# Patient Record
Sex: MALE | Race: OTHER | NOT HISPANIC OR LATINO
[De-identification: names, ages, dates, MRNs, and addresses within clinical notes are randomized per-mention and may not be internally consistent; named-entity substitution may affect disease eponyms.]

---

## 2017-01-13 ENCOUNTER — APPOINTMENT (OUTPATIENT)
Dept: ORTHOPEDIC SURGERY | Facility: CLINIC | Age: 57
End: 2017-01-13

## 2017-05-22 ENCOUNTER — APPOINTMENT (OUTPATIENT)
Dept: ORTHOPEDIC SURGERY | Facility: CLINIC | Age: 57
End: 2017-05-22

## 2017-09-27 ENCOUNTER — APPOINTMENT (OUTPATIENT)
Dept: ORTHOPEDIC SURGERY | Facility: CLINIC | Age: 57
End: 2017-09-27
Payer: MEDICARE

## 2017-09-27 VITALS — WEIGHT: 240 LBS | HEIGHT: 71 IN | BODY MASS INDEX: 33.6 KG/M2

## 2017-09-27 DIAGNOSIS — Z86.39 PERSONAL HISTORY OF OTHER ENDOCRINE, NUTRITIONAL AND METABOLIC DISEASE: ICD-10-CM

## 2017-09-27 DIAGNOSIS — Z78.9 OTHER SPECIFIED HEALTH STATUS: ICD-10-CM

## 2017-09-27 DIAGNOSIS — M17.11 UNILATERAL PRIMARY OSTEOARTHRITIS, RIGHT KNEE: ICD-10-CM

## 2017-09-27 DIAGNOSIS — Z96.651 PRESENCE OF RIGHT ARTIFICIAL KNEE JOINT: ICD-10-CM

## 2017-09-27 PROCEDURE — 99213 OFFICE O/P EST LOW 20 MIN: CPT | Mod: PD

## 2017-09-27 PROCEDURE — 73562 X-RAY EXAM OF KNEE 3: CPT | Mod: LT

## 2017-09-27 PROCEDURE — 73560 X-RAY EXAM OF KNEE 1 OR 2: CPT | Mod: RT

## 2017-09-28 ENCOUNTER — INPATIENT (INPATIENT)
Facility: HOSPITAL | Age: 57
LOS: 11 days | Discharge: HOME | End: 2017-10-10
Attending: SURGERY | Admitting: SURGERY
Payer: MEDICARE

## 2017-09-28 VITALS
DIASTOLIC BLOOD PRESSURE: 66 MMHG | SYSTOLIC BLOOD PRESSURE: 128 MMHG | TEMPERATURE: 98 F | HEART RATE: 83 BPM | OXYGEN SATURATION: 100 % | RESPIRATION RATE: 20 BRPM

## 2017-09-28 LAB
ALBUMIN SERPL ELPH-MCNC: 4 G/DL — SIGNIFICANT CHANGE UP (ref 3.3–5)
ALP SERPL-CCNC: 86 U/L — SIGNIFICANT CHANGE UP (ref 40–120)
ALT FLD-CCNC: 8 U/L — SIGNIFICANT CHANGE UP (ref 4–41)
APTT BLD: 29.9 SEC — SIGNIFICANT CHANGE UP (ref 27.5–37.4)
AST SERPL-CCNC: 15 U/L — SIGNIFICANT CHANGE UP (ref 4–40)
BASOPHILS # BLD AUTO: 0.09 K/UL — SIGNIFICANT CHANGE UP (ref 0–0.2)
BASOPHILS NFR BLD AUTO: 0.8 % — SIGNIFICANT CHANGE UP (ref 0–2)
BILIRUB SERPL-MCNC: 0.6 MG/DL — SIGNIFICANT CHANGE UP (ref 0.2–1.2)
BUN SERPL-MCNC: 12 MG/DL — SIGNIFICANT CHANGE UP (ref 7–23)
CALCIUM SERPL-MCNC: 8.9 MG/DL — SIGNIFICANT CHANGE UP (ref 8.4–10.5)
CHLORIDE SERPL-SCNC: 100 MMOL/L — SIGNIFICANT CHANGE UP (ref 98–107)
CO2 SERPL-SCNC: 21 MMOL/L — LOW (ref 22–31)
CREAT SERPL-MCNC: 0.92 MG/DL — SIGNIFICANT CHANGE UP (ref 0.5–1.3)
EOSINOPHIL # BLD AUTO: 0.66 K/UL — HIGH (ref 0–0.5)
EOSINOPHIL NFR BLD AUTO: 6.2 % — HIGH (ref 0–6)
GLUCOSE SERPL-MCNC: 103 MG/DL — HIGH (ref 70–99)
HCT VFR BLD CALC: 42.4 % — SIGNIFICANT CHANGE UP (ref 39–50)
HGB BLD-MCNC: 14.5 G/DL — SIGNIFICANT CHANGE UP (ref 13–17)
IMM GRANULOCYTES # BLD AUTO: 0.06 # — SIGNIFICANT CHANGE UP
IMM GRANULOCYTES NFR BLD AUTO: 0.6 % — SIGNIFICANT CHANGE UP (ref 0–1.5)
INR BLD: 1.06 — SIGNIFICANT CHANGE UP (ref 0.88–1.17)
LYMPHOCYTES # BLD AUTO: 2.94 K/UL — SIGNIFICANT CHANGE UP (ref 1–3.3)
LYMPHOCYTES # BLD AUTO: 27.4 % — SIGNIFICANT CHANGE UP (ref 13–44)
MCHC RBC-ENTMCNC: 32.1 PG — SIGNIFICANT CHANGE UP (ref 27–34)
MCHC RBC-ENTMCNC: 34.2 % — SIGNIFICANT CHANGE UP (ref 32–36)
MCV RBC AUTO: 93.8 FL — SIGNIFICANT CHANGE UP (ref 80–100)
MONOCYTES # BLD AUTO: 1.19 K/UL — HIGH (ref 0–0.9)
MONOCYTES NFR BLD AUTO: 11.1 % — SIGNIFICANT CHANGE UP (ref 2–14)
NEUTROPHILS # BLD AUTO: 5.79 K/UL — SIGNIFICANT CHANGE UP (ref 1.8–7.4)
NEUTROPHILS NFR BLD AUTO: 53.9 % — SIGNIFICANT CHANGE UP (ref 43–77)
NRBC # FLD: 0 — SIGNIFICANT CHANGE UP
PLATELET # BLD AUTO: 260 K/UL — SIGNIFICANT CHANGE UP (ref 150–400)
PMV BLD: 9.3 FL — SIGNIFICANT CHANGE UP (ref 7–13)
POTASSIUM SERPL-MCNC: 4.2 MMOL/L — SIGNIFICANT CHANGE UP (ref 3.5–5.3)
POTASSIUM SERPL-SCNC: 4.2 MMOL/L — SIGNIFICANT CHANGE UP (ref 3.5–5.3)
PROT SERPL-MCNC: 7.8 G/DL — SIGNIFICANT CHANGE UP (ref 6–8.3)
PROTHROM AB SERPL-ACNC: 11.9 SEC — SIGNIFICANT CHANGE UP (ref 9.8–13.1)
RBC # BLD: 4.52 M/UL — SIGNIFICANT CHANGE UP (ref 4.2–5.8)
RBC # FLD: 13.8 % — SIGNIFICANT CHANGE UP (ref 10.3–14.5)
SODIUM SERPL-SCNC: 138 MMOL/L — SIGNIFICANT CHANGE UP (ref 135–145)
WBC # BLD: 10.73 K/UL — HIGH (ref 3.8–10.5)
WBC # FLD AUTO: 10.73 K/UL — HIGH (ref 3.8–10.5)

## 2017-09-28 PROCEDURE — 74174 CTA ABD&PLVS W/CONTRAST: CPT | Mod: 26

## 2017-09-28 PROCEDURE — 93971 EXTREMITY STUDY: CPT | Mod: 26,LT

## 2017-09-28 RX ORDER — RIVAROXABAN 15 MG-20MG
15 KIT ORAL ONCE
Qty: 0 | Refills: 0 | Status: COMPLETED | OUTPATIENT
Start: 2017-09-28 | End: 2017-09-28

## 2017-09-28 RX ADMIN — RIVAROXABAN 15 MILLIGRAM(S): KIT at 23:29

## 2017-09-28 NOTE — ED ADULT NURSE NOTE - OBJECTIVE STATEMENT
pt received in RW. pt robbie lt leg swelling and pain x 3 weeks. states he had a DVT in his rt leg that went to his lung. pt placed on plavix but it was costing $800.00 / month and he stopped it because his insurance capped out. denies difficulty breathing and chest pain. sl placed labs sent. awaiting ant

## 2017-09-28 NOTE — ED PROVIDER NOTE - PMH
Anxiety    Bulging disc    Depression    Diabetes Mellitus  diagnosed 10 years ago  doesn't do fingersticks  GERD (gastroesophageal reflux disease)    Hyperlipidemia    Hypertension, Essential    Insomnia    Obese    Sleep apnea  had test > 10 years ago--supposed to wear device.  Lost 25 pounds but never retested  Spinal stenosis    Spondylisthesis    Varicosities

## 2017-09-28 NOTE — ED PROVIDER NOTE - OBJECTIVE STATEMENT
58 y/o male, with PMHx of DM (on metformin), HTN (hydrochlorothiazide) right leg DVT and PE, 3 months ago, and PSHx of right knee replacement in 2013 and revision in 2014, presents to the ED c/o atraumatic left calf pain x 2 weeks. Report known cause for DVT. Started on unknown anti-coagulant, which were stopped 2 months ago due to cost. Visited an orthopedist with normal XR. Denies fever, chills, SOB, CP, N/V, and any other complaints.

## 2017-09-28 NOTE — ED PROVIDER NOTE - PROGRESS NOTE DETAILS
RICHARD Mckeon: pt NAD, VSS. Discussed the results of the imaging with the patient. Spoke with the hospitalist, Dr. Moctezuma, for admission due to the high copay on Xaralto and the inability to pay for the medication. Dr. Moctezuma wanted to see if the  was able to assist the patient in getting Xaralto with a lower payment to prevent admission. Discussed the case with the  - will speak with patient and assist the patient to receive 0$ copay on Xaralto. Pending CTA of abd/pelvis and chest to see extent of clot which will determine admission vs. possible CDU stay to have the patient follow up with social work tomorrow. RICHARD Mckeon: called PCP Dr. Darnell [phone]. Spoke with the  left a message. Awaiting call back. RICHARD Mckeon: spoke with the covering doctor. Does not know the patient. States that if there warrents an admission they admit to Dr. Eduardo Ugarte [phone]

## 2017-09-28 NOTE — ED PROVIDER NOTE - LOWER EXTREMITY EXAM, LEFT
Able to fully range left leg. Left knee with old scar. No surrounding erythema. Distal pulses intact. FROM of ankle. Some TTP and swelling at the posterior calf to left leg.

## 2017-09-28 NOTE — ED ADULT TRIAGE NOTE - CHIEF COMPLAINT QUOTE
Pt co left ankle swelling x 3 weeks with pain. Pt treated for DVT and PE in past ,sent in by PMD to r.o dvt. Pt currently not on any blood thinners. Pt denies sob or chest pain.

## 2017-09-28 NOTE — ED PROVIDER NOTE - PSH
H/O laser iridotomy  left-2008  R Knee Arthroplasty  Right 2003, Left 2011,  Reflux  Gastroscopy 2013

## 2017-09-29 DIAGNOSIS — G47.30 SLEEP APNEA, UNSPECIFIED: ICD-10-CM

## 2017-09-29 DIAGNOSIS — I82.409 ACUTE EMBOLISM AND THROMBOSIS OF UNSPECIFIED DEEP VEINS OF UNSPECIFIED LOWER EXTREMITY: ICD-10-CM

## 2017-09-29 DIAGNOSIS — I10 ESSENTIAL (PRIMARY) HYPERTENSION: ICD-10-CM

## 2017-09-29 DIAGNOSIS — E11.9 TYPE 2 DIABETES MELLITUS WITHOUT COMPLICATIONS: ICD-10-CM

## 2017-09-29 DIAGNOSIS — F32.9 MAJOR DEPRESSIVE DISORDER, SINGLE EPISODE, UNSPECIFIED: ICD-10-CM

## 2017-09-29 DIAGNOSIS — I82.492 ACUTE EMBOLISM AND THROMBOSIS OF OTHER SPECIFIED DEEP VEIN OF LEFT LOWER EXTREMITY: ICD-10-CM

## 2017-09-29 DIAGNOSIS — I82.412 ACUTE EMBOLISM AND THROMBOSIS OF LEFT FEMORAL VEIN: ICD-10-CM

## 2017-09-29 LAB — HBA1C BLD-MCNC: 6.7 % — HIGH (ref 4–5.6)

## 2017-09-29 PROCEDURE — 99222 1ST HOSP IP/OBS MODERATE 55: CPT

## 2017-09-29 RX ORDER — DEXTROSE 50 % IN WATER 50 %
25 SYRINGE (ML) INTRAVENOUS ONCE
Qty: 0 | Refills: 0 | Status: DISCONTINUED | OUTPATIENT
Start: 2017-09-29 | End: 2017-10-10

## 2017-09-29 RX ORDER — TRAMADOL HYDROCHLORIDE 50 MG/1
50 TABLET ORAL ONCE
Qty: 0 | Refills: 0 | Status: DISCONTINUED | OUTPATIENT
Start: 2017-09-29 | End: 2017-09-29

## 2017-09-29 RX ORDER — LOSARTAN POTASSIUM 100 MG/1
100 TABLET, FILM COATED ORAL DAILY
Qty: 0 | Refills: 0 | Status: DISCONTINUED | OUTPATIENT
Start: 2017-09-29 | End: 2017-10-10

## 2017-09-29 RX ORDER — INFLUENZA VIRUS VACCINE 15; 15; 15; 15 UG/.5ML; UG/.5ML; UG/.5ML; UG/.5ML
0.5 SUSPENSION INTRAMUSCULAR ONCE
Qty: 0 | Refills: 0 | Status: DISCONTINUED | OUTPATIENT
Start: 2017-09-29 | End: 2017-10-10

## 2017-09-29 RX ORDER — RIVAROXABAN 15 MG-20MG
15 KIT ORAL
Qty: 0 | Refills: 0 | Status: DISCONTINUED | OUTPATIENT
Start: 2017-09-29 | End: 2017-09-29

## 2017-09-29 RX ORDER — ZOLPIDEM TARTRATE 10 MG/1
1 TABLET ORAL
Qty: 0 | Refills: 0 | COMMUNITY

## 2017-09-29 RX ORDER — OXYCODONE AND ACETAMINOPHEN 5; 325 MG/1; MG/1
1 TABLET ORAL EVERY 4 HOURS
Qty: 0 | Refills: 0 | Status: DISCONTINUED | OUTPATIENT
Start: 2017-09-29 | End: 2017-10-01

## 2017-09-29 RX ORDER — ZOLPIDEM TARTRATE 10 MG/1
5 TABLET ORAL AT BEDTIME
Qty: 0 | Refills: 0 | Status: DISCONTINUED | OUTPATIENT
Start: 2017-09-29 | End: 2017-09-29

## 2017-09-29 RX ORDER — ZOLPIDEM TARTRATE 10 MG/1
5 TABLET ORAL ONCE
Qty: 0 | Refills: 0 | Status: DISCONTINUED | OUTPATIENT
Start: 2017-09-29 | End: 2017-09-29

## 2017-09-29 RX ORDER — HEPARIN SODIUM 5000 [USP'U]/ML
1800 INJECTION INTRAVENOUS; SUBCUTANEOUS
Qty: 25000 | Refills: 0 | Status: DISCONTINUED | OUTPATIENT
Start: 2017-09-29 | End: 2017-09-30

## 2017-09-29 RX ORDER — ENOXAPARIN SODIUM 100 MG/ML
100 INJECTION SUBCUTANEOUS
Qty: 0 | Refills: 0 | Status: DISCONTINUED | OUTPATIENT
Start: 2017-09-29 | End: 2017-09-29

## 2017-09-29 RX ORDER — ACETAMINOPHEN 500 MG
650 TABLET ORAL EVERY 6 HOURS
Qty: 0 | Refills: 0 | Status: DISCONTINUED | OUTPATIENT
Start: 2017-09-29 | End: 2017-10-10

## 2017-09-29 RX ORDER — SODIUM CHLORIDE 9 MG/ML
1000 INJECTION, SOLUTION INTRAVENOUS
Qty: 0 | Refills: 0 | Status: DISCONTINUED | OUTPATIENT
Start: 2017-09-29 | End: 2017-09-29

## 2017-09-29 RX ORDER — TRAMADOL HYDROCHLORIDE 50 MG/1
2 TABLET ORAL
Qty: 0 | Refills: 0 | COMMUNITY

## 2017-09-29 RX ORDER — ENOXAPARIN SODIUM 100 MG/ML
120 INJECTION SUBCUTANEOUS
Qty: 30 | Refills: 0 | OUTPATIENT
Start: 2017-09-29 | End: 2017-10-29

## 2017-09-29 RX ORDER — ZOLPIDEM TARTRATE 10 MG/1
5 TABLET ORAL AT BEDTIME
Qty: 0 | Refills: 0 | Status: DISCONTINUED | OUTPATIENT
Start: 2017-09-29 | End: 2017-10-03

## 2017-09-29 RX ORDER — DEXTROSE 50 % IN WATER 50 %
12.5 SYRINGE (ML) INTRAVENOUS ONCE
Qty: 0 | Refills: 0 | Status: DISCONTINUED | OUTPATIENT
Start: 2017-09-29 | End: 2017-10-10

## 2017-09-29 RX ORDER — DEXTROSE 50 % IN WATER 50 %
1 SYRINGE (ML) INTRAVENOUS ONCE
Qty: 0 | Refills: 0 | Status: DISCONTINUED | OUTPATIENT
Start: 2017-09-29 | End: 2017-10-10

## 2017-09-29 RX ORDER — GLUCAGON INJECTION, SOLUTION 0.5 MG/.1ML
1 INJECTION, SOLUTION SUBCUTANEOUS ONCE
Qty: 0 | Refills: 0 | Status: DISCONTINUED | OUTPATIENT
Start: 2017-09-29 | End: 2017-10-10

## 2017-09-29 RX ORDER — INSULIN LISPRO 100/ML
VIAL (ML) SUBCUTANEOUS
Qty: 0 | Refills: 0 | Status: DISCONTINUED | OUTPATIENT
Start: 2017-09-29 | End: 2017-10-02

## 2017-09-29 RX ORDER — WARFARIN SODIUM 2.5 MG/1
7.5 TABLET ORAL ONCE
Qty: 0 | Refills: 0 | Status: COMPLETED | OUTPATIENT
Start: 2017-09-29 | End: 2017-09-29

## 2017-09-29 RX ORDER — METFORMIN HYDROCHLORIDE 850 MG/1
500 TABLET ORAL
Qty: 0 | Refills: 0 | Status: DISCONTINUED | OUTPATIENT
Start: 2017-09-29 | End: 2017-09-29

## 2017-09-29 RX ADMIN — TRAMADOL HYDROCHLORIDE 50 MILLIGRAM(S): 50 TABLET ORAL at 07:11

## 2017-09-29 RX ADMIN — ZOLPIDEM TARTRATE 5 MILLIGRAM(S): 10 TABLET ORAL at 22:05

## 2017-09-29 RX ADMIN — OXYCODONE AND ACETAMINOPHEN 1 TABLET(S): 5; 325 TABLET ORAL at 21:32

## 2017-09-29 RX ADMIN — RIVAROXABAN 15 MILLIGRAM(S): KIT at 09:21

## 2017-09-29 RX ADMIN — LOSARTAN POTASSIUM 100 MILLIGRAM(S): 100 TABLET, FILM COATED ORAL at 17:49

## 2017-09-29 RX ADMIN — TRAMADOL HYDROCHLORIDE 50 MILLIGRAM(S): 50 TABLET ORAL at 08:06

## 2017-09-29 RX ADMIN — OXYCODONE AND ACETAMINOPHEN 1 TABLET(S): 5; 325 TABLET ORAL at 20:32

## 2017-09-29 RX ADMIN — OXYCODONE AND ACETAMINOPHEN 1 TABLET(S): 5; 325 TABLET ORAL at 14:49

## 2017-09-29 RX ADMIN — WARFARIN SODIUM 7.5 MILLIGRAM(S): 2.5 TABLET ORAL at 17:49

## 2017-09-29 RX ADMIN — ENOXAPARIN SODIUM 100 MILLIGRAM(S): 100 INJECTION SUBCUTANEOUS at 18:08

## 2017-09-29 RX ADMIN — HEPARIN SODIUM 18 UNIT(S)/HR: 5000 INJECTION INTRAVENOUS; SUBCUTANEOUS at 22:28

## 2017-09-29 NOTE — ED CDU PROVIDER NOTE - FAMILY HISTORY
Father  Still living? No  Family history of pancreatic cancer, Age at diagnosis: Age Unknown     Mother  Still living? Yes, Estimated age: Age Unknown  Family history of rheumatoid arthritis, Age at diagnosis: Age Unknown  Family history of asthma, Age at diagnosis: Age Unknown  Family history of asthma, Age at diagnosis: Age Unknown  Family history of essential hypertension, Age at diagnosis: Age Unknown

## 2017-09-29 NOTE — ED CDU PROVIDER NOTE - ATTENDING CONTRIBUTION TO CARE
Dr. Wild Pt was seen and evaluated by me in the ED. Pt presented for left calf pain over the past 2 weeks with a history of a previous right calf DVT. Was on Xarelto previously but stopped 2 months ago secondary to cost. Pt denies any fever, chills, SOB, chest pain, or abd pain. Lungs CTA b/l. RRR. Abd soft, non-tender. + distal pulses with left calf swelling and tenderness. Found to have a DVT to LE and sent to CDU for  and possible options for anticoagulation with insurance coverage.

## 2017-09-29 NOTE — ED CDU PROVIDER NOTE - OBJECTIVE STATEMENT
58 y/o male, with PMHx of DM (on metformin), HTN (hydrochlorothiazide) right leg DVT and PE, 3 months ago, and PSHx of right knee replacement in 2013 and revision in 2014, presents to the ED c/o atraumatic left calf pain x 2 weeks. Report known cause for DVT. Started on unknown anti-coagulant, which were stopped 2 months ago due to cost. Visited an orthopedist with normal XR. Denies fever, chills, SOB, CP, N/V, and any other complaints.    CDU RICHARD Malik Note-----  ED Provider Note reviewed per above; pt is a 58 yo male with PMH of DVT (diagnosed a few months ago, was on anticoagulant treatment, but pt ran into insurance issues where insurance co. stated pt max-ed out on coverage), DM, HTN, hyperlipidemia, GERD, insomnia, anxiety, depression, bulging discs, sleep apnea (intermittently adherent to CPAP use), spinal stenosis, and varicosities, who presented to the ED for atraumatic left calf pain/swelling as noted above.  Pt. was evaluated in the ED and diagnosed with extensive acute DVT extending from the left femoral vein into the distal lower extremity on US; pt had CT scan of abdomen and pelvis which showed no evidence of proximal extension.  Pt. was sent to CDU for meds per orders (started on Xarelto in ED), and for  regarding coordinating outpatient medication regimen with insurance/pharmacy, and for generalized observation and reassessment.  On CDU arrival, pt has no active c/o; states LLE discomfort (described as "tightness"/"heaviness") to calf is tolerable; pt declined needing analgesia at the time.  Pt. denies SOB, palpitations, chest discomfort/heaviness or other c/o.  CDU plan discussed with pt who verbalizes agreement with plan.  Of note, regarding home meds, pt states he takes metformin 500 mg TID with meals and ambien 5 mg at night for insomnia; cannot recall names/dosages of other home 56 y/o male, with PMHx of DM (on metformin), HTN (hydrochlorothiazide) right leg DVT and PE, 3 months ago, and PSHx of right knee replacement in 2013 and revision in 2014, presents to the ED c/o atraumatic left calf pain x 2 weeks. Report known cause for DVT. Started on unknown anti-coagulant, which were stopped 2 months ago due to cost. Visited an orthopedist with normal XR. Denies fever, chills, SOB, CP, N/V, and any other complaints.    CDU RICHARD Malik Note-----  ED Provider Note reviewed per above; pt is a 56 yo male with PMH of DVT (diagnosed a few months ago, was on anticoagulant treatment, but pt ran into insurance issues where insurance co. stated pt max-ed out on coverage), DM, HTN, hyperlipidemia, GERD, insomnia, anxiety, depression, bulging discs, sleep apnea (intermittently adherent to CPAP use), spinal stenosis, and varicosities, who presented to the ED for atraumatic left calf pain/swelling as noted above.  Pt. was evaluated in the ED and diagnosed with extensive acute DVT extending from the left femoral vein into the distal lower extremity on US; pt had CT scan of abdomen and pelvis which showed no evidence of proximal extension.  Pt. was sent to CDU for meds per orders (started on Xarelto in ED), and for  regarding coordinating outpatient medication regimen with insurance/pharmacy, and for generalized observation and reassessment.  On CDU arrival, pt has no active c/o; states LLE discomfort (described as "tightness"/"heaviness") to calf is tolerable; pt declined needing analgesia at the time.  Pt. denies SOB, palpitations, chest discomfort/heaviness or other c/o.  CDU plan discussed with pt who verbalizes agreement with plan.  Of note, regarding home meds, pt states he takes metformin 500 mg TID with meals and ambien 5 mg at night for insomnia; cannot recall names/dosages of other home meds.  Pt. uses [facility], [place], [place] (store phone #: [phone]); closed at time of CDU arrival.

## 2017-09-29 NOTE — H&P ADULT - NSHPREVIEWOFSYSTEMS_GEN_ALL_CORE
Gen: no loss of wt or appetite  ENT: no dizziness or hearing loss  Ophth: no blurring of vision or loss of vision  Resp: No cough or sputum production  CVS: No CP or palpitations or orthopnea  GI: no N/V/D  : no dysuria, hematuria  Endo: no polyuria or excessive sweating  Neuro: no weakness or paresthesias  Psych: No suicidal or depressive ideation  Heme: No petechiae or easy bruising  Msk: No joint pain or swelling + left leg pain and swelling  All other ROS negative

## 2017-09-29 NOTE — ED CDU PROVIDER NOTE - INDICATION FOR OBSERVATION
Other/Meds per orders,  regarding outpatient medication regimen, observation and reassessment to improvement.

## 2017-09-29 NOTE — ED CDU PROVIDER NOTE - RESPIRATORY NEGATIVE STATEMENT, MLM
no chest pain, no cough, and no shortness of breath. no pleurisy, no cough, and no shortness of breath.

## 2017-09-29 NOTE — H&P ADULT - ASSESSMENT
58 y/o male, with PMHx of DM (on metformin), HTN (hydrochlorothiazide) right leg DVT and PE, 3 months ago, ?PTSD and PSHx of right knee replacement in 2013 and revision in 2014, presents to the ED c/o atraumatic left calf pain x 2 weeks now with extensive DVT left leg

## 2017-09-29 NOTE — ED CDU PROVIDER NOTE - PMH
Anxiety    Bulging disc    Depression    Diabetes Mellitus  diagnosed 10 years ago  doesn't do fingersticks  GERD (gastroesophageal reflux disease)    Hyperlipidemia    Hypertension, Essential    Insomnia    Obese    Sleep apnea  had test > 10 years ago--supposed to wear device.  Lost 25 pounds but never retested  Spinal stenosis    Spondylisthesis    Varicosities Anxiety    Bulging disc    Depression    Diabetes Mellitus  diagnosed 10 years ago  doesn't do fingersticks  DVT (deep venous thrombosis)    GERD (gastroesophageal reflux disease)    Hyperlipidemia    Hypertension, Essential    Insomnia    Obese    Sleep apnea  had test > 10 years ago--supposed to wear device.  Lost 25 pounds but never retested  Spinal stenosis    Spondylisthesis    Varicosities

## 2017-09-29 NOTE — CONSULT NOTE ADULT - VASCULAR DETAILS
rle mild and lle mod venous insuff w rle mild and lle mod  to sev edema no signs of phlegmasia at this time   grossly intact motor and sensory fx

## 2017-09-29 NOTE — CONSULT NOTE ADULT - SUBJECTIVE AND OBJECTIVE BOX
CC: 57y old Male admitted with a chief complaint of leg swelling, now with LLE DVT    HPI: 58 yo M with prior RLE DVT diagnosed in April, had been maintained on Xarelto until his insurance stopped covering it. Then had several days of heaviness and discomfort in his left leg, with swelling. Patient has been ambulatory, and able to weight bear but uncomfortable with the swelling. Denies weakness, parasthesia.    PMHx: HTN, HLD, GERD, DM II, WILSON, insomnia, anxiety, RLE DVT (04/2017), chronic back pain, obesity    PSHx: bilateral knee arthroplasties, spinal fusion, "stapling of a swollen vessel in R leg"    Medications (inpatient): rivaroxaban 15 milliGRAM(s) Oral two times a day    Allergies: No Known Allergies  (Intolerances: None)  Social Hx: denies EtOH/tobacco use; lives at home with wife and three teenaged kids, on disability/workman's comp for 10 years secondary to back and knee pain    Physical Exam  T(C): 37.2 (09-29-17 @ 09:57)  HR: 82 (09-29-17 @ 09:57) (72 - 83)  BP: 118/76 (09-29-17 @ 09:57) (118/76 - 141/93)  RR: 14 (09-29-17 @ 09:57) (14 - 20)  SpO2: 99% (09-29-17 @ 09:57) (96% - 100%)  Tmax: T(C): , Max: 37.2 (09-29-17 @ 09:57)    General: well developed, well nourished, NAD  Neuro: alert and oriented, no focal deficits, moves all extremities spontaneously  HEENT: NCAT, EOMI, anicteric, mucosa moist  Respiratory: airway patent, respirations unlabored  CVS: regular rate and rhythm  Abdomen: soft, obese  Extremities: bilateral edema, L > R, with bilateral palpable DP pulses, no tenderness to palpation; sensation and movement grossly intact, normal capillary refill  Skin: warm, dry, appropriate color    Labs:                        14.5   10.73 )-----------( 260      ( 28 Sep 2017 16:50 )             42.4     PT/INR - ( 28 Sep 2017 16:50 )   PT: 11.9 SEC;   INR: 1.06          PTT - ( 28 Sep 2017 16:50 )  PTT:29.9 SEC  09-28    138  |  100  |  12  ----------------------------<  103<H>  4.2   |  21<L>  |  0.92    Ca    8.9      28 Sep 2017 16:50    TPro  7.8  /  Alb  4.0  /  TBili  0.6  /  DBili  x   /  AST  15  /  ALT  8   /  AlkPhos  86  09-28    Imaging and other studies:    < from: CT Angio Abdomen and Pelvis w/ IV Cont (09.28.17 @ 20:34) >  IMPRESSION: Redemonstration of thrombus in the left common femoral vein.   No evidence of proximal extension.    < end of copied text >  < from: US Duplex Venous Lower Ext Ltd, Left (09.28.17 @ 18:02) >  FINDINGS:    There is extensive filling defect extending from the left common femoral   vein and at least as far distal as the popliteal vein. The calf veins   could not be visualized. Findings are consistent with acute DVT.    The contralateral common femoral vein is patent.    < end of copied text > CC: 57y old Male admitted with a chief complaint of leg swelling, now with LLE DVT  pt currently c/o severe left leg pain and swelling and cannot ambulate  more than sev feet  and pain c/o is worse from the onset 3 weeks ago     HPI: 56 yo M with prior RLE DVT diagnosed in April, had been maintained on Xarelto until his insurance stopped covering it. Then had several days of heaviness and discomfort in his left leg, with swelling. Patient has been ambulatory, and able to weight bear but uncomfortable with the swelling. Denies weakness, parasthesia.    PMHx: HTN, HLD, GERD, DM II, WILSON, insomnia, anxiety, RLE DVT (04/2017), chronic back pain, obesity    PSHx: bilateral knee arthroplasties, spinal fusion, "stapling of a swollen vessel in R leg"    Medications (inpatient): rivaroxaban 15 milliGRAM(s) Oral two times a day    Allergies: No Known Allergies  (Intolerances: None)  Social Hx: denies EtOH/tobacco use; lives at home with wife and three teenaged kids, on disability/workman's comp for 10 years secondary to back and knee pain    Physical Exam  T(C): 37.2 (09-29-17 @ 09:57)  HR: 82 (09-29-17 @ 09:57) (72 - 83)  BP: 118/76 (09-29-17 @ 09:57) (118/76 - 141/93)  RR: 14 (09-29-17 @ 09:57) (14 - 20)  SpO2: 99% (09-29-17 @ 09:57) (96% - 100%)  Tmax: T(C): , Max: 37.2 (09-29-17 @ 09:57)    General: well developed, well nourished, NAD  Neuro: alert and oriented, no focal deficits, moves all extremities spontaneously  HEENT: NCAT, EOMI, anicteric, mucosa moist  Respiratory: airway patent, respirations unlabored  CVS: regular rate and rhythm  Abdomen: soft, obese  Extremities: bilateral edema, L > R, with bilateral palpable DP pulses, no tenderness to palpation; sensation and movement grossly intact, normal capillary refill  Skin: warm, dry, appropriate color    Labs:                        14.5   10.73 )-----------( 260      ( 28 Sep 2017 16:50 )             42.4     PT/INR - ( 28 Sep 2017 16:50 )   PT: 11.9 SEC;   INR: 1.06          PTT - ( 28 Sep 2017 16:50 )  PTT:29.9 SEC  09-28    138  |  100  |  12  ----------------------------<  103<H>  4.2   |  21<L>  |  0.92    Ca    8.9      28 Sep 2017 16:50    TPro  7.8  /  Alb  4.0  /  TBili  0.6  /  DBili  x   /  AST  15  /  ALT  8   /  AlkPhos  86  09-28    Imaging and other studies:    < from: CT Angio Abdomen and Pelvis w/ IV Cont (09.28.17 @ 20:34) >  IMPRESSION: Redemonstration of thrombus in the left common femoral vein.   No evidence of proximal extension.    < end of copied text >  < from: US Duplex Venous Lower Ext Ltd, Left (09.28.17 @ 18:02) >  FINDINGS:    There is extensive filling defect extending from the left common femoral   vein and at least as far distal as the popliteal vein. The calf veins   could not be visualized. Findings are consistent with acute DVT.    The contralateral common femoral vein is patent.    < end of copied text >

## 2017-09-29 NOTE — H&P ADULT - HISTORY OF PRESENT ILLNESS
56 y/o male, with PMHx of DM (on metformin), HTN (hydrochlorothiazide) right leg DVT and PE, 3 months ago, ?PTSD and PSHx of right knee replacement in 2013 and revision in 2014, presents to the ED c/o atraumatic left calf pain x 2 weeks. Report known cause for DVT. Started on Xarelto, which patient self discontinued secondary to not being afford the copay. Denies fever, chills, SOB, chest pain , nausea, vomiting or diarrhea, and any other complaints. Does have pain in the left leg moderate intensity

## 2017-09-29 NOTE — H&P ADULT - PMH
Anxiety    Bulging disc    Depression    Diabetes Mellitus  diagnosed 10 years ago  doesn't do fingersticks  DVT (deep venous thrombosis)    GERD (gastroesophageal reflux disease)    Hyperlipidemia    Hypertension, Essential    Insomnia    Obese    Sleep apnea  had test > 10 years ago--supposed to wear device.  Lost 25 pounds but never retested  Spinal stenosis    Spondylisthesis    Varicosities

## 2017-09-29 NOTE — CONSULT NOTE ADULT - SUBJECTIVE AND OBJECTIVE BOX
Pulmonary Consult Note    SKYLAR MCCULLOUGH  MRN-[id number]    Chief Complaint: Patient is a 57y old  Male who presents with a chief complaint of leg pain    HPI:  57yMale   -extensive lower extremity DVT.  Was diagnosed with DVT/PE earlier this year (unprovoked)  was only on ac for 2 months bc of insurance/medication cost issues.   Denies sob/chest pain  palpitations.  No lightheadedness.  No history of previous blood clots prior to this year,   no known hx of clotting disorder, per patient all routine cancer screening up to date.    ROS:  All other systems reviewed and negative    PAST MEDICAL HISTORY: HEALTH ISSUES - PROBLEM Dx:  DVT/PE      ACTIVE MEDICATION LIST:  MEDICATIONS  (STANDING):  rivaroxaban 15 milliGRAM(s) Oral two times a day    MEDICATIONS  (PRN):      EXAM:  Vital Signs Last 24 Hrs  T(C): 37.2 (29 Sep 2017 09:57), Max: 37.2 (29 Sep 2017 09:57)  T(F): 99 (29 Sep 2017 09:57), Max: 99 (29 Sep 2017 09:57)  HR: 82 (29 Sep 2017 09:57) (72 - 83)  BP: 118/76 (29 Sep 2017 09:57) (118/76 - 141/93)  BP(mean): --  RR: 14 (29 Sep 2017 09:57) (14 - 20)  SpO2: 99% (29 Sep 2017 09:57) (96% - 100%)  GENERAL: No acute distress  NEURO: Alert and oriented x 3  LUNGS: Clear to auscultation bilaterally, no rales or wheezes  CV: S1/S2, no murmur  ABDOMEN: +BS, nontender  EXTREMITIES: +edema and pain    LABS/IMAGING: reviewed                          14.5   10.73 )-----------( 260      ( 28 Sep 2017 16:50 )             42.4     09-28    138  |  100  |  12  ----------------------------<  103<H>  4.2   |  21<L>  |  0.92    Ca    8.9      28 Sep 2017 16:50    TPro  7.8  /  Alb  4.0  /  TBili  0.6  /  DBili  x   /  AST  15  /  ALT  8   /  AlkPhos  86  09-28    < from: US Duplex Venous Lower Ext Ltd, Left (09.28.17 @ 18:02) >  IMPRESSION:     Extensive acute DVT extending from the left femoral vein into the distal   lower extremity.    < end of copied text >      PROBLEM LIST:  57yMale with HEALTH ISSUES - PROBLEM Dx:  unprovoked DVT/PE earlier this year  extensive LLE DVT    RECS:  -full dose a/c  -follow up vascular note for possible intervention/filter?  -heme follow up for hypercoaguable work up as out patient  -routine cancer screening.  -Pt asymptomatic, hemodynamically stable but could check TTE to r/o heart strain in case new/worse PE.  Would not   do CTA at this time given that patient just received IV contrast for CT abdomen and may  have vascular procedure?  Would likely not  at this time anyway.    Thank you for this consultation, please feel free to call with any questions [phone]  Paz Victoria MD

## 2017-09-29 NOTE — ED CDU PROVIDER NOTE - CALF TENDERNESS
Left lower extremity edema (non-pitting) noted.  +NVI to lower extremities.  No mottling/cyanosis/skin discoloration.  Skin temperature symmetrical./LEFT

## 2017-09-29 NOTE — H&P ADULT - NSHPPHYSICALEXAM_GEN_ALL_CORE
vital signs as above  in no apparent distress  HEENT: KEIRY EOMI  Neck: Supple, no JVD, no thyromegaly  Lungs: clear, no rhonchi, no wheeze, no crackles  CVS: S1 S2 no M/R/G  Abdomen: no tenderness, no organomegaly, BS present  Neuro: AO x 3 no focal weakness, no sensory abnormalities  Psych: appropriate affect  Skin: warm, dry  Ext: no edema, no clubbing increased warmth and mild tenderness left leg consistent with DVT  Msk: no joint swelling or deformities b/l scars over knees  Back: no CVA tenderness, no kyphosis/scoliosis

## 2017-09-29 NOTE — ED CDU PROVIDER NOTE - PROGRESS NOTE DETAILS
VIK Malik Addendum----  Pt. resting comfortably in interim; no issues to date; will continue to monitor / reassess. CDU attending admission note, Pierre:  57M sent to CDU for LLE DVT management.  Pt previously on Xarelto for DVT in RLE and PE, stopped taking the meds as his insurance would not cover it.  Sent to CDU pending / for medication coverage options.  No complaints overnight, denies CP/SOB.  Exam: well appearing, rrr, ctab, abd soft ntnd, LLE edema with no discoloration and distal pulses intact.  A/P:  spoke with [facility], unfortunately patient's insurance will not cover either Xarelto nor Lovenox.  Will admit patient for heparin bridge to Coumadin. CDU attending admission note, Pierre:  Dr. Jay:  I performed a face to face bedside interview with patient regarding history of present illness, review of symptoms and past medical history. I completed an independent physical exam.  I have discussed patient's plan of care with PA.   I agree with note as stated above, having amended the EMR as needed to reflect my findings.   This includes HISTORY OF PRESENT ILLNESS, HIV, PAST MEDICAL/SURGICAL/FAMILY/SOCIAL HISTORY, ALLERGIES AND HOME MEDICATIONS, REVIEW OF SYSTEMS, PHYSICAL EXAM, and any PROGRESS NOTES during the time I functioned as the attending physician for this patient.  57M sent to CDU for LLE DVT management.  Pt previously on Xarelto for DVT in RLE and PE, stopped taking the meds as his insurance would not cover it.  Sent to CDU pending / for medication coverage options.  No complaints overnight, denies CP/SOB.  Exam: well appearing, rrr, ctab, abd soft ntnd, LLE edema with no discoloration and distal pulses intact.  A/P:  spoke with [facility], unfortunately patient's insurance will not cover either Xarelto nor Lovenox.  Will admit patient for heparin bridge to Coumadin.

## 2017-09-29 NOTE — ED CDU PROVIDER NOTE - MEDICAL DECISION MAKING DETAILS
Meds per orders,  regarding outpatient medication regimen, observation and reassessment to improvement.

## 2017-09-29 NOTE — H&P ADULT - PROBLEM SELECTOR PLAN 1
recurrent episodes  will need Lovenox bridge to coumadin  unable to afford co-pays for any of the novel agents  vascular input noted

## 2017-09-29 NOTE — ED CDU PROVIDER NOTE - CARDIOVASCULAR NEGATIVE STATEMENT, MLM
normal rate and rhythm, no chest pain and no edema. no palpitations, no chest pain/discomfort and no edema.

## 2017-09-29 NOTE — ED CDU PROVIDER NOTE - SKIN NEGATIVE STATEMENT, MLM
no abrasions, no jaundice, no lesions, no pruritis, and no rashes. no lesions, no pruritis, and no rashes.

## 2017-09-30 DIAGNOSIS — M48.00 SPINAL STENOSIS, SITE UNSPECIFIED: ICD-10-CM

## 2017-09-30 DIAGNOSIS — I82.492 ACUTE EMBOLISM AND THROMBOSIS OF OTHER SPECIFIED DEEP VEIN OF LEFT LOWER EXTREMITY: ICD-10-CM

## 2017-09-30 LAB
APTT BLD: 51.4 SEC — HIGH (ref 27.5–37.4)
APTT BLD: 54 SEC — HIGH (ref 27.5–37.4)
APTT BLD: 69.9 SEC — HIGH (ref 27.5–37.4)
BUN SERPL-MCNC: 10 MG/DL — SIGNIFICANT CHANGE UP (ref 7–23)
CALCIUM SERPL-MCNC: 8.9 MG/DL — SIGNIFICANT CHANGE UP (ref 8.4–10.5)
CHLORIDE SERPL-SCNC: 101 MMOL/L — SIGNIFICANT CHANGE UP (ref 98–107)
CO2 SERPL-SCNC: 26 MMOL/L — SIGNIFICANT CHANGE UP (ref 22–31)
CREAT SERPL-MCNC: 0.76 MG/DL — SIGNIFICANT CHANGE UP (ref 0.5–1.3)
GLUCOSE SERPL-MCNC: 109 MG/DL — HIGH (ref 70–99)
HCT VFR BLD CALC: 43.6 % — SIGNIFICANT CHANGE UP (ref 39–50)
HCV AB S/CO SERPL IA: 0.17 S/CO — SIGNIFICANT CHANGE UP
HCV AB SERPL-IMP: SIGNIFICANT CHANGE UP
HGB BLD-MCNC: 14.9 G/DL — SIGNIFICANT CHANGE UP (ref 13–17)
INR BLD: 1.18 — HIGH (ref 0.88–1.17)
MAGNESIUM SERPL-MCNC: 1.6 MG/DL — SIGNIFICANT CHANGE UP (ref 1.6–2.6)
MCHC RBC-ENTMCNC: 31.5 PG — SIGNIFICANT CHANGE UP (ref 27–34)
MCHC RBC-ENTMCNC: 34.2 % — SIGNIFICANT CHANGE UP (ref 32–36)
MCV RBC AUTO: 92.2 FL — SIGNIFICANT CHANGE UP (ref 80–100)
NRBC # FLD: 0 — SIGNIFICANT CHANGE UP
PLATELET # BLD AUTO: 306 K/UL — SIGNIFICANT CHANGE UP (ref 150–400)
PMV BLD: 9.4 FL — SIGNIFICANT CHANGE UP (ref 7–13)
POTASSIUM SERPL-MCNC: 4.3 MMOL/L — SIGNIFICANT CHANGE UP (ref 3.5–5.3)
POTASSIUM SERPL-SCNC: 4.3 MMOL/L — SIGNIFICANT CHANGE UP (ref 3.5–5.3)
PROTHROM AB SERPL-ACNC: 13.3 SEC — HIGH (ref 9.8–13.1)
RBC # BLD: 4.73 M/UL — SIGNIFICANT CHANGE UP (ref 4.2–5.8)
RBC # FLD: 13.7 % — SIGNIFICANT CHANGE UP (ref 10.3–14.5)
SODIUM SERPL-SCNC: 141 MMOL/L — SIGNIFICANT CHANGE UP (ref 135–145)
TSH SERPL-MCNC: 2.12 UIU/ML — SIGNIFICANT CHANGE UP (ref 0.27–4.2)
WBC # BLD: 9.46 K/UL — SIGNIFICANT CHANGE UP (ref 3.8–10.5)
WBC # FLD AUTO: 9.46 K/UL — SIGNIFICANT CHANGE UP (ref 3.8–10.5)

## 2017-09-30 PROCEDURE — G0452: CPT | Mod: 26

## 2017-09-30 PROCEDURE — 99232 SBSQ HOSP IP/OBS MODERATE 35: CPT | Mod: 24,57

## 2017-09-30 RX ORDER — HEPARIN SODIUM 5000 [USP'U]/ML
1900 INJECTION INTRAVENOUS; SUBCUTANEOUS
Qty: 25000 | Refills: 0 | Status: DISCONTINUED | OUTPATIENT
Start: 2017-09-30 | End: 2017-10-01

## 2017-09-30 RX ADMIN — Medication 650 MILLIGRAM(S): at 12:15

## 2017-09-30 RX ADMIN — LOSARTAN POTASSIUM 100 MILLIGRAM(S): 100 TABLET, FILM COATED ORAL at 05:44

## 2017-09-30 RX ADMIN — OXYCODONE AND ACETAMINOPHEN 1 TABLET(S): 5; 325 TABLET ORAL at 19:07

## 2017-09-30 RX ADMIN — Medication 1: at 17:17

## 2017-09-30 RX ADMIN — ZOLPIDEM TARTRATE 5 MILLIGRAM(S): 10 TABLET ORAL at 00:14

## 2017-09-30 RX ADMIN — OXYCODONE AND ACETAMINOPHEN 1 TABLET(S): 5; 325 TABLET ORAL at 18:37

## 2017-09-30 RX ADMIN — Medication 650 MILLIGRAM(S): at 06:48

## 2017-09-30 RX ADMIN — HEPARIN SODIUM 18 UNIT(S)/HR: 5000 INJECTION INTRAVENOUS; SUBCUTANEOUS at 07:34

## 2017-09-30 RX ADMIN — Medication 650 MILLIGRAM(S): at 12:45

## 2017-09-30 RX ADMIN — Medication 650 MILLIGRAM(S): at 05:49

## 2017-09-30 RX ADMIN — ZOLPIDEM TARTRATE 5 MILLIGRAM(S): 10 TABLET ORAL at 22:00

## 2017-09-30 RX ADMIN — Medication 1: at 12:16

## 2017-09-30 NOTE — PROGRESS NOTE ADULT - ASSESSMENT
56 yo M with acute L DVT, prior R DVT, hemodynamically stable    -hematology follow up for hypercoagulable workup, and for long term anticoagulation plan clinically improving; patient previously couldn't afford his anticoag meds, and will be switched to coumadin for outpt management  -continue iv hep; goal is to maintain aPTT  b/w 60-90sec.  Adjust dose per Heparin Normogram.  -Patient consented for OR on Monday (likely Monday) for IR thrombectomy.

## 2017-09-30 NOTE — PROGRESS NOTE ADULT - ASSESSMENT
56 y/o male, with PMHx of DM (on metformin), HTN (hydrochlorothiazide) right leg DVT and PE, 3 months ago, ?PTSD and PSHx of right knee replacement in 2013 and revision in 2014, presents to the ED c/o atraumatic left calf pain x 2 weeks now with extensive DVT left leg

## 2017-09-30 NOTE — PROVIDER CONTACT NOTE (OTHER) - RECOMMENDATIONS
Maintain heparin dose Maintain heparin dose.  Consent form at nurses' station. Awaiting MD to get consent from pt.

## 2017-09-30 NOTE — CONSULT NOTE ADULT - ASSESSMENT
56 yo M with acute L DVT, prior R DVT, hemodynamically stable, without evidence of phlegmasia.    - patient reports discomfort, may benefit from IR thrombolysis  - recommend elevation, compression, heat as tolerated, and therapeutic anticoagulation  - no acute surgical needs at this time  - would consider hematology follow up for hypercoagulable workup, and for long term anticoagulation plan    will review with Dr. Adams  --MERRITT Tomas, [id number]
58 y/o male, with PMHx of DM (on metformin), HTN (hydrochlorothiazide) right leg DVT and PE, 3,PSHx of right knee replacement in 2013 and revision in 2014, presents to the ED with left calf pain x 2 weeks now with extensive DVT left leg.  Was not on anticoagulation for over 2 months due to insurance reasons. Prior episde of DVT and PE was unprovoked without any secondary cause.

## 2017-09-30 NOTE — PROGRESS NOTE ADULT - SUBJECTIVE AND OBJECTIVE BOX
Patient is a 57y old  Male who presents with a chief complaint of left leg swelling and pain (29 Sep 2017 14:28)      Vascular Surgery Attending Progress Note    Interval HPI: pt states he is feeliong better on  iv hep rx w less left leg discomfort    Medications:  acetaminophen   Tablet. 650 milliGRAM(s) Oral every 6 hours PRN  oxyCODONE    5 mG/acetaminophen 325 mG 1 Tablet(s) Oral every 4 hours PRN  losartan 100 milliGRAM(s) Oral daily  insulin lispro (HumaLOG) corrective regimen sliding scale   SubCutaneous three times a day before meals  dextrose Gel 1 Dose(s) Oral once PRN  dextrose 50% Injectable 12.5 Gram(s) IV Push once  dextrose 50% Injectable 25 Gram(s) IV Push once  dextrose 50% Injectable 25 Gram(s) IV Push once  glucagon  Injectable 1 milliGRAM(s) IntraMuscular once PRN  influenza   Vaccine 0.5 milliLiter(s) IntraMuscular once  heparin  Infusion 1800 Unit(s)/Hr IV Continuous <Continuous>  zolpidem 5 milliGRAM(s) Oral at bedtime PRN      Vital Signs Last 24 Hrs  T(C): 36.7 (30 Sep 2017 09:52), Max: 37.1 (29 Sep 2017 16:50)  T(F): 98 (30 Sep 2017 09:52), Max: 98.8 (29 Sep 2017 16:50)  HR: 83 (30 Sep 2017 09:52) (77 - 83)  BP: 115/76 (30 Sep 2017 09:52) (115/76 - 148/96)  BP(mean): --  RR: 18 (30 Sep 2017 09:52) (16 - 19)  SpO2: 99% (30 Sep 2017 09:52) (96% - 100%)  I&O's Summary      Physical Exam:  Neuro  A&Ox3 VSS  Vascular:  slight dec in lle edema   Pulses    DPA          rt   +2        lt  +2                PTA          rt   +2        lt  +2    Musculoskeletal: wnl grossly intact motor and sensory    LABS:                        14.9   9.46  )-----------( 306      ( 30 Sep 2017 04:15 )             43.6     09-30    141  |  101  |  10  ----------------------------<  109<H>  4.3   |  26  |  0.76    Ca    8.9      30 Sep 2017 04:30  Mg     1.6     09-30    TPro  7.8  /  Alb  4.0  /  TBili  0.6  /  DBili  x   /  AST  15  /  ALT  8   /  AlkPhos  86  09-28    PT/INR - ( 30 Sep 2017 04:15 )   PT: 13.3 SEC;   INR: 1.18          PTT - ( 30 Sep 2017 10:45 )  PTT:54.0 SEC    DENVER BISHOP MD  [phone]

## 2017-09-30 NOTE — PROGRESS NOTE ADULT - SUBJECTIVE AND OBJECTIVE BOX
Patient is a 57y old  Male who presents with a chief complaint of left leg swelling and pain (29 Sep 2017 14:28)      SUBJECTIVE / OVERNIGHT EVENTS: No nausea, vomiting or diarrhea, no fever or chills, no dizziness or chest pain, no dysuria or hematuria, no joint pain or swelling    states left leg is less tense and tender  no chest pain     MEDICATIONS  (STANDING):  losartan 100 milliGRAM(s) Oral daily  insulin lispro (HumaLOG) corrective regimen sliding scale   SubCutaneous three times a day before meals  dextrose 50% Injectable 12.5 Gram(s) IV Push once  dextrose 50% Injectable 25 Gram(s) IV Push once  dextrose 50% Injectable 25 Gram(s) IV Push once  influenza   Vaccine 0.5 milliLiter(s) IntraMuscular once  heparin  Infusion 1800 Unit(s)/Hr (18 mL/Hr) IV Continuous <Continuous>    MEDICATIONS  (PRN):  acetaminophen   Tablet. 650 milliGRAM(s) Oral every 6 hours PRN Mild Pain (1 - 3)  oxyCODONE    5 mG/acetaminophen 325 mG 1 Tablet(s) Oral every 4 hours PRN Moderate Pain (4 - 6)  dextrose Gel 1 Dose(s) Oral once PRN Blood Glucose LESS THAN 70 milliGRAM(s)/deciliter  glucagon  Injectable 1 milliGRAM(s) IntraMuscular once PRN Glucose LESS THAN 70 milligrams/deciliter  zolpidem 5 milliGRAM(s) Oral at bedtime PRN Insomnia        CAPILLARY BLOOD GLUCOSE  172 (30 Sep 2017 11:55)  120 (30 Sep 2017 07:45)  133 (29 Sep 2017 21:31)  134 (29 Sep 2017 16:59)        I&O's Summary      PHYSICAL EXAM:  vital signs as above  in no apparent distress  HEENT: KEIRY EOMI  Neck: Supple, no JVD, no thyromegaly  Lungs: clear, no rhonchi, no wheeze, no crackles  CVS: S1 S2 no M/R/G  Abdomen: no tenderness, no organomegaly, BS present  Neuro: AO x 3 no focal weakness, no sensory abnormalities  Psych: appropriate affect  Skin: warm, dry  Ext: no edema, no clubbing  less warmth and tenseness left LE, ROM improved   Msk: no joint swelling or deformities b/l scars over knees  Back: no CVA tenderness, no kyphosis/scoliosis    LABS:                        14.9   9.46  )-----------( 306      ( 30 Sep 2017 04:15 )             43.6     09-30    141  |  101  |  10  ----------------------------<  109<H>  4.3   |  26  |  0.76    Ca    8.9      30 Sep 2017 04:30  Mg     1.6     09-30    TPro  7.8  /  Alb  4.0  /  TBili  0.6  /  DBili  x   /  AST  15  /  ALT  8   /  AlkPhos  86  09-28    PT/INR - ( 30 Sep 2017 04:15 )   PT: 13.3 SEC;   INR: 1.18          PTT - ( 30 Sep 2017 10:45 )  PTT:54.0 SEC            Consultant(s) Notes Reviewed:      Care Discussed with Consultants/Other Providers:    Contact Number, Dr Ugarte [phone]

## 2017-09-30 NOTE — CONSULT NOTE ADULT - PROBLEM SELECTOR PROBLEM 1
Acute deep vein thrombosis (DVT) of femoral vein of left lower extremity
Acute deep vein thrombosis (DVT) of femoral vein of left lower extremity

## 2017-09-30 NOTE — PROGRESS NOTE ADULT - PROBLEM SELECTOR PLAN 1
vasc input noted  for possible thrombectomy tomorrow  on IV heparin for now  PTT therapeutic  heme input noted for work up of recurrent DVT vasc input noted  for possible thrombectomy tomorrow  patient is medically optimized for the procedure tomorrow  d/w vascular service   on IV heparin for now  PTT therapeutic  heme input noted for work up of recurrent DVT

## 2017-09-30 NOTE — CONSULT NOTE ADULT - ATTENDING COMMENTS
discussed with patient in detail, all questions answered
pt  has no sign of phlegmasia but  may benefit for open lle thrombectomy   d/w opt indications risks and benefits and that due to the chronicity  of the dvt there is a possibility  that a mech thrombectomy may not succeed but it is the best  chance for minimizing long term lle post phlebitic syndrome   pt to decide  given the chronicity of the lle dvt pt is in my opinion not a good candidate for lle thrombolysis rx  d/w Dr Ugarte  will transfer pt to the [facility] sueg service and d/c lovenox and start iv hep rx

## 2017-09-30 NOTE — PROGRESS NOTE ADULT - ASSESSMENT
56 yo M with acute L DVT, prior R DVT, hemodynamically stable, without evidence of phlegmasia.    -hematology follow up for hypercoagulable workup, and for long term anticoagulation plan  clinically improving  continue iv hep  pt wants to proceed w lle attempted thrombectomy  indications risks and benefits d/w pt who consents

## 2017-09-30 NOTE — CONSULT NOTE ADULT - SUBJECTIVE AND OBJECTIVE BOX
Chief Complaint: Patient is a 57y old  Male who presents with a chief complaint of left leg pain    HPI:  57yMale   -extensive lower extremity DVT.  Was diagnosed with DVT/PE earlier this year (unprovoked)  was only on ac for 2 months bc of insurance/medication cost issues.   Denies sob/chest pain  palpitations.  No lightheadedness.  No history of previous blood clots prior to this year,   no known hx of clotting disorder, per patient all routine cancer screening up to date. Patient denies any family or personal history of DVT.    ROS:  All other systems reviewed and negative    PAST MEDICAL HISTORY: HEALTH ISSUES - PROBLEM Dx:  DVT/PE      ACTIVE MEDICATION LIST:  MEDICATIONS  (STANDING):  rivaroxaban 15 milliGRAM(s) Oral two times a day    MEDICATIONS  (PRN):      EXAM:  Vital Signs Last 24 Hrs  T(C): 37.2 (29 Sep 2017 09:57), Max: 37.2 (29 Sep 2017 09:57)  < from: CT Angio Abdomen and Pelvis w/ IV Cont (09.28.17 @ 20:34) >    IMPRESSION: Redemonstration of thrombus in the left common femoral vein.   No evidence of proximal extension.    < end of copied text >  T(F): 99 (29 Sep 2017 09:57), Max: 99 (29 Sep 2017 09:57)  HR: 82 (29 Sep 2017 09:57) (72 - 83)  BP: 118/76 (29 Sep 2017 09:57) (118/76 - 141/93)  BP(mean): --  RR: 14 (29 Sep 2017 09:57) (14 - 20)  SpO2: 99% (29 Sep 2017 09:57) (96% - 100%)  GENERAL: No acute distress  NEURO: Alert and oriented x 3  LUNGS: Clear to auscultation bilaterally, no rales or wheezes  CV: S1/S2, no murmur  ABDOMEN: +BS, nontender  EXTREMITIES: +edema and pain    LABS/IMAGING: reviewed                          14.5   10.73 )-----------( 260      ( 28 Sep 2017 16:50 )             42.4     09-28    138  |  100  |  12  ----------------------------<  103<H>  4.2   |  21<L>  |  0.92    Ca    8.9      28 Sep 2017 16:50    TPro  7.8  /  Alb  4.0  /  TBili  0.6  /  DBili  x   /  AST  15  /  ALT  8   /  AlkPhos  86  09-28    < from: US Duplex Venous Lower Ext Ltd, Left (09.28.17 @ 18:02) >  IMPRESSION:     Sept 28 2017: CT Angio :Extensive acute DVT extending from the left femoral vein into the distal   lower extremity.

## 2017-09-30 NOTE — CONSULT NOTE ADULT - PROBLEM SELECTOR RECOMMENDATION 9
as above
Patient on heparin and coumadin. Hypercoagulable workup requested. At present the cause of his prior DVT and PE is not known. Patient was seen by vascular surgery and thrombolysis by IR is recommended.

## 2017-09-30 NOTE — PROGRESS NOTE ADULT - SUBJECTIVE AND OBJECTIVE BOX
Surgery C Team (Vascular) Progress Note:    HD 2 admitted for LLE DVT in Left Common Femoral Vein    Subjective:  No acute overnight events. Patient examined at bedside, resting well.  No complaints at this time.    Objective:  Vital Signs Last 24 Hrs  T(C): 37.1 (30 Sep 2017 14:10), Max: 37.1 (29 Sep 2017 16:50)  T(F): 98.8 (30 Sep 2017 14:10), Max: 98.8 (29 Sep 2017 16:50)  HR: 80 (30 Sep 2017 14:10) (77 - 83)  BP: 135/87 (30 Sep 2017 14:10) (115/76 - 148/96)  RR: 18 (30 Sep 2017 14:10) (16 - 19)  SpO2: 96% (30 Sep 2017 14:10) (96% - 100%)    Labs:                        14.9   9.46  )-----------( 306      ( 30 Sep 2017 04:15 )             43.6       09-30    141  |  101  |  10  ----------------------------<  109<H>  4.3   |  26  |  0.76    Ca    8.9      30 Sep 2017 04:30  Mg     1.6     09-30    TPro  7.8  /  Alb  4.0  /  TBili  0.6  /  DBili  x   /  AST  15  /  ALT  8   /  AlkPhos  86  09-28      I&O's Detail    Focused Physical Exam:  General: NAD  Respiratory: Nonlabored breathing  Extremities: Mild LLE edema, PT/DP pulses intact

## 2017-09-30 NOTE — CONSULT NOTE ADULT - CONSULT REASON
DVT
Patient with left lower extremity DVT. H/O PE and RLE DVT about 4 months ago, off anticoagulation for last 2 months as he had high copayment and readmitted with left lower extremity swelling.
DVT hx of PE

## 2017-10-01 ENCOUNTER — TRANSCRIPTION ENCOUNTER (OUTPATIENT)
Age: 57
End: 2017-10-01

## 2017-10-01 ENCOUNTER — RESULT REVIEW (OUTPATIENT)
Age: 57
End: 2017-10-01

## 2017-10-01 LAB
APTT BLD: 181.1 SEC — CRITICAL HIGH (ref 27.5–37.4)
APTT BLD: 59 SEC — HIGH (ref 27.5–37.4)
APTT BLD: 59 SEC — HIGH (ref 27.5–37.4)
BASOPHILS # BLD AUTO: 0.06 K/UL — SIGNIFICANT CHANGE UP (ref 0–0.2)
BASOPHILS NFR BLD AUTO: 0.6 % — SIGNIFICANT CHANGE UP (ref 0–2)
BLD GP AB SCN SERPL QL: NEGATIVE — SIGNIFICANT CHANGE UP
BUN SERPL-MCNC: 11 MG/DL — SIGNIFICANT CHANGE UP (ref 7–23)
BUN SERPL-MCNC: 12 MG/DL — SIGNIFICANT CHANGE UP (ref 7–23)
CALCIUM SERPL-MCNC: 8.2 MG/DL — LOW (ref 8.4–10.5)
CALCIUM SERPL-MCNC: 9.3 MG/DL — SIGNIFICANT CHANGE UP (ref 8.4–10.5)
CHLORIDE SERPL-SCNC: 103 MMOL/L — SIGNIFICANT CHANGE UP (ref 98–107)
CHLORIDE SERPL-SCNC: 98 MMOL/L — SIGNIFICANT CHANGE UP (ref 98–107)
CO2 SERPL-SCNC: 21 MMOL/L — LOW (ref 22–31)
CO2 SERPL-SCNC: 23 MMOL/L — SIGNIFICANT CHANGE UP (ref 22–31)
CREAT SERPL-MCNC: 0.73 MG/DL — SIGNIFICANT CHANGE UP (ref 0.5–1.3)
CREAT SERPL-MCNC: 0.76 MG/DL — SIGNIFICANT CHANGE UP (ref 0.5–1.3)
D DIMER BLD IA.RAPID-MCNC: 1867 NG/ML — SIGNIFICANT CHANGE UP
EOSINOPHIL # BLD AUTO: 0.5 K/UL — SIGNIFICANT CHANGE UP (ref 0–0.5)
EOSINOPHIL NFR BLD AUTO: 5.3 % — SIGNIFICANT CHANGE UP (ref 0–6)
FACT II CIRC INHIB PPP QL: 37.4 SEC — SIGNIFICANT CHANGE UP (ref 27.5–37.4)
FIBRINOGEN PPP-MCNC: 731.1 MG/DL — HIGH (ref 310–510)
GLUCOSE SERPL-MCNC: 136 MG/DL — HIGH (ref 70–99)
GLUCOSE SERPL-MCNC: 226 MG/DL — HIGH (ref 70–99)
HCT VFR BLD CALC: 37.2 % — LOW (ref 39–50)
HCT VFR BLD CALC: 45.3 % — SIGNIFICANT CHANGE UP (ref 39–50)
HCYS SERPL-MCNC: 7.8 UMOL/L — SIGNIFICANT CHANGE UP (ref 5–15)
HGB BLD-MCNC: 12.4 G/DL — LOW (ref 13–17)
HGB BLD-MCNC: 15.2 G/DL — SIGNIFICANT CHANGE UP (ref 13–17)
IMM GRANULOCYTES # BLD AUTO: 0.03 # — SIGNIFICANT CHANGE UP
IMM GRANULOCYTES NFR BLD AUTO: 0.3 % — SIGNIFICANT CHANGE UP (ref 0–1.5)
INR BLD: 1.04 — SIGNIFICANT CHANGE UP (ref 0.88–1.17)
INR BLD: 1.09 — SIGNIFICANT CHANGE UP (ref 0.88–1.17)
LYMPHOCYTES # BLD AUTO: 2.53 K/UL — SIGNIFICANT CHANGE UP (ref 1–3.3)
LYMPHOCYTES # BLD AUTO: 26.9 % — SIGNIFICANT CHANGE UP (ref 13–44)
MAGNESIUM SERPL-MCNC: 1.5 MG/DL — LOW (ref 1.6–2.6)
MAGNESIUM SERPL-MCNC: 1.8 MG/DL — SIGNIFICANT CHANGE UP (ref 1.6–2.6)
MCHC RBC-ENTMCNC: 30.8 PG — SIGNIFICANT CHANGE UP (ref 27–34)
MCHC RBC-ENTMCNC: 31.1 PG — SIGNIFICANT CHANGE UP (ref 27–34)
MCHC RBC-ENTMCNC: 33.3 % — SIGNIFICANT CHANGE UP (ref 32–36)
MCHC RBC-ENTMCNC: 33.6 % — SIGNIFICANT CHANGE UP (ref 32–36)
MCV RBC AUTO: 92.3 FL — SIGNIFICANT CHANGE UP (ref 80–100)
MCV RBC AUTO: 92.6 FL — SIGNIFICANT CHANGE UP (ref 80–100)
MONOCYTES # BLD AUTO: 0.95 K/UL — HIGH (ref 0–0.9)
MONOCYTES NFR BLD AUTO: 10.1 % — SIGNIFICANT CHANGE UP (ref 2–14)
NEUTROPHILS # BLD AUTO: 5.32 K/UL — SIGNIFICANT CHANGE UP (ref 1.8–7.4)
NEUTROPHILS NFR BLD AUTO: 56.8 % — SIGNIFICANT CHANGE UP (ref 43–77)
NRBC # FLD: 0 — SIGNIFICANT CHANGE UP
NRBC # FLD: 0 — SIGNIFICANT CHANGE UP
PHOSPHATE SERPL-MCNC: 3 MG/DL — SIGNIFICANT CHANGE UP (ref 2.5–4.5)
PHOSPHATE SERPL-MCNC: 4.2 MG/DL — SIGNIFICANT CHANGE UP (ref 2.5–4.5)
PLATELET # BLD AUTO: 300 K/UL — SIGNIFICANT CHANGE UP (ref 150–400)
PLATELET # BLD AUTO: 321 K/UL — SIGNIFICANT CHANGE UP (ref 150–400)
PMV BLD: 9.5 FL — SIGNIFICANT CHANGE UP (ref 7–13)
PMV BLD: 9.5 FL — SIGNIFICANT CHANGE UP (ref 7–13)
POTASSIUM SERPL-MCNC: 4.2 MMOL/L — SIGNIFICANT CHANGE UP (ref 3.5–5.3)
POTASSIUM SERPL-MCNC: 4.4 MMOL/L — SIGNIFICANT CHANGE UP (ref 3.5–5.3)
POTASSIUM SERPL-SCNC: 4.2 MMOL/L — SIGNIFICANT CHANGE UP (ref 3.5–5.3)
POTASSIUM SERPL-SCNC: 4.4 MMOL/L — SIGNIFICANT CHANGE UP (ref 3.5–5.3)
PROTHROM AB SERPL-ACNC: 11.7 SEC — SIGNIFICANT CHANGE UP (ref 9.8–13.1)
PROTHROM AB SERPL-ACNC: 12.2 SEC — SIGNIFICANT CHANGE UP (ref 9.8–13.1)
PROTHROMBIN TIME/NOMAL: 11 SEC — SIGNIFICANT CHANGE UP (ref 9.8–15.3)
PROTHROMBIN TIME/NOMAL: 33.4 SEC — SIGNIFICANT CHANGE UP (ref 27.5–37.4)
PTT INHIB SC 2 HR: 43.3 SEC — HIGH (ref 27.5–37.4)
RBC # BLD: 4.03 M/UL — LOW (ref 4.2–5.8)
RBC # BLD: 4.89 M/UL — SIGNIFICANT CHANGE UP (ref 4.2–5.8)
RBC # FLD: 13.5 % — SIGNIFICANT CHANGE UP (ref 10.3–14.5)
RBC # FLD: 13.7 % — SIGNIFICANT CHANGE UP (ref 10.3–14.5)
RH IG SCN BLD-IMP: POSITIVE — SIGNIFICANT CHANGE UP
SODIUM SERPL-SCNC: 137 MMOL/L — SIGNIFICANT CHANGE UP (ref 135–145)
SODIUM SERPL-SCNC: 139 MMOL/L — SIGNIFICANT CHANGE UP (ref 135–145)
THROMBIN TIME: 24.1 SEC — SIGNIFICANT CHANGE UP (ref 17–26)
WBC # BLD: 10.83 K/UL — HIGH (ref 3.8–10.5)
WBC # BLD: 9.39 K/UL — SIGNIFICANT CHANGE UP (ref 3.8–10.5)
WBC # FLD AUTO: 10.83 K/UL — HIGH (ref 3.8–10.5)
WBC # FLD AUTO: 9.39 K/UL — SIGNIFICANT CHANGE UP (ref 3.8–10.5)

## 2017-10-01 PROCEDURE — 88304 TISSUE EXAM BY PATHOLOGIST: CPT | Mod: 26

## 2017-10-01 PROCEDURE — 34451 REMOVAL OF VEIN CLOT: CPT | Mod: LT

## 2017-10-01 PROCEDURE — 36140 INTRO NDL ICATH UPR/LXTR ART: CPT | Mod: LT

## 2017-10-01 PROCEDURE — 75820 VEIN X-RAY ARM/LEG: CPT | Mod: 26,LT

## 2017-10-01 RX ORDER — HEPARIN SODIUM 5000 [USP'U]/ML
14 INJECTION INTRAVENOUS; SUBCUTANEOUS
Qty: 25000 | Refills: 0 | Status: DISCONTINUED | OUTPATIENT
Start: 2017-10-01 | End: 2017-10-01

## 2017-10-01 RX ORDER — SODIUM CHLORIDE 9 MG/ML
1000 INJECTION, SOLUTION INTRAVENOUS ONCE
Qty: 0 | Refills: 0 | Status: COMPLETED | OUTPATIENT
Start: 2017-10-01 | End: 2017-10-01

## 2017-10-01 RX ORDER — SODIUM CHLORIDE 9 MG/ML
1000 INJECTION, SOLUTION INTRAVENOUS
Qty: 0 | Refills: 0 | Status: DISCONTINUED | OUTPATIENT
Start: 2017-10-01 | End: 2017-10-01

## 2017-10-01 RX ORDER — HEPARIN SODIUM 5000 [USP'U]/ML
1400 INJECTION INTRAVENOUS; SUBCUTANEOUS
Qty: 25000 | Refills: 0 | Status: DISCONTINUED | OUTPATIENT
Start: 2017-10-01 | End: 2017-10-02

## 2017-10-01 RX ORDER — ONDANSETRON 8 MG/1
4 TABLET, FILM COATED ORAL ONCE
Qty: 0 | Refills: 0 | Status: DISCONTINUED | OUTPATIENT
Start: 2017-10-01 | End: 2017-10-01

## 2017-10-01 RX ORDER — WARFARIN SODIUM 2.5 MG/1
1 TABLET ORAL ONCE
Qty: 0 | Refills: 0 | Status: DISCONTINUED | OUTPATIENT
Start: 2017-10-01 | End: 2017-10-01

## 2017-10-01 RX ORDER — OXYCODONE HYDROCHLORIDE 5 MG/1
10 TABLET ORAL ONCE
Qty: 0 | Refills: 0 | Status: DISCONTINUED | OUTPATIENT
Start: 2017-10-01 | End: 2017-10-01

## 2017-10-01 RX ORDER — ACETAMINOPHEN 500 MG
1000 TABLET ORAL ONCE
Qty: 0 | Refills: 0 | Status: COMPLETED | OUTPATIENT
Start: 2017-10-02 | End: 2017-10-02

## 2017-10-01 RX ORDER — ACETAMINOPHEN 500 MG
1000 TABLET ORAL ONCE
Qty: 0 | Refills: 0 | Status: COMPLETED | OUTPATIENT
Start: 2017-10-01 | End: 2017-10-01

## 2017-10-01 RX ORDER — SERTRALINE 25 MG/1
200 TABLET, FILM COATED ORAL DAILY
Qty: 0 | Refills: 0 | Status: DISCONTINUED | OUTPATIENT
Start: 2017-10-01 | End: 2017-10-10

## 2017-10-01 RX ORDER — OXYCODONE HYDROCHLORIDE 5 MG/1
5 TABLET ORAL ONCE
Qty: 0 | Refills: 0 | Status: DISCONTINUED | OUTPATIENT
Start: 2017-10-01 | End: 2017-10-02

## 2017-10-01 RX ORDER — HYDROMORPHONE HYDROCHLORIDE 2 MG/ML
0.5 INJECTION INTRAMUSCULAR; INTRAVENOUS; SUBCUTANEOUS
Qty: 0 | Refills: 0 | Status: DISCONTINUED | OUTPATIENT
Start: 2017-10-01 | End: 2017-10-01

## 2017-10-01 RX ORDER — OXYCODONE AND ACETAMINOPHEN 5; 325 MG/1; MG/1
1 TABLET ORAL EVERY 4 HOURS
Qty: 0 | Refills: 0 | Status: DISCONTINUED | OUTPATIENT
Start: 2017-10-01 | End: 2017-10-01

## 2017-10-01 RX ORDER — SODIUM CHLORIDE 9 MG/ML
3 INJECTION INTRAMUSCULAR; INTRAVENOUS; SUBCUTANEOUS EVERY 8 HOURS
Qty: 0 | Refills: 0 | Status: DISCONTINUED | OUTPATIENT
Start: 2017-10-01 | End: 2017-10-10

## 2017-10-01 RX ORDER — SODIUM CHLORIDE 9 MG/ML
1000 INJECTION, SOLUTION INTRAVENOUS
Qty: 0 | Refills: 0 | Status: DISCONTINUED | OUTPATIENT
Start: 2017-10-01 | End: 2017-10-02

## 2017-10-01 RX ORDER — HEPARIN SODIUM 5000 [USP'U]/ML
1700 INJECTION INTRAVENOUS; SUBCUTANEOUS
Qty: 25000 | Refills: 0 | Status: DISCONTINUED | OUTPATIENT
Start: 2017-10-01 | End: 2017-10-01

## 2017-10-01 RX ADMIN — HYDROMORPHONE HYDROCHLORIDE 0.5 MILLIGRAM(S): 2 INJECTION INTRAMUSCULAR; INTRAVENOUS; SUBCUTANEOUS at 14:20

## 2017-10-01 RX ADMIN — HEPARIN SODIUM 19 UNIT(S)/HR: 5000 INJECTION INTRAVENOUS; SUBCUTANEOUS at 01:27

## 2017-10-01 RX ADMIN — OXYCODONE HYDROCHLORIDE 10 MILLIGRAM(S): 5 TABLET ORAL at 16:50

## 2017-10-01 RX ADMIN — SODIUM CHLORIDE 50 MILLILITER(S): 9 INJECTION, SOLUTION INTRAVENOUS at 14:35

## 2017-10-01 RX ADMIN — HYDROMORPHONE HYDROCHLORIDE 0.5 MILLIGRAM(S): 2 INJECTION INTRAMUSCULAR; INTRAVENOUS; SUBCUTANEOUS at 14:29

## 2017-10-01 RX ADMIN — HEPARIN SODIUM 0.14 UNIT(S)/HR: 5000 INJECTION INTRAVENOUS; SUBCUTANEOUS at 15:36

## 2017-10-01 RX ADMIN — HYDROMORPHONE HYDROCHLORIDE 0.5 MILLIGRAM(S): 2 INJECTION INTRAMUSCULAR; INTRAVENOUS; SUBCUTANEOUS at 15:37

## 2017-10-01 RX ADMIN — HEPARIN SODIUM 17 UNIT(S)/HR: 5000 INJECTION INTRAVENOUS; SUBCUTANEOUS at 14:00

## 2017-10-01 RX ADMIN — SODIUM CHLORIDE 3 MILLILITER(S): 9 INJECTION INTRAMUSCULAR; INTRAVENOUS; SUBCUTANEOUS at 17:22

## 2017-10-01 RX ADMIN — HEPARIN SODIUM 14 UNIT(S)/HR: 5000 INJECTION INTRAVENOUS; SUBCUTANEOUS at 20:45

## 2017-10-01 RX ADMIN — SODIUM CHLORIDE 3 MILLILITER(S): 9 INJECTION INTRAMUSCULAR; INTRAVENOUS; SUBCUTANEOUS at 22:25

## 2017-10-01 RX ADMIN — SODIUM CHLORIDE 1000 MILLILITER(S): 9 INJECTION, SOLUTION INTRAVENOUS at 13:55

## 2017-10-01 RX ADMIN — Medication 1000 MILLIGRAM(S): at 21:15

## 2017-10-01 RX ADMIN — HEPARIN SODIUM 14 UNIT(S)/HR: 5000 INJECTION INTRAVENOUS; SUBCUTANEOUS at 16:40

## 2017-10-01 RX ADMIN — SODIUM CHLORIDE 30 MILLILITER(S): 9 INJECTION, SOLUTION INTRAVENOUS at 20:45

## 2017-10-01 RX ADMIN — OXYCODONE HYDROCHLORIDE 10 MILLIGRAM(S): 5 TABLET ORAL at 17:21

## 2017-10-01 RX ADMIN — Medication 400 MILLIGRAM(S): at 20:45

## 2017-10-01 RX ADMIN — HYDROMORPHONE HYDROCHLORIDE 0.5 MILLIGRAM(S): 2 INJECTION INTRAMUSCULAR; INTRAVENOUS; SUBCUTANEOUS at 14:43

## 2017-10-01 NOTE — BRIEF OPERATIVE NOTE - POST-OP DX
Acute deep vein thrombosis (DVT) of femoral vein of left lower extremity  10/01/2017    Active  Giancarlo Perez

## 2017-10-01 NOTE — PROGRESS NOTE ADULT - ASSESSMENT
56 yo M with acute L DVT, prior R DVT, hemodynamically stable  -IR today for thrombectomy.  -hematology follow up for hypercoagulable workup, and for long term anticoagulation plan clinically improving; patient previously couldn't afford his anticoag meds, and will be switched to coumadin for outpt management  -continue iv hep; goal is to maintain aPTT  b/w 60-90sec.  Adjust dose per Heparin Normogram.  -pain control post op  -resume regular diet

## 2017-10-01 NOTE — PROVIDER CONTACT NOTE (CRITICAL VALUE NOTIFICATION) - ASSESSMENT
VSS. Surgical sites intact. Heparin gtt ongoing. No active bleeding. Small amount of dry bloody drainage to dressings. Provider aware.

## 2017-10-01 NOTE — PROGRESS NOTE ADULT - SUBJECTIVE AND OBJECTIVE BOX
Patient is a 57y old  Male who presents with a chief complaint of left leg swelling and pain (29 Sep 2017 14:28)      SUBJECTIVE / OVERNIGHT EVENTS: No nausea, vomiting or diarrhea, no fever or chills, no dizziness or chest pain, no dysuria or hematuria, no joint pain or swelling    left leg better    MEDICATIONS  (STANDING):  losartan 100 milliGRAM(s) Oral daily  insulin lispro (HumaLOG) corrective regimen sliding scale   SubCutaneous three times a day before meals  dextrose 50% Injectable 12.5 Gram(s) IV Push once  dextrose 50% Injectable 25 Gram(s) IV Push once  dextrose 50% Injectable 25 Gram(s) IV Push once  influenza   Vaccine 0.5 milliLiter(s) IntraMuscular once  heparin  Infusion 1900 Unit(s)/Hr (19 mL/Hr) IV Continuous <Continuous>    MEDICATIONS  (PRN):  acetaminophen   Tablet. 650 milliGRAM(s) Oral every 6 hours PRN Mild Pain (1 - 3)  oxyCODONE    5 mG/acetaminophen 325 mG 1 Tablet(s) Oral every 4 hours PRN Moderate Pain (4 - 6)  dextrose Gel 1 Dose(s) Oral once PRN Blood Glucose LESS THAN 70 milliGRAM(s)/deciliter  glucagon  Injectable 1 milliGRAM(s) IntraMuscular once PRN Glucose LESS THAN 70 milligrams/deciliter  zolpidem 5 milliGRAM(s) Oral at bedtime PRN Insomnia        CAPILLARY BLOOD GLUCOSE  134 (01 Oct 2017 07:45)  134 (01 Oct 2017 07:43)  126 (30 Sep 2017 23:09)  184 (30 Sep 2017 16:55)  172 (30 Sep 2017 11:55)        I&O's Summary    30 Sep 2017 07:01  -  01 Oct 2017 07:00  --------------------------------------------------------  IN: 580 mL / OUT: 1600 mL / NET: -1020 mL    PHYSICAL EXAM:  vital signs as above  in no apparent distress  HEENT: KEIRY EOMI  Neck: Supple, no JVD, no thyromegaly  Lungs: clear, no rhonchi, no wheeze, no crackles  CVS: S1 S2 no M/R/G  Abdomen: no tenderness, no organomegaly, BS present  Neuro: AO x 3 no focal weakness, no sensory abnormalities  Psych: appropriate affect  Skin: warm, dry  Ext: no edema, no clubbing  less warmth and tenseness left LE, ROM improved   Msk: no joint swelling or deformities b/l scars over knees  Back: no CVA tenderness, no kyphosis/scoliosis    LABS:                        15.2   9.39  )-----------( 321      ( 01 Oct 2017 04:40 )             45.3     10-01    137  |  98  |  12  ----------------------------<  136<H>  4.2   |  23  |  0.76    Ca    9.3      01 Oct 2017 04:40  Phos  4.2     10-01  Mg     1.8     10-01      PT/INR - ( 01 Oct 2017 04:40 )   PT: 11.7 SEC;   INR: 1.04          PTT - ( 01 Oct 2017 04:40 )  PTT:59.0 SEC            Consultant(s) Notes Reviewed:      Care Discussed with Consultants/Other Providers:    Contact Number, Dr Ugarte [phone]

## 2017-10-01 NOTE — PROGRESS NOTE ADULT - SUBJECTIVE AND OBJECTIVE BOX
Surgery C Team (Vascular) Progress Note:    Subjective:  No acute overnight events. Patient examined at bedside doing well.  Going to IR for thrombectomy this morning    Objective:  T(C): 37.1 (10-01-17 @ 07:45), Max: 37.1 (09-30-17 @ 14:10)  HR: 74 (10-01-17 @ 07:45) (72 - 86)  BP: 125/86 (10-01-17 @ 07:45) (125/86 - 143/82)  RR: 18 (10-01-17 @ 07:45) (18 - 18)  SpO2: 100% (10-01-17 @ 07:45) (96% - 100%)  Wt(kg): --    09-30 @ 07:01  -  10-01 @ 07:00  --------------------------------------------------------  IN:    Oral Fluid: 580 mL  Total IN: 580 mL    OUT:    Voided: 1600 mL  Total OUT: 1600 mL    Total NET: -1020 mL                          15.2   9.39  )-----------( 321      ( 01 Oct 2017 04:40 )             45.3     10-01    137  |  98  |  12  ----------------------------<  136<H>  4.2   |  23  |  0.76    Ca    9.3      01 Oct 2017 04:40  Phos  4.2     10-01  Mg     1.8     10-01        PT/INR - ( 01 Oct 2017 04:40 )   PT: 11.7 SEC;   INR: 1.04          PTT - ( 01 Oct 2017 04:40 )  PTT:59.0 SEC      PE:  General: NAD  Respiratory: Nonlabored breathing  Extremities: Mild LLE edema, PT/DP pulses intact

## 2017-10-01 NOTE — PROGRESS NOTE ADULT - SUBJECTIVE AND OBJECTIVE BOX
POST-OPERATIVE NOTE    Patient is a 57y old  Male who presents with a chief complaint of left leg swelling and pain (29 Sep 2017 14:28)    Pt is S/P Left lower extremity fem-pop vein thrombectomy                             Vital Signs Last 24 Hrs  T(C): 36.7 (01 Oct 2017 18:00), Max: 37.1 (01 Oct 2017 01:43)  T(F): 98.1 (01 Oct 2017 18:00), Max: 98.8 (01 Oct 2017 01:43)  HR: 86 (01 Oct 2017 18:00) (63 - 98)  BP: 112/66 (01 Oct 2017 18:00) (91/57 - 143/82)  BP(mean): --  RR: 12 (01 Oct 2017 18:00) (10 - 23)  SpO2: 100% (01 Oct 2017 18:00) (98% - 100%)  I&O's Detail    30 Sep 2017 07:01  -  01 Oct 2017 07:00  --------------------------------------------------------  IN:    Oral Fluid: 580 mL  Total IN: 580 mL    OUT:    Voided: 1600 mL  Total OUT: 1600 mL    Total NET: -1020 mL      01 Oct 2017 07:01  -  01 Oct 2017 18:45  --------------------------------------------------------  IN:    heparin Infusion: 17 mL    heparin Infusion: 14 mL    lactated ringers.: 200 mL    Oral Fluid: 600 mL  Total IN: 831 mL    OUT:    Indwelling Catheter - Urethral: 1025 mL  Total OUT: 1025 mL    Total NET: -194 mL        losartan 100  heparin  Infusion 1400    PAST MEDICAL & SURGICAL HISTORY:  DVT (deep venous thrombosis)  Varicosities  Insomnia  Anxiety  Sleep apnea: had test &gt; 10 years ago--supposed to wear device.  Lost 25 pounds but never retested  Spondylisthesis  Spinal stenosis  Bulging disc  Obese  Depression  GERD (gastroesophageal reflux disease)  Hyperlipidemia  Hypertension, Essential  Diabetes Mellitus: diagnosed 10 years ago  doesn&#x27;t do fingersticks  Reflux: Gastroscopy 2013  H/O laser iridotomy: left-2008  R Knee Arthroplasty: Right 2003, Left 2011,    Physical Exam:  General: NAD, resting comfortably in bed  Pulmonary: Nonlabored breathing, no respiratory distress  Extremities: L groin incision c/d/i with staples, TTP, no hematoma or erythema. L lower leg medial incision c/d/i, no evidence of hematoma. TTP along posterior LLE.  Pulses: Palpable DP pulses b/l. Dopplerable PT pulses b/l.       LABS:                        12.4   10.83 )-----------( 300      ( 01 Oct 2017 14:15 )             37.2     10-01    139  |  103  |  11  ----------------------------<  226<H>  4.4   |  21<L>  |  0.73    Ca    8.2<L>      01 Oct 2017 14:15  Phos  3.0     10-01  Mg     1.5     10-01      PT/INR - ( 01 Oct 2017 14:15 )   PT: 12.2 SEC;   INR: 1.09          PTT - ( 01 Oct 2017 14:15 )  PTT:181.1 SEC  CAPILLARY BLOOD GLUCOSE  134 (01 Oct 2017 07:45)  134 (01 Oct 2017 07:43)  126 (30 Sep 2017 23:09)          Radiology and Additional Studies:    Assessment:57yMaleHPI:  56 y/o male, with PMHx of DM (on metformin), HTN (hydrochlorothiazide) right leg DVT and PE, 3 months ago, ?PTSD and PSHx of right knee replacement in 2013 and revision in 2014, presents to the ED c/o atraumatic left calf pain x 2 weeks. Report known cause for DVT. Started on Xarelto, which patient self discontinued secondary to not being afford the copay. Denies fever, chills, SOB, chest pain , nausea, vomiting or diarrhea, and any other complaints. Does have pain in the left leg moderate intensity (29 Sep 2017 14:28)   S/P Left lower extremity fem-pop vein thrombectomy  Plan:  - Pain control  - Heparin gtt  - Trend coags  - F/u labs  - Monitor vitals  - DVT ppx

## 2017-10-02 LAB
APCR PPP: 2.84 RATIO — SIGNIFICANT CHANGE UP
APTT BLD: 46.8 SEC — HIGH (ref 27.5–37.4)
APTT BLD: 49.8 SEC — HIGH (ref 27.5–37.4)
APTT BLD: 51.3 SEC — HIGH (ref 27.5–37.4)
APTT BLD: 57.9 SEC — HIGH (ref 27.5–37.4)
AT III ACT/NOR PPP CHRO: 84 % — SIGNIFICANT CHANGE UP (ref 76–140)
BASOPHILS # BLD AUTO: 0.04 K/UL — SIGNIFICANT CHANGE UP (ref 0–0.2)
BASOPHILS NFR BLD AUTO: 0.3 % — SIGNIFICANT CHANGE UP (ref 0–2)
BUN SERPL-MCNC: 12 MG/DL — SIGNIFICANT CHANGE UP (ref 7–23)
CALCIUM SERPL-MCNC: 8.5 MG/DL — SIGNIFICANT CHANGE UP (ref 8.4–10.5)
CHLORIDE SERPL-SCNC: 101 MMOL/L — SIGNIFICANT CHANGE UP (ref 98–107)
CO2 SERPL-SCNC: 23 MMOL/L — SIGNIFICANT CHANGE UP (ref 22–31)
CREAT SERPL-MCNC: 0.69 MG/DL — SIGNIFICANT CHANGE UP (ref 0.5–1.3)
DRVVT CONFIRM: 30.5 SEC — SIGNIFICANT CHANGE UP (ref 27–41)
DRVVT INTERPRETATION.: POSITIVE — SIGNIFICANT CHANGE UP
DRVVT SCREEN TO CONFIRM RATIO: 1.39 — HIGH (ref 0–1.2)
EOSINOPHIL # BLD AUTO: 0.05 K/UL — SIGNIFICANT CHANGE UP (ref 0–0.5)
EOSINOPHIL NFR BLD AUTO: 0.4 % — SIGNIFICANT CHANGE UP (ref 0–6)
FACT VIII ACT/NOR PPP: 157.6 % — HIGH (ref 50–125)
GLUCOSE SERPL-MCNC: 222 MG/DL — HIGH (ref 70–99)
HCT VFR BLD CALC: 34.1 % — LOW (ref 39–50)
HGB BLD-MCNC: 11.6 G/DL — LOW (ref 13–17)
IMM GRANULOCYTES # BLD AUTO: 0.08 # — SIGNIFICANT CHANGE UP
IMM GRANULOCYTES NFR BLD AUTO: 0.6 % — SIGNIFICANT CHANGE UP (ref 0–1.5)
INR BLD: 1.03 — SIGNIFICANT CHANGE UP (ref 0.88–1.17)
LYMPHOCYTES # BLD AUTO: 16.5 % — SIGNIFICANT CHANGE UP (ref 13–44)
LYMPHOCYTES # BLD AUTO: 2.25 K/UL — SIGNIFICANT CHANGE UP (ref 1–3.3)
MCHC RBC-ENTMCNC: 32 PG — SIGNIFICANT CHANGE UP (ref 27–34)
MCHC RBC-ENTMCNC: 34 % — SIGNIFICANT CHANGE UP (ref 32–36)
MCV RBC AUTO: 94.2 FL — SIGNIFICANT CHANGE UP (ref 80–100)
MONOCYTES # BLD AUTO: 1.37 K/UL — HIGH (ref 0–0.9)
MONOCYTES NFR BLD AUTO: 10.1 % — SIGNIFICANT CHANGE UP (ref 2–14)
NEUTROPHILS # BLD AUTO: 9.82 K/UL — HIGH (ref 1.8–7.4)
NEUTROPHILS NFR BLD AUTO: 72.1 % — SIGNIFICANT CHANGE UP (ref 43–77)
NORMALIZED SCT PPP-RTO: 1.03 — SIGNIFICANT CHANGE UP (ref 0.81–1.17)
NORMALIZED SCT PPP-RTO: 37.9 SEC — SIGNIFICANT CHANGE UP (ref 33.7–49.5)
NORMALIZED SCT PPP-RTO: NEGATIVE — SIGNIFICANT CHANGE UP
NRBC # FLD: 0 — SIGNIFICANT CHANGE UP
PLATELET # BLD AUTO: 306 K/UL — SIGNIFICANT CHANGE UP (ref 150–400)
PMV BLD: 9.7 FL — SIGNIFICANT CHANGE UP (ref 7–13)
POTASSIUM SERPL-MCNC: 4.1 MMOL/L — SIGNIFICANT CHANGE UP (ref 3.5–5.3)
POTASSIUM SERPL-SCNC: 4.1 MMOL/L — SIGNIFICANT CHANGE UP (ref 3.5–5.3)
PROT C ACT/NOR PPP: 55 % — LOW (ref 74–150)
PROT S FREE AG PPP IA-ACNC: 102 % — SIGNIFICANT CHANGE UP (ref 67–141)
PROTHROM AB SERPL-ACNC: 11.5 SEC — SIGNIFICANT CHANGE UP (ref 9.8–13.1)
RBC # BLD: 3.62 M/UL — LOW (ref 4.2–5.8)
RBC # FLD: 13.5 % — SIGNIFICANT CHANGE UP (ref 10.3–14.5)
SODIUM SERPL-SCNC: 140 MMOL/L — SIGNIFICANT CHANGE UP (ref 135–145)
WBC # BLD: 13.61 K/UL — HIGH (ref 3.8–10.5)
WBC # FLD AUTO: 13.61 K/UL — HIGH (ref 3.8–10.5)

## 2017-10-02 RX ORDER — TRAMADOL HYDROCHLORIDE 50 MG/1
100 TABLET ORAL THREE TIMES A DAY
Qty: 0 | Refills: 0 | Status: DISCONTINUED | OUTPATIENT
Start: 2017-10-02 | End: 2017-10-09

## 2017-10-02 RX ORDER — OXYCODONE AND ACETAMINOPHEN 5; 325 MG/1; MG/1
2 TABLET ORAL EVERY 6 HOURS
Qty: 0 | Refills: 0 | Status: DISCONTINUED | OUTPATIENT
Start: 2017-10-02 | End: 2017-10-09

## 2017-10-02 RX ORDER — HEPARIN SODIUM 5000 [USP'U]/ML
1600 INJECTION INTRAVENOUS; SUBCUTANEOUS
Qty: 25000 | Refills: 0 | Status: DISCONTINUED | OUTPATIENT
Start: 2017-10-02 | End: 2017-10-07

## 2017-10-02 RX ORDER — INSULIN LISPRO 100/ML
VIAL (ML) SUBCUTANEOUS
Qty: 0 | Refills: 0 | Status: DISCONTINUED | OUTPATIENT
Start: 2017-10-02 | End: 2017-10-10

## 2017-10-02 RX ORDER — TRAMADOL HYDROCHLORIDE 50 MG/1
100 TABLET ORAL THREE TIMES A DAY
Qty: 0 | Refills: 0 | Status: DISCONTINUED | OUTPATIENT
Start: 2017-10-02 | End: 2017-10-02

## 2017-10-02 RX ORDER — OXYCODONE AND ACETAMINOPHEN 5; 325 MG/1; MG/1
1 TABLET ORAL EVERY 6 HOURS
Qty: 0 | Refills: 0 | Status: DISCONTINUED | OUTPATIENT
Start: 2017-10-02 | End: 2017-10-07

## 2017-10-02 RX ORDER — WARFARIN SODIUM 2.5 MG/1
10 TABLET ORAL ONCE
Qty: 0 | Refills: 0 | Status: COMPLETED | OUTPATIENT
Start: 2017-10-02 | End: 2017-10-02

## 2017-10-02 RX ADMIN — ZOLPIDEM TARTRATE 5 MILLIGRAM(S): 10 TABLET ORAL at 00:34

## 2017-10-02 RX ADMIN — OXYCODONE AND ACETAMINOPHEN 2 TABLET(S): 5; 325 TABLET ORAL at 16:42

## 2017-10-02 RX ADMIN — SODIUM CHLORIDE 3 MILLILITER(S): 9 INJECTION INTRAMUSCULAR; INTRAVENOUS; SUBCUTANEOUS at 13:56

## 2017-10-02 RX ADMIN — Medication 1: at 10:45

## 2017-10-02 RX ADMIN — OXYCODONE HYDROCHLORIDE 5 MILLIGRAM(S): 5 TABLET ORAL at 00:47

## 2017-10-02 RX ADMIN — HEPARIN SODIUM 16 UNIT(S)/HR: 5000 INJECTION INTRAVENOUS; SUBCUTANEOUS at 00:40

## 2017-10-02 RX ADMIN — HEPARIN SODIUM 19 UNIT(S)/HR: 5000 INJECTION INTRAVENOUS; SUBCUTANEOUS at 22:10

## 2017-10-02 RX ADMIN — SODIUM CHLORIDE 3 MILLILITER(S): 9 INJECTION INTRAMUSCULAR; INTRAVENOUS; SUBCUTANEOUS at 06:11

## 2017-10-02 RX ADMIN — OXYCODONE AND ACETAMINOPHEN 2 TABLET(S): 5; 325 TABLET ORAL at 16:12

## 2017-10-02 RX ADMIN — OXYCODONE HYDROCHLORIDE 5 MILLIGRAM(S): 5 TABLET ORAL at 01:17

## 2017-10-02 RX ADMIN — Medication 1: at 22:20

## 2017-10-02 RX ADMIN — HEPARIN SODIUM 18 UNIT(S)/HR: 5000 INJECTION INTRAVENOUS; SUBCUTANEOUS at 10:45

## 2017-10-02 RX ADMIN — OXYCODONE AND ACETAMINOPHEN 2 TABLET(S): 5; 325 TABLET ORAL at 22:19

## 2017-10-02 RX ADMIN — Medication 400 MILLIGRAM(S): at 03:29

## 2017-10-02 RX ADMIN — OXYCODONE AND ACETAMINOPHEN 1 TABLET(S): 5; 325 TABLET ORAL at 11:54

## 2017-10-02 RX ADMIN — LOSARTAN POTASSIUM 100 MILLIGRAM(S): 100 TABLET, FILM COATED ORAL at 08:40

## 2017-10-02 RX ADMIN — OXYCODONE AND ACETAMINOPHEN 1 TABLET(S): 5; 325 TABLET ORAL at 09:29

## 2017-10-02 RX ADMIN — SODIUM CHLORIDE 30 MILLILITER(S): 9 INJECTION, SOLUTION INTRAVENOUS at 13:41

## 2017-10-02 RX ADMIN — Medication 1000 MILLIGRAM(S): at 03:59

## 2017-10-02 RX ADMIN — WARFARIN SODIUM 10 MILLIGRAM(S): 2.5 TABLET ORAL at 23:25

## 2017-10-02 RX ADMIN — SODIUM CHLORIDE 3 MILLILITER(S): 9 INJECTION INTRAMUSCULAR; INTRAVENOUS; SUBCUTANEOUS at 22:09

## 2017-10-02 RX ADMIN — OXYCODONE AND ACETAMINOPHEN 1 TABLET(S): 5; 325 TABLET ORAL at 10:35

## 2017-10-02 RX ADMIN — Medication 400 MILLIGRAM(S): at 12:07

## 2017-10-02 RX ADMIN — SERTRALINE 200 MILLIGRAM(S): 25 TABLET, FILM COATED ORAL at 22:10

## 2017-10-02 RX ADMIN — Medication 1000 MILLIGRAM(S): at 12:37

## 2017-10-02 RX ADMIN — OXYCODONE AND ACETAMINOPHEN 2 TABLET(S): 5; 325 TABLET ORAL at 22:49

## 2017-10-02 NOTE — PROGRESS NOTE ADULT - PROBLEM SELECTOR PLAN 1
s/p thrombectomy  doing well  on IV heparin post procedure  d/w heme attending   will defer to heme to start coumadin

## 2017-10-02 NOTE — PROGRESS NOTE ADULT - SUBJECTIVE AND OBJECTIVE BOX
Patient seen this morning,s/p left femoral and popliteal vein thrombectomy. On iv heparin. Left lower extremity swelling reduced significantly.  Vital Signs Last 24 Hrs  T(C): 37.2 (02 Oct 2017 14:08), Max: 37.2 (02 Oct 2017 14:08)  T(F): 98.9 (02 Oct 2017 14:08), Max: 98.9 (02 Oct 2017 14:08)  HR: 85 (02 Oct 2017 14:08) (66 - 98)  BP: 149/79 (02 Oct 2017 14:08) (99/57 - 149/79)  BP(mean): --  RR: 16 (02 Oct 2017 14:08) (12 - 23)  SpO2: 98% (02 Oct 2017 14:08) (97% - 100%)                          11.6   13.61 )-----------( 306      ( 02 Oct 2017 06:00 )             34.1       10-02    140  |  101  |  12  ----------------------------<  222<H>  4.1   |  23  |  0.69    Ca    8.5      02 Oct 2017 06:00  Phos  3.0     10-01  Mg     1.5     10-01            PT/INR - ( 02 Oct 2017 06:00 )   PT: 11.5 SEC;   INR: 1.03          PTT - ( 02 Oct 2017 12:20 )  PTT:49.8 SEC    diagnosis

## 2017-10-02 NOTE — PROGRESS NOTE ADULT - SUBJECTIVE AND OBJECTIVE BOX
ANESTHESIA POSTOP CHECK    57y Male POSTOP DAY 1     No COMPLAINTS    NO APPARENT ANESTHESIA COMPLICATIONS

## 2017-10-02 NOTE — PROGRESS NOTE ADULT - SUBJECTIVE AND OBJECTIVE BOX
Patient is a 57y old  Male who presents with a chief complaint of left leg swelling and pain (29 Sep 2017 14:28)      SUBJECTIVE / OVERNIGHT EVENTS: No nausea, vomiting or diarrhea, no fever or chills, no dizziness or chest pain, no dysuria or hematuria, no joint pain or swelling  left groin discomfort    MEDICATIONS  (STANDING):  losartan 100 milliGRAM(s) Oral daily  insulin lispro (HumaLOG) corrective regimen sliding scale   SubCutaneous three times a day before meals  dextrose 50% Injectable 12.5 Gram(s) IV Push once  dextrose 50% Injectable 25 Gram(s) IV Push once  dextrose 50% Injectable 25 Gram(s) IV Push once  influenza   Vaccine 0.5 milliLiter(s) IntraMuscular once  sodium chloride 0.9% lock flush 3 milliLiter(s) IV Push every 8 hours  lactated ringers. 1000 milliLiter(s) (30 mL/Hr) IV Continuous <Continuous>  sertraline 200 milliGRAM(s) Oral daily  heparin  Infusion 1600 Unit(s)/Hr (18 mL/Hr) IV Continuous <Continuous>  warfarin 10 milliGRAM(s) Oral once    MEDICATIONS  (PRN):  acetaminophen   Tablet. 650 milliGRAM(s) Oral every 6 hours PRN Mild Pain (1 - 3)  dextrose Gel 1 Dose(s) Oral once PRN Blood Glucose LESS THAN 70 milliGRAM(s)/deciliter  glucagon  Injectable 1 milliGRAM(s) IntraMuscular once PRN Glucose LESS THAN 70 milligrams/deciliter  zolpidem 5 milliGRAM(s) Oral at bedtime PRN Insomnia  oxyCODONE    5 mG/acetaminophen 325 mG 1 Tablet(s) Oral every 6 hours PRN Moderate Pain (4 - 6)  oxyCODONE    5 mG/acetaminophen 325 mG 2 Tablet(s) Oral every 6 hours PRN Severe Pain (7 - 10)  traMADol 100 milliGRAM(s) Oral three times a day PRN Pain        CAPILLARY BLOOD GLUCOSE  142 (02 Oct 2017 12:08)  196 (02 Oct 2017 08:50)        I&O's Summary    01 Oct 2017 07:01  -  02 Oct 2017 07:00  --------------------------------------------------------  IN: 1967 mL / OUT: 3625 mL / NET: -1658 mL    02 Oct 2017 07:01  -  02 Oct 2017 13:49  --------------------------------------------------------  IN: 586 mL / OUT: 800 mL / NET: -214 mL    PHYSICAL EXAM:  vital signs as above  in no apparent distress  HEENT: KEIRY EOMI  Neck: Supple, no JVD, no thyromegaly  Lungs: clear, no rhonchi, no wheeze, no crackles  CVS: S1 S2 no M/R/G  Abdomen: no tenderness, no organomegaly, BS present  Neuro: AO x 3 no focal weakness, no sensory abnormalities  Psych: appropriate affect  Skin: warm, dry  Ext: no edema, no clubbing + clean suture lines over incision sites in left groin and left medial thigh  less warmth and tenseness left LE, ROM improved   Msk: no joint swelling or deformities b/l scars over knees  Back: no CVA tenderness, no kyphosis/scoliosis    LABS:                        11.6   13.61 )-----------( 306      ( 02 Oct 2017 06:00 )             34.1     10-02    140  |  101  |  12  ----------------------------<  222<H>  4.1   |  23  |  0.69    Ca    8.5      02 Oct 2017 06:00  Phos  3.0     10-01  Mg     1.5     10-01      PT/INR - ( 02 Oct 2017 06:00 )   PT: 11.5 SEC;   INR: 1.03          PTT - ( 02 Oct 2017 12:20 )  PTT:49.8 SEC            Consultant(s) Notes Reviewed:      Care Discussed with Consultants/Other Providers:    Contact Number, Dr Ugarte [phone]

## 2017-10-02 NOTE — PROGRESS NOTE ADULT - ASSESSMENT
Patient with left lower extremity extensive DVT. About 3 months ago diagnosed with right lower extremity DVT and PE, but for last 2 months was off anticoagulation as he could not afford it.  Now readmitted with extensive left lower extremity DVT.  He had left fem/popliteal thrombectomy by IR yesterday and currently on UFH.  he had his hypercoagulable workup drawn that shows low protein C functional assay and postive DRVVT. These tests were done after he received one dose of Coumadin that can affect protein C level and presence of heparin can give a positive DRVVT.  other tests, including anticardiolipin antibody and beta 2 glycoprotein requested together with factor V Leiden and prothrombin gene mutation. His CT scan did not show any evidence of malignancy.  clinical exam nor, organomegaly, or any new lumps.  At present, the cause of recurrent venous thrombosis is not clear.  Will start him on Coumadin as directed.  Thrombin inhibitors are not covered by his insurance.  Continue unfractionated heparin.

## 2017-10-02 NOTE — PROGRESS NOTE ADULT - SUBJECTIVE AND OBJECTIVE BOX
Physical Exam:  General: NAD, resting comfortably in bed  Pulmonary: Nonlabored breathing, no respiratory distress  Extremities: L groin incision c/d/i with staples, TTP, no hematoma or erythema. L lower leg medial incision c/d/i, no evidence of hematoma. TTP along posterior LLE.  Pulses: Palpable DP pulses b/l. Dopplerable PT pulses b/l    Radiology and Additional Studies:    Assessment: 58 y/o male, with PMHx of DM (on metformin), HTN (hydrochlorothiazide) right leg DVT and PE, 3 months ago, stopped anticoagulation because was unable to afford it, presented with LLE DVT S/P Left lower extremity fem-pop vein thrombectomy, currently on heparin gtt doing well.  Plan:  - Pain control  - Heparin gtt  - Trend coags  - F/u labs  - Monitor vitals  - DVT ppx  - OOB  - D/c benton  - F/u with hematology re workup for DVT's, if they are completed with workup with start coumadin tonight  - D/w Dr Adams Morning Surgical Progress Note  Patient is a 57y old  Male who presents with a chief complaint of left leg swelling and pain (29 Sep 2017 14:28)    SUBJECTIVE: Patient seen and examined at bedside with surgical team, patient without complaints.     Vital Signs Last 24 Hrs  T(C): 36.7 (02 Oct 2017 06:25), Max: 37 (01 Oct 2017 22:00)  T(F): 98 (02 Oct 2017 06:25), Max: 98.6 (01 Oct 2017 22:00)  HR: 82 (02 Oct 2017 06:25) (63 - 98)  BP: 126/79 (02 Oct 2017 06:25) (91/57 - 135/79)  BP(mean): --  RR: 17 (02 Oct 2017 06:25) (10 - 23)  SpO2: 99% (02 Oct 2017 06:25) (97% - 100%)  I&O's Detail    01 Oct 2017 07:01  -  02 Oct 2017 07:00  --------------------------------------------------------  IN:    heparin Infusion: 120 mL    heparin Infusion: 17 mL    heparin Infusion: 14 mL    IV PiggyBack: 100 mL    lactated ringers.: 180 mL    lactated ringers.: 200 mL    Oral Fluid: 960 mL  Total IN: 1591 mL    OUT:    Indwelling Catheter - Urethral: 3625 mL  Total OUT: 3625 mL    Total NET: -2034 mL    MEDICATIONS  (STANDING):  losartan 100 milliGRAM(s) Oral daily  insulin lispro (HumaLOG) corrective regimen sliding scale   SubCutaneous three times a day before meals  dextrose 50% Injectable 12.5 Gram(s) IV Push once  dextrose 50% Injectable 25 Gram(s) IV Push once  dextrose 50% Injectable 25 Gram(s) IV Push once  influenza   Vaccine 0.5 milliLiter(s) IntraMuscular once  sodium chloride 0.9% lock flush 3 milliLiter(s) IV Push every 8 hours  acetaminophen  IVPB. 1000 milliGRAM(s) IV Intermittent once  lactated ringers. 1000 milliLiter(s) (30 mL/Hr) IV Continuous <Continuous>  sertraline 200 milliGRAM(s) Oral daily  heparin  Infusion 1600 Unit(s)/Hr (18 mL/Hr) IV Continuous <Continuous>    MEDICATIONS  (PRN):  acetaminophen   Tablet. 650 milliGRAM(s) Oral every 6 hours PRN Mild Pain (1 - 3)  dextrose Gel 1 Dose(s) Oral once PRN Blood Glucose LESS THAN 70 milliGRAM(s)/deciliter  glucagon  Injectable 1 milliGRAM(s) IntraMuscular once PRN Glucose LESS THAN 70 milligrams/deciliter  zolpidem 5 milliGRAM(s) Oral at bedtime PRN Insomnia      Physical Exam  General: NAD, resting comfortably in bed, pleasant   Extremities: L groin incision c/d/i with staples, TTP, no hematoma or erythema. L lower leg medial incision c/d/i, no evidence of hematoma. mildly tender to palpation lle, soft  Pulses: Palpable DP pulses b/l      LABS:                        11.6   13.61 )-----------( 306      ( 02 Oct 2017 06:00 )             34.1     10-02    140  |  101  |  12  ----------------------------<  222<H>  4.1   |  23  |  0.69    Ca    8.5      02 Oct 2017 06:00  Phos  3.0     10-01  Mg     1.5     10-01      PT/INR - ( 02 Oct 2017 06:00 )   PT: 11.5 SEC;   INR: 1.03          PTT - ( 02 Oct 2017 06:00 )  PTT:51.3 SEC    Assessment: 56 y/o male, with PMHx of DM (on metformin), HTN (hydrochlorothiazide) right leg DVT and PE, 3 months ago, stopped anticoagulation because was unable to afford it, presented with LLE DVT S/P Left lower extremity fem-pop vein thrombectomy, currently on heparin gtt doing well.  Plan:  - Pain control  - Heparin gtt increased by 2 as per normogram for ptt of 51  - Trend coags  - F/u labs  - Monitor vitals  - DVT ppx  - OOB  - D/c benton  - F/u with hematology re workup for DVT's, if they are completed with workup with start coumadin tonight  - D/w Dr Adams Morning Surgical Progress Note  Patient is a 57y old  Male who presents with a chief complaint of left leg swelling and pain (29 Sep 2017 14:28)    SUBJECTIVE: Patient seen and examined at bedside with surgical team, patient without complaints.   pt states left leg is feeling better     Vital Signs Last 24 Hrs  T(C): 36.7 (02 Oct 2017 06:25), Max: 37 (01 Oct 2017 22:00)  T(F): 98 (02 Oct 2017 06:25), Max: 98.6 (01 Oct 2017 22:00)  HR: 82 (02 Oct 2017 06:25) (63 - 98)  BP: 126/79 (02 Oct 2017 06:25) (91/57 - 135/79)  BP(mean): --  RR: 17 (02 Oct 2017 06:25) (10 - 23)  SpO2: 99% (02 Oct 2017 06:25) (97% - 100%)  I&O's Detail    01 Oct 2017 07:01  -  02 Oct 2017 07:00  --------------------------------------------------------  IN:    heparin Infusion: 120 mL    heparin Infusion: 17 mL    heparin Infusion: 14 mL    IV PiggyBack: 100 mL    lactated ringers.: 180 mL    lactated ringers.: 200 mL    Oral Fluid: 960 mL  Total IN: 1591 mL    OUT:    Indwelling Catheter - Urethral: 3625 mL  Total OUT: 3625 mL    Total NET: -2034 mL    MEDICATIONS  (STANDING):  losartan 100 milliGRAM(s) Oral daily  insulin lispro (HumaLOG) corrective regimen sliding scale   SubCutaneous three times a day before meals  dextrose 50% Injectable 12.5 Gram(s) IV Push once  dextrose 50% Injectable 25 Gram(s) IV Push once  dextrose 50% Injectable 25 Gram(s) IV Push once  influenza   Vaccine 0.5 milliLiter(s) IntraMuscular once  sodium chloride 0.9% lock flush 3 milliLiter(s) IV Push every 8 hours  acetaminophen  IVPB. 1000 milliGRAM(s) IV Intermittent once  lactated ringers. 1000 milliLiter(s) (30 mL/Hr) IV Continuous <Continuous>  sertraline 200 milliGRAM(s) Oral daily  heparin  Infusion 1600 Unit(s)/Hr (18 mL/Hr) IV Continuous <Continuous>    MEDICATIONS  (PRN):  acetaminophen   Tablet. 650 milliGRAM(s) Oral every 6 hours PRN Mild Pain (1 - 3)  dextrose Gel 1 Dose(s) Oral once PRN Blood Glucose LESS THAN 70 milliGRAM(s)/deciliter  glucagon  Injectable 1 milliGRAM(s) IntraMuscular once PRN Glucose LESS THAN 70 milligrams/deciliter  zolpidem 5 milliGRAM(s) Oral at bedtime PRN Insomnia      Physical Exam  General: NAD, resting comfortably in bed, pleasant   Extremities: L groin incision c/d/i with staples, TTP, no hematoma or erythema. L lower leg medial incision c/d/i, no evidence of hematoma. mildly tender to palpation lle, soft  Pulses: Palpable DP pulses b/l      LABS:                        11.6   13.61 )-----------( 306      ( 02 Oct 2017 06:00 )             34.1     10-02    140  |  101  |  12  ----------------------------<  222<H>  4.1   |  23  |  0.69    Ca    8.5      02 Oct 2017 06:00  Phos  3.0     10-01  Mg     1.5     10-01      PT/INR - ( 02 Oct 2017 06:00 )   PT: 11.5 SEC;   INR: 1.03          PTT - ( 02 Oct 2017 06:00 )  PTT:51.3 SEC    Assessment: 58 y/o male, with PMHx of DM (on metformin), HTN (hydrochlorothiazide) right leg DVT and PE, 3 months ago, stopped anticoagulation because was unable to afford it, presented with LLE DVT S/P Left lower extremity fem-pop vein thrombectomy, currently on heparin gtt doing well.  Plan:  - Pain control  - Heparin gtt increased by 2 as per normogram for ptt of 51  - Trend coags  - F/u labs  - Monitor vitals  - DVT ppx  - OOB  - D/c benton  - F/u with hematology re workup for DVT's, if they are completed with workup with start coumadin tonight  - D/w Dr Adams

## 2017-10-03 ENCOUNTER — TRANSCRIPTION ENCOUNTER (OUTPATIENT)
Age: 57
End: 2017-10-03

## 2017-10-03 LAB
APTT BLD: 39.7 SEC — HIGH (ref 27.5–37.4)
APTT BLD: 66.5 SEC — HIGH (ref 27.5–37.4)
APTT BLD: 72.3 SEC — HIGH (ref 27.5–37.4)
HIV 1+2 AB+HIV1 P24 AG SERPL QL IA: SIGNIFICANT CHANGE UP
INR BLD: 1.1 — SIGNIFICANT CHANGE UP (ref 0.88–1.17)
LIDOCAIN SERPL-MCNC: 14 NMOL/L — SIGNIFICANT CHANGE UP
PROTHROM AB SERPL-ACNC: 12.4 SEC — SIGNIFICANT CHANGE UP (ref 9.8–13.1)

## 2017-10-03 RX ORDER — ZOLPIDEM TARTRATE 10 MG/1
5 TABLET ORAL AT BEDTIME
Qty: 0 | Refills: 0 | Status: DISCONTINUED | OUTPATIENT
Start: 2017-10-03 | End: 2017-10-09

## 2017-10-03 RX ORDER — SODIUM CHLORIDE 9 MG/ML
1000 INJECTION INTRAMUSCULAR; INTRAVENOUS; SUBCUTANEOUS
Qty: 0 | Refills: 0 | Status: DISCONTINUED | OUTPATIENT
Start: 2017-10-03 | End: 2017-10-10

## 2017-10-03 RX ORDER — WARFARIN SODIUM 2.5 MG/1
5 TABLET ORAL ONCE
Qty: 0 | Refills: 0 | Status: COMPLETED | OUTPATIENT
Start: 2017-10-03 | End: 2017-10-03

## 2017-10-03 RX ORDER — WARFARIN SODIUM 2.5 MG/1
2.5 TABLET ORAL ONCE
Qty: 0 | Refills: 0 | Status: COMPLETED | OUTPATIENT
Start: 2017-10-03 | End: 2017-10-03

## 2017-10-03 RX ADMIN — OXYCODONE AND ACETAMINOPHEN 2 TABLET(S): 5; 325 TABLET ORAL at 13:38

## 2017-10-03 RX ADMIN — TRAMADOL HYDROCHLORIDE 100 MILLIGRAM(S): 50 TABLET ORAL at 10:42

## 2017-10-03 RX ADMIN — HEPARIN SODIUM 20 UNIT(S)/HR: 5000 INJECTION INTRAVENOUS; SUBCUTANEOUS at 03:41

## 2017-10-03 RX ADMIN — SODIUM CHLORIDE 3 MILLILITER(S): 9 INJECTION INTRAMUSCULAR; INTRAVENOUS; SUBCUTANEOUS at 05:53

## 2017-10-03 RX ADMIN — LOSARTAN POTASSIUM 100 MILLIGRAM(S): 100 TABLET, FILM COATED ORAL at 05:57

## 2017-10-03 RX ADMIN — WARFARIN SODIUM 5 MILLIGRAM(S): 2.5 TABLET ORAL at 19:15

## 2017-10-03 RX ADMIN — OXYCODONE AND ACETAMINOPHEN 2 TABLET(S): 5; 325 TABLET ORAL at 19:47

## 2017-10-03 RX ADMIN — OXYCODONE AND ACETAMINOPHEN 2 TABLET(S): 5; 325 TABLET ORAL at 20:17

## 2017-10-03 RX ADMIN — Medication 1: at 10:19

## 2017-10-03 RX ADMIN — TRAMADOL HYDROCHLORIDE 100 MILLIGRAM(S): 50 TABLET ORAL at 11:30

## 2017-10-03 RX ADMIN — OXYCODONE AND ACETAMINOPHEN 2 TABLET(S): 5; 325 TABLET ORAL at 06:34

## 2017-10-03 RX ADMIN — WARFARIN SODIUM 2.5 MILLIGRAM(S): 2.5 TABLET ORAL at 22:04

## 2017-10-03 RX ADMIN — SODIUM CHLORIDE 3 MILLILITER(S): 9 INJECTION INTRAMUSCULAR; INTRAVENOUS; SUBCUTANEOUS at 13:31

## 2017-10-03 RX ADMIN — SODIUM CHLORIDE 3 MILLILITER(S): 9 INJECTION INTRAMUSCULAR; INTRAVENOUS; SUBCUTANEOUS at 21:52

## 2017-10-03 RX ADMIN — HEPARIN SODIUM 20 UNIT(S)/HR: 5000 INJECTION INTRAVENOUS; SUBCUTANEOUS at 13:37

## 2017-10-03 RX ADMIN — Medication 1: at 19:15

## 2017-10-03 RX ADMIN — SERTRALINE 200 MILLIGRAM(S): 25 TABLET, FILM COATED ORAL at 22:03

## 2017-10-03 RX ADMIN — OXYCODONE AND ACETAMINOPHEN 2 TABLET(S): 5; 325 TABLET ORAL at 14:08

## 2017-10-03 RX ADMIN — ZOLPIDEM TARTRATE 5 MILLIGRAM(S): 10 TABLET ORAL at 00:25

## 2017-10-03 RX ADMIN — ZOLPIDEM TARTRATE 5 MILLIGRAM(S): 10 TABLET ORAL at 22:21

## 2017-10-03 RX ADMIN — OXYCODONE AND ACETAMINOPHEN 2 TABLET(S): 5; 325 TABLET ORAL at 06:04

## 2017-10-03 NOTE — DISCHARGE NOTE ADULT - HOSPITAL COURSE
Patient is a 57y Male with history of DVT who stopped taking his anticoagulation, he presented with LLE SVT and went to the OR with Dr Adams s/p thrombectomy, post op he was on heparin gtt with coumadin bridge INR 2.07 today Patient is a 57y Male with history of DVT who stopped taking his anticoagulation, he presented with LLE SVT and went to the OR with Dr Adams s/p thrombectomy, post op he was on heparin gtt with coumadin bridge INR 2.07 today, rechecked prior to d/c 2.14. During hospital course patients diet was slowly advanced as tolerated.  At this time, pt is tolerating a regular diet, ambulating and voiding.  Pt has been deemed stable for discharge at this time.

## 2017-10-03 NOTE — DISCHARGE NOTE ADULT - MEDICATION SUMMARY - MEDICATIONS TO TAKE
I will START or STAY ON the medications listed below when I get home from the hospital:    traMADol 50 mg oral tablet  -- 2  by mouth 3 times a day  -- Indication: For Pain    acetaminophen 325 mg oral tablet  -- 2 tab(s) by mouth every 6 hours, As needed, Mild Pain (1 - 3)  -- Indication: For Pain    losartan 100 mg oral tablet  -- 1 tab(s) by mouth once a day  -- Indication: For Hypertension, Essential    Coumadin 6 mg oral tablet  -- 2 tab(s) by mouth once a day MDD:2  -- Do not take this drug if you are pregnant.  It is very important that you take or use this exactly as directed.  Do not skip doses or discontinue unless directed by your doctor.  Obtain medical advice before taking any non-prescription drugs as some may affect the action of this medication.    -- Indication: For DVT (deep venous thrombosis)    sertraline 100 mg oral tablet  -- 2 tab(s) by mouth once a day  -- Indication: For Depression    metFORMIN 500 mg oral tablet  -- 1 tab(s) by mouth 3 times a day with meals  -- Indication: For Diabetes Mellitus    Jardiance 25 mg oral tablet  -- 1 tab(s) by mouth once a day (in the morning)  -- Indication: For Diabetes Mellitus    zolpidem 12.5 mg oral tablet, extended release  -- 1 tab(s) by mouth once a day (at bedtime)  -- Indication: For Depression    Advair Diskus 500 mcg-50 mcg inhalation powder  -- 1 puff(s) inhaled 2 times a day  -- Indication: For Asthma    docusate sodium 100 mg oral capsule  -- 1 cap(s) by mouth 3 times a day  -- Indication: For Constipation    senna oral tablet  -- 2 tab(s) by mouth once a day (at bedtime)  -- Indication: For Constipation    fluticasone nasal  -- nasal once a day  -- Indication: For Allergies    Nasal Mist 0.05% nasal spray  -- 2 spray(s) into nose every 4 hours  -- Indication: For Allergies    omeprazole 40 mg oral delayed release capsule  -- 1 cap(s) by mouth once a day  -- Indication: For GERD I will START or STAY ON the medications listed below when I get home from the hospital:    traMADol 50 mg oral tablet  -- 2  by mouth 3 times a day  -- Indication: For Pain    acetaminophen 325 mg oral tablet  -- 2 tab(s) by mouth every 6 hours, As needed, Mild Pain (1 - 3)  -- Indication: For Pain    losartan 100 mg oral tablet  -- 1 tab(s) by mouth once a day  -- Indication: For Hypertension, Essential    Coumadin 10 mg oral tablet  -- 1 tab(s) by mouth once a day MDD:1  -- Do not take this drug if you are pregnant.  It is very important that you take or use this exactly as directed.  Do not skip doses or discontinue unless directed by your doctor.  Obtain medical advice before taking any non-prescription drugs as some may affect the action of this medication.    -- Indication: For DVT (deep venous thrombosis)    sertraline 100 mg oral tablet  -- 2 tab(s) by mouth once a day  -- Indication: For Depression    metFORMIN 500 mg oral tablet  -- 1 tab(s) by mouth 3 times a day with meals  -- Indication: For Diabetes Mellitus    Jardiance 25 mg oral tablet  -- 1 tab(s) by mouth once a day (in the morning)  -- Indication: For Diabetes Mellitus    zolpidem 12.5 mg oral tablet, extended release  -- 1 tab(s) by mouth once a day (at bedtime)  -- Indication: For Depression    Advair Diskus 500 mcg-50 mcg inhalation powder  -- 1 puff(s) inhaled 2 times a day  -- Indication: For Asthma    docusate sodium 100 mg oral capsule  -- 1 cap(s) by mouth 3 times a day  -- Indication: For Constipation    senna oral tablet  -- 2 tab(s) by mouth once a day (at bedtime)  -- Indication: For Constipation    fluticasone nasal  -- nasal once a day  -- Indication: For Allergies    Nasal Mist 0.05% nasal spray  -- 2 spray(s) into nose every 4 hours  -- Indication: For Allergies    omeprazole 40 mg oral delayed release capsule  -- 1 cap(s) by mouth once a day  -- Indication: For GERD

## 2017-10-03 NOTE — DISCHARGE NOTE ADULT - CARE PROVIDERS DIRECT ADDRESSES
[email or web address] [email or web address],DirectAddress_Unknown,DirectAddress_Unknown

## 2017-10-03 NOTE — DIETITIAN INITIAL EVALUATION ADULT. - NS FNS WEIGHT CHANGE REASON
intentional/Pt reports that he lost ~35# in 1 year and gained 20# back d/t to his medical condition.

## 2017-10-03 NOTE — PROGRESS NOTE ADULT - PROBLEM SELECTOR PLAN 1
s/p thrombectomy  doing well  on IV heparin post procedure  will d/w vascular attending if can switch to full dose warfarin   s/p warfarin 10 mg yesterday  monitor INR daily

## 2017-10-03 NOTE — PROGRESS NOTE ADULT - SUBJECTIVE AND OBJECTIVE BOX
Surgery Team C (Vascular) Progress Note:    HD 5 POD 2 s/p LLE DVT thrombectomy    Subjective:  No acute overnight events.  Yesterday patient's heparin gtt bridge to coumadin started w/ 10mg coumadin per heme/onc.  Patient examined at bedside this AM - states his groin rash (site of surgical incision) is improving.  No complaints at this time.    Objective:  Vital Signs Last 24 Hrs  T(C): 37 (03 Oct 2017 06:01), Max: 37.2 (02 Oct 2017 14:08)  T(F): 98.6 (03 Oct 2017 06:01), Max: 98.9 (02 Oct 2017 14:08)  HR: 74 (03 Oct 2017 06:01) (70 - 85)  BP: 142/88 (03 Oct 2017 06:01) (138/75 - 149/79)  RR: 16 (03 Oct 2017 06:01) (16 - 18)  SpO2: 99% (03 Oct 2017 06:01) (97% - 100%)    Labs:                        11.6   13.61 )-----------( 306      ( 02 Oct 2017 06:00 )             34.1       10-02    140  |  101  |  12  ----------------------------<  222<H>  4.1   |  23  |  0.69    Ca    8.5      02 Oct 2017 06:00  Phos  3.0     10-01  Mg     1.5     10-01        I&O's Detail    02 Oct 2017 07:01  -  03 Oct 2017 07:00  --------------------------------------------------------  IN:    heparin Infusion: 427 mL    lactated ringers.: 210 mL    Oral Fluid: 1450 mL  Total IN: 2087 mL    OUT:    Indwelling Catheter - Urethral: 800 mL    Voided: 1500 mL  Total OUT: 2300 mL    Total NET: -213 mL      Focused Physical Exam:  General: NAD  Resp: Nonlabored breathing  Extremities: L groin incision c/d/i with staples, TTP, no hematoma, mild erythema but improvement from yesterday (likely irritation). L lower leg medial incision c/d/i, no evidence of hematoma. mildly tender to palpation lle, soft  Pulses: Palpable DP pulses b/l.    MEDICATIONS  (STANDING):  dextrose 50% Injectable 12.5 Gram(s) IV Push once  dextrose 50% Injectable 25 Gram(s) IV Push once  dextrose 50% Injectable 25 Gram(s) IV Push once  heparin  Infusion 1600 Unit(s)/Hr (20 mL/Hr) IV Continuous <Continuous>  influenza   Vaccine 0.5 milliLiter(s) IntraMuscular once  insulin lispro (HumaLOG) corrective regimen sliding scale   SubCutaneous Before meals and at bedtime  losartan 100 milliGRAM(s) Oral daily  sertraline 200 milliGRAM(s) Oral daily  sodium chloride 0.9% lock flush 3 milliLiter(s) IV Push every 8 hours    MEDICATIONS  (PRN):  acetaminophen   Tablet. 650 milliGRAM(s) Oral every 6 hours PRN Mild Pain (1 - 3)  dextrose Gel 1 Dose(s) Oral once PRN Blood Glucose LESS THAN 70 milliGRAM(s)/deciliter  glucagon  Injectable 1 milliGRAM(s) IntraMuscular once PRN Glucose LESS THAN 70 milligrams/deciliter  oxyCODONE    5 mG/acetaminophen 325 mG 1 Tablet(s) Oral every 6 hours PRN Moderate Pain (4 - 6)  oxyCODONE    5 mG/acetaminophen 325 mG 2 Tablet(s) Oral every 6 hours PRN Severe Pain (7 - 10)  traMADol 100 milliGRAM(s) Oral three times a day PRN Break Through pain  zolpidem 5 milliGRAM(s) Oral at bedtime PRN Insomnia Surgery Team C (Vascular) Progress Note:    HD 5 POD 2 s/p LLE DVT thrombectomy    Subjective:  No acute overnight events.  Yesterday patient's heparin gtt bridge to coumadin started w/ 10mg coumadin per heme/onc.  Patient examined at bedside this AM - states his groin rash (site of surgical incision) is improving.  No complaints at this time.    Objective:  Vital Signs Last 24 Hrs  T(C): 37 (03 Oct 2017 06:01), Max: 37.2 (02 Oct 2017 14:08)  T(F): 98.6 (03 Oct 2017 06:01), Max: 98.9 (02 Oct 2017 14:08)  HR: 74 (03 Oct 2017 06:01) (70 - 85)  BP: 142/88 (03 Oct 2017 06:01) (138/75 - 149/79)  RR: 16 (03 Oct 2017 06:01) (16 - 18)  SpO2: 99% (03 Oct 2017 06:01) (97% - 100%)    Labs:                        11.6   13.61 )-----------( 306      ( 02 Oct 2017 06:00 )             34.1       10-02    140  |  101  |  12  ----------------------------<  222<H>  4.1   |  23  |  0.69    Ca    8.5      02 Oct 2017 06:00  Phos  3.0     10-01  Mg     1.5     10-01        I&O's Detail    02 Oct 2017 07:01  -  03 Oct 2017 07:00  --------------------------------------------------------  IN:    heparin Infusion: 427 mL    lactated ringers.: 210 mL    Oral Fluid: 1450 mL  Total IN: 2087 mL    OUT:    Indwelling Catheter - Urethral: 800 mL    Voided: 1500 mL  Total OUT: 2300 mL    Total NET: -213 mL      Focused Physical Exam:  General: NAD  Resp: Nonlabored breathing  Extremities: L groin incision c/d/i with staples, TTP, no hematoma, mild erythema but improvement from yesterday (likely irritation). L lower leg medial incision c/d/i, no evidence of hematoma. mildly tender to palpation lle, soft w sig dec in left thigh edema   Pulses: Palpable DP pulses b/l.    MEDICATIONS  (STANDING):  dextrose 50% Injectable 12.5 Gram(s) IV Push once  dextrose 50% Injectable 25 Gram(s) IV Push once  dextrose 50% Injectable 25 Gram(s) IV Push once  heparin  Infusion 1600 Unit(s)/Hr (20 mL/Hr) IV Continuous <Continuous>  influenza   Vaccine 0.5 milliLiter(s) IntraMuscular once  insulin lispro (HumaLOG) corrective regimen sliding scale   SubCutaneous Before meals and at bedtime  losartan 100 milliGRAM(s) Oral daily  sertraline 200 milliGRAM(s) Oral daily  sodium chloride 0.9% lock flush 3 milliLiter(s) IV Push every 8 hours    MEDICATIONS  (PRN):  acetaminophen   Tablet. 650 milliGRAM(s) Oral every 6 hours PRN Mild Pain (1 - 3)  dextrose Gel 1 Dose(s) Oral once PRN Blood Glucose LESS THAN 70 milliGRAM(s)/deciliter  glucagon  Injectable 1 milliGRAM(s) IntraMuscular once PRN Glucose LESS THAN 70 milligrams/deciliter  oxyCODONE    5 mG/acetaminophen 325 mG 1 Tablet(s) Oral every 6 hours PRN Moderate Pain (4 - 6)  oxyCODONE    5 mG/acetaminophen 325 mG 2 Tablet(s) Oral every 6 hours PRN Severe Pain (7 - 10)  traMADol 100 milliGRAM(s) Oral three times a day PRN Break Through pain  zolpidem 5 milliGRAM(s) Oral at bedtime PRN Insomnia

## 2017-10-03 NOTE — DISCHARGE NOTE ADULT - CARE PROVIDER_API CALL
Lazarus Adams), Vascular Surgery  [address]  [address]  [place] [address]  Phone: [phone]  Fax: [phone]    Alejandro Hartman), Cardiology; Internal Medicine; Nuclear Cardiology  [address] [place] [address]  Phone: [phone]  Fax: [phone] Lazarus Adams), Vascular Surgery  [address] [facility]  [address]  [place] [address]  Phone: [phone]  Fax: [phone]    Alejandro Hartman), Cardiology; Internal Medicine; Nuclear Cardiology  [address] [place] [address]  Phone: [phone]  Fax: [phone]    Max Darnell), EndocrinologyMetabDiabetes; Internal Medicine  [address] [facility] [address]  [address]  [place], [place] [id number]  Phone: [phone]  Fax: [phone]

## 2017-10-03 NOTE — DIETITIAN INITIAL EVALUATION ADULT. - ADHERENCE
Regular diet without any restrictions. Pt states he likes Estonian and Mexican food. He usually eats one meal per day (in the evening). Sometimes he will have soup during the day. Pt presents with HgbA1c 6.7% (09/28)/poor

## 2017-10-03 NOTE — DIETITIAN INITIAL EVALUATION ADULT. - OTHER INFO
Nutrition consult received for education. Pt is 56 y/o M with past medical history of DM, HTN, HLD, Depression, Knee replacement in 2013. Pt presents with left lower extremity with extensive DVT. Now on Coumadin. Met with patient at bedside. Pt reports good appetite and PO intake at present; consumes >80% of his meals. He denies any nausea, vomiting, diarrhea or constipation nor any chewing or swallowing difficulties. Patient has no known food allergies. RD provides comprehensive verbal and written education on Coumadin/Vitamin K interaction, vitamin K points and serving sizes, consistent vitamin K intake.

## 2017-10-03 NOTE — DISCHARGE NOTE ADULT - HOME CARE AGENCY
[facility] agency [phone]. Visiting Nurse to visit day after discharge. Nurse to call prior to visit to arrange time.

## 2017-10-03 NOTE — DISCHARGE NOTE ADULT - PATIENT PORTAL LINK FT
“You can access the Follow[employer] Patient Portal, offered by [facility], by registering with the following website: [email or web address]”

## 2017-10-03 NOTE — DIETITIAN INITIAL EVALUATION ADULT. - ENERGY NEEDS
Admission weight 106 kg (09/28), Height 5'9'', BMI 32.6, IBW 72.7 kg  No noted edema or pressure injuries.

## 2017-10-03 NOTE — DISCHARGE NOTE ADULT - PLAN OF CARE
DVT Continue Coumadin, follow up with your cardiologist Dr. Hartman for your INR to be checked  [address] [place] [address] [phone] Continue Coumadin, follow up with your cardiologist Dr. Hartman for your INR to be checked  As per Esvin Tay from hem/onc need at least 6-9 months of complete anticoagulation followed by repeat evaluation to demonstrate complete resolution of clot Thrombectomy WOUND CARE:  Please keep incisions clean and dry. Please do not Scrub or rub incisions. Do not use lotion or powder on incisions.   BATHING: You may shower and/or sponge bathe. You may use warm soapy water in the shower and rinse, pat dry.  ACTIVITY: No heavy lifting or straining. Otherwise, you may return to your usual level of physical activity. If you are taking narcotic pain medication DO NOT drive a car, operate machinery or make important decisions.  DIET: Return to your usual diet.  NOTIFY YOUR SURGEON IF: You have any bleeding that does not stop, any pus draining from your wound(s), any fever (over 100.4 F) persistent nausea/vomiting, or if your pain is not controlled on your discharge pain medications.  Please follow up with your primary care physician in one week regarding your hospitalization.  Please follow up with your surgeon, Dr. Adams next week for staple removal Continue Coumadin (12mg starting tonight, take this medication every night), follow up with your primary care doctor Dr Darnell on Thursday for your INR to be checked, your INR should be between 2-3, your INR on 10/10 was 2.07  As per Esvin Tay from hem/onc need at least 6-9 months of complete anticoagulation followed by repeat evaluation to demonstrate complete resolution of clot Continue Coumadin (10mg starting tonight, take this medication every night), follow up with your primary care doctor Dr Darnell on Thursday for your INR to be checked, your INR should be between 2-3, your INR on 10/10 was 2.07  As per Esvin Tay from hem/onc need at least 6-9 months of complete anticoagulation followed by repeat evaluation to demonstrate complete resolution of clot

## 2017-10-03 NOTE — PROGRESS NOTE ADULT - ASSESSMENT
56 yo M with acute L DVT, prior R DVT, hemodynamically stable  -Per hematology recs, patient is being bridge from heparin gtt to coumadin; started yesterday 10/02/17 at 10mg coumadin, continue transition today, will increase per heme/onc recs  -pain control post op  -continue regular diet  -OOB 56 yo M with acute L DVT, prior R DVT, hemodynamically stable  -Per hematology recs, patient is being bridge from heparin gtt to coumadin; started yesterday 10/02/17 at 10mg coumadin, continue transition today, will increase per heme/onc recs  -pain control post op  -continue regular diet  -OOB  continue anticoag

## 2017-10-03 NOTE — PROGRESS NOTE ADULT - ASSESSMENT
Patient with left lower extremity extensive DVT. About 3 months ago diagnosed with right lower extremity DVT and PE, but for last 2 months was off anticoagulation as he could not afford it.  Now readmitted with extensive left lower extremity DVT.  He had left fem/popliteal thrombectomy by IR yesterday and currently on UFH.  he had his hypercoagulable workup drawn that shows low protein C functional assay and postive DRVVT. These tests were done after he received one dose of Coumadin that can affect protein C level and presence of heparin can give a positive DRVVT.  other tests, including anticardiolipin antibody and beta 2 glycoprotein requested together with factor V Leiden and prothrombin gene mutation. His CT scan did not show any evidence of malignancy.  clinical exam nor, organomegaly, or any new lumps.  At present, the cause of recurrent venous thrombosis is not clear. Patient is on coumadin with heparin bridge.

## 2017-10-03 NOTE — DISCHARGE NOTE ADULT - CARE PLAN
Goal:	DVT  Instructions for follow-up, activity and diet:	Continue Coumadin, follow up with your cardiologist Dr. Hartman for your INR to be checked  [address] [place] [address] [phone] Goal:	DVT  Instructions for follow-up, activity and diet:	Continue Coumadin, follow up with your cardiologist Dr. Hartman for your INR to be checked  As per Esvin Tay from hem/onc need at least 6-9 months of complete anticoagulation followed by repeat evaluation to demonstrate complete resolution of clot Goal:	Thrombectomy  Instructions for follow-up, activity and diet:	WOUND CARE:  Please keep incisions clean and dry. Please do not Scrub or rub incisions. Do not use lotion or powder on incisions.   BATHING: You may shower and/or sponge bathe. You may use warm soapy water in the shower and rinse, pat dry.  ACTIVITY: No heavy lifting or straining. Otherwise, you may return to your usual level of physical activity. If you are taking narcotic pain medication DO NOT drive a car, operate machinery or make important decisions.  DIET: Return to your usual diet.  NOTIFY YOUR SURGEON IF: You have any bleeding that does not stop, any pus draining from your wound(s), any fever (over 100.4 F) persistent nausea/vomiting, or if your pain is not controlled on your discharge pain medications.  Please follow up with your primary care physician in one week regarding your hospitalization.  Please follow up with your surgeon, Dr. Adams next week for staple removal  Goal:	DVT  Instructions for follow-up, activity and diet:	Continue Coumadin (12mg starting tonight, take this medication every night), follow up with your primary care doctor Dr Darnell on Thursday for your INR to be checked, your INR should be between 2-3, your INR on 10/10 was 2.07  As per Esvin Tay from hem/onc need at least 6-9 months of complete anticoagulation followed by repeat evaluation to demonstrate complete resolution of clot Goal:	Thrombectomy  Instructions for follow-up, activity and diet:	WOUND CARE:  Please keep incisions clean and dry. Please do not Scrub or rub incisions. Do not use lotion or powder on incisions.   BATHING: You may shower and/or sponge bathe. You may use warm soapy water in the shower and rinse, pat dry.  ACTIVITY: No heavy lifting or straining. Otherwise, you may return to your usual level of physical activity. If you are taking narcotic pain medication DO NOT drive a car, operate machinery or make important decisions.  DIET: Return to your usual diet.  NOTIFY YOUR SURGEON IF: You have any bleeding that does not stop, any pus draining from your wound(s), any fever (over 100.4 F) persistent nausea/vomiting, or if your pain is not controlled on your discharge pain medications.  Please follow up with your primary care physician in one week regarding your hospitalization.  Please follow up with your surgeon, Dr. Adams next week for staple removal  Goal:	DVT  Instructions for follow-up, activity and diet:	Continue Coumadin (10mg starting tonight, take this medication every night), follow up with your primary care doctor Dr Darnell on Thursday for your INR to be checked, your INR should be between 2-3, your INR on 10/10 was 2.07  As per Esvin Tay from hem/onc need at least 6-9 months of complete anticoagulation followed by repeat evaluation to demonstrate complete resolution of clot

## 2017-10-03 NOTE — PROGRESS NOTE ADULT - SUBJECTIVE AND OBJECTIVE BOX
Patient seen this afternoon,doing well ,left lower extremity swelling reduced .  Vital Signs Last 24 Hrs  T(C): 37.1 (03 Oct 2017 14:00), Max: 37.2 (02 Oct 2017 17:12)  T(F): 98.8 (03 Oct 2017 14:00), Max: 98.9 (02 Oct 2017 17:12)  HR: 77 (03 Oct 2017 14:00) (71 - 77)  BP: 130/79 (03 Oct 2017 14:00) (113/74 - 142/88)  BP(mean): --  RR: 16 (03 Oct 2017 14:00) (16 - 18)  SpO2: 98% (03 Oct 2017 14:00) (97% - 99%)                          11.6   13.61 )-----------( 306      ( 02 Oct 2017 06:00 )             34.1       10-02    140  |  101  |  12  ----------------------------<  222<H>  4.1   |  23  |  0.69    Ca    8.5      02 Oct 2017 06:00            PT/INR - ( 03 Oct 2017 06:37 )   PT: 12.4 SEC;   INR: 1.10          PTT - ( 03 Oct 2017 13:15 )  PTT:72.3 SEC    diagnosis

## 2017-10-03 NOTE — DISCHARGE NOTE ADULT - MEDICATION SUMMARY - MEDICATIONS TO STOP TAKING
I will STOP taking the medications listed below when I get home from the hospital:    Xarelto 20 mg oral tablet  -- 1 tab(s) by mouth once a day (in the evening) with food

## 2017-10-03 NOTE — DISCHARGE NOTE ADULT - PROVIDER TOKENS
TOKEN:'[id number]:'[id number]' TOKEN:'[id number]:'[id number]:'[id number]'

## 2017-10-03 NOTE — PROGRESS NOTE ADULT - SUBJECTIVE AND OBJECTIVE BOX
Patient is a 57y old  Male who presents with a chief complaint of left leg swelling and pain (03 Oct 2017 09:03)      SUBJECTIVE / OVERNIGHT EVENTS: No nausea, vomiting or diarrhea, no fever or chills, no dizziness or chest pain, no dysuria or hematuria, no joint pain or swelling  left groin less red and swollen    MEDICATIONS  (STANDING):  dextrose 50% Injectable 12.5 Gram(s) IV Push once  dextrose 50% Injectable 25 Gram(s) IV Push once  dextrose 50% Injectable 25 Gram(s) IV Push once  heparin  Infusion 1600 Unit(s)/Hr (20 mL/Hr) IV Continuous <Continuous>  influenza   Vaccine 0.5 milliLiter(s) IntraMuscular once  insulin lispro (HumaLOG) corrective regimen sliding scale   SubCutaneous Before meals and at bedtime  losartan 100 milliGRAM(s) Oral daily  sertraline 200 milliGRAM(s) Oral daily  sodium chloride 0.9% lock flush 3 milliLiter(s) IV Push every 8 hours    MEDICATIONS  (PRN):  acetaminophen   Tablet. 650 milliGRAM(s) Oral every 6 hours PRN Mild Pain (1 - 3)  dextrose Gel 1 Dose(s) Oral once PRN Blood Glucose LESS THAN 70 milliGRAM(s)/deciliter  glucagon  Injectable 1 milliGRAM(s) IntraMuscular once PRN Glucose LESS THAN 70 milligrams/deciliter  oxyCODONE    5 mG/acetaminophen 325 mG 1 Tablet(s) Oral every 6 hours PRN Moderate Pain (4 - 6)  oxyCODONE    5 mG/acetaminophen 325 mG 2 Tablet(s) Oral every 6 hours PRN Severe Pain (7 - 10)  traMADol 100 milliGRAM(s) Oral three times a day PRN Break Through pain  zolpidem 5 milliGRAM(s) Oral at bedtime PRN Insomnia        CAPILLARY BLOOD GLUCOSE  169 (03 Oct 2017 08:26)  158 (02 Oct 2017 22:23)  122 (02 Oct 2017 16:37)        I&O's Summary    02 Oct 2017 07:01  -  03 Oct 2017 07:00  --------------------------------------------------------  IN: 2087 mL / OUT: 2300 mL / NET: -213 mL    03 Oct 2017 07:01  -  03 Oct 2017 12:43  --------------------------------------------------------  IN: 310 mL / OUT: 1400 mL / NET: -1090 mL    PHYSICAL EXAM:  vital signs as above  in no apparent distress  HEENT: KEIRY EOMI  Neck: Supple, no JVD, no thyromegaly  Lungs: clear, no rhonchi, no wheeze, no crackles  CVS: S1 S2 no M/R/G  Abdomen: no tenderness, no organomegaly, BS present  Neuro: AO x 3 no focal weakness, no sensory abnormalities  Psych: appropriate affect  Skin: warm, dry  Ext: no edema, no clubbing + clean suture lines over incision sites in left groin and left medial thigh  less redness and warmth left groin  less warmth and tenseness left LE, ROM improved   Msk: no joint swelling or deformities b/l scars over knees  Back: no CVA tenderness, no kyphosis/scoliosis    LABS:                        11.6   13.61 )-----------( 306      ( 02 Oct 2017 06:00 )             34.1     10-02    140  |  101  |  12  ----------------------------<  222<H>  4.1   |  23  |  0.69    Ca    8.5      02 Oct 2017 06:00  Phos  3.0     10-01  Mg     1.5     10-01      PT/INR - ( 03 Oct 2017 06:37 )   PT: 12.4 SEC;   INR: 1.10          PTT - ( 03 Oct 2017 06:37 )  PTT:66.5 SEC            Consultant(s) Notes Reviewed:      Care Discussed with Consultants/Other Providers:    Contact Number, Dr Ugarte [phone]

## 2017-10-03 NOTE — DISCHARGE NOTE ADULT - ADDITIONAL INSTRUCTIONS
Please call Dr. Adams at [phone] to make an appointment in one week Please call Dr. Adams at [phone] to make an appointment in one week  Please follow up with Dr. Hartman this week for INR check [address] [place] [address] [phone]

## 2017-10-03 NOTE — PROGRESS NOTE ADULT - ASSESSMENT
56 y/o male, with PMHx of DM (on metformin), HTN (hydrochlorothiazide) right leg DVT and PE, 3 months ago, ?PTSD and PSHx of right knee replacement in 2013 and revision in 2014, presents to the ED c/o atraumatic left calf pain x 2 weeks now with extensive DVT left leg s/p thrombectomy

## 2017-10-03 NOTE — DIETITIAN INITIAL EVALUATION ADULT. - NS AS NUTRI INTERV MEALS SNACK
1) Continue on consistent CHO diet and DASH/TLC. 2) Provide food preferences within diet restrictions when requested by patient.

## 2017-10-04 LAB
APTT BLD: 69.4 SEC — HIGH (ref 27.5–37.4)
CARDIOLIPIN IGA SER-MCNC: SIGNIFICANT CHANGE UP
HCT VFR BLD CALC: 41.8 % — SIGNIFICANT CHANGE UP (ref 39–50)
HGB BLD-MCNC: 13.7 G/DL — SIGNIFICANT CHANGE UP (ref 13–17)
INR BLD: 1.16 — SIGNIFICANT CHANGE UP (ref 0.88–1.17)
MCHC RBC-ENTMCNC: 31.2 PG — SIGNIFICANT CHANGE UP (ref 27–34)
MCHC RBC-ENTMCNC: 32.8 % — SIGNIFICANT CHANGE UP (ref 32–36)
MCV RBC AUTO: 95.2 FL — SIGNIFICANT CHANGE UP (ref 80–100)
NRBC # FLD: 0 — SIGNIFICANT CHANGE UP
PLATELET # BLD AUTO: 401 K/UL — HIGH (ref 150–400)
PMV BLD: 9.7 FL — SIGNIFICANT CHANGE UP (ref 7–13)
PROTHROM AB SERPL-ACNC: 13 SEC — SIGNIFICANT CHANGE UP (ref 9.8–13.1)
RBC # BLD: 4.39 M/UL — SIGNIFICANT CHANGE UP (ref 4.2–5.8)
RBC # FLD: 13.6 % — SIGNIFICANT CHANGE UP (ref 10.3–14.5)
WBC # BLD: 8.35 K/UL — SIGNIFICANT CHANGE UP (ref 3.8–10.5)
WBC # FLD AUTO: 8.35 K/UL — SIGNIFICANT CHANGE UP (ref 3.8–10.5)

## 2017-10-04 RX ORDER — WARFARIN SODIUM 2.5 MG/1
5 TABLET ORAL ONCE
Qty: 0 | Refills: 0 | Status: DISCONTINUED | OUTPATIENT
Start: 2017-10-04 | End: 2017-10-04

## 2017-10-04 RX ORDER — WARFARIN SODIUM 2.5 MG/1
7.5 TABLET ORAL ONCE
Qty: 0 | Refills: 0 | Status: COMPLETED | OUTPATIENT
Start: 2017-10-04 | End: 2017-10-04

## 2017-10-04 RX ADMIN — OXYCODONE AND ACETAMINOPHEN 2 TABLET(S): 5; 325 TABLET ORAL at 05:53

## 2017-10-04 RX ADMIN — LOSARTAN POTASSIUM 100 MILLIGRAM(S): 100 TABLET, FILM COATED ORAL at 05:48

## 2017-10-04 RX ADMIN — SODIUM CHLORIDE 3 MILLILITER(S): 9 INJECTION INTRAMUSCULAR; INTRAVENOUS; SUBCUTANEOUS at 05:48

## 2017-10-04 RX ADMIN — Medication 1: at 18:30

## 2017-10-04 RX ADMIN — OXYCODONE AND ACETAMINOPHEN 2 TABLET(S): 5; 325 TABLET ORAL at 12:35

## 2017-10-04 RX ADMIN — OXYCODONE AND ACETAMINOPHEN 2 TABLET(S): 5; 325 TABLET ORAL at 07:19

## 2017-10-04 RX ADMIN — SODIUM CHLORIDE 3 MILLILITER(S): 9 INJECTION INTRAMUSCULAR; INTRAVENOUS; SUBCUTANEOUS at 22:00

## 2017-10-04 RX ADMIN — OXYCODONE AND ACETAMINOPHEN 2 TABLET(S): 5; 325 TABLET ORAL at 12:01

## 2017-10-04 RX ADMIN — OXYCODONE AND ACETAMINOPHEN 2 TABLET(S): 5; 325 TABLET ORAL at 18:45

## 2017-10-04 RX ADMIN — OXYCODONE AND ACETAMINOPHEN 2 TABLET(S): 5; 325 TABLET ORAL at 19:15

## 2017-10-04 RX ADMIN — SODIUM CHLORIDE 3 MILLILITER(S): 9 INJECTION INTRAMUSCULAR; INTRAVENOUS; SUBCUTANEOUS at 13:33

## 2017-10-04 RX ADMIN — Medication 1: at 09:22

## 2017-10-04 RX ADMIN — SERTRALINE 200 MILLIGRAM(S): 25 TABLET, FILM COATED ORAL at 22:00

## 2017-10-04 RX ADMIN — HEPARIN SODIUM 20 UNIT(S)/HR: 5000 INJECTION INTRAVENOUS; SUBCUTANEOUS at 16:08

## 2017-10-04 RX ADMIN — WARFARIN SODIUM 7.5 MILLIGRAM(S): 2.5 TABLET ORAL at 18:32

## 2017-10-04 RX ADMIN — OXYCODONE AND ACETAMINOPHEN 2 TABLET(S): 5; 325 TABLET ORAL at 06:23

## 2017-10-04 RX ADMIN — ZOLPIDEM TARTRATE 5 MILLIGRAM(S): 10 TABLET ORAL at 22:00

## 2017-10-04 NOTE — PROGRESS NOTE ADULT - SUBJECTIVE AND OBJECTIVE BOX
Patient seen this morning, doing well with reduction in left lower extremity swelling.  Vital Signs Last 24 Hrs  T(C): 36.9 (04 Oct 2017 14:27), Max: 37.1 (03 Oct 2017 18:32)  T(F): 98.5 (04 Oct 2017 14:27), Max: 98.7 (03 Oct 2017 18:32)  HR: 75 (04 Oct 2017 14:27) (66 - 96)  BP: 117/76 (04 Oct 2017 14:27) (117/76 - 142/91)  BP(mean): --  RR: 17 (04 Oct 2017 14:27) (16 - 18)  SpO2: 97% (04 Oct 2017 14:27) (95% - 100%)                          13.7   8.35  )-----------( 401      ( 04 Oct 2017 06:15 )             41.8                   PT/INR - ( 04 Oct 2017 06:15 )   PT: 13.0 SEC;   INR: 1.16          PTT - ( 04 Oct 2017 06:15 )  PTT:69.4 SEC    diagnosis

## 2017-10-04 NOTE — PROGRESS NOTE ADULT - SUBJECTIVE AND OBJECTIVE BOX
Morning Surgical Progress Note  Patient is a 57y old  Male who presents with a chief complaint of left leg swelling and pain (03 Oct 2017 09:03)    SUBJECTIVE: Patient seen and examined at bedside with surgical team, patient without complaints. Received 7.5 mg of coumadin last night    Vital Signs Last 24 Hrs  T(C): 37.1 (04 Oct 2017 10:01), Max: 37.1 (03 Oct 2017 14:00)  T(F): 98.7 (04 Oct 2017 10:01), Max: 98.8 (03 Oct 2017 14:00)  HR: 96 (04 Oct 2017 10:01) (66 - 96)  BP: 129/74 (04 Oct 2017 10:01) (121/75 - 142/91)  BP(mean): --  RR: 16 (04 Oct 2017 10:01) (16 - 18)  SpO2: 100% (04 Oct 2017 10:01) (95% - 100%)  I&O's Detail    03 Oct 2017 07:01  -  04 Oct 2017 07:00  --------------------------------------------------------  IN:    heparin Infusion: 440 mL    Oral Fluid: 1210 mL    sodium chloride 0.9%.: 280 mL  Total IN: 1930 mL    OUT:    Voided: 4950 mL  Total OUT: 4950 mL    Total NET: -3020 mL      04 Oct 2017 07:01  -  04 Oct 2017 10:10  --------------------------------------------------------  IN:    heparin Infusion: 60 mL    Oral Fluid: 250 mL    sodium chloride 0.9%.: 60 mL  Total IN: 370 mL    OUT:  Total OUT: 0 mL    Total NET: 370 mL    MEDICATIONS  (STANDING):  dextrose 50% Injectable 12.5 Gram(s) IV Push once  dextrose 50% Injectable 25 Gram(s) IV Push once  dextrose 50% Injectable 25 Gram(s) IV Push once  heparin  Infusion 1600 Unit(s)/Hr (20 mL/Hr) IV Continuous <Continuous>  influenza   Vaccine 0.5 milliLiter(s) IntraMuscular once  insulin lispro (HumaLOG) corrective regimen sliding scale   SubCutaneous Before meals and at bedtime  losartan 100 milliGRAM(s) Oral daily  sertraline 200 milliGRAM(s) Oral daily  sodium chloride 0.9% lock flush 3 milliLiter(s) IV Push every 8 hours  sodium chloride 0.9%. 1000 milliLiter(s) (20 mL/Hr) IV Continuous <Continuous>  warfarin 7.5 milliGRAM(s) Oral once    MEDICATIONS  (PRN):  acetaminophen   Tablet. 650 milliGRAM(s) Oral every 6 hours PRN Mild Pain (1 - 3)  dextrose Gel 1 Dose(s) Oral once PRN Blood Glucose LESS THAN 70 milliGRAM(s)/deciliter  glucagon  Injectable 1 milliGRAM(s) IntraMuscular once PRN Glucose LESS THAN 70 milligrams/deciliter  oxyCODONE    5 mG/acetaminophen 325 mG 1 Tablet(s) Oral every 6 hours PRN Moderate Pain (4 - 6)  oxyCODONE    5 mG/acetaminophen 325 mG 2 Tablet(s) Oral every 6 hours PRN Severe Pain (7 - 10)  traMADol 100 milliGRAM(s) Oral three times a day PRN Break Through pain  zolpidem 5 milliGRAM(s) Oral at bedtime PRN Insomnia      Physical Exam  General: A&Ox3, NAD  Ext: L groin incision c/d/i, periwound erythema resolved, soft nontender, l leg incision c/d/i no surrounding edema or erythema, feet warm    LABS:                        13.7   8.35  )-----------( 401      ( 04 Oct 2017 06:15 )             41.8           PT/INR - ( 04 Oct 2017 06:15 )   PT: 13.0 SEC;   INR: 1.16          PTT - ( 04 Oct 2017 06:15 )  PTT:69.4 SEC          Patient is a 57y Male Morning Surgical Progress Note  Patient is a 57y old  Male who presents with a chief complaint of left leg swelling and pain (03 Oct 2017 09:03)    SUBJECTIVE: Patient seen and examined at bedside with surgical team, patient without complaints. Received 7.5 mg of coumadin last night states that he is able to now ambulate at least sev blocks w/o discomfort     Vital Signs Last 24 Hrs  T(C): 37.1 (04 Oct 2017 10:01), Max: 37.1 (03 Oct 2017 14:00)  T(F): 98.7 (04 Oct 2017 10:01), Max: 98.8 (03 Oct 2017 14:00)  HR: 96 (04 Oct 2017 10:01) (66 - 96)  BP: 129/74 (04 Oct 2017 10:01) (121/75 - 142/91)  BP(mean): --  RR: 16 (04 Oct 2017 10:01) (16 - 18)  SpO2: 100% (04 Oct 2017 10:01) (95% - 100%)  I&O's Detail    03 Oct 2017 07:01  -  04 Oct 2017 07:00  --------------------------------------------------------  IN:    heparin Infusion: 440 mL    Oral Fluid: 1210 mL    sodium chloride 0.9%.: 280 mL  Total IN: 1930 mL    OUT:    Voided: 4950 mL  Total OUT: 4950 mL    Total NET: -3020 mL      04 Oct 2017 07:01  -  04 Oct 2017 10:10  --------------------------------------------------------  IN:    heparin Infusion: 60 mL    Oral Fluid: 250 mL    sodium chloride 0.9%.: 60 mL  Total IN: 370 mL    OUT:  Total OUT: 0 mL    Total NET: 370 mL    MEDICATIONS  (STANDING):  dextrose 50% Injectable 12.5 Gram(s) IV Push once  dextrose 50% Injectable 25 Gram(s) IV Push once  dextrose 50% Injectable 25 Gram(s) IV Push once  heparin  Infusion 1600 Unit(s)/Hr (20 mL/Hr) IV Continuous <Continuous>  influenza   Vaccine 0.5 milliLiter(s) IntraMuscular once  insulin lispro (HumaLOG) corrective regimen sliding scale   SubCutaneous Before meals and at bedtime  losartan 100 milliGRAM(s) Oral daily  sertraline 200 milliGRAM(s) Oral daily  sodium chloride 0.9% lock flush 3 milliLiter(s) IV Push every 8 hours  sodium chloride 0.9%. 1000 milliLiter(s) (20 mL/Hr) IV Continuous <Continuous>  warfarin 7.5 milliGRAM(s) Oral once    MEDICATIONS  (PRN):  acetaminophen   Tablet. 650 milliGRAM(s) Oral every 6 hours PRN Mild Pain (1 - 3)  dextrose Gel 1 Dose(s) Oral once PRN Blood Glucose LESS THAN 70 milliGRAM(s)/deciliter  glucagon  Injectable 1 milliGRAM(s) IntraMuscular once PRN Glucose LESS THAN 70 milligrams/deciliter  oxyCODONE    5 mG/acetaminophen 325 mG 1 Tablet(s) Oral every 6 hours PRN Moderate Pain (4 - 6)  oxyCODONE    5 mG/acetaminophen 325 mG 2 Tablet(s) Oral every 6 hours PRN Severe Pain (7 - 10)  traMADol 100 milliGRAM(s) Oral three times a day PRN Break Through pain  zolpidem 5 milliGRAM(s) Oral at bedtime PRN Insomnia      Physical Exam  General: A&Ox3, NAD  Ext: L groin incision c/d/i, periwound erythema resolved, soft nontender, l leg incision c/d/i no surrounding edema or erythema, feet warm    LABS:                        13.7   8.35  )-----------( 401      ( 04 Oct 2017 06:15 )             41.8           PT/INR - ( 04 Oct 2017 06:15 )   PT: 13.0 SEC;   INR: 1.16          PTT - ( 04 Oct 2017 06:15 )  PTT:69.4 SEC          Patient is a 57y Male

## 2017-10-04 NOTE — PROGRESS NOTE ADULT - ASSESSMENT
Physical Exam:   · Constitutional  Well-developed, well nourished  · Eyes  EOMI; PERRL; no drainage or redness  · ENMT  No oral lesions; no gross abnormalities  · Neck  No bruits; no thyromegaly or nodules  · Breasts  No masses; no nipple discharge  · Back  No deformity or limitation of movement  · Respiratory  Breath Sounds equal & clear to percussion & auscultation, no accessory muscle use  · Cardiovascular  Regular rate & rhythm, normal S1, S2; no murmurs, gallops or rubs; no S3, S4  · Gastrointestinal  Soft, non-tender, no hepatosplenomegaly, normal bowel sounds  · Genitourinary  Normal genitalia; no lesions; no discharge  · Extremities  detailed exam  · Extremities Comments  reduced swelling LLE  · Neurological  Alert & oriented; no sensory, motor or coordination deficits, normal reflexes  · Skin  No lesions; no rash  · Lymph Nodes  No lymphadedenopathy  · Musculoskeletal  No joint pain, swelling or deformity; no limitation of movement    Assessment and Plan:   · Assessment    Patient with left lower extremity extensive DVT. About 3 months ago diagnosed with right lower extremity DVT and PE, but for last 2 months was off anticoagulation as he could not afford it.  Now readmitted with extensive left lower extremity DVT.  He had left fem/popliteal thrombectomy by IR yesterday and currently on UFH.  he had his hypercoagulable workup drawn that shows low protein C functional assay and postive DRVVT. These tests were done after he received one dose of Coumadin that can affect protein C level and presence of heparin can give a positive DRVVT.  other tests, including anticardiolipin antibody and beta 2 glycoprotein requested together with factor V Leiden and prothrombin gene mutation. His CT scan did not show any evidence of malignancy.  clinical exam nor, organomegaly, or any new lumps.  At present, the cause of recurrent venous thrombosis is not clear. Patient is on coumadin with heparin bridge.  Hypercoagulable workup drawn will follow the results. Physical Exam:   · Constitutional  Well-developed, well nourished  · Eyes  EOMI; PERRL; no drainage or redness  · ENMT  No oral lesions; no gross abnormalities  · Neck  No bruits; no thyromegaly or nodules  · Breasts  No masses; no nipple discharge  · Back  No deformity or limitation of movement  · Respiratory  Breath Sounds equal & clear to percussion & auscultation, no accessory muscle use  · Cardiovascular  Regular rate & rhythm, normal S1, S2; no murmurs, gallops or rubs; no S3, S4  · Gastrointestinal  Soft, non-tender, no hepatosplenomegaly, normal bowel sounds  · Genitourinary  Normal genitalia; no lesions; no discharge  · Extremities  detailed exam  · Extremities Comments  reduced swelling LLE  · Neurological  Alert & oriented; no sensory, motor or coordination deficits, normal reflexes  · Skin  No lesions; no rash  · Lymph Nodes  No lymphadedenopathy  · Musculoskeletal  No joint pain, swelling or deformity; no limitation of movement    Assessment and Plan:   · Assessment    Patient with left lower extremity extensive DVT. About 3 months ago diagnosed with right lower extremity DVT and PE, but for last 2 months was off anticoagulation as he could not afford it.  Now readmitted with extensive left lower extremity DVT.  He had left fem/popliteal thrombectomy by IR yesterday and currently on UFH.  he had his hypercoagulable workup drawn that shows low protein C functional assay and postive DRVVT. These tests were done after he received one dose of Coumadin that can affect protein C level and presence of heparin can give a positive DRVVT.  other tests, including anticardiolipin antibody and beta 2 glycoprotein requested together with factor V Leiden and prothrombin gene mutation. His CT scan did not show any evidence of malignancy.  clinical exam normal, no organomegaly, or any new lumps.  At present, the cause of recurrent venous thrombosis is not clear. Patient is on coumadin with heparin bridge.  Hypercoagulable workup drawn will follow the results.

## 2017-10-04 NOTE — PROGRESS NOTE ADULT - SUBJECTIVE AND OBJECTIVE BOX
Patient is a 57y old  Male who presents with a chief complaint of left leg swelling and pain (03 Oct 2017 09:03)      SUBJECTIVE / OVERNIGHT EVENTS: No nausea, vomiting or diarrhea, no fever or chills, no dizziness or chest pain, no dysuria or hematuria, no joint pain or swelling  states continues to improve    MEDICATIONS  (STANDING):  dextrose 50% Injectable 12.5 Gram(s) IV Push once  dextrose 50% Injectable 25 Gram(s) IV Push once  dextrose 50% Injectable 25 Gram(s) IV Push once  heparin  Infusion 1600 Unit(s)/Hr (20 mL/Hr) IV Continuous <Continuous>  influenza   Vaccine 0.5 milliLiter(s) IntraMuscular once  insulin lispro (HumaLOG) corrective regimen sliding scale   SubCutaneous Before meals and at bedtime  losartan 100 milliGRAM(s) Oral daily  sertraline 200 milliGRAM(s) Oral daily  sodium chloride 0.9% lock flush 3 milliLiter(s) IV Push every 8 hours  sodium chloride 0.9%. 1000 milliLiter(s) (20 mL/Hr) IV Continuous <Continuous>  warfarin 7.5 milliGRAM(s) Oral once    MEDICATIONS  (PRN):  acetaminophen   Tablet. 650 milliGRAM(s) Oral every 6 hours PRN Mild Pain (1 - 3)  dextrose Gel 1 Dose(s) Oral once PRN Blood Glucose LESS THAN 70 milliGRAM(s)/deciliter  glucagon  Injectable 1 milliGRAM(s) IntraMuscular once PRN Glucose LESS THAN 70 milligrams/deciliter  oxyCODONE    5 mG/acetaminophen 325 mG 1 Tablet(s) Oral every 6 hours PRN Moderate Pain (4 - 6)  oxyCODONE    5 mG/acetaminophen 325 mG 2 Tablet(s) Oral every 6 hours PRN Severe Pain (7 - 10)  traMADol 100 milliGRAM(s) Oral three times a day PRN Break Through pain  zolpidem 5 milliGRAM(s) Oral at bedtime PRN Insomnia        CAPILLARY BLOOD GLUCOSE  130 (04 Oct 2017 12:32)  155 (04 Oct 2017 09:01)  121 (03 Oct 2017 22:13)  158 (03 Oct 2017 18:32)        I&O's Summary    03 Oct 2017 07:01  -  04 Oct 2017 07:00  --------------------------------------------------------  IN: 1930 mL / OUT: 4950 mL / NET: -3020 mL    04 Oct 2017 07:01  -  04 Oct 2017 14:43  --------------------------------------------------------  IN: 810 mL / OUT: 1100 mL / NET: -290 mL    PHYSICAL EXAM:  vital signs as above  in no apparent distress  HEENT: KEIRY EOMI  Neck: Supple, no JVD, no thyromegaly  Lungs: clear, no rhonchi, no wheeze, no crackles  CVS: S1 S2 no M/R/G  Abdomen: no tenderness, no organomegaly, BS present  Neuro: AO x 3 no focal weakness, no sensory abnormalities  Psych: appropriate affect  Skin: warm, dry  Ext: no edema, no clubbing + clean suture lines over incision sites in left groin and left medial thigh  redness and warmth left groin resolved  less warmth and tenseness left LE, ROM improved   Msk: no joint swelling or deformities b/l scars over knees  Back: no CVA tenderness, no kyphosis/scoliosis    LABS:                        13.7   8.35  )-----------( 401      ( 04 Oct 2017 06:15 )             41.8           PT/INR - ( 04 Oct 2017 06:15 )   PT: 13.0 SEC;   INR: 1.16          PTT - ( 04 Oct 2017 06:15 )  PTT:69.4 SEC            Consultant(s) Notes Reviewed:      Care Discussed with Consultants/Other Providers:    Contact Number, Dr Ugarte [phone]

## 2017-10-04 NOTE — PROGRESS NOTE ADULT - PROBLEM SELECTOR PLAN 1
s/p thrombectomy  doing well  on IV heparin post procedure  s/p warfarin 10 mg yesterday  monitor INR daily

## 2017-10-04 NOTE — PROGRESS NOTE ADULT - ASSESSMENT
56 yo M with acute L DVT, prior R DVT, hemodynamically stable, on a/c hep gtt bridge to coumadin day 3  -Per hematology recs, patient is being bridge from heparin gtt to coumadin; started 10/02/17 at 10mg coumadin, 7.5 mg given last night will give additional 7.5 today for INR of 1.1  -pain control post op  -continue regular diet  -OOB  -DW Dr. Adams 58 yo M with acute L DVT, prior R DVT, hemodynamically stable, on a/c hep gtt bridge to coumadin day 3  -Per hematology recs, patient is being bridge from heparin gtt to coumadin; started 10/02/17 at 10mg coumadin, 7.5 mg given last night will give additional 7.5 today for INR of 1.1  -pain control post op  -continue regular diet    continue iv hep rx till inr therapeutic >2  continue coumadin  rx  ADVISED PT THAT HE WILL NEED HEME F/U FOR HYPERCOAG W/U AND THAT GIVEN HIS HX IF THE W/U IS NEG GIVEN HIS HX I WOULD RECOMMEND [facility]  ANTICOAG RX  given hx and also recommend  his siblings and kids be also tested

## 2017-10-05 LAB
APTT BLD: 75.2 SEC — HIGH (ref 27.5–37.4)
B2 GLYCOPROT1 AB SER QL: NEGATIVE — SIGNIFICANT CHANGE UP
B2 GLYCOPROT1 AB SER QL: NEGATIVE — SIGNIFICANT CHANGE UP
DNA PLOIDY SPEC FC-IMP: NORMAL — SIGNIFICANT CHANGE UP
INR BLD: 1.22 — HIGH (ref 0.88–1.17)
PROTHROM AB SERPL-ACNC: 13.7 SEC — HIGH (ref 9.8–13.1)
PTR INTERPRETATION: NORMAL — SIGNIFICANT CHANGE UP

## 2017-10-05 RX ORDER — WARFARIN SODIUM 2.5 MG/1
7.5 TABLET ORAL ONCE
Qty: 0 | Refills: 0 | Status: DISCONTINUED | OUTPATIENT
Start: 2017-10-05 | End: 2017-10-05

## 2017-10-05 RX ORDER — WARFARIN SODIUM 2.5 MG/1
10 TABLET ORAL ONCE
Qty: 0 | Refills: 0 | Status: COMPLETED | OUTPATIENT
Start: 2017-10-05 | End: 2017-10-05

## 2017-10-05 RX ADMIN — SODIUM CHLORIDE 20 MILLILITER(S): 9 INJECTION INTRAMUSCULAR; INTRAVENOUS; SUBCUTANEOUS at 11:02

## 2017-10-05 RX ADMIN — Medication 1: at 09:14

## 2017-10-05 RX ADMIN — WARFARIN SODIUM 10 MILLIGRAM(S): 2.5 TABLET ORAL at 19:02

## 2017-10-05 RX ADMIN — SERTRALINE 200 MILLIGRAM(S): 25 TABLET, FILM COATED ORAL at 21:01

## 2017-10-05 RX ADMIN — OXYCODONE AND ACETAMINOPHEN 2 TABLET(S): 5; 325 TABLET ORAL at 14:52

## 2017-10-05 RX ADMIN — Medication 1: at 12:44

## 2017-10-05 RX ADMIN — SODIUM CHLORIDE 3 MILLILITER(S): 9 INJECTION INTRAMUSCULAR; INTRAVENOUS; SUBCUTANEOUS at 23:10

## 2017-10-05 RX ADMIN — OXYCODONE AND ACETAMINOPHEN 2 TABLET(S): 5; 325 TABLET ORAL at 06:49

## 2017-10-05 RX ADMIN — OXYCODONE AND ACETAMINOPHEN 2 TABLET(S): 5; 325 TABLET ORAL at 15:22

## 2017-10-05 RX ADMIN — ZOLPIDEM TARTRATE 5 MILLIGRAM(S): 10 TABLET ORAL at 22:52

## 2017-10-05 RX ADMIN — SODIUM CHLORIDE 3 MILLILITER(S): 9 INJECTION INTRAMUSCULAR; INTRAVENOUS; SUBCUTANEOUS at 05:34

## 2017-10-05 RX ADMIN — OXYCODONE AND ACETAMINOPHEN 2 TABLET(S): 5; 325 TABLET ORAL at 21:00

## 2017-10-05 RX ADMIN — Medication 1: at 23:10

## 2017-10-05 RX ADMIN — LOSARTAN POTASSIUM 100 MILLIGRAM(S): 100 TABLET, FILM COATED ORAL at 06:48

## 2017-10-05 RX ADMIN — SODIUM CHLORIDE 3 MILLILITER(S): 9 INJECTION INTRAMUSCULAR; INTRAVENOUS; SUBCUTANEOUS at 12:38

## 2017-10-05 RX ADMIN — OXYCODONE AND ACETAMINOPHEN 2 TABLET(S): 5; 325 TABLET ORAL at 21:30

## 2017-10-05 NOTE — PROGRESS NOTE ADULT - SUBJECTIVE AND OBJECTIVE BOX
Patient is a 57y old  Male who presents with a chief complaint of left leg swelling and pain (03 Oct 2017 09:03)      SUBJECTIVE / OVERNIGHT EVENTS: No nausea, vomiting or diarrhea, no fever or chills, no dizziness or chest pain, no dysuria or hematuria, no joint pain or swelling    continues to improve    MEDICATIONS  (STANDING):  dextrose 50% Injectable 12.5 Gram(s) IV Push once  dextrose 50% Injectable 25 Gram(s) IV Push once  dextrose 50% Injectable 25 Gram(s) IV Push once  heparin  Infusion 1600 Unit(s)/Hr (20 mL/Hr) IV Continuous <Continuous>  influenza   Vaccine 0.5 milliLiter(s) IntraMuscular once  insulin lispro (HumaLOG) corrective regimen sliding scale   SubCutaneous Before meals and at bedtime  losartan 100 milliGRAM(s) Oral daily  sertraline 200 milliGRAM(s) Oral daily  sodium chloride 0.9% lock flush 3 milliLiter(s) IV Push every 8 hours  sodium chloride 0.9%. 1000 milliLiter(s) (20 mL/Hr) IV Continuous <Continuous>  warfarin 10 milliGRAM(s) Oral once    MEDICATIONS  (PRN):  acetaminophen   Tablet. 650 milliGRAM(s) Oral every 6 hours PRN Mild Pain (1 - 3)  dextrose Gel 1 Dose(s) Oral once PRN Blood Glucose LESS THAN 70 milliGRAM(s)/deciliter  glucagon  Injectable 1 milliGRAM(s) IntraMuscular once PRN Glucose LESS THAN 70 milligrams/deciliter  oxyCODONE    5 mG/acetaminophen 325 mG 1 Tablet(s) Oral every 6 hours PRN Moderate Pain (4 - 6)  oxyCODONE    5 mG/acetaminophen 325 mG 2 Tablet(s) Oral every 6 hours PRN Severe Pain (7 - 10)  traMADol 100 milliGRAM(s) Oral three times a day PRN Break Through pain  zolpidem 5 milliGRAM(s) Oral at bedtime PRN Insomnia        CAPILLARY BLOOD GLUCOSE  166 (05 Oct 2017 12:24)  157 (05 Oct 2017 08:52)  140 (04 Oct 2017 21:28)  165 (04 Oct 2017 17:43)        I&O's Summary    04 Oct 2017 07:01  -  05 Oct 2017 07:00  --------------------------------------------------------  IN: 2000 mL / OUT: 4000 mL / NET: -2000 mL    05 Oct 2017 07:01  -  05 Oct 2017 14:09  --------------------------------------------------------  IN: 0 mL / OUT: 1150 mL / NET: -1150 mL        PHYSICAL EXAM:  vital signs as above  in no apparent distress  HEENT: KEIRY EOMI  Neck: Supple, no JVD, no thyromegaly  Lungs: clear, no rhonchi, no wheeze, no crackles  CVS: S1 S2 no M/R/G  Abdomen: no tenderness, no organomegaly, BS present  Neuro: AO x 3 no focal weakness, no sensory abnormalities  Psych: appropriate affect  Skin: warm, dry  Ext: no edema, no clubbing + clean suture lines over incision sites in left groin and left medial thigh  Msk: no joint swelling or deformities b/l scars over knees  Back: no CVA tenderness, no kyphosis/scoliosis    LABS:                        13.7   8.35  )-----------( 401      ( 04 Oct 2017 06:15 )             41.8           PT/INR - ( 05 Oct 2017 06:10 )   PT: 13.7 SEC;   INR: 1.22          PTT - ( 05 Oct 2017 06:10 )  PTT:75.2 SEC            Consultant(s) Notes Reviewed:      Care Discussed with Consultants/Other Providers:    Contact Number, Dr Ugarte [phone]

## 2017-10-05 NOTE — PROGRESS NOTE ADULT - ASSESSMENT
58 y/o male, with PMHx of DM (on metformin), HTN (hydrochlorothiazide) right leg DVT and PE, 3 months ago, ?PTSD and PSHx of right knee replacement in 2013 and revision in 2014, presents to the ED c/o atraumatic left calf pain x 2 weeks now with extensive DVT left leg s/p thrombectomy

## 2017-10-05 NOTE — PROGRESS NOTE ADULT - SUBJECTIVE AND OBJECTIVE BOX
C-Team progress note    SUBJECTIVE: Patient seen and examined at bedside with surgical team, patient without complaints. Continuing to bridge to coumadin.     T(C): 36.6 (10-05-17 @ 06:50), Max: 37.1 (10-04-17 @ 10:01)  HR: 70 (10-05-17 @ 06:50) (62 - 96)  BP: 121/88 (10-05-17 @ 06:50) (116/71 - 132/80)  RR: 18 (10-05-17 @ 06:50) (16 - 18)  SpO2: 100% (10-05-17 @ 06:50) (94% - 100%)  Wt(kg): --    10-04 @ 07:01  -  10-05 @ 07:00  --------------------------------------------------------  IN:    heparin Infusion: 440 mL    Oral Fluid: 1140 mL    sodium chloride 0.9%.: 420 mL  Total IN: 2000 mL    OUT:    Voided: 4000 mL  Total OUT: 4000 mL    Total NET: -2000 mL      10-05 @ 07:01  -  10-05 @ 09:59  --------------------------------------------------------  IN:  Total IN: 0 mL    OUT:    Voided: 550 mL  Total OUT: 550 mL    Total NET: -550 mL      Physical Exam  General: A&Ox3, NAD  Ext: L groin incision c/d/i, periwound erythema resolved, soft nontender, l leg incision c/d/i no surrounding edema or erythema, feet warm                          13.7   8.35  )-----------( 401      ( 04 Oct 2017 06:15 )             41.8             PT/INR - ( 05 Oct 2017 06:10 )   PT: 13.7 SEC;   INR: 1.22          PTT - ( 05 Oct 2017 06:10 )  PTT:75.2 SEC

## 2017-10-05 NOTE — PROGRESS NOTE ADULT - ASSESSMENT
Patient with left lower extremity extensive DVT. About 3 months ago diagnosed with right lower extremity DVT and PE, but for last 2 months was off anticoagulation as he could not afford it.  Now readmitted with extensive left lower extremity DVT.  He had left fem/popliteal thrombectomy by IR yesterday and currently on UFH.  he had his hypercoagulable workup drawn that shows low protein C functional assay and postive DRVVT. These tests were done after he received one dose of Coumadin that can affect protein C level and presence of heparin can give a positive DRVVT.  other tests, including anticardiolipin antibody and beta 2 glycoprotein requested together with factor V Leiden and prothrombin gene mutation. His CT scan did not show any evidence of malignancy.  clinical exam normal, no organomegaly, or any new lumps.  At present, the cause of recurrent venous thrombosis is not clear. Patient is on coumadin with heparin bridge.  · Constitutional  Well-developed, well nourished  · Eyes  EOMI; PERRL; no drainage or redness  · ENMT  No oral lesions; no gross abnormalities  · Neck  No bruits; no thyromegaly or nodules  · Breasts  No masses; no nipple discharge  · Back  No deformity or limitation of movement  · Respiratory  Breath Sounds equal & clear to percussion & auscultation, no accessory muscle use  · Cardiovascular  Regular rate & rhythm, normal S1, S2; no murmurs, gallops or rubs; no S3, S4  · Gastrointestinal  Soft, non-tender, no hepatosplenomegaly, normal bowel sounds  · Genitourinary  Normal genitalia; no lesions; no discharge  · Extremities  detailed exam  · Extremities Comments  reduced swelling LLE  · Neurological  Alert & oriented; no sensory, motor or coordination deficits, normal reflexes  · Skin  No lesions; no rash  · Lymph Nodes  No lymphadedenopathy  · Musculoskeletal  No joint pain, swelling or deformity; no limitation of movement    Assessment and Plan:   · Assessment    Patient with left lower extremity extensive DVT. About 3 months ago diagnosed with right lower extremity DVT and PE, but for last 2 months was off anticoagulation as he could not afford it.  Now readmitted with extensive left lower extremity DVT.  He had left fem/popliteal thrombectomy by IR yesterday and currently on UFH.  he had his hypercoagulable workup drawn that shows low protein C functional assay and postive DRVVT. These tests were done after he received one dose of Coumadin that can affect protein C level and presence of heparin can give a positive DRVVT.  other tests, including anticardiolipin antibody and beta 2 glycoprotein requested together with factor V Leiden and prothrombin gene mutation. His CT scan did not show any evidence of malignancy.  clinical exam nor, organomegaly, or any new lumps.  At present, the cause of recurrent venous thrombosis is not clear. Patient is on coumadin with heparin bridge.  Hypercoagulable workup drawn will follow the results.   patient does not have factor V leiden or prothrombin gene mutation in his blood work.he will need at lest 6-9 months of complete anticoagulation followed by repeat evaluation to demonstrate complete resolution of his clots.

## 2017-10-05 NOTE — PROGRESS NOTE ADULT - PROBLEM SELECTOR PLAN 1
s/p thrombectomy  doing well  on IV heparin post procedure  would dose 10 mg today (d/w Dr Adams)  INR still only 1.2

## 2017-10-05 NOTE — PROGRESS NOTE ADULT - SUBJECTIVE AND OBJECTIVE BOX
Patient seen this afternoon, feeling better and left lower extremity swelling has reduced.    Vital Signs Last 24 Hrs  T(C): 36.9 (05 Oct 2017 10:11), Max: 37 (04 Oct 2017 21:28)  T(F): 98.5 (05 Oct 2017 10:11), Max: 98.6 (04 Oct 2017 21:28)  HR: 80 (05 Oct 2017 10:11) (62 - 80)  BP: 117/73 (05 Oct 2017 10:11) (116/71 - 132/80)  BP(mean): --  RR: 18 (05 Oct 2017 10:11) (18 - 18)  SpO2: 97% (05 Oct 2017 10:11) (94% - 100%)                          13.7   8.35  )-----------( 401      ( 04 Oct 2017 06:15 )             41.8                   PT/INR - ( 05 Oct 2017 06:10 )   PT: 13.7 SEC;   INR: 1.22          PTT - ( 05 Oct 2017 06:10 )  PTT:75.2 SEC    diagnosis

## 2017-10-05 NOTE — PROGRESS NOTE ADULT - ASSESSMENT
58 yo M with acute L DVT, prior R DVT, hemodynamically stable, on a/c hep gtt bridge to coumadin  -Per hematology recs, patient is being bridge from heparin gtt to coumadin; started 10/02/17 at 10mg coumadin, 7.5 mg given last night will give additional 7.5 today, goal INR 2-3  -pain control post op  -continue regular diet  - patient will need heme f/u for genetic hypercoag w/u, if w/u is negative it is recommended he receive lifelong anticoag given his history.  Also reccomended his siblings and children be tested.

## 2017-10-06 LAB
APTT BLD: 66 SEC — HIGH (ref 27.5–37.4)
APTT BLD: 89.7 SEC — HIGH (ref 27.5–37.4)
APTT BLD: 97.9 SEC — HIGH (ref 27.5–37.4)
B2 GLYCOPROT1 AB SER QL: NEGATIVE — SIGNIFICANT CHANGE UP
CARDIOLIPIN IGM SER-MCNC: 23.06 GPL — HIGH (ref 0–23)
CARDIOLIPIN IGM SER-MCNC: 9.64 MPL — SIGNIFICANT CHANGE UP (ref 0–11)
INR BLD: 1.38 — HIGH (ref 0.88–1.17)
PROTHROM AB SERPL-ACNC: 15.6 SEC — HIGH (ref 9.8–13.1)

## 2017-10-06 RX ORDER — WARFARIN SODIUM 2.5 MG/1
7.5 TABLET ORAL ONCE
Qty: 0 | Refills: 0 | Status: DISCONTINUED | OUTPATIENT
Start: 2017-10-06 | End: 2017-10-06

## 2017-10-06 RX ORDER — WARFARIN SODIUM 2.5 MG/1
10 TABLET ORAL ONCE
Qty: 0 | Refills: 0 | Status: COMPLETED | OUTPATIENT
Start: 2017-10-06 | End: 2017-10-06

## 2017-10-06 RX ORDER — DOCUSATE SODIUM 100 MG
100 CAPSULE ORAL THREE TIMES A DAY
Qty: 0 | Refills: 0 | Status: DISCONTINUED | OUTPATIENT
Start: 2017-10-06 | End: 2017-10-10

## 2017-10-06 RX ORDER — SENNA PLUS 8.6 MG/1
2 TABLET ORAL AT BEDTIME
Qty: 0 | Refills: 0 | Status: DISCONTINUED | OUTPATIENT
Start: 2017-10-06 | End: 2017-10-10

## 2017-10-06 RX ORDER — LACTULOSE 10 G/15ML
20 SOLUTION ORAL ONCE
Qty: 0 | Refills: 0 | Status: COMPLETED | OUTPATIENT
Start: 2017-10-06 | End: 2017-10-06

## 2017-10-06 RX ADMIN — HEPARIN SODIUM 19 UNIT(S)/HR: 5000 INJECTION INTRAVENOUS; SUBCUTANEOUS at 08:21

## 2017-10-06 RX ADMIN — SODIUM CHLORIDE 3 MILLILITER(S): 9 INJECTION INTRAMUSCULAR; INTRAVENOUS; SUBCUTANEOUS at 21:28

## 2017-10-06 RX ADMIN — WARFARIN SODIUM 10 MILLIGRAM(S): 2.5 TABLET ORAL at 19:49

## 2017-10-06 RX ADMIN — OXYCODONE AND ACETAMINOPHEN 2 TABLET(S): 5; 325 TABLET ORAL at 13:24

## 2017-10-06 RX ADMIN — OXYCODONE AND ACETAMINOPHEN 2 TABLET(S): 5; 325 TABLET ORAL at 07:01

## 2017-10-06 RX ADMIN — LACTULOSE 20 GRAM(S): 10 SOLUTION ORAL at 13:24

## 2017-10-06 RX ADMIN — SERTRALINE 200 MILLIGRAM(S): 25 TABLET, FILM COATED ORAL at 21:29

## 2017-10-06 RX ADMIN — Medication 100 MILLIGRAM(S): at 13:24

## 2017-10-06 RX ADMIN — OXYCODONE AND ACETAMINOPHEN 2 TABLET(S): 5; 325 TABLET ORAL at 07:31

## 2017-10-06 RX ADMIN — OXYCODONE AND ACETAMINOPHEN 2 TABLET(S): 5; 325 TABLET ORAL at 19:49

## 2017-10-06 RX ADMIN — SENNA PLUS 2 TABLET(S): 8.6 TABLET ORAL at 21:29

## 2017-10-06 RX ADMIN — SODIUM CHLORIDE 3 MILLILITER(S): 9 INJECTION INTRAMUSCULAR; INTRAVENOUS; SUBCUTANEOUS at 13:17

## 2017-10-06 RX ADMIN — OXYCODONE AND ACETAMINOPHEN 2 TABLET(S): 5; 325 TABLET ORAL at 20:39

## 2017-10-06 RX ADMIN — SODIUM CHLORIDE 20 MILLILITER(S): 9 INJECTION INTRAMUSCULAR; INTRAVENOUS; SUBCUTANEOUS at 13:24

## 2017-10-06 RX ADMIN — OXYCODONE AND ACETAMINOPHEN 2 TABLET(S): 5; 325 TABLET ORAL at 13:54

## 2017-10-06 RX ADMIN — Medication 1: at 09:47

## 2017-10-06 RX ADMIN — Medication 100 MILLIGRAM(S): at 21:29

## 2017-10-06 RX ADMIN — LOSARTAN POTASSIUM 100 MILLIGRAM(S): 100 TABLET, FILM COATED ORAL at 07:01

## 2017-10-06 RX ADMIN — SODIUM CHLORIDE 3 MILLILITER(S): 9 INJECTION INTRAMUSCULAR; INTRAVENOUS; SUBCUTANEOUS at 06:57

## 2017-10-06 NOTE — PROGRESS NOTE ADULT - EXTREMITIES COMMENTS
swelling of left lower extremity staples in the leg
left leg with staples.
left thigh dressing in place, swelling reduced  from before.
reduced swelling LLE

## 2017-10-06 NOTE — PROGRESS NOTE ADULT - SUBJECTIVE AND OBJECTIVE BOX
Morning Surgical Progress Note  Patient is a 57y old  Male who presents with a chief complaint of left leg swelling and pain (03 Oct 2017 09:03)    SUBJECTIVE: Patient seen and examined at bedside with surgical team, patient without complaints.     Vital Signs Last 24 Hrs  T(C): 36.9 (06 Oct 2017 10:04), Max: 37.1 (05 Oct 2017 22:01)  T(F): 98.5 (06 Oct 2017 10:04), Max: 98.8 (05 Oct 2017 22:01)  HR: 79 (06 Oct 2017 10:04) (66 - 80)  BP: 107/67 (06 Oct 2017 10:04) (107/67 - 130/89)  BP(mean): --  RR: 18 (06 Oct 2017 10:04) (17 - 18)  SpO2: 97% (06 Oct 2017 10:04) (96% - 100%)  I&O's Detail    05 Oct 2017 07:01  -  06 Oct 2017 07:00  --------------------------------------------------------  IN:    heparin Infusion: 480 mL    Oral Fluid: 860 mL    sodium chloride 0.9%.: 440 mL  Total IN: 1780 mL    OUT:    Voided: 6450 mL  Total OUT: 6450 mL    Total NET: -4670 mL    MEDICATIONS  (STANDING):  dextrose 50% Injectable 12.5 Gram(s) IV Push once  dextrose 50% Injectable 25 Gram(s) IV Push once  dextrose 50% Injectable 25 Gram(s) IV Push once  heparin  Infusion 1600 Unit(s)/Hr (19 mL/Hr) IV Continuous <Continuous>  influenza   Vaccine 0.5 milliLiter(s) IntraMuscular once  insulin lispro (HumaLOG) corrective regimen sliding scale   SubCutaneous Before meals and at bedtime  losartan 100 milliGRAM(s) Oral daily  sertraline 200 milliGRAM(s) Oral daily  sodium chloride 0.9% lock flush 3 milliLiter(s) IV Push every 8 hours  sodium chloride 0.9%. 1000 milliLiter(s) (20 mL/Hr) IV Continuous <Continuous>  warfarin 7.5 milliGRAM(s) Oral once    MEDICATIONS  (PRN):  acetaminophen   Tablet. 650 milliGRAM(s) Oral every 6 hours PRN Mild Pain (1 - 3)  dextrose Gel 1 Dose(s) Oral once PRN Blood Glucose LESS THAN 70 milliGRAM(s)/deciliter  glucagon  Injectable 1 milliGRAM(s) IntraMuscular once PRN Glucose LESS THAN 70 milligrams/deciliter  oxyCODONE    5 mG/acetaminophen 325 mG 1 Tablet(s) Oral every 6 hours PRN Moderate Pain (4 - 6)  oxyCODONE    5 mG/acetaminophen 325 mG 2 Tablet(s) Oral every 6 hours PRN Severe Pain (7 - 10)  traMADol 100 milliGRAM(s) Oral three times a day PRN Break Through pain  zolpidem 5 milliGRAM(s) Oral at bedtime PRN Insomnia      Physical Exam  General: A&Ox3, NAD  Ext: LLE both incisions are closed with staples, clean dry and intact no surround erythema or edema     LABS:  PT/INR - ( 06 Oct 2017 06:44 )   PT: 15.6 SEC;   INR: 1.38     PTT - ( 06 Oct 2017 06:44 )  PTT:97.9 SEC

## 2017-10-06 NOTE — PROGRESS NOTE ADULT - ASSESSMENT
Assessment and Plan:   · Assessment		  Patient with left lower extremity extensive DVT. About 3 months ago diagnosed with right lower extremity DVT and PE, but for last 2 months was off anticoagulation as he could not afford it.  Now readmitted with extensive left lower extremity DVT.  He had left fem/popliteal thrombectomy by IR yesterday and currently on UFH.  he had his hypercoagulable workup drawn that shows low protein C functional assay and postive DRVVT. These tests were done after he received one dose of Coumadin that can affect protein C level and presence of heparin can give a positive DRVVT.  other tests, including anticardiolipin antibody and beta 2 glycoprotein requested together with factor V Leiden and prothrombin gene mutation. His CT scan did not show any evidence of malignancy.  clinical exam normal, no organomegaly, or any new lumps.  At present, the cause of recurrent venous thrombosis is not clear. Patient is on coumadin with heparin bridge.  · Constitutional  Well-developed, well nourished  · Eyes  EOMI; PERRL; no drainage or redness  · ENMT  No oral lesions; no gross abnormalities  · Neck  No bruits; no thyromegaly or nodules  · Breasts  No masses; no nipple discharge  · Back  No deformity or limitation of movement  · Respiratory  Breath Sounds equal & clear to percussion & auscultation, no accessory muscle use  · Cardiovascular  Regular rate & rhythm, normal S1, S2; no murmurs, gallops or rubs; no S3, S4  · Gastrointestinal  Soft, non-tender, no hepatosplenomegaly, normal bowel sounds  · Genitourinary  Normal genitalia; no lesions; no discharge  · Extremities  detailed exam  · Extremities Comments  reduced swelling LLE  · Neurological  Alert & oriented; no sensory, motor or coordination deficits, normal reflexes  · Skin  No lesions; no rash  · Lymph Nodes  No lymphadedenopathy  · Musculoskeletal  No joint pain, swelling or deformity; no limitation of movement    Assessment and Plan:   · Assessment    Patient with left lower extremity extensive DVT. About 3 months ago diagnosed with right lower extremity DVT and PE, but for last 2 months was off anticoagulation as he could not afford it.  Now readmitted with extensive left lower extremity DVT.  He had left fem/popliteal thrombectomy by IR yesterday and currently on UFH.  he had his hypercoagulable workup drawn that shows low protein C functional assay and postive DRVVT. These tests were done after he received one dose of Coumadin that can affect protein C level and presence of heparin can give a positive DRVVT.  other tests, including anticardiolipin antibody and beta 2 glycoprotein requested together with factor V Leiden and prothrombin gene mutation. His CT scan did not show any evidence of malignancy.  clinical exam nor, organomegaly, or any new lumps.  At present, the cause of recurrent venous thrombosis is not clear. Patient is on coumadin with heparin bridge.  Hypercoagulable workup drawn will follow the results.   patient does not have factor V leiden or prothrombin gene mutation in his blood work.he will need at lest 6-9 months of complete anticoagulation followed by repeat evaluation to demonstrate complete resolution of his clots.  10/6/17 Patient feels better.On coumadin and heparin,follow the INR. Hypercoagulable workup is negative so far.

## 2017-10-06 NOTE — PROGRESS NOTE ADULT - PROBLEM SELECTOR PLAN 1
INR 1.4 today  will dose 10 mg today as well  trend INR daily INR 1.4 today  will dose 10 mg today as well  trend INR daily  hypercoagulable work up pending

## 2017-10-06 NOTE — PROGRESS NOTE ADULT - SUBJECTIVE AND OBJECTIVE BOX
Patient is a 57y old  Male who presents with a chief complaint of left leg swelling and pain (03 Oct 2017 09:03)      SUBJECTIVE / OVERNIGHT EVENTS: No nausea, vomiting or diarrhea, no fever or chills, no dizziness or chest pain, no dysuria or hematuria, no joint pain or swelling    c/o constipation    MEDICATIONS  (STANDING):  dextrose 50% Injectable 12.5 Gram(s) IV Push once  dextrose 50% Injectable 25 Gram(s) IV Push once  dextrose 50% Injectable 25 Gram(s) IV Push once  docusate sodium 100 milliGRAM(s) Oral three times a day  heparin  Infusion 1600 Unit(s)/Hr (19 mL/Hr) IV Continuous <Continuous>  influenza   Vaccine 0.5 milliLiter(s) IntraMuscular once  insulin lispro (HumaLOG) corrective regimen sliding scale   SubCutaneous Before meals and at bedtime  lactulose Syrup 20 Gram(s) Oral once  losartan 100 milliGRAM(s) Oral daily  senna 2 Tablet(s) Oral at bedtime  sertraline 200 milliGRAM(s) Oral daily  sodium chloride 0.9% lock flush 3 milliLiter(s) IV Push every 8 hours  sodium chloride 0.9%. 1000 milliLiter(s) (20 mL/Hr) IV Continuous <Continuous>  warfarin 7.5 milliGRAM(s) Oral once    MEDICATIONS  (PRN):  acetaminophen   Tablet. 650 milliGRAM(s) Oral every 6 hours PRN Mild Pain (1 - 3)  dextrose Gel 1 Dose(s) Oral once PRN Blood Glucose LESS THAN 70 milliGRAM(s)/deciliter  glucagon  Injectable 1 milliGRAM(s) IntraMuscular once PRN Glucose LESS THAN 70 milligrams/deciliter  oxyCODONE    5 mG/acetaminophen 325 mG 1 Tablet(s) Oral every 6 hours PRN Moderate Pain (4 - 6)  oxyCODONE    5 mG/acetaminophen 325 mG 2 Tablet(s) Oral every 6 hours PRN Severe Pain (7 - 10)  traMADol 100 milliGRAM(s) Oral three times a day PRN Break Through pain  zolpidem 5 milliGRAM(s) Oral at bedtime PRN Insomnia        CAPILLARY BLOOD GLUCOSE  167 (06 Oct 2017 08:35)  156 (05 Oct 2017 22:01)  125 (05 Oct 2017 17:34)  166 (05 Oct 2017 12:24)        I&O's Summary    05 Oct 2017 07:01  -  06 Oct 2017 07:00  --------------------------------------------------------  IN: 1780 mL / OUT: 6450 mL / NET: -4670 mL    PHYSICAL EXAM:  vital signs as above  in no apparent distress  HEENT: KEIRY EOMI  Neck: Supple, no JVD, no thyromegaly  Lungs: clear, no rhonchi, no wheeze, no crackles  CVS: S1 S2 no M/R/G  Abdomen: no tenderness, no organomegaly, BS present  Neuro: AO x 3 no focal weakness, no sensory abnormalities  Psych: appropriate affect  Skin: warm, dry  Ext: no edema, no clubbing + clean suture lines over incision sites in left groin and left medial thigh  redness and warmth left groin resolved  Msk: no joint swelling or deformities b/l scars over knees  Back: no CVA tenderness, no kyphosis/scoliosis    LABS:          PT/INR - ( 06 Oct 2017 06:44 )   PT: 15.6 SEC;   INR: 1.38          PTT - ( 06 Oct 2017 06:44 )  PTT:97.9 SEC            Consultant(s) Notes Reviewed:      Care Discussed with Consultants/Other Providers:    Contact Number, Dr Ugarte [phone]

## 2017-10-06 NOTE — PROGRESS NOTE ADULT - ASSESSMENT
56 yo M with acute L DVT, prior R DVT, hemodynamically stable, on a/c hep gtt bridge to coumadin  Neuro:  Continue current treatment  CV: Essential. Continue Losartan 100 mg po qd as per medicine  Resp: Stable  GI: regular diet  Renal: No active issues  Hem: On heparin gtt with po coumadin transition s/p 10 mg overnight INR this am 1.3, will dose 7.5 mg as per Dr Adams.  Dr. Sen states patient will need at lest 6-9 months of complete anticoagulation followed by repeat evaluation to demonstrate complete resolution of his clots  INR goal 2-3  ID: Stable  D/w Dr Adams 58 yo M with acute L DVT, prior R DVT, hemodynamically stable, on a/c hep gtt bridge to coumadin  Neuro:  Continue current treatment  CV: Essential. Continue Losartan 100 mg po qd as per medicine  Resp: Stable  GI: regular diet  Renal: No active issues  Hem: On heparin gtt with po coumadin transition s/p 10 mg overnight INR this am 1.38, will dose 7.5 mg as per Dr Adams.  Dr. Sen states patient will need at lest 6-9 months of complete anticoagulation followed by repeat evaluation to demonstrate complete resolution of his clots  INR goal 2-3  ID: Stable  D/w Dr Adams 56 yo M with acute L DVT, prior R DVT, hemodynamically stable, on a/c hep gtt bridge to coumadin  Neuro:  Continue current treatment  CV: Essential. Continue Losartan 100 mg po qd as per medicine  Resp: Stable  GI: regular diet  Renal: No active issues  Hem: On heparin gtt with po coumadin transition s/p 10 mg overnight INR this am 1.38, will dose 7.5 mg as per Dr Adams.  Dr. Sen states patient will need at lest 6-9 months of complete anticoagulation followed by repeat evaluation to demonstrate complete resolution of his clots  INR goal 2-3  ID: Stable  D/w Dr Adams    ***ADDENDUM****:  Vascular Surgery resident spoke to Dr. Ugarte (Internal Medicine Attending); Dr. Ugarte conveyed that he spoke to Dr. Adams and discussed adding an additional 10mg of Coumadin today to the patient's regimen as the INR/aPTT goals have not yet been achieved with the current 7.5mg regimen.  Per Dr. Ugarte, Dr. Adams agrees, and Dr. Ugarte has ordered 10mg of Coumadin today.  The patient will likely continue this regimen tomorrow as well.  Will continue to follow CBCs and trend the coag panel.

## 2017-10-06 NOTE — PROGRESS NOTE ADULT - SUBJECTIVE AND OBJECTIVE BOX
Patient seen this afternoon, feeling better. On heparin and coumadin.  Vital Signs Last 24 Hrs  T(C): 37.1 (06 Oct 2017 18:04), Max: 37.1 (05 Oct 2017 22:01)  T(F): 98.7 (06 Oct 2017 18:04), Max: 98.8 (05 Oct 2017 22:01)  HR: 68 (06 Oct 2017 18:04) (66 - 80)  BP: 118/79 (06 Oct 2017 18:04) (107/67 - 130/89)  BP(mean): --  RR: 18 (06 Oct 2017 18:04) (17 - 18)  SpO2: 99% (06 Oct 2017 18:04) (97% - 100%)                    PT/INR - ( 06 Oct 2017 06:44 )   PT: 15.6 SEC;   INR: 1.38          PTT - ( 06 Oct 2017 12:37 )  PTT:89.7 SEC    diagnosis

## 2017-10-07 LAB
APTT BLD: 133.5 SEC — CRITICAL HIGH (ref 27.5–37.4)
BUN SERPL-MCNC: 13 MG/DL — SIGNIFICANT CHANGE UP (ref 7–23)
CALCIUM SERPL-MCNC: 9.1 MG/DL — SIGNIFICANT CHANGE UP (ref 8.4–10.5)
CHLORIDE SERPL-SCNC: 97 MMOL/L — LOW (ref 98–107)
CO2 SERPL-SCNC: 22 MMOL/L — SIGNIFICANT CHANGE UP (ref 22–31)
CREAT SERPL-MCNC: 0.74 MG/DL — SIGNIFICANT CHANGE UP (ref 0.5–1.3)
GLUCOSE SERPL-MCNC: 152 MG/DL — HIGH (ref 70–99)
HCT VFR BLD CALC: 41.9 % — SIGNIFICANT CHANGE UP (ref 39–50)
HGB BLD-MCNC: 14.2 G/DL — SIGNIFICANT CHANGE UP (ref 13–17)
INR BLD: 1.48 — HIGH (ref 0.88–1.17)
MCHC RBC-ENTMCNC: 31.9 PG — SIGNIFICANT CHANGE UP (ref 27–34)
MCHC RBC-ENTMCNC: 33.9 % — SIGNIFICANT CHANGE UP (ref 32–36)
MCV RBC AUTO: 94.2 FL — SIGNIFICANT CHANGE UP (ref 80–100)
NRBC # FLD: 0 — SIGNIFICANT CHANGE UP
PLATELET # BLD AUTO: 422 K/UL — HIGH (ref 150–400)
PMV BLD: 9.7 FL — SIGNIFICANT CHANGE UP (ref 7–13)
POTASSIUM SERPL-MCNC: 4.5 MMOL/L — SIGNIFICANT CHANGE UP (ref 3.5–5.3)
POTASSIUM SERPL-SCNC: 4.5 MMOL/L — SIGNIFICANT CHANGE UP (ref 3.5–5.3)
PROTHROM AB SERPL-ACNC: 16.7 SEC — HIGH (ref 9.8–13.1)
RBC # BLD: 4.45 M/UL — SIGNIFICANT CHANGE UP (ref 4.2–5.8)
RBC # FLD: 13.5 % — SIGNIFICANT CHANGE UP (ref 10.3–14.5)
SODIUM SERPL-SCNC: 134 MMOL/L — LOW (ref 135–145)
WBC # BLD: 9.7 K/UL — SIGNIFICANT CHANGE UP (ref 3.8–10.5)
WBC # FLD AUTO: 9.7 K/UL — SIGNIFICANT CHANGE UP (ref 3.8–10.5)

## 2017-10-07 RX ORDER — OXYCODONE AND ACETAMINOPHEN 5; 325 MG/1; MG/1
1 TABLET ORAL EVERY 4 HOURS
Qty: 0 | Refills: 0 | Status: DISCONTINUED | OUTPATIENT
Start: 2017-10-07 | End: 2017-10-10

## 2017-10-07 RX ORDER — HEPARIN SODIUM 5000 [USP'U]/ML
1600 INJECTION INTRAVENOUS; SUBCUTANEOUS
Qty: 25000 | Refills: 0 | Status: DISCONTINUED | OUTPATIENT
Start: 2017-10-07 | End: 2017-10-10

## 2017-10-07 RX ORDER — WARFARIN SODIUM 2.5 MG/1
12.5 TABLET ORAL ONCE
Qty: 0 | Refills: 0 | Status: COMPLETED | OUTPATIENT
Start: 2017-10-07 | End: 2017-10-07

## 2017-10-07 RX ORDER — OXYCODONE AND ACETAMINOPHEN 5; 325 MG/1; MG/1
1 TABLET ORAL ONCE
Qty: 0 | Refills: 0 | Status: DISCONTINUED | OUTPATIENT
Start: 2017-10-07 | End: 2017-10-07

## 2017-10-07 RX ADMIN — TRAMADOL HYDROCHLORIDE 100 MILLIGRAM(S): 50 TABLET ORAL at 06:59

## 2017-10-07 RX ADMIN — OXYCODONE AND ACETAMINOPHEN 2 TABLET(S): 5; 325 TABLET ORAL at 22:29

## 2017-10-07 RX ADMIN — SERTRALINE 200 MILLIGRAM(S): 25 TABLET, FILM COATED ORAL at 21:30

## 2017-10-07 RX ADMIN — HEPARIN SODIUM 16 UNIT(S)/HR: 5000 INJECTION INTRAVENOUS; SUBCUTANEOUS at 23:02

## 2017-10-07 RX ADMIN — OXYCODONE AND ACETAMINOPHEN 2 TABLET(S): 5; 325 TABLET ORAL at 13:53

## 2017-10-07 RX ADMIN — SODIUM CHLORIDE 3 MILLILITER(S): 9 INJECTION INTRAMUSCULAR; INTRAVENOUS; SUBCUTANEOUS at 05:24

## 2017-10-07 RX ADMIN — OXYCODONE AND ACETAMINOPHEN 1 TABLET(S): 5; 325 TABLET ORAL at 17:59

## 2017-10-07 RX ADMIN — OXYCODONE AND ACETAMINOPHEN 1 TABLET(S): 5; 325 TABLET ORAL at 14:56

## 2017-10-07 RX ADMIN — SODIUM CHLORIDE 20 MILLILITER(S): 9 INJECTION INTRAMUSCULAR; INTRAVENOUS; SUBCUTANEOUS at 10:24

## 2017-10-07 RX ADMIN — TRAMADOL HYDROCHLORIDE 100 MILLIGRAM(S): 50 TABLET ORAL at 07:55

## 2017-10-07 RX ADMIN — LOSARTAN POTASSIUM 100 MILLIGRAM(S): 100 TABLET, FILM COATED ORAL at 05:24

## 2017-10-07 RX ADMIN — OXYCODONE AND ACETAMINOPHEN 1 TABLET(S): 5; 325 TABLET ORAL at 16:00

## 2017-10-07 RX ADMIN — Medication 2: at 17:27

## 2017-10-07 RX ADMIN — OXYCODONE AND ACETAMINOPHEN 1 TABLET(S): 5; 325 TABLET ORAL at 05:25

## 2017-10-07 RX ADMIN — SODIUM CHLORIDE 3 MILLILITER(S): 9 INJECTION INTRAMUSCULAR; INTRAVENOUS; SUBCUTANEOUS at 21:28

## 2017-10-07 RX ADMIN — OXYCODONE AND ACETAMINOPHEN 2 TABLET(S): 5; 325 TABLET ORAL at 21:29

## 2017-10-07 RX ADMIN — SODIUM CHLORIDE 3 MILLILITER(S): 9 INJECTION INTRAMUSCULAR; INTRAVENOUS; SUBCUTANEOUS at 13:54

## 2017-10-07 RX ADMIN — OXYCODONE AND ACETAMINOPHEN 1 TABLET(S): 5; 325 TABLET ORAL at 06:05

## 2017-10-07 RX ADMIN — OXYCODONE AND ACETAMINOPHEN 2 TABLET(S): 5; 325 TABLET ORAL at 10:24

## 2017-10-07 RX ADMIN — SENNA PLUS 2 TABLET(S): 8.6 TABLET ORAL at 21:30

## 2017-10-07 RX ADMIN — Medication 100 MILLIGRAM(S): at 21:30

## 2017-10-07 RX ADMIN — HEPARIN SODIUM 19 UNIT(S)/HR: 5000 INJECTION INTRAVENOUS; SUBCUTANEOUS at 10:24

## 2017-10-07 RX ADMIN — Medication 100 MILLIGRAM(S): at 05:24

## 2017-10-07 RX ADMIN — WARFARIN SODIUM 12.5 MILLIGRAM(S): 2.5 TABLET ORAL at 17:38

## 2017-10-07 NOTE — PROGRESS NOTE ADULT - ASSESSMENT
58 yo M with acute L DVT, prior R DVT, hemodynamically stable, on a/c hep gtt bridge to coumadin  General: NAD  CV: Essential HTN. Continue Losartan 100 mg po qd as per medicine  Resp: Stable  Hem: On heparin gtt with po coumadin transition s/p 10 mg overnight INR this am 1.48, will dose 10mg again today.  Dr. Sen states patient will need at lest 6-9 months of complete anticoagulation followed by repeat evaluation to demonstrate complete resolution of his clots  INR goal 2-3

## 2017-10-07 NOTE — PROGRESS NOTE ADULT - SUBJECTIVE AND OBJECTIVE BOX
Vascular surgery progress note    SUBJECTIVE: Patient seen and examined at bedside with surgical team, patient without complaints.     T(C): 37.1 (10-07-17 @ 05:26), Max: 37.1 (10-06-17 @ 18:04)  HR: 69 (10-07-17 @ 05:26) (67 - 80)  BP: 128/87 (10-07-17 @ 05:26) (107/67 - 128/87)  RR: 18 (10-07-17 @ 05:26) (18 - 18)  SpO2: 99% (10-07-17 @ 05:26) (95% - 99%)  Wt(kg): --    10-06 @ 07:01  -  10-07 @ 07:00  --------------------------------------------------------  IN:    heparin Infusion: 437 mL    Oral Fluid: 360 mL    sodium chloride 0.9%.: 480 mL  Total IN: 1277 mL    OUT:    Voided: 3750 mL  Total OUT: 3750 mL    Total NET: -2473 mL      Physical Exam  General: A&Ox3, NAD  Ext: LLE both incisions are closed with staples, clean dry and intact no surround erythema or edema                           14.2   9.70  )-----------( 422      ( 07 Oct 2017 06:40 )             41.9     10-07    134<L>  |  97<L>  |  13  ----------------------------<  152<H>  4.5   |  22  |  0.74    Ca    9.1      07 Oct 2017 06:40        PT/INR - ( 07 Oct 2017 06:40 )   PT: 16.7 SEC;   INR: 1.48          PTT - ( 06 Oct 2017 21:23 )  PTT:66.0 SEC

## 2017-10-07 NOTE — PROGRESS NOTE ADULT - PROBLEM SELECTOR PLAN 1
INR still only ~ 1.5 after 5 days of relatively bigger doses of warfarin  will dose 12.5 mg today  I suspect patient will require 12 - 15 mg warfarin daily as maintainence

## 2017-10-07 NOTE — PROGRESS NOTE ADULT - SUBJECTIVE AND OBJECTIVE BOX
Patient seen this morning, feeling much better no pain in left lower extremity.    Vital Signs Last 24 Hrs  T(C): 36.9 (07 Oct 2017 10:17), Max: 37.1 (06 Oct 2017 18:04)  T(F): 98.5 (07 Oct 2017 10:17), Max: 98.7 (06 Oct 2017 18:04)  HR: 78 (07 Oct 2017 10:17) (67 - 80)  BP: 109/70 (07 Oct 2017 10:17) (108/68 - 128/87)  BP(mean): --  RR: 18 (07 Oct 2017 10:17) (18 - 18)  SpO2: 96% (07 Oct 2017 10:17) (95% - 99%)                          14.2   9.70  )-----------( 422      ( 07 Oct 2017 06:40 )             41.9       10-07    134<L>  |  97<L>  |  13  ----------------------------<  152<H>  4.5   |  22  |  0.74    Ca    9.1      07 Oct 2017 06:40            PT/INR - ( 07 Oct 2017 06:40 )   PT: 16.7 SEC;   INR: 1.48          PTT - ( 06 Oct 2017 21:23 )  PTT:66.0 SEC    diagnosis   Review of Systems:   · General	negative	  · Skin/Breast	negative	  · Ophthalmologic	negative	  · ENMT	negative	  · Respiratory and Thorax	negative	  · Cardiovascular	negative	  · Gastrointestinal	negative	  · Genitourinary	negative	  · Negative Musculoskeletal Symptoms	left thigh and leg with staples	    Physical Exam:   · Constitutional	Well-developed, well nourished	  · Eyes	EOMI; PERRL; no drainage or redness	  · ENMT	No oral lesions; no gross abnormalities	  · Neck	No bruits; no thyromegaly or nodules	  · Breasts	No masses; no nipple discharge	  · Back	No deformity or limitation of movement	  · Respiratory	Breath Sounds equal & clear to percussion & auscultation, no accessory muscle use	  · Cardiovascular	Regular rate & rhythm, normal S1, S2; no murmurs, gallops or rubs; no S3, S4	  · Gastrointestinal	Soft, non-tender, no hepatosplenomegaly, normal bowel sounds	  · Genitourinary	Normal genitalia; no lesions; no discharge	  · Extremities	detailed exam	  · Extremities Comments	left leg with staples.	    Assessment and Plan:   · Assessment		  Assessment and Plan:   · Assessment		  Patient with left lower extremity extensive DVT. About 3 months ago diagnosed with right lower extremity DVT and PE, but for last 2 months was off anticoagulation as he could not afford it.  Now readmitted with extensive left lower extremity DVT.  He had left fem/popliteal thrombectomy by IR yesterday and currently on UFH.  he had his hypercoagulable workup drawn that shows low protein C functional assay and postive DRVVT. These tests were done after he received one dose of Coumadin that can affect protein C level and presence of heparin can give a positive DRVVT.  other tests, including anticardiolipin antibody and beta 2 glycoprotein requested together with factor V Leiden and prothrombin gene mutation. His CT scan did not show any evidence of malignancy.  clinical exam normal, no organomegaly, or any new lumps.  At present, the cause of recurrent venous thrombosis is not clear. Patient is on coumadin with heparin bridge.  · Constitutional  Well-developed, well nourished  · Eyes  EOMI; PERRL; no drainage or redness  · ENMT  No oral lesions; no gross abnormalities  · Neck  No bruits; no thyromegaly or nodules  · Breasts  No masses; no nipple discharge  · Back  No deformity or limitation of movement  · Respiratory  Breath Sounds equal & clear to percussion & auscultation, no accessory muscle use  · Cardiovascular  Regular rate & rhythm, normal S1, S2; no murmurs, gallops or rubs; no S3, S4  · Gastrointestinal  Soft, non-tender, no hepatosplenomegaly, normal bowel sounds  · Genitourinary  Normal genitalia; no lesions; no discharge  · Extremities  detailed exam  · Extremities Comments  reduced swelling LLE  · Neurological  Alert & oriented; no sensory, motor or coordination deficits, normal reflexes  · Skin  No lesions; no rash  · Lymph Nodes  No lymphadedenopathy  · Musculoskeletal  No joint pain, swelling or deformity; no limitation of movement    Assessment and Plan:   · Assessment    Patient with left lower extremity extensive DVT. About 3 months ago diagnosed with right lower extremity DVT and PE, but for last 2 months was off anticoagulation as he could not afford it.  Now readmitted with extensive left lower extremity DVT.  He had left fem/popliteal thrombectomy by IR yesterday and currently on UFH.  he had his hypercoagulable workup drawn that shows low protein C functional assay and postive DRVVT. These tests were done after he received one dose of Coumadin that can affect protein C level and presence of heparin can give a positive DRVVT.  other tests, including anticardiolipin antibody and beta 2 glycoprotein requested together with factor V Leiden and prothrombin gene mutation. His CT scan did not show any evidence of malignancy.  clinical exam nor, organomegaly, or any new lumps.  At present, the cause of recurrent venous thrombosis is not clear. Patient is on coumadin with heparin bridge.  Hypercoagulable workup drawn will follow the results.   patient does not have factor V leiden or prothrombin gene mutation in his blood work.he will need at lest 6-9 months of complete anticoagulation followed by repeat evaluation to demonstrate complete resolution of his clots.  10/6/17 Patient feels better.On coumadin and heparin,follow the INR. Hypercoagulable workup is negative so far.  10/7/17 Patient still with subtherapeutic INR. Continue UFH and coumadin.He will follow with his cardiologist for INR checks.   Outpt followup with hematology in 2 weeks

## 2017-10-08 DIAGNOSIS — K59.00 CONSTIPATION, UNSPECIFIED: ICD-10-CM

## 2017-10-08 LAB
APTT BLD: 67.4 SEC — HIGH (ref 27.5–37.4)
APTT BLD: 73.4 SEC — HIGH (ref 27.5–37.4)
BUN SERPL-MCNC: 15 MG/DL — SIGNIFICANT CHANGE UP (ref 7–23)
CALCIUM SERPL-MCNC: 8.9 MG/DL — SIGNIFICANT CHANGE UP (ref 8.4–10.5)
CHLORIDE SERPL-SCNC: 101 MMOL/L — SIGNIFICANT CHANGE UP (ref 98–107)
CO2 SERPL-SCNC: 22 MMOL/L — SIGNIFICANT CHANGE UP (ref 22–31)
CREAT SERPL-MCNC: 0.85 MG/DL — SIGNIFICANT CHANGE UP (ref 0.5–1.3)
GLUCOSE SERPL-MCNC: 147 MG/DL — HIGH (ref 70–99)
HCT VFR BLD CALC: 39.8 % — SIGNIFICANT CHANGE UP (ref 39–50)
HGB BLD-MCNC: 13.5 G/DL — SIGNIFICANT CHANGE UP (ref 13–17)
INR BLD: 1.67 — HIGH (ref 0.88–1.17)
MCHC RBC-ENTMCNC: 32 PG — SIGNIFICANT CHANGE UP (ref 27–34)
MCHC RBC-ENTMCNC: 33.9 % — SIGNIFICANT CHANGE UP (ref 32–36)
MCV RBC AUTO: 94.3 FL — SIGNIFICANT CHANGE UP (ref 80–100)
NRBC # FLD: 0 — SIGNIFICANT CHANGE UP
PLATELET # BLD AUTO: 397 K/UL — SIGNIFICANT CHANGE UP (ref 150–400)
PMV BLD: 9.5 FL — SIGNIFICANT CHANGE UP (ref 7–13)
POTASSIUM SERPL-MCNC: 4.6 MMOL/L — SIGNIFICANT CHANGE UP (ref 3.5–5.3)
POTASSIUM SERPL-SCNC: 4.6 MMOL/L — SIGNIFICANT CHANGE UP (ref 3.5–5.3)
PROTHROM AB SERPL-ACNC: 18.9 SEC — HIGH (ref 9.8–13.1)
RBC # BLD: 4.22 M/UL — SIGNIFICANT CHANGE UP (ref 4.2–5.8)
RBC # FLD: 13.3 % — SIGNIFICANT CHANGE UP (ref 10.3–14.5)
SODIUM SERPL-SCNC: 137 MMOL/L — SIGNIFICANT CHANGE UP (ref 135–145)
WBC # BLD: 9.16 K/UL — SIGNIFICANT CHANGE UP (ref 3.8–10.5)
WBC # FLD AUTO: 9.16 K/UL — SIGNIFICANT CHANGE UP (ref 3.8–10.5)

## 2017-10-08 RX ORDER — WARFARIN SODIUM 2.5 MG/1
12.5 TABLET ORAL ONCE
Qty: 0 | Refills: 0 | Status: COMPLETED | OUTPATIENT
Start: 2017-10-08 | End: 2017-10-08

## 2017-10-08 RX ADMIN — OXYCODONE AND ACETAMINOPHEN 2 TABLET(S): 5; 325 TABLET ORAL at 18:40

## 2017-10-08 RX ADMIN — OXYCODONE AND ACETAMINOPHEN 2 TABLET(S): 5; 325 TABLET ORAL at 19:10

## 2017-10-08 RX ADMIN — LOSARTAN POTASSIUM 100 MILLIGRAM(S): 100 TABLET, FILM COATED ORAL at 05:20

## 2017-10-08 RX ADMIN — Medication 100 MILLIGRAM(S): at 22:52

## 2017-10-08 RX ADMIN — OXYCODONE AND ACETAMINOPHEN 2 TABLET(S): 5; 325 TABLET ORAL at 04:22

## 2017-10-08 RX ADMIN — SODIUM CHLORIDE 3 MILLILITER(S): 9 INJECTION INTRAMUSCULAR; INTRAVENOUS; SUBCUTANEOUS at 22:48

## 2017-10-08 RX ADMIN — OXYCODONE AND ACETAMINOPHEN 2 TABLET(S): 5; 325 TABLET ORAL at 12:53

## 2017-10-08 RX ADMIN — Medication 100 MILLIGRAM(S): at 17:14

## 2017-10-08 RX ADMIN — Medication 100 MILLIGRAM(S): at 05:20

## 2017-10-08 RX ADMIN — OXYCODONE AND ACETAMINOPHEN 2 TABLET(S): 5; 325 TABLET ORAL at 05:22

## 2017-10-08 RX ADMIN — SERTRALINE 200 MILLIGRAM(S): 25 TABLET, FILM COATED ORAL at 22:49

## 2017-10-08 RX ADMIN — ZOLPIDEM TARTRATE 5 MILLIGRAM(S): 10 TABLET ORAL at 22:49

## 2017-10-08 RX ADMIN — WARFARIN SODIUM 12.5 MILLIGRAM(S): 2.5 TABLET ORAL at 17:20

## 2017-10-08 RX ADMIN — SODIUM CHLORIDE 3 MILLILITER(S): 9 INJECTION INTRAMUSCULAR; INTRAVENOUS; SUBCUTANEOUS at 14:00

## 2017-10-08 RX ADMIN — OXYCODONE AND ACETAMINOPHEN 2 TABLET(S): 5; 325 TABLET ORAL at 12:23

## 2017-10-08 RX ADMIN — SENNA PLUS 2 TABLET(S): 8.6 TABLET ORAL at 22:49

## 2017-10-08 RX ADMIN — SODIUM CHLORIDE 3 MILLILITER(S): 9 INJECTION INTRAMUSCULAR; INTRAVENOUS; SUBCUTANEOUS at 05:20

## 2017-10-08 NOTE — PROGRESS NOTE ADULT - SUBJECTIVE AND OBJECTIVE BOX
Vascular surgery progress note    SUBJECTIVE: Patient seen and examined at bedside. Yesterday patient complained od some LLE swelling and cold toes.  Pulses remained palpable.  He is still complaining of some swelling.    ICU Vital Signs Last 24 Hrs  T(C): 36.7 (08 Oct 2017 05:21), Max: 37.1 (07 Oct 2017 23:05)  T(F): 98 (08 Oct 2017 05:21), Max: 98.7 (07 Oct 2017 23:05)  HR: 69 (08 Oct 2017 05:21) (68 - 88)  BP: 147/85 (08 Oct 2017 05:21) (106/72 - 147/85)  BP(mean): --  ABP: --  ABP(mean): --  RR: 18 (08 Oct 2017 05:21) (18 - 20)  SpO2: 99% (08 Oct 2017 05:21) (96% - 100%)      Physical Exam  General: A&Ox3, NAD  Ext: LLE both incisions are closed with staples, clean dry and intact.  Leg edematous  Toes cold but non discolored. DP pulse intact, PT dopplerable,                           13.5   9.16  )-----------( 397      ( 08 Oct 2017 03:46 )             39.8     10-08    137  |  101  |  15  ----------------------------<  147<H>  4.6   |  22  |  0.85    Ca    8.9      08 Oct 2017 03:46        PT/INR - ( 08 Oct 2017 03:46 )   PT: 18.9 SEC;   INR: 1.67          PTT - ( 08 Oct 2017 03:46 )  PTT:73.4 SEC

## 2017-10-08 NOTE — PROGRESS NOTE ADULT - SUBJECTIVE AND OBJECTIVE BOX
Patient is a 57y old  Male who presents with a chief complaint of left leg swelling and pain (03 Oct 2017 09:03)      SUBJECTIVE / OVERNIGHT EVENTS: No nausea, vomiting or diarrhea, no fever or chills, no dizziness or chest pain, no dysuria or hematuria, no joint pain or swelling    MEDICATIONS  (STANDING):  dextrose 50% Injectable 12.5 Gram(s) IV Push once  dextrose 50% Injectable 25 Gram(s) IV Push once  dextrose 50% Injectable 25 Gram(s) IV Push once  docusate sodium 100 milliGRAM(s) Oral three times a day  heparin  Infusion 1600 Unit(s)/Hr (16 mL/Hr) IV Continuous <Continuous>  influenza   Vaccine 0.5 milliLiter(s) IntraMuscular once  insulin lispro (HumaLOG) corrective regimen sliding scale   SubCutaneous Before meals and at bedtime  losartan 100 milliGRAM(s) Oral daily  senna 2 Tablet(s) Oral at bedtime  sertraline 200 milliGRAM(s) Oral daily  sodium chloride 0.9% lock flush 3 milliLiter(s) IV Push every 8 hours  sodium chloride 0.9%. 1000 milliLiter(s) (20 mL/Hr) IV Continuous <Continuous>    MEDICATIONS  (PRN):  acetaminophen   Tablet. 650 milliGRAM(s) Oral every 6 hours PRN Mild Pain (1 - 3)  dextrose Gel 1 Dose(s) Oral once PRN Blood Glucose LESS THAN 70 milliGRAM(s)/deciliter  glucagon  Injectable 1 milliGRAM(s) IntraMuscular once PRN Glucose LESS THAN 70 milligrams/deciliter  oxyCODONE    5 mG/acetaminophen 325 mG 1 Tablet(s) Oral every 4 hours PRN Moderate Pain (4 - 6)  oxyCODONE    5 mG/acetaminophen 325 mG 2 Tablet(s) Oral every 6 hours PRN Severe Pain (7 - 10)  traMADol 100 milliGRAM(s) Oral three times a day PRN Break Through pain  zolpidem 5 milliGRAM(s) Oral at bedtime PRN Insomnia        CAPILLARY BLOOD GLUCOSE  133 (08 Oct 2017 08:21)  133 (07 Oct 2017 23:22)  242 (07 Oct 2017 17:39)  148 (07 Oct 2017 12:24)        I&O's Summary    07 Oct 2017 07:01  -  08 Oct 2017 07:00  --------------------------------------------------------  IN: 886 mL / OUT: 3400 mL / NET: -2514 mL    08 Oct 2017 07:01  -  08 Oct 2017 10:56  --------------------------------------------------------  IN: 0 mL / OUT: 300 mL / NET: -300 mL    PHYSICAL EXAM:  vital signs as above  in no apparent distress  HEENT: KEIRY EOMI  Neck: Supple, no JVD, no thyromegaly  Lungs: clear, no rhonchi, no wheeze, no crackles  CVS: S1 S2 no M/R/G  Abdomen: no tenderness, no organomegaly, BS present  Neuro: AO x 3 no focal weakness, no sensory abnormalities  Psych: appropriate affect  Skin: warm, dry  Ext: no edema, no clubbing + clean suture lines over incision sites in left groin and left medial thigh  Msk: no joint swelling or deformities b/l scars over knees  Back: no CVA tenderness, no kyphosis/scoliosis    LABS:                        13.5   9.16  )-----------( 397      ( 08 Oct 2017 03:46 )             39.8     10-08    137  |  101  |  15  ----------------------------<  147<H>  4.6   |  22  |  0.85    Ca    8.9      08 Oct 2017 03:46      PT/INR - ( 08 Oct 2017 03:46 )   PT: 18.9 SEC;   INR: 1.67          PTT - ( 08 Oct 2017 03:46 )  PTT:73.4 SEC            Consultant(s) Notes Reviewed:      Care Discussed with Consultants/Other Providers:    Contact Number, Dr Ugarte [phone]

## 2017-10-08 NOTE — PROGRESS NOTE ADULT - ASSESSMENT
56 yo M with acute L DVT, prior R DVT, hemodynamically stable, on a/c hep gtt bridge to coumadin  General: NAD  CV: Essential HTN. Continue Losartan 100 mg po qd as per medicine  Resp: Stable  Hem: On heparin gtt with po coumadin transition s/p 12 mg overnight INR this am 1.67, will dose 10mg again today.  Dr. Sen states patient will need at lest 6-9 months of complete anticoagulation followed by repeat evaluation to demonstrate complete resolution of his clots  INR goal 2-3

## 2017-10-09 LAB
APTT BLD: 92.7 SEC — HIGH (ref 27.5–37.4)
BASOPHILS # BLD AUTO: 0.1 K/UL — SIGNIFICANT CHANGE UP (ref 0–0.2)
BASOPHILS NFR BLD AUTO: 1.2 % — SIGNIFICANT CHANGE UP (ref 0–2)
EOSINOPHIL # BLD AUTO: 0.97 K/UL — HIGH (ref 0–0.5)
EOSINOPHIL NFR BLD AUTO: 11.7 % — HIGH (ref 0–6)
HCT VFR BLD CALC: 42.8 % — SIGNIFICANT CHANGE UP (ref 39–50)
HGB BLD-MCNC: 14.5 G/DL — SIGNIFICANT CHANGE UP (ref 13–17)
IMM GRANULOCYTES # BLD AUTO: 0.05 # — SIGNIFICANT CHANGE UP
IMM GRANULOCYTES NFR BLD AUTO: 0.6 % — SIGNIFICANT CHANGE UP (ref 0–1.5)
INR BLD: 1.96 — HIGH (ref 0.88–1.17)
LYMPHOCYTES # BLD AUTO: 3.35 K/UL — HIGH (ref 1–3.3)
LYMPHOCYTES # BLD AUTO: 40.6 % — SIGNIFICANT CHANGE UP (ref 13–44)
MCHC RBC-ENTMCNC: 31.8 PG — SIGNIFICANT CHANGE UP (ref 27–34)
MCHC RBC-ENTMCNC: 33.9 % — SIGNIFICANT CHANGE UP (ref 32–36)
MCV RBC AUTO: 93.9 FL — SIGNIFICANT CHANGE UP (ref 80–100)
MONOCYTES # BLD AUTO: 0.88 K/UL — SIGNIFICANT CHANGE UP (ref 0–0.9)
MONOCYTES NFR BLD AUTO: 10.7 % — SIGNIFICANT CHANGE UP (ref 2–14)
NEUTROPHILS # BLD AUTO: 2.91 K/UL — SIGNIFICANT CHANGE UP (ref 1.8–7.4)
NEUTROPHILS NFR BLD AUTO: 35.2 % — LOW (ref 43–77)
NRBC # FLD: 0 — SIGNIFICANT CHANGE UP
PLATELET # BLD AUTO: 422 K/UL — HIGH (ref 150–400)
PMV BLD: 9.7 FL — SIGNIFICANT CHANGE UP (ref 7–13)
PROTHROM AB SERPL-ACNC: 22.2 SEC — HIGH (ref 9.8–13.1)
RBC # BLD: 4.56 M/UL — SIGNIFICANT CHANGE UP (ref 4.2–5.8)
RBC # FLD: 13.5 % — SIGNIFICANT CHANGE UP (ref 10.3–14.5)
SURGICAL PATHOLOGY STUDY: SIGNIFICANT CHANGE UP
WBC # BLD: 8.26 K/UL — SIGNIFICANT CHANGE UP (ref 3.8–10.5)
WBC # FLD AUTO: 8.26 K/UL — SIGNIFICANT CHANGE UP (ref 3.8–10.5)

## 2017-10-09 PROCEDURE — 93970 EXTREMITY STUDY: CPT | Mod: 26

## 2017-10-09 RX ORDER — OXYCODONE AND ACETAMINOPHEN 5; 325 MG/1; MG/1
2 TABLET ORAL EVERY 6 HOURS
Qty: 0 | Refills: 0 | Status: DISCONTINUED | OUTPATIENT
Start: 2017-10-09 | End: 2017-10-10

## 2017-10-09 RX ORDER — WARFARIN SODIUM 2.5 MG/1
10 TABLET ORAL ONCE
Qty: 0 | Refills: 0 | Status: COMPLETED | OUTPATIENT
Start: 2017-10-09 | End: 2017-10-09

## 2017-10-09 RX ORDER — TRAMADOL HYDROCHLORIDE 50 MG/1
100 TABLET ORAL THREE TIMES A DAY
Qty: 0 | Refills: 0 | Status: DISCONTINUED | OUTPATIENT
Start: 2017-10-09 | End: 2017-10-10

## 2017-10-09 RX ADMIN — SENNA PLUS 2 TABLET(S): 8.6 TABLET ORAL at 22:32

## 2017-10-09 RX ADMIN — OXYCODONE AND ACETAMINOPHEN 2 TABLET(S): 5; 325 TABLET ORAL at 01:19

## 2017-10-09 RX ADMIN — HEPARIN SODIUM 16 UNIT(S)/HR: 5000 INJECTION INTRAVENOUS; SUBCUTANEOUS at 08:24

## 2017-10-09 RX ADMIN — OXYCODONE AND ACETAMINOPHEN 2 TABLET(S): 5; 325 TABLET ORAL at 14:07

## 2017-10-09 RX ADMIN — Medication 1: at 22:32

## 2017-10-09 RX ADMIN — OXYCODONE AND ACETAMINOPHEN 2 TABLET(S): 5; 325 TABLET ORAL at 23:01

## 2017-10-09 RX ADMIN — OXYCODONE AND ACETAMINOPHEN 2 TABLET(S): 5; 325 TABLET ORAL at 08:05

## 2017-10-09 RX ADMIN — SODIUM CHLORIDE 3 MILLILITER(S): 9 INJECTION INTRAMUSCULAR; INTRAVENOUS; SUBCUTANEOUS at 22:29

## 2017-10-09 RX ADMIN — SODIUM CHLORIDE 3 MILLILITER(S): 9 INJECTION INTRAMUSCULAR; INTRAVENOUS; SUBCUTANEOUS at 13:59

## 2017-10-09 RX ADMIN — LOSARTAN POTASSIUM 100 MILLIGRAM(S): 100 TABLET, FILM COATED ORAL at 06:30

## 2017-10-09 RX ADMIN — ZOLPIDEM TARTRATE 5 MILLIGRAM(S): 10 TABLET ORAL at 22:30

## 2017-10-09 RX ADMIN — OXYCODONE AND ACETAMINOPHEN 2 TABLET(S): 5; 325 TABLET ORAL at 07:35

## 2017-10-09 RX ADMIN — OXYCODONE AND ACETAMINOPHEN 2 TABLET(S): 5; 325 TABLET ORAL at 01:49

## 2017-10-09 RX ADMIN — OXYCODONE AND ACETAMINOPHEN 2 TABLET(S): 5; 325 TABLET ORAL at 22:31

## 2017-10-09 RX ADMIN — OXYCODONE AND ACETAMINOPHEN 2 TABLET(S): 5; 325 TABLET ORAL at 14:40

## 2017-10-09 RX ADMIN — WARFARIN SODIUM 10 MILLIGRAM(S): 2.5 TABLET ORAL at 18:29

## 2017-10-09 RX ADMIN — Medication 100 MILLIGRAM(S): at 08:24

## 2017-10-09 RX ADMIN — SODIUM CHLORIDE 3 MILLILITER(S): 9 INJECTION INTRAMUSCULAR; INTRAVENOUS; SUBCUTANEOUS at 05:23

## 2017-10-09 RX ADMIN — SERTRALINE 200 MILLIGRAM(S): 25 TABLET, FILM COATED ORAL at 22:31

## 2017-10-09 NOTE — PROGRESS NOTE ADULT - SUBJECTIVE AND OBJECTIVE BOX
Patient is a 57y old  Male who presents with a chief complaint of left leg swelling and pain (03 Oct 2017 09:03)      SUBJECTIVE / OVERNIGHT EVENTS: No nausea, vomiting or diarrhea, no fever or chills, no dizziness or chest pain, no dysuria or hematuria, no joint pain or swelling  feels great    MEDICATIONS  (STANDING):  dextrose 50% Injectable 12.5 Gram(s) IV Push once  dextrose 50% Injectable 25 Gram(s) IV Push once  dextrose 50% Injectable 25 Gram(s) IV Push once  docusate sodium 100 milliGRAM(s) Oral three times a day  heparin  Infusion 1600 Unit(s)/Hr (16 mL/Hr) IV Continuous <Continuous>  influenza   Vaccine 0.5 milliLiter(s) IntraMuscular once  insulin lispro (HumaLOG) corrective regimen sliding scale   SubCutaneous Before meals and at bedtime  losartan 100 milliGRAM(s) Oral daily  senna 2 Tablet(s) Oral at bedtime  sertraline 200 milliGRAM(s) Oral daily  sodium chloride 0.9% lock flush 3 milliLiter(s) IV Push every 8 hours  sodium chloride 0.9%. 1000 milliLiter(s) (20 mL/Hr) IV Continuous <Continuous>  warfarin 10 milliGRAM(s) Oral once    MEDICATIONS  (PRN):  acetaminophen   Tablet. 650 milliGRAM(s) Oral every 6 hours PRN Mild Pain (1 - 3)  dextrose Gel 1 Dose(s) Oral once PRN Blood Glucose LESS THAN 70 milliGRAM(s)/deciliter  glucagon  Injectable 1 milliGRAM(s) IntraMuscular once PRN Glucose LESS THAN 70 milligrams/deciliter  oxyCODONE    5 mG/acetaminophen 325 mG 1 Tablet(s) Oral every 4 hours PRN Moderate Pain (4 - 6)  oxyCODONE    5 mG/acetaminophen 325 mG 2 Tablet(s) Oral every 6 hours PRN Severe Pain (7 - 10)  traMADol 100 milliGRAM(s) Oral three times a day PRN Break Through pain  zolpidem 5 milliGRAM(s) Oral at bedtime PRN Insomnia        CAPILLARY BLOOD GLUCOSE  139 (09 Oct 2017 08:25)  140 (08 Oct 2017 22:28)  122 (08 Oct 2017 17:40)  141 (08 Oct 2017 12:13)        I&O's Summary    08 Oct 2017 07:01  -  09 Oct 2017 07:00  --------------------------------------------------------  IN: 2112 mL / OUT: 3700 mL / NET: -1588 mL    09 Oct 2017 07:01  -  09 Oct 2017 11:50  --------------------------------------------------------  IN: 108 mL / OUT: 300 mL / NET: -192 mL    PHYSICAL EXAM:  vital signs as above  in no apparent distress  HEENT: KEIRY EOMI  Neck: Supple, no JVD, no thyromegaly  Lungs: clear, no rhonchi, no wheeze, no crackles  CVS: S1 S2 no M/R/G  Abdomen: no tenderness, no organomegaly, BS present  Neuro: AO x 3 no focal weakness, no sensory abnormalities  Psych: appropriate affect  Skin: warm, dry  Ext: no edema, no clubbing + clean suture lines over incision sites in left groin and left medial thigh  Msk: no joint swelling or deformities b/l scars over knees  Back: no CVA tenderness, no kyphosis/scoliosis    LABS:                        14.5   8.26  )-----------( 422      ( 09 Oct 2017 06:45 )             42.8     10-08    137  |  101  |  15  ----------------------------<  147<H>  4.6   |  22  |  0.85    Ca    8.9      08 Oct 2017 03:46      PT/INR - ( 09 Oct 2017 06:45 )   PT: 22.2 SEC;   INR: 1.96          PTT - ( 09 Oct 2017 06:45 )  PTT:92.7 SEC            Consultant(s) Notes Reviewed:      Care Discussed with Consultants/Other Providers:    Contact Number, Dr Ugarte [phone]

## 2017-10-09 NOTE — PROGRESS NOTE ADULT - SUBJECTIVE AND OBJECTIVE BOX
Patient seen this morning, feeling well no chest pain/ SOB or extremity pain.    Vital Signs Last 24 Hrs  T(C): 37.1 (09 Oct 2017 10:20), Max: 37.3 (08 Oct 2017 23:02)  T(F): 98.7 (09 Oct 2017 10:20), Max: 99.2 (08 Oct 2017 23:02)  HR: 81 (09 Oct 2017 10:20) (64 - 81)  BP: 123/81 (09 Oct 2017 10:20) (118/76 - 132/77)  BP(mean): --  RR: 17 (09 Oct 2017 10:20) (16 - 18)  SpO2: 100% (09 Oct 2017 10:20) (98% - 100%)                          14.5   8.26  )-----------( 422      ( 09 Oct 2017 06:45 )             42.8       10-08    137  |  101  |  15  ----------------------------<  147<H>  4.6   |  22  |  0.85    Ca    8.9      08 Oct 2017 03:46            PT/INR - ( 09 Oct 2017 06:45 )   PT: 22.2 SEC;   INR: 1.96          PTT - ( 09 Oct 2017 06:45 )  PTT:92.7 SEC  Review of Systems:   · General	negative	  · Skin/Breast	negative	  · Ophthalmologic	negative	  · ENMT	negative	  · Respiratory and Thorax	negative	  · Cardiovascular	negative	  · Gastrointestinal	negative	  · Genitourinary	negative	  · Negative Musculoskeletal Symptoms	left thigh and leg with staples	    Physical Exam:   · Constitutional	Well-developed, well nourished	  · Eyes	EOMI; PERRL; no drainage or redness	  · ENMT	No oral lesions; no gross abnormalities	  · Neck	No bruits; no thyromegaly or nodules	  · Breasts	No masses; no nipple discharge	  · Back	No deformity or limitation of movement	  · Respiratory	Breath Sounds equal & clear to percussion & auscultation, no accessory muscle use	  · Cardiovascular	Regular rate & rhythm, normal S1, S2; no murmurs, gallops or rubs; no S3, S4	  · Gastrointestinal	Soft, non-tender, no hepatosplenomegaly, normal bowel sounds	  · Genitourinary	Normal genitalia; no lesions; no discharge	  · Extremities	detailed exam	  · Extremities Comments	left leg with staples.	    Assessment and Plan:   · Assessment		  Assessment and Plan:   · Assessment		  Patient with left lower extremity extensive DVT. About 3 months ago diagnosed with right lower extremity DVT and PE, but for last 2 months was off anticoagulation as he could not afford it.  Now readmitted with extensive left lower extremity DVT.  He had left fem/popliteal thrombectomy by IR yesterday and currently on UFH.  he had his hypercoagulable workup drawn that shows low protein C functional assay and postive DRVVT. These tests were done after he received one dose of Coumadin that can affect protein C level and presence of heparin can give a positive DRVVT.  other tests, including anticardiolipin antibody and beta 2 glycoprotein requested together with factor V Leiden and prothrombin gene mutation. His CT scan did not show any evidence of malignancy.  clinical exam normal, no organomegaly, or any new lumps.  At present, the cause of recurrent venous thrombosis is not clear. Patient is on coumadin with heparin bridge.  · Constitutional  Well-developed, well nourished  · Eyes  EOMI; PERRL; no drainage or redness  · ENMT  No oral lesions; no gross abnormalities  · Neck  No bruits; no thyromegaly or nodules  · Breasts  No masses; no nipple discharge  · Back  No deformity or limitation of movement  · Respiratory  Breath Sounds equal & clear to percussion & auscultation, no accessory muscle use  · Cardiovascular  Regular rate & rhythm, normal S1, S2; no murmurs, gallops or rubs; no S3, S4  · Gastrointestinal  Soft, non-tender, no hepatosplenomegaly, normal bowel sounds  · Genitourinary  Normal genitalia; no lesions; no discharge  · Extremities  detailed exam  · Extremities Comments  reduced swelling LLE  · Neurological  Alert & oriented; no sensory, motor or coordination deficits, normal reflexes  · Skin  No lesions; no rash  · Lymph Nodes  No lymphadedenopathy  · Musculoskeletal  No joint pain, swelling or deformity; no limitation of movement    Assessment and Plan:   · Assessment    Patient with left lower extremity extensive DVT. About 3 months ago diagnosed with right lower extremity DVT and PE, but for last 2 months was off anticoagulation as he could not afford it.  Now readmitted with extensive left lower extremity DVT.  He had left fem/popliteal thrombectomy by IR yesterday and currently on UFH.  he had his hypercoagulable workup drawn that shows low protein C functional assay and postive DRVVT. These tests were done after he received one dose of Coumadin that can affect protein C level and presence of heparin can give a positive DRVVT.  other tests, including anticardiolipin antibody and beta 2 glycoprotein requested together with factor V Leiden and prothrombin gene mutation. His CT scan did not show any evidence of malignancy.  clinical exam nor, organomegaly, or any new lumps.  At present, the cause of recurrent venous thrombosis is not clear. Patient is on coumadin with heparin bridge.  Hypercoagulable workup drawn will follow the results.   patient does not have factor V leiden or prothrombin gene mutation in his blood work.he will need at lest 6-9 months of complete anticoagulation followed by repeat evaluation to demonstrate complete resolution of his clots.  10/6/17 Patient feels better.On coumadin and heparin,follow the INR. Hypercoagulable workup is negative so far.  10/7/17 Patient still with subtherapeutic INR. Continue UFH and coumadin.He will follow with his cardiologist for INR checks.   Outpt followup with hematology in 2 weeks .  10/9/17 DVT and PE on anticoagulation. To be discharged once INR therapeutic. Outpt followup post discharge.

## 2017-10-09 NOTE — PROGRESS NOTE ADULT - SUBJECTIVE AND OBJECTIVE BOX
VASCULAR DAILY PROGRESS NOTE:     Hospital Day: 11    Postoperative Day: 1    Status post: L common fem s/p thrombectomy    Subjective:  NAEO. Pain well controlled.         Objective:    PE:  General: A&Ox3, NAD  Ext: LLE both incisions are closed with staples, clean dry and intact.  Leg edematous  Toes cold but non discolored. DP pulse intact, PT dopplerable,    Vital Signs Last 24 Hrs  T(C): 36.6 (09 Oct 2017 06:20), Max: 37.3 (08 Oct 2017 23:02)  T(F): 97.8 (09 Oct 2017 06:20), Max: 99.2 (08 Oct 2017 23:02)  HR: 64 (09 Oct 2017 06:20) (64 - 78)  BP: 129/86 (09 Oct 2017 06:20) (102/60 - 132/77)  BP(mean): --  RR: 17 (09 Oct 2017 06:20) (16 - 18)  SpO2: 98% (09 Oct 2017 06:20) (98% - 100%)    I&O's Detail    08 Oct 2017 07:01  -  09 Oct 2017 07:00  --------------------------------------------------------  IN:    heparin Infusion: 352 mL    Oral Fluid: 1320 mL    sodium chloride 0.9%.: 440 mL  Total IN: 2112 mL    OUT:    Voided: 3700 mL  Total OUT: 3700 mL    Total NET: -1588 mL          Daily     Daily     MEDICATIONS  (STANDING):  dextrose 50% Injectable 12.5 Gram(s) IV Push once  dextrose 50% Injectable 25 Gram(s) IV Push once  dextrose 50% Injectable 25 Gram(s) IV Push once  docusate sodium 100 milliGRAM(s) Oral three times a day  heparin  Infusion 1600 Unit(s)/Hr (16 mL/Hr) IV Continuous <Continuous>  influenza   Vaccine 0.5 milliLiter(s) IntraMuscular once  insulin lispro (HumaLOG) corrective regimen sliding scale   SubCutaneous Before meals and at bedtime  losartan 100 milliGRAM(s) Oral daily  senna 2 Tablet(s) Oral at bedtime  sertraline 200 milliGRAM(s) Oral daily  sodium chloride 0.9% lock flush 3 milliLiter(s) IV Push every 8 hours  sodium chloride 0.9%. 1000 milliLiter(s) (20 mL/Hr) IV Continuous <Continuous>    MEDICATIONS  (PRN):  acetaminophen   Tablet. 650 milliGRAM(s) Oral every 6 hours PRN Mild Pain (1 - 3)  dextrose Gel 1 Dose(s) Oral once PRN Blood Glucose LESS THAN 70 milliGRAM(s)/deciliter  glucagon  Injectable 1 milliGRAM(s) IntraMuscular once PRN Glucose LESS THAN 70 milligrams/deciliter  oxyCODONE    5 mG/acetaminophen 325 mG 1 Tablet(s) Oral every 4 hours PRN Moderate Pain (4 - 6)  oxyCODONE    5 mG/acetaminophen 325 mG 2 Tablet(s) Oral every 6 hours PRN Severe Pain (7 - 10)  traMADol 100 milliGRAM(s) Oral three times a day PRN Break Through pain  zolpidem 5 milliGRAM(s) Oral at bedtime PRN Insomnia      LABS:                        14.5   8.26  )-----------( 422      ( 09 Oct 2017 06:45 )             42.8     10-08    137  |  101  |  15  ----------------------------<  147<H>  4.6   |  22  |  0.85    Ca    8.9      08 Oct 2017 03:46      PT/INR - ( 09 Oct 2017 06:45 )   PT: 22.2 SEC;   INR: 1.96          PTT - ( 09 Oct 2017 06:45 )  PTT:92.7 SEC      RADIOLOGY & ADDITIONAL STUDIES: VASCULAR DAILY PROGRESS NOTE:     Hospital Day: 11    Status post: L common fem s/p thrombectomy    Subjective:  pt states that he is able to ambulate much better with increasing distances     NAEO. Pain well controlled.         Objective:    PE:  General: A&Ox3, NAD  Ext: LLE both incisions are closed with staples, clean dry and intact.  Leg edematous  Toes cold but non discolored. DP pulse intact, PT dopplerable,  lle only mild edema at this time     Vital Signs Last 24 Hrs  T(C): 36.6 (09 Oct 2017 06:20), Max: 37.3 (08 Oct 2017 23:02)  T(F): 97.8 (09 Oct 2017 06:20), Max: 99.2 (08 Oct 2017 23:02)  HR: 64 (09 Oct 2017 06:20) (64 - 78)  BP: 129/86 (09 Oct 2017 06:20) (102/60 - 132/77)  BP(mean): --  RR: 17 (09 Oct 2017 06:20) (16 - 18)  SpO2: 98% (09 Oct 2017 06:20) (98% - 100%)    I&O's Detail    08 Oct 2017 07:01  -  09 Oct 2017 07:00  --------------------------------------------------------  IN:    heparin Infusion: 352 mL    Oral Fluid: 1320 mL    sodium chloride 0.9%.: 440 mL  Total IN: 2112 mL    OUT:    Voided: 3700 mL  Total OUT: 3700 mL    Total NET: -1588 mL            Daily     MEDICATIONS  (STANDING):  dextrose 50% Injectable 12.5 Gram(s) IV Push once  dextrose 50% Injectable 25 Gram(s) IV Push once  dextrose 50% Injectable 25 Gram(s) IV Push once  docusate sodium 100 milliGRAM(s) Oral three times a day  heparin  Infusion 1600 Unit(s)/Hr (16 mL/Hr) IV Continuous <Continuous>  influenza   Vaccine 0.5 milliLiter(s) IntraMuscular once  insulin lispro (HumaLOG) corrective regimen sliding scale   SubCutaneous Before meals and at bedtime  losartan 100 milliGRAM(s) Oral daily  senna 2 Tablet(s) Oral at bedtime  sertraline 200 milliGRAM(s) Oral daily  sodium chloride 0.9% lock flush 3 milliLiter(s) IV Push every 8 hours  sodium chloride 0.9%. 1000 milliLiter(s) (20 mL/Hr) IV Continuous <Continuous>    MEDICATIONS  (PRN):  acetaminophen   Tablet. 650 milliGRAM(s) Oral every 6 hours PRN Mild Pain (1 - 3)  dextrose Gel 1 Dose(s) Oral once PRN Blood Glucose LESS THAN 70 milliGRAM(s)/deciliter  glucagon  Injectable 1 milliGRAM(s) IntraMuscular once PRN Glucose LESS THAN 70 milligrams/deciliter  oxyCODONE    5 mG/acetaminophen 325 mG 1 Tablet(s) Oral every 4 hours PRN Moderate Pain (4 - 6)  oxyCODONE    5 mG/acetaminophen 325 mG 2 Tablet(s) Oral every 6 hours PRN Severe Pain (7 - 10)  traMADol 100 milliGRAM(s) Oral three times a day PRN Break Through pain  zolpidem 5 milliGRAM(s) Oral at bedtime PRN Insomnia      LABS:                        14.5   8.26  )-----------( 422      ( 09 Oct 2017 06:45 )             42.8     10-08    137  |  101  |  15  ----------------------------<  147<H>  4.6   |  22  |  0.85    Ca    8.9      08 Oct 2017 03:46      PT/INR - ( 09 Oct 2017 06:45 )   PT: 22.2 SEC;   INR: 1.96          PTT - ( 09 Oct 2017 06:45 )  PTT:92.7 SEC      RADIOLOGY & ADDITIONAL STUDIES:

## 2017-10-09 NOTE — PROGRESS NOTE ADULT - PROBLEM SELECTOR PLAN 1
INR 1.9  continue IV heparin  repeat INR at 1600 hrs  if > 2 can discharge heparin and repeat tomorrow  dose 10 mg tonight  d/w vascular service  heme follow up for work up of hypercoagulable disorder

## 2017-10-09 NOTE — PROGRESS NOTE ADULT - ASSESSMENT
58 yo M with acute L DVT, prior R DVT, hemodynamically stable, on a/c hep gtt bridge to coumadin  - Continue Losartan 100 mg po qd as per medicine  - On heparin gtt with po coumadin transition s/p 12 mg overnight INR this am 1.9, INR goal 2-3

## 2017-10-10 VITALS
HEART RATE: 70 BPM | OXYGEN SATURATION: 98 % | SYSTOLIC BLOOD PRESSURE: 112 MMHG | RESPIRATION RATE: 16 BRPM | TEMPERATURE: 98 F | DIASTOLIC BLOOD PRESSURE: 78 MMHG

## 2017-10-10 LAB
APTT BLD: 40.4 SEC — HIGH (ref 27.5–37.4)
APTT BLD: 46.9 SEC — HIGH (ref 27.5–37.4)
BUN SERPL-MCNC: 12 MG/DL — SIGNIFICANT CHANGE UP (ref 7–23)
CALCIUM SERPL-MCNC: 9.2 MG/DL — SIGNIFICANT CHANGE UP (ref 8.4–10.5)
CHLORIDE SERPL-SCNC: 100 MMOL/L — SIGNIFICANT CHANGE UP (ref 98–107)
CO2 SERPL-SCNC: 21 MMOL/L — LOW (ref 22–31)
CREAT SERPL-MCNC: 0.85 MG/DL — SIGNIFICANT CHANGE UP (ref 0.5–1.3)
GLUCOSE SERPL-MCNC: 125 MG/DL — HIGH (ref 70–99)
HCT VFR BLD CALC: 43 % — SIGNIFICANT CHANGE UP (ref 39–50)
HGB BLD-MCNC: 14.8 G/DL — SIGNIFICANT CHANGE UP (ref 13–17)
INR BLD: 1.97 — HIGH (ref 0.88–1.17)
INR BLD: 2.07 — HIGH (ref 0.88–1.17)
INR BLD: 2.13 — HIGH (ref 0.88–1.17)
MAGNESIUM SERPL-MCNC: 1.9 MG/DL — SIGNIFICANT CHANGE UP (ref 1.6–2.6)
MCHC RBC-ENTMCNC: 32 PG — SIGNIFICANT CHANGE UP (ref 27–34)
MCHC RBC-ENTMCNC: 34.4 % — SIGNIFICANT CHANGE UP (ref 32–36)
MCV RBC AUTO: 93.1 FL — SIGNIFICANT CHANGE UP (ref 80–100)
NRBC # FLD: 0 — SIGNIFICANT CHANGE UP
PHOSPHATE SERPL-MCNC: 4.3 MG/DL — SIGNIFICANT CHANGE UP (ref 2.5–4.5)
PLATELET # BLD AUTO: 427 K/UL — HIGH (ref 150–400)
PMV BLD: 9.5 FL — SIGNIFICANT CHANGE UP (ref 7–13)
POTASSIUM SERPL-MCNC: 4.3 MMOL/L — SIGNIFICANT CHANGE UP (ref 3.5–5.3)
POTASSIUM SERPL-SCNC: 4.3 MMOL/L — SIGNIFICANT CHANGE UP (ref 3.5–5.3)
PROTHROM AB SERPL-ACNC: 22.4 SEC — HIGH (ref 9.8–13.1)
PROTHROM AB SERPL-ACNC: 23.5 SEC — HIGH (ref 9.8–13.1)
PROTHROM AB SERPL-ACNC: 24.2 SEC — HIGH (ref 9.8–13.1)
RBC # BLD: 4.62 M/UL — SIGNIFICANT CHANGE UP (ref 4.2–5.8)
RBC # FLD: 13.2 % — SIGNIFICANT CHANGE UP (ref 10.3–14.5)
SODIUM SERPL-SCNC: 138 MMOL/L — SIGNIFICANT CHANGE UP (ref 135–145)
WBC # BLD: 9.37 K/UL — SIGNIFICANT CHANGE UP (ref 3.8–10.5)
WBC # FLD AUTO: 9.37 K/UL — SIGNIFICANT CHANGE UP (ref 3.8–10.5)

## 2017-10-10 RX ORDER — RIVAROXABAN 15 MG-20MG
1 KIT ORAL
Qty: 0 | Refills: 0 | COMMUNITY

## 2017-10-10 RX ORDER — WARFARIN SODIUM 2.5 MG/1
2 TABLET ORAL
Qty: 14 | Refills: 0 | OUTPATIENT
Start: 2017-10-10 | End: 2017-10-17

## 2017-10-10 RX ORDER — WARFARIN SODIUM 2.5 MG/1
1 TABLET ORAL
Qty: 7 | Refills: 0 | OUTPATIENT
Start: 2017-10-10 | End: 2017-10-17

## 2017-10-10 RX ORDER — SENNA PLUS 8.6 MG/1
2 TABLET ORAL
Qty: 0 | Refills: 0 | COMMUNITY
Start: 2017-10-10

## 2017-10-10 RX ORDER — ACETAMINOPHEN 500 MG
2 TABLET ORAL
Qty: 0 | Refills: 0 | DISCHARGE
Start: 2017-10-10

## 2017-10-10 RX ORDER — HEPARIN SODIUM 5000 [USP'U]/ML
1800 INJECTION INTRAVENOUS; SUBCUTANEOUS
Qty: 25000 | Refills: 0 | Status: DISCONTINUED | OUTPATIENT
Start: 2017-10-10 | End: 2017-10-10

## 2017-10-10 RX ORDER — DOCUSATE SODIUM 100 MG
1 CAPSULE ORAL
Qty: 0 | Refills: 0 | COMMUNITY
Start: 2017-10-10

## 2017-10-10 RX ADMIN — LOSARTAN POTASSIUM 100 MILLIGRAM(S): 100 TABLET, FILM COATED ORAL at 06:39

## 2017-10-10 RX ADMIN — HEPARIN SODIUM 18 UNIT(S)/HR: 5000 INJECTION INTRAVENOUS; SUBCUTANEOUS at 02:25

## 2017-10-10 RX ADMIN — OXYCODONE AND ACETAMINOPHEN 2 TABLET(S): 5; 325 TABLET ORAL at 14:10

## 2017-10-10 RX ADMIN — Medication 100 MILLIGRAM(S): at 02:25

## 2017-10-10 RX ADMIN — OXYCODONE AND ACETAMINOPHEN 2 TABLET(S): 5; 325 TABLET ORAL at 07:20

## 2017-10-10 RX ADMIN — OXYCODONE AND ACETAMINOPHEN 2 TABLET(S): 5; 325 TABLET ORAL at 06:50

## 2017-10-10 RX ADMIN — SODIUM CHLORIDE 3 MILLILITER(S): 9 INJECTION INTRAMUSCULAR; INTRAVENOUS; SUBCUTANEOUS at 14:04

## 2017-10-10 RX ADMIN — Medication 100 MILLIGRAM(S): at 09:31

## 2017-10-10 RX ADMIN — Medication 100 MILLIGRAM(S): at 16:59

## 2017-10-10 RX ADMIN — OXYCODONE AND ACETAMINOPHEN 2 TABLET(S): 5; 325 TABLET ORAL at 14:40

## 2017-10-10 RX ADMIN — SODIUM CHLORIDE 3 MILLILITER(S): 9 INJECTION INTRAMUSCULAR; INTRAVENOUS; SUBCUTANEOUS at 06:38

## 2017-10-10 NOTE — PROGRESS NOTE ADULT - PROBLEM SELECTOR PLAN 4
affect appears normal   no meds for now
affect appears normal   no meds for now
pulmonary help appreciated  echocardiogram for right heart strain if worsens  no evidence at present
affect appears normal   no meds for now
pulmonary help appreciated  echocardiogram for right heart strain if worsens  no evidence at present
pulmonary help appreciated  outpatient follow up with pulmonary

## 2017-10-10 NOTE — PROGRESS NOTE ADULT - SUBJECTIVE AND OBJECTIVE BOX
Morning Surgical Progress Note  Patient is a 57y old  Male who presents with a chief complaint of left leg swelling and pain (03 Oct 2017 09:03)    SUBJECTIVE: Patient seen and examined at bedside with surgical team, patient without complaints.     Vital Signs Last 24 Hrs  T(C): 37 (10 Oct 2017 06:12), Max: 37.1 (09 Oct 2017 10:20)  T(F): 98.6 (10 Oct 2017 06:12), Max: 98.7 (09 Oct 2017 10:20)  HR: 71 (10 Oct 2017 06:12) (66 - 81)  BP: 127/84 (10 Oct 2017 06:12) (117/70 - 140/89)  BP(mean): --  RR: 17 (10 Oct 2017 06:12) (17 - 18)  SpO2: 97% (10 Oct 2017 06:12) (97% - 100%)  I&O's Detail    09 Oct 2017 07:01  -  10 Oct 2017 07:00  --------------------------------------------------------  IN:    heparin Infusion: 240 mL    heparin Infusion: 72 mL    Oral Fluid: 1950 mL    sodium chloride 0.9%.: 500 mL  Total IN: 2762 mL    OUT:    Voided: 3700 mL  Total OUT: 3700 mL    Total NET: -938 mL        MEDICATIONS  (STANDING):  dextrose 50% Injectable 12.5 Gram(s) IV Push once  dextrose 50% Injectable 25 Gram(s) IV Push once  dextrose 50% Injectable 25 Gram(s) IV Push once  docusate sodium 100 milliGRAM(s) Oral three times a day  influenza   Vaccine 0.5 milliLiter(s) IntraMuscular once  insulin lispro (HumaLOG) corrective regimen sliding scale   SubCutaneous Before meals and at bedtime  losartan 100 milliGRAM(s) Oral daily  senna 2 Tablet(s) Oral at bedtime  sertraline 200 milliGRAM(s) Oral daily  sodium chloride 0.9% lock flush 3 milliLiter(s) IV Push every 8 hours  sodium chloride 0.9%. 1000 milliLiter(s) (20 mL/Hr) IV Continuous <Continuous>    MEDICATIONS  (PRN):  acetaminophen   Tablet. 650 milliGRAM(s) Oral every 6 hours PRN Mild Pain (1 - 3)  dextrose Gel 1 Dose(s) Oral once PRN Blood Glucose LESS THAN 70 milliGRAM(s)/deciliter  glucagon  Injectable 1 milliGRAM(s) IntraMuscular once PRN Glucose LESS THAN 70 milligrams/deciliter  oxyCODONE    5 mG/acetaminophen 325 mG 1 Tablet(s) Oral every 4 hours PRN Moderate Pain (4 - 6)  oxyCODONE    5 mG/acetaminophen 325 mG 2 Tablet(s) Oral every 6 hours PRN Severe Pain (7 - 10)  traMADol 100 milliGRAM(s) Oral three times a day PRN Break Through pain  zolpidem 5 milliGRAM(s) Oral at bedtime PRN Insomnia      Physical Exam  General: A&Ox3, NAD  Ext: LLE mildly swollen, incisions closed with staples, c/d/i no drainage, nontender    LABS:                        14.8   9.37  )-----------( 427      ( 10 Oct 2017 06:35 )             43.0     10-10    138  |  100  |  12  ----------------------------<  125<H>  4.3   |  21<L>  |  0.85    Ca    9.2      10 Oct 2017 06:35  Phos  4.3     10-10  Mg     1.9     10-10      PT/INR - ( 10 Oct 2017 06:35 )   PT: 23.5 SEC;   INR: 2.07          PTT - ( 10 Oct 2017 01:30 )  PTT:46.9 SEC          Patient is a 57y Male with LLE SVT s/p thrombectomy, INR 2.07 today  - Continue Losartan 100 mg po qd as per medicine  - Stop heparin drip and repeat INR in 6 hours if greater than 2 d/c on coumadin, f/u hem for outpatient dose  - D/w C team Morning Surgical Progress Note  Patient is a 57y old  Male who presents with a chief complaint of left leg swelling and pain (03 Oct 2017 09:03)    SUBJECTIVE: Patient seen and examined at bedside with surgical team, patient without complaints. pt is able to ambulate well     Vital Signs Last 24 Hrs  T(C): 37 (10 Oct 2017 06:12), Max: 37.1 (09 Oct 2017 10:20)  T(F): 98.6 (10 Oct 2017 06:12), Max: 98.7 (09 Oct 2017 10:20)  HR: 71 (10 Oct 2017 06:12) (66 - 81)  BP: 127/84 (10 Oct 2017 06:12) (117/70 - 140/89)  BP(mean): --  RR: 17 (10 Oct 2017 06:12) (17 - 18)  SpO2: 97% (10 Oct 2017 06:12) (97% - 100%)  I&O's Detail    09 Oct 2017 07:01  -  10 Oct 2017 07:00  --------------------------------------------------------  IN:    heparin Infusion: 240 mL    heparin Infusion: 72 mL    Oral Fluid: 1950 mL    sodium chloride 0.9%.: 500 mL  Total IN: 2762 mL    OUT:    Voided: 3700 mL  Total OUT: 3700 mL    Total NET: -938 mL        MEDICATIONS  (STANDING):  dextrose 50% Injectable 12.5 Gram(s) IV Push once  dextrose 50% Injectable 25 Gram(s) IV Push once  dextrose 50% Injectable 25 Gram(s) IV Push once  docusate sodium 100 milliGRAM(s) Oral three times a day  influenza   Vaccine 0.5 milliLiter(s) IntraMuscular once  insulin lispro (HumaLOG) corrective regimen sliding scale   SubCutaneous Before meals and at bedtime  losartan 100 milliGRAM(s) Oral daily  senna 2 Tablet(s) Oral at bedtime  sertraline 200 milliGRAM(s) Oral daily  sodium chloride 0.9% lock flush 3 milliLiter(s) IV Push every 8 hours  sodium chloride 0.9%. 1000 milliLiter(s) (20 mL/Hr) IV Continuous <Continuous>    MEDICATIONS  (PRN):  acetaminophen   Tablet. 650 milliGRAM(s) Oral every 6 hours PRN Mild Pain (1 - 3)  dextrose Gel 1 Dose(s) Oral once PRN Blood Glucose LESS THAN 70 milliGRAM(s)/deciliter  glucagon  Injectable 1 milliGRAM(s) IntraMuscular once PRN Glucose LESS THAN 70 milligrams/deciliter  oxyCODONE    5 mG/acetaminophen 325 mG 1 Tablet(s) Oral every 4 hours PRN Moderate Pain (4 - 6)  oxyCODONE    5 mG/acetaminophen 325 mG 2 Tablet(s) Oral every 6 hours PRN Severe Pain (7 - 10)  traMADol 100 milliGRAM(s) Oral three times a day PRN Break Through pain  zolpidem 5 milliGRAM(s) Oral at bedtime PRN Insomnia      Physical Exam  General: A&Ox3, NAD  Ext: LLE mildly swollen, incisions closed with staples healing well , c/d/i no drainage, nontender    LABS:                        14.8   9.37  )-----------( 427      ( 10 Oct 2017 06:35 )             43.0     10-10    138  |  100  |  12  ----------------------------<  125<H>  4.3   |  21<L>  |  0.85    Ca    9.2      10 Oct 2017 06:35  Phos  4.3     10-10  Mg     1.9     10-10      PT/INR - ( 10 Oct 2017 06:35 )   PT: 23.5 SEC;   INR: 2.07          PTT - ( 10 Oct 2017 01:30 )  PTT:46.9 SEC          Patient is a 57y Male with LLE SVT s/p thrombectomy, INR 2.07 today  - Continue Losartan 100 mg po qd as per medicine  - Stop heparin drip and repeat INR in 6 hours if greater than 2 d/c on coumadin, f/u hem for outpatient dose  - D/w C team Morning Surgical Progress Note  Patient is a 57y old  Male who presents with a chief complaint of left leg swelling and pain (03 Oct 2017 09:03)    SUBJECTIVE: Patient seen and examined at bedside with surgical team, patient without complaints. pt is able to ambulate well     Vital Signs Last 24 Hrs  T(C): 37 (10 Oct 2017 06:12), Max: 37.1 (09 Oct 2017 10:20)  T(F): 98.6 (10 Oct 2017 06:12), Max: 98.7 (09 Oct 2017 10:20)  HR: 71 (10 Oct 2017 06:12) (66 - 81)  BP: 127/84 (10 Oct 2017 06:12) (117/70 - 140/89)  BP(mean): --  RR: 17 (10 Oct 2017 06:12) (17 - 18)  SpO2: 97% (10 Oct 2017 06:12) (97% - 100%)  I&O's Detail    09 Oct 2017 07:01  -  10 Oct 2017 07:00  --------------------------------------------------------  IN:    heparin Infusion: 240 mL    heparin Infusion: 72 mL    Oral Fluid: 1950 mL    sodium chloride 0.9%.: 500 mL  Total IN: 2762 mL    OUT:    Voided: 3700 mL  Total OUT: 3700 mL    Total NET: -938 mL        MEDICATIONS  (STANDING):  dextrose 50% Injectable 12.5 Gram(s) IV Push once  dextrose 50% Injectable 25 Gram(s) IV Push once  dextrose 50% Injectable 25 Gram(s) IV Push once  docusate sodium 100 milliGRAM(s) Oral three times a day  influenza   Vaccine 0.5 milliLiter(s) IntraMuscular once  insulin lispro (HumaLOG) corrective regimen sliding scale   SubCutaneous Before meals and at bedtime  losartan 100 milliGRAM(s) Oral daily  senna 2 Tablet(s) Oral at bedtime  sertraline 200 milliGRAM(s) Oral daily  sodium chloride 0.9% lock flush 3 milliLiter(s) IV Push every 8 hours  sodium chloride 0.9%. 1000 milliLiter(s) (20 mL/Hr) IV Continuous <Continuous>    MEDICATIONS  (PRN):  acetaminophen   Tablet. 650 milliGRAM(s) Oral every 6 hours PRN Mild Pain (1 - 3)  dextrose Gel 1 Dose(s) Oral once PRN Blood Glucose LESS THAN 70 milliGRAM(s)/deciliter  glucagon  Injectable 1 milliGRAM(s) IntraMuscular once PRN Glucose LESS THAN 70 milligrams/deciliter  oxyCODONE    5 mG/acetaminophen 325 mG 1 Tablet(s) Oral every 4 hours PRN Moderate Pain (4 - 6)  oxyCODONE    5 mG/acetaminophen 325 mG 2 Tablet(s) Oral every 6 hours PRN Severe Pain (7 - 10)  traMADol 100 milliGRAM(s) Oral three times a day PRN Break Through pain  zolpidem 5 milliGRAM(s) Oral at bedtime PRN Insomnia      Physical Exam  General: A&Ox3, NAD  Ext: LLE mildly swollen, incisions closed with staples healing well , c/d/i no drainage, nontender    LABS:                        14.8   9.37  )-----------( 427      ( 10 Oct 2017 06:35 )             43.0     10-10    138  |  100  |  12  ----------------------------<  125<H>  4.3   |  21<L>  |  0.85    Ca    9.2      10 Oct 2017 06:35  Phos  4.3     10-10  Mg     1.9     10-10      PT/INR - ( 10 Oct 2017 06:35 )   PT: 23.5 SEC;   INR: 2.07          PTT - ( 10 Oct 2017 01:30 )  PTT:46.9 SEC

## 2017-10-10 NOTE — PROGRESS NOTE ADULT - PROBLEM SELECTOR PROBLEM 1
DVT (deep venous thrombosis)
Acute deep vein thrombosis (DVT) of femoral vein of left lower extremity
DVT (deep venous thrombosis)
Deep vein thrombosis (DVT) of other vein of left lower extremity
DVT (deep venous thrombosis)
DVT (deep venous thrombosis)

## 2017-10-10 NOTE — PROGRESS NOTE ADULT - ATTENDING COMMENTS
as above
as above
Patient seen and evaluated with resident. Incision with staples c/d/i. On hep gtt. INR 1.5. Coumadin today. D/c when INR therapeutic.
discussed with patient in detail, all questions answered
discussed with patient in detail, all questions answered
as above
discussed with patient in detail, all questions answered
discussed with patient in detail, all questions answered   discussed with family at bedside in detail
discussed with patient in detail, all questions answered
discussed with patient in detail, all questions answered
discussed with patient in detail, all questions answered   PCP Dr Darnell updated at patient request

## 2017-10-10 NOTE — PROVIDER CONTACT NOTE (OTHER) - BACKGROUND
Pt on heparin drip.
58 y/o s/p acute DVT of femoral vein of LLE.
Heparin gtt. DVT.
Patient on heparin drip.
Pt on heparin drip.
pt s/p LLE femo-pop vein thrombectomy venogram
Patient on heparin drip.

## 2017-10-10 NOTE — PROGRESS NOTE ADULT - ASSESSMENT
56 yo M with acute L DVT, prior R DVT, hemodynamically stable, on a/c hep gtt bridge to coumadin  - Continue Losartan 100 mg po qd as per medicine  - On heparin gtt with po coumadin transition s/p 12 mg overnight INR this am 1.9, INR goal 2-3  re check inr w iv heop off Patient is a 57y Male with LLE SVT s/p thrombectomy, INR 2.07 today  - Continue Losartan 100 mg po qd as per medicine  - Stop heparin drip and repeat INR in 6 hours if greater than 2 d/c on coumadin, f/u hem for outpatient dose  - D/w C team

## 2017-10-10 NOTE — PROGRESS NOTE ADULT - PROBLEM SELECTOR PLAN 3
continue Losartan 100 mg po qd with hold parameters
continue Losartan 100 mg po qd with hold parameters
continue Losartan 100 mg po qd  acceptable at this time
continue Losartan 100 mg po qd  acceptable at this time
continue Losartan 100 mg po qd  well controlled
continue Losartan 100 mg po qd  well controlled at this time
continue Losartan 100 mg po qd with hold parameters
continue Losartan 100 mg po qd with hold parameters  well controlled at this time

## 2017-10-10 NOTE — PROVIDER CONTACT NOTE (OTHER) - NAME OF MD/NP/PA/DO NOTIFIED:
FLORES ALCANTAR MD (C team [id number])
FLORES ALCANTAR MD (C team [id number])
LOGAN RILEY MD, [id number]  team [id number]
MD Jean Baptiste
MD Jean Baptiste
Musa [email or web address]
NUVIA GONZALES MD, [id number] C team [id number]
NUVIA GONZALES MD, [id number] C team [id number]
NUVIA GONZALES MD,C team [id number]
RACHEL RICH ([id number]  C team [id number])
RACHEL RICH, [id number] [facility] [id number]
Team C [id number]
SONY STERN MD, [id number] C team [id number]
Skinny Jean Baptiste  (C team [id number])

## 2017-10-10 NOTE — PROGRESS NOTE ADULT - PROBLEM SELECTOR PLAN 5
pulmonary help appreciated  echocardiogram for right heart strain if worsens  no evidence at present
resolved s/p lactulose  continue bowel regimen

## 2017-10-10 NOTE — PROGRESS NOTE ADULT - SUBJECTIVE AND OBJECTIVE BOX
Patient is a 57y old  Male who presents with a chief complaint of left leg swelling and pain (03 Oct 2017 09:03)      SUBJECTIVE / OVERNIGHT EVENTS: No nausea, vomiting or diarrhea, no fever or chills, no dizziness or chest pain, no dysuria or hematuria, no joint pain or swelling    MEDICATIONS  (STANDING):  dextrose 50% Injectable 12.5 Gram(s) IV Push once  dextrose 50% Injectable 25 Gram(s) IV Push once  dextrose 50% Injectable 25 Gram(s) IV Push once  docusate sodium 100 milliGRAM(s) Oral three times a day  influenza   Vaccine 0.5 milliLiter(s) IntraMuscular once  insulin lispro (HumaLOG) corrective regimen sliding scale   SubCutaneous Before meals and at bedtime  losartan 100 milliGRAM(s) Oral daily  senna 2 Tablet(s) Oral at bedtime  sertraline 200 milliGRAM(s) Oral daily  sodium chloride 0.9% lock flush 3 milliLiter(s) IV Push every 8 hours  sodium chloride 0.9%. 1000 milliLiter(s) (20 mL/Hr) IV Continuous <Continuous>    MEDICATIONS  (PRN):  acetaminophen   Tablet. 650 milliGRAM(s) Oral every 6 hours PRN Mild Pain (1 - 3)  dextrose Gel 1 Dose(s) Oral once PRN Blood Glucose LESS THAN 70 milliGRAM(s)/deciliter  glucagon  Injectable 1 milliGRAM(s) IntraMuscular once PRN Glucose LESS THAN 70 milligrams/deciliter  oxyCODONE    5 mG/acetaminophen 325 mG 1 Tablet(s) Oral every 4 hours PRN Moderate Pain (4 - 6)  oxyCODONE    5 mG/acetaminophen 325 mG 2 Tablet(s) Oral every 6 hours PRN Severe Pain (7 - 10)  traMADol 100 milliGRAM(s) Oral three times a day PRN Break Through pain  zolpidem 5 milliGRAM(s) Oral at bedtime PRN Insomnia        CAPILLARY BLOOD GLUCOSE  139 (10 Oct 2017 12:20)  134 (10 Oct 2017 08:29)  156 (09 Oct 2017 22:21)  112 (09 Oct 2017 17:46)        I&O's Summary    09 Oct 2017 07:01  -  10 Oct 2017 07:00  --------------------------------------------------------  IN: 2762 mL / OUT: 3700 mL / NET: -938 mL    10 Oct 2017 07:01  -  10 Oct 2017 14:00  --------------------------------------------------------  IN: 80 mL / OUT: 300 mL / NET: -220 mL      PHYSICAL EXAM:  vital signs as above  in no apparent distress  HEENT: KEIRY EOMI  Neck: Supple, no JVD, no thyromegaly  Lungs: clear, no rhonchi, no wheeze, no crackles  CVS: S1 S2 no M/R/G  Abdomen: no tenderness, no organomegaly, BS present  Neuro: AO x 3 no focal weakness, no sensory abnormalities  Psych: appropriate affect  Skin: warm, dry  Ext: no edema, no clubbing + clean suture lines   Msk: no joint swelling or deformities b/l scars over knees  Back: no CVA tenderness, no kyphosis/scoliosis       LABS:                 14.8   9.37  )-----------( 427      ( 10 Oct 2017 06:35 )             43.0     10-10    138  |  100  |  12  ----------------------------<  125<H>  4.3   |  21<L>  |  0.85    Ca    9.2      10 Oct 2017 06:35  Phos  4.3     10-10  Mg     1.9     10-10      PT/INR - ( 10 Oct 2017 06:35 )   PT: 23.5 SEC;   INR: 2.07          PTT - ( 10 Oct 2017 01:30 )  PTT:46.9 SEC    Consultant(s) Notes Reviewed:      Care Discussed with Consultants/Other Providers:    Contact Number, Dr Ugarte [phone]

## 2017-10-10 NOTE — PROVIDER CONTACT NOTE (OTHER) - ACTION/TREATMENT ORDERED:
MD will place order for change in heparin rate (currently at 16ml/hr).
No new orders.  Continue rate of 19cc/hr.  Monitor labs in am.
Awaiting new orders.
Awaiting new orders.
Heparin dose changed from 14 ml/hr to 16ml/hr
Heparin dose order changed from 16 ml/hr to 18ml/hr
Heparin dose order changed from 18 ml/hr to 19ml/hr
MD assessed site this morning and is monitoring site
No actions taken at this time. Heparin rate (16ml/hr) maintained.
No new orders.  Continue rate of 16cc/hr.
No new orders.  Continue rate of 19cc/hr.  Will do another aPtt 24hrs from last check.
Pt access.  Site not of concern.  Continue to monitor.  No new orders at this time.

## 2017-10-10 NOTE — PROGRESS NOTE ADULT - PROBLEM SELECTOR PLAN 2
as above
as above
continue FS monitoring  still consistently below 200  RISS
A1c is 6.7   will monitor FS glucose and hold oral agents for now  FS are < 150
as above
as above
continue FS monitoring  mostly < 200  one reading 242  will continue to monitor for now  resume metformin on discharge
continue FS monitoring  mostly < 200  will continue to monitor for now  resume metformin on discharge
continue FS monitoring  mostly < 200  will continue to monitor for now  resume metformin on discharge
continue FS monitoring  still consistently below 200  RISS
continue FS monitoring  still consistently below 200  RISS
as above
A1c is 6.7   will monitor FS glucose and hold oral agents for now  FS are < 150
A1c is 6.7   will monitor FS glucose and hold oral agents for now  FS are < 150

## 2017-10-10 NOTE — PROVIDER CONTACT NOTE (OTHER) - ASSESSMENT
Patient is A&0X4, no signs or symptoms of bleeding. Patient denied SOB or chest pain.
Pt is A&0X4, bleeding assessed. No SOB or chest pain.
Groin site has erythema, swelling, and pain.
No distress noted.
Patient is A&0X4, no signs or symptoms of bleeding. Patient denied SOB or chest pain.
Pt is A&0X4, No SOB or chest pain.
Pt is A&0X4, bleeding assess. No sob and chest pain.
Pt is A&0X4, bleeding assessed. No SOB or chest pain.

## 2017-10-10 NOTE — PROGRESS NOTE ADULT - SUBJECTIVE AND OBJECTIVE BOX
Patient seen this afternoon,feels well with no active complains.    Vital Signs Last 24 Hrs  T(C): 36.6 (10 Oct 2017 14:03), Max: 37 (10 Oct 2017 06:12)  T(F): 97.9 (10 Oct 2017 14:03), Max: 98.6 (10 Oct 2017 06:12)  HR: 70 (10 Oct 2017 14:03) (66 - 76)  BP: 112/78 (10 Oct 2017 14:03) (97/63 - 140/89)  BP(mean): --  RR: 16 (10 Oct 2017 14:03) (16 - 18)  SpO2: 98% (10 Oct 2017 14:03) (97% - 100%)                          14.8   9.37  )-----------( 427      ( 10 Oct 2017 06:35 )             43.0       10-10    138  |  100  |  12  ----------------------------<  125<H>  4.3   |  21<L>  |  0.85    Ca    9.2      10 Oct 2017 06:35  Phos  4.3     10-10  Mg     1.9     10-10            PT/INR - ( 10 Oct 2017 13:19 )   PT: 24.2 SEC;   INR: 2.13          PTT - ( 10 Oct 2017 13:19 )  PTT:40.4 SEC    diagnosis     Review of Systems:   · General	negative	  · Skin/Breast	negative	  · Ophthalmologic	negative	  · ENMT	negative	  · Respiratory and Thorax	negative	  · Cardiovascular	negative	  · Gastrointestinal	negative	  · Genitourinary	negative	  · Negative Musculoskeletal Symptoms	left thigh and leg with staples	  Physical Exam:   · Constitutional	Well-developed, well nourished	  · Eyes	EOMI; PERRL; no drainage or redness	  · ENMT	No oral lesions; no gross abnormalities	  · Neck	No bruits; no thyromegaly or nodules	  · Breasts	No masses; no nipple discharge	  · Back	No deformity or limitation of movement	  · Respiratory	Breath Sounds equal & clear to percussion & auscultation, no accessory muscle use	  · Cardiovascular	Regular rate & rhythm, normal S1, S2; no murmurs, gallops or rubs; no S3, S4	  · Gastrointestinal	Soft, non-tender, no hepatosplenomegaly, normal bowel sounds	  · Genitourinary	Normal genitalia; no lesions; no discharge	  · Extremities	detailed exam	  · Extremities Comments	left leg with staples.	    Assessment and Plan:   · Assessment		  Assessment and Plan:   · Assessment		  Patient with left lower extremity extensive DVT. About 3 months ago diagnosed with right lower extremity DVT and PE, but for last 2 months was off anticoagulation as he could not afford it.  Now readmitted with extensive left lower extremity DVT.  He had left fem/popliteal thrombectomy by IR yesterday and currently on UFH.  he had his hypercoagulable workup drawn that shows low protein C functional assay and postive DRVVT. These tests were done after he received one dose of Coumadin that can affect protein C level and presence of heparin can give a positive DRVVT.  other tests, including anticardiolipin antibody and beta 2 glycoprotein requested together with factor V Leiden and prothrombin gene mutation. His CT scan did not show any evidence of malignancy.  clinical exam normal, no organomegaly, or any new lumps.  At present, the cause of recurrent venous thrombosis is not clear. Patient is on coumadin with heparin bridge.  · Constitutional  Well-developed, well nourished  · Eyes  EOMI; PERRL; no drainage or redness  · ENMT  No oral lesions; no gross abnormalities  · Neck  No bruits; no thyromegaly or nodules  · Breasts  No masses; no nipple discharge  · Back  No deformity or limitation of movement  · Respiratory  Breath Sounds equal & clear to percussion & auscultation, no accessory muscle use  · Cardiovascular  Regular rate & rhythm, normal S1, S2; no murmurs, gallops or rubs; no S3, S4  · Gastrointestinal  Soft, non-tender, no hepatosplenomegaly, normal bowel sounds  · Genitourinary  Normal genitalia; no lesions; no discharge  · Extremities  detailed exam  · Extremities Comments  reduced swelling LLE  · Neurological  Alert & oriented; no sensory, motor or coordination deficits, normal reflexes  · Skin  No lesions; no rash  · Lymph Nodes  No lymphadedenopathy  · Musculoskeletal  No joint pain, swelling or deformity; no limitation of movement    Assessment and Plan:   · Assessment    Patient with left lower extremity extensive DVT. About 3 months ago diagnosed with right lower extremity DVT and PE, but for last 2 months was off anticoagulation as he could not afford it.  Now readmitted with extensive left lower extremity DVT.  He had left fem/popliteal thrombectomy by IR yesterday and currently on UFH.  he had his hypercoagulable workup drawn that shows low protein C functional assay and postive DRVVT. These tests were done after he received one dose of Coumadin that can affect protein C level and presence of heparin can give a positive DRVVT.  other tests, including anticardiolipin antibody and beta 2 glycoprotein requested together with factor V Leiden and prothrombin gene mutation. His CT scan did not show any evidence of malignancy.  clinical exam nor, organomegaly, or any new lumps.  At present, the cause of recurrent venous thrombosis is not clear. Patient is on coumadin with heparin bridge.  Hypercoagulable workup drawn will follow the results.   patient does not have factor V leiden or prothrombin gene mutation in his blood work.he will need at lest 6-9 months of complete anticoagulation followed by repeat evaluation to demonstrate complete resolution of his clots.  10/6/17 Patient feels better.On coumadin and heparin,follow the INR. Hypercoagulable workup is negative so far.  10/7/17 Patient still with subtherapeutic INR. Continue UFH and coumadin.He will follow with his cardiologist for INR checks.   Outpt followup with hematology in 2 weeks .  10/9/17 DVT and PE on anticoagulation. To be discharged once INR therapeutic. Outpt followup post discharge.   10/10/17 Patient has therapeutic INR.To be discharged today. Will follow with his pcp for INR check.Outpt followup in one month with hematology(phone no given).

## 2017-10-10 NOTE — PROVIDER CONTACT NOTE (OTHER) - DATE AND TIME:
06-Oct-2017 22:15
02-Oct-2017 00:58
02-Oct-2017 07:37
02-Oct-2017 10:54
02-Oct-2017 21:20
03-Oct-2017 03:27
03-Oct-2017 08:01
07-Oct-2017 01:47
07-Oct-2017 07:40
08-Oct-2017 05:30
09-Oct-2017 07:54
30-Sep-2017 07:30
30-Sep-2017 11:00
10-Oct-2017 02:04

## 2017-10-10 NOTE — PROGRESS NOTE ADULT - PROBLEM SELECTOR PROBLEM 2
Diabetes Mellitus
Acute deep vein thrombosis (DVT) of femoral vein of left lower extremity
Acute deep vein thrombosis (DVT) of femoral vein of left lower extremity
Diabetes Mellitus
Acute deep vein thrombosis (DVT) of femoral vein of left lower extremity
Diabetes Mellitus
Diabetes Mellitus

## 2017-10-10 NOTE — PROGRESS NOTE ADULT - PROBLEM SELECTOR PROBLEM 3
Hypertension, Essential

## 2017-10-10 NOTE — PROGRESS NOTE ADULT - PROBLEM SELECTOR PROBLEM 4
Depression
Depression
Sleep apnea
Depression
Sleep apnea

## 2017-10-10 NOTE — PROGRESS NOTE ADULT - PROBLEM SELECTOR PLAN 1
INR 2.07  off IV heparin  repeat INR pending  if still > 2 can discharge home on warfarin 12 mg po qd and he will follow up with DR Darnell PCP on Thu (2 days from now) for repeat INR  d/w vascular service

## 2017-10-10 NOTE — PROGRESS NOTE ADULT - PROVIDER SPECIALTY LIST ADULT
Anesthesia
Heme/Onc
Internal Medicine
Surgery
Vascular Surgery
Internal Medicine
Internal Medicine
Heme/Onc
Heme/Onc

## 2017-10-13 PROBLEM — I82.409 ACUTE EMBOLISM AND THROMBOSIS OF UNSPECIFIED DEEP VEINS OF UNSPECIFIED LOWER EXTREMITY: Chronic | Status: ACTIVE | Noted: 2017-09-29

## 2017-10-15 ENCOUNTER — INPATIENT (INPATIENT)
Facility: HOSPITAL | Age: 57
LOS: 3 days | Discharge: HOME | End: 2017-10-19
Attending: SURGERY | Admitting: SURGERY
Payer: MEDICARE

## 2017-10-15 VITALS
OXYGEN SATURATION: 96 % | HEART RATE: 96 BPM | DIASTOLIC BLOOD PRESSURE: 84 MMHG | HEIGHT: 71 IN | WEIGHT: 220.02 LBS | SYSTOLIC BLOOD PRESSURE: 155 MMHG | TEMPERATURE: 98 F | RESPIRATION RATE: 14 BRPM

## 2017-10-15 LAB
ALBUMIN SERPL ELPH-MCNC: 3.9 G/DL — SIGNIFICANT CHANGE UP (ref 3.3–5)
ALP SERPL-CCNC: 58 U/L — SIGNIFICANT CHANGE UP (ref 40–120)
ALT FLD-CCNC: 12 U/L — SIGNIFICANT CHANGE UP (ref 4–41)
APTT BLD: 39.4 SEC — HIGH (ref 27.5–37.4)
AST SERPL-CCNC: 14 U/L — SIGNIFICANT CHANGE UP (ref 4–40)
BASOPHILS # BLD AUTO: 0.09 K/UL — SIGNIFICANT CHANGE UP (ref 0–0.2)
BASOPHILS NFR BLD AUTO: 1.1 % — SIGNIFICANT CHANGE UP (ref 0–2)
BILIRUB SERPL-MCNC: 0.3 MG/DL — SIGNIFICANT CHANGE UP (ref 0.2–1.2)
BLD GP AB SCN SERPL QL: NEGATIVE — SIGNIFICANT CHANGE UP
BUN SERPL-MCNC: 13 MG/DL — SIGNIFICANT CHANGE UP (ref 7–23)
CALCIUM SERPL-MCNC: 8.2 MG/DL — LOW (ref 8.4–10.5)
CHLORIDE SERPL-SCNC: 103 MMOL/L — SIGNIFICANT CHANGE UP (ref 98–107)
CO2 SERPL-SCNC: 23 MMOL/L — SIGNIFICANT CHANGE UP (ref 22–31)
CREAT SERPL-MCNC: 0.96 MG/DL — SIGNIFICANT CHANGE UP (ref 0.5–1.3)
EOSINOPHIL # BLD AUTO: 0.49 K/UL — SIGNIFICANT CHANGE UP (ref 0–0.5)
EOSINOPHIL NFR BLD AUTO: 5.9 % — SIGNIFICANT CHANGE UP (ref 0–6)
GLUCOSE SERPL-MCNC: 119 MG/DL — HIGH (ref 70–99)
HCT VFR BLD CALC: 34.4 % — LOW (ref 39–50)
HGB BLD-MCNC: 11.7 G/DL — LOW (ref 13–17)
IMM GRANULOCYTES # BLD AUTO: 0.02 # — SIGNIFICANT CHANGE UP
IMM GRANULOCYTES NFR BLD AUTO: 0.2 % — SIGNIFICANT CHANGE UP (ref 0–1.5)
INR BLD: 3.77 — HIGH (ref 0.88–1.17)
LYMPHOCYTES # BLD AUTO: 2.8 K/UL — SIGNIFICANT CHANGE UP (ref 1–3.3)
LYMPHOCYTES # BLD AUTO: 33.7 % — SIGNIFICANT CHANGE UP (ref 13–44)
MCHC RBC-ENTMCNC: 32.3 PG — SIGNIFICANT CHANGE UP (ref 27–34)
MCHC RBC-ENTMCNC: 34 % — SIGNIFICANT CHANGE UP (ref 32–36)
MCV RBC AUTO: 95 FL — SIGNIFICANT CHANGE UP (ref 80–100)
MONOCYTES # BLD AUTO: 0.77 K/UL — SIGNIFICANT CHANGE UP (ref 0–0.9)
MONOCYTES NFR BLD AUTO: 9.3 % — SIGNIFICANT CHANGE UP (ref 2–14)
NEUTROPHILS # BLD AUTO: 4.14 K/UL — SIGNIFICANT CHANGE UP (ref 1.8–7.4)
NEUTROPHILS NFR BLD AUTO: 49.8 % — SIGNIFICANT CHANGE UP (ref 43–77)
NRBC # FLD: 0 — SIGNIFICANT CHANGE UP
PLATELET # BLD AUTO: 323 K/UL — SIGNIFICANT CHANGE UP (ref 150–400)
PMV BLD: 10.1 FL — SIGNIFICANT CHANGE UP (ref 7–13)
POTASSIUM SERPL-MCNC: 3.7 MMOL/L — SIGNIFICANT CHANGE UP (ref 3.5–5.3)
POTASSIUM SERPL-SCNC: 3.7 MMOL/L — SIGNIFICANT CHANGE UP (ref 3.5–5.3)
PROT SERPL-MCNC: 7 G/DL — SIGNIFICANT CHANGE UP (ref 6–8.3)
PROTHROM AB SERPL-ACNC: 43.4 SEC — HIGH (ref 9.8–13.1)
RBC # BLD: 3.62 M/UL — LOW (ref 4.2–5.8)
RBC # FLD: 13.1 % — SIGNIFICANT CHANGE UP (ref 10.3–14.5)
RH IG SCN BLD-IMP: POSITIVE — SIGNIFICANT CHANGE UP
SODIUM SERPL-SCNC: 140 MMOL/L — SIGNIFICANT CHANGE UP (ref 135–145)
WBC # BLD: 8.31 K/UL — SIGNIFICANT CHANGE UP (ref 3.8–10.5)
WBC # FLD AUTO: 8.31 K/UL — SIGNIFICANT CHANGE UP (ref 3.8–10.5)

## 2017-10-15 RX ORDER — VANCOMYCIN HCL 1 G
1000 VIAL (EA) INTRAVENOUS ONCE
Qty: 0 | Refills: 0 | Status: COMPLETED | OUTPATIENT
Start: 2017-10-15 | End: 2017-10-15

## 2017-10-15 RX ORDER — PIPERACILLIN AND TAZOBACTAM 4; .5 G/20ML; G/20ML
3.38 INJECTION, POWDER, LYOPHILIZED, FOR SOLUTION INTRAVENOUS ONCE
Qty: 0 | Refills: 0 | Status: COMPLETED | OUTPATIENT
Start: 2017-10-15 | End: 2017-10-15

## 2017-10-15 RX ORDER — MORPHINE SULFATE 50 MG/1
4 CAPSULE, EXTENDED RELEASE ORAL ONCE
Qty: 0 | Refills: 0 | Status: DISCONTINUED | OUTPATIENT
Start: 2017-10-15 | End: 2017-10-15

## 2017-10-15 RX ADMIN — MORPHINE SULFATE 4 MILLIGRAM(S): 50 CAPSULE, EXTENDED RELEASE ORAL at 22:33

## 2017-10-15 RX ADMIN — MORPHINE SULFATE 4 MILLIGRAM(S): 50 CAPSULE, EXTENDED RELEASE ORAL at 21:38

## 2017-10-15 RX ADMIN — PIPERACILLIN AND TAZOBACTAM 200 GRAM(S): 4; .5 INJECTION, POWDER, LYOPHILIZED, FOR SOLUTION INTRAVENOUS at 21:38

## 2017-10-15 RX ADMIN — Medication 250 MILLIGRAM(S): at 22:33

## 2017-10-15 NOTE — ED PROVIDER NOTE - OBJECTIVE STATEMENT
57M h/o HTN, HLD, DM, DVT s/p LLE fem-pop thrombectomy 10/1/17 with Dr Adams p/w erythema and pain to surgical sites worsening x 2 days. Denies fever, drainage, CP, SOB, n/v/d.

## 2017-10-15 NOTE — ED PROVIDER NOTE - ATTENDING CONTRIBUTION TO CARE
58yo M PMH: HTN, HLD, DM, DVT s/p LLE fem-pop thrombectomy 10/1/17 with Dr Adams referred to ED by vascular fellow for erythema and pain to surgical site worsening x 2 days.

## 2017-10-15 NOTE — ED PROVIDER NOTE - LOCATION
leg/Inguinal site with minimal erythema. Popliteal site with surrounding erythema, no drainage, 2+ LLE edema. Palpable DP pulse, sensation intact.

## 2017-10-15 NOTE — ED ADULT TRIAGE NOTE - CHIEF COMPLAINT QUOTE
Pt s/p thrombectomy on 10/1 c/o swelling and pain at sx site X2days. Pt called MD on call and was told to come to the ED. Denies fever, chills, drainage from sx site, chest pain, SOB. Appears comfortable in triage

## 2017-10-15 NOTE — ED PROVIDER NOTE - PROGRESS NOTE DETAILS
Daksha PGY-3: vascular surgery fellow at bedside, requesting labs, abx, CT venogram and admission to Dr Adams. Dr. Breaux: Pt doing well, awaiting US results and CT lower ext. Received IV abx, IVF ordered. Will admit to Vascular after imaging (as per Vascular request)

## 2017-10-16 DIAGNOSIS — I82.412 ACUTE EMBOLISM AND THROMBOSIS OF LEFT FEMORAL VEIN: ICD-10-CM

## 2017-10-16 DIAGNOSIS — I82.409 ACUTE EMBOLISM AND THROMBOSIS OF UNSPECIFIED DEEP VEINS OF UNSPECIFIED LOWER EXTREMITY: Chronic | ICD-10-CM

## 2017-10-16 DIAGNOSIS — T81.4XXA INFECTION FOLLOWING A PROCEDURE, INITIAL ENCOUNTER: ICD-10-CM

## 2017-10-16 LAB
APTT BLD: 44.1 SEC — HIGH (ref 27.5–37.4)
GLUCOSE BLDC GLUCOMTR-MCNC: 101 MG/DL — HIGH (ref 70–99)
GLUCOSE BLDC GLUCOMTR-MCNC: 133 MG/DL — HIGH (ref 70–99)
GLUCOSE BLDC GLUCOMTR-MCNC: 167 MG/DL — HIGH (ref 70–99)
GLUCOSE BLDC GLUCOMTR-MCNC: 87 MG/DL — SIGNIFICANT CHANGE UP (ref 70–99)
GLUCOSE BLDC GLUCOMTR-MCNC: 97 MG/DL — SIGNIFICANT CHANGE UP (ref 70–99)
INR BLD: 4.42 — HIGH (ref 0.88–1.17)
PROTHROM AB SERPL-ACNC: 51 SEC — HIGH (ref 9.8–13.1)
VANCOMYCIN TROUGH SERPL-MCNC: 14.9 UG/ML — SIGNIFICANT CHANGE UP (ref 10–20)

## 2017-10-16 PROCEDURE — 74177 CT ABD & PELVIS W/CONTRAST: CPT | Mod: 26

## 2017-10-16 PROCEDURE — 93970 EXTREMITY STUDY: CPT | Mod: 26

## 2017-10-16 PROCEDURE — 99222 1ST HOSP IP/OBS MODERATE 55: CPT | Mod: 24

## 2017-10-16 RX ORDER — HYDROMORPHONE HYDROCHLORIDE 2 MG/ML
1 INJECTION INTRAMUSCULAR; INTRAVENOUS; SUBCUTANEOUS EVERY 6 HOURS
Qty: 0 | Refills: 0 | Status: DISCONTINUED | OUTPATIENT
Start: 2017-10-16 | End: 2017-10-17

## 2017-10-16 RX ORDER — TRAMADOL HYDROCHLORIDE 50 MG/1
50 TABLET ORAL THREE TIMES A DAY
Qty: 0 | Refills: 0 | Status: DISCONTINUED | OUTPATIENT
Start: 2017-10-16 | End: 2017-10-19

## 2017-10-16 RX ORDER — DEXTROSE 50 % IN WATER 50 %
1 SYRINGE (ML) INTRAVENOUS ONCE
Qty: 0 | Refills: 0 | Status: DISCONTINUED | OUTPATIENT
Start: 2017-10-16 | End: 2017-10-19

## 2017-10-16 RX ORDER — WARFARIN SODIUM 2.5 MG/1
1 TABLET ORAL ONCE
Qty: 0 | Refills: 0 | Status: COMPLETED | OUTPATIENT
Start: 2017-10-16 | End: 2017-10-16

## 2017-10-16 RX ORDER — DEXTROSE 50 % IN WATER 50 %
25 SYRINGE (ML) INTRAVENOUS ONCE
Qty: 0 | Refills: 0 | Status: DISCONTINUED | OUTPATIENT
Start: 2017-10-16 | End: 2017-10-19

## 2017-10-16 RX ORDER — SODIUM CHLORIDE 9 MG/ML
1000 INJECTION, SOLUTION INTRAVENOUS
Qty: 0 | Refills: 0 | Status: DISCONTINUED | OUTPATIENT
Start: 2017-10-16 | End: 2017-10-19

## 2017-10-16 RX ORDER — ZOLPIDEM TARTRATE 10 MG/1
5 TABLET ORAL AT BEDTIME
Qty: 0 | Refills: 0 | Status: DISCONTINUED | OUTPATIENT
Start: 2017-10-16 | End: 2017-10-19

## 2017-10-16 RX ORDER — ACETAMINOPHEN 500 MG
1000 TABLET ORAL ONCE
Qty: 0 | Refills: 0 | Status: COMPLETED | OUTPATIENT
Start: 2017-10-16 | End: 2017-10-16

## 2017-10-16 RX ORDER — DEXTROSE 50 % IN WATER 50 %
12.5 SYRINGE (ML) INTRAVENOUS ONCE
Qty: 0 | Refills: 0 | Status: DISCONTINUED | OUTPATIENT
Start: 2017-10-16 | End: 2017-10-19

## 2017-10-16 RX ORDER — SERTRALINE 25 MG/1
100 TABLET, FILM COATED ORAL DAILY
Qty: 0 | Refills: 0 | Status: DISCONTINUED | OUTPATIENT
Start: 2017-10-16 | End: 2017-10-19

## 2017-10-16 RX ORDER — SODIUM CHLORIDE 9 MG/ML
1000 INJECTION INTRAMUSCULAR; INTRAVENOUS; SUBCUTANEOUS
Qty: 0 | Refills: 0 | Status: DISCONTINUED | OUTPATIENT
Start: 2017-10-16 | End: 2017-10-16

## 2017-10-16 RX ORDER — VANCOMYCIN HCL 1 G
1000 VIAL (EA) INTRAVENOUS EVERY 12 HOURS
Qty: 0 | Refills: 0 | Status: DISCONTINUED | OUTPATIENT
Start: 2017-10-16 | End: 2017-10-16

## 2017-10-16 RX ORDER — WARFARIN SODIUM 2.5 MG/1
10 TABLET ORAL DAILY
Qty: 0 | Refills: 0 | Status: DISCONTINUED | OUTPATIENT
Start: 2017-10-16 | End: 2017-10-16

## 2017-10-16 RX ORDER — INSULIN LISPRO 100/ML
VIAL (ML) SUBCUTANEOUS
Qty: 0 | Refills: 0 | Status: DISCONTINUED | OUTPATIENT
Start: 2017-10-16 | End: 2017-10-19

## 2017-10-16 RX ORDER — HYDROMORPHONE HYDROCHLORIDE 2 MG/ML
0.5 INJECTION INTRAMUSCULAR; INTRAVENOUS; SUBCUTANEOUS EVERY 6 HOURS
Qty: 0 | Refills: 0 | Status: DISCONTINUED | OUTPATIENT
Start: 2017-10-16 | End: 2017-10-17

## 2017-10-16 RX ORDER — PIPERACILLIN AND TAZOBACTAM 4; .5 G/20ML; G/20ML
3.38 INJECTION, POWDER, LYOPHILIZED, FOR SOLUTION INTRAVENOUS EVERY 8 HOURS
Qty: 0 | Refills: 0 | Status: DISCONTINUED | OUTPATIENT
Start: 2017-10-16 | End: 2017-10-17

## 2017-10-16 RX ORDER — VANCOMYCIN HCL 1 G
1250 VIAL (EA) INTRAVENOUS EVERY 8 HOURS
Qty: 0 | Refills: 0 | Status: DISCONTINUED | OUTPATIENT
Start: 2017-10-16 | End: 2017-10-17

## 2017-10-16 RX ORDER — PANTOPRAZOLE SODIUM 20 MG/1
40 TABLET, DELAYED RELEASE ORAL
Qty: 0 | Refills: 0 | Status: DISCONTINUED | OUTPATIENT
Start: 2017-10-16 | End: 2017-10-19

## 2017-10-16 RX ORDER — GLUCAGON INJECTION, SOLUTION 0.5 MG/.1ML
1 INJECTION, SOLUTION SUBCUTANEOUS ONCE
Qty: 0 | Refills: 0 | Status: DISCONTINUED | OUTPATIENT
Start: 2017-10-16 | End: 2017-10-19

## 2017-10-16 RX ORDER — LOSARTAN POTASSIUM 100 MG/1
100 TABLET, FILM COATED ORAL DAILY
Qty: 0 | Refills: 0 | Status: DISCONTINUED | OUTPATIENT
Start: 2017-10-16 | End: 2017-10-19

## 2017-10-16 RX ORDER — SODIUM CHLORIDE 9 MG/ML
2000 INJECTION INTRAMUSCULAR; INTRAVENOUS; SUBCUTANEOUS ONCE
Qty: 0 | Refills: 0 | Status: COMPLETED | OUTPATIENT
Start: 2017-10-16 | End: 2017-10-16

## 2017-10-16 RX ADMIN — TRAMADOL HYDROCHLORIDE 50 MILLIGRAM(S): 50 TABLET ORAL at 13:00

## 2017-10-16 RX ADMIN — HYDROMORPHONE HYDROCHLORIDE 1 MILLIGRAM(S): 2 INJECTION INTRAMUSCULAR; INTRAVENOUS; SUBCUTANEOUS at 21:19

## 2017-10-16 RX ADMIN — SODIUM CHLORIDE 75 MILLILITER(S): 9 INJECTION INTRAMUSCULAR; INTRAVENOUS; SUBCUTANEOUS at 07:12

## 2017-10-16 RX ADMIN — PIPERACILLIN AND TAZOBACTAM 25 GRAM(S): 4; .5 INJECTION, POWDER, LYOPHILIZED, FOR SOLUTION INTRAVENOUS at 14:55

## 2017-10-16 RX ADMIN — SERTRALINE 100 MILLIGRAM(S): 25 TABLET, FILM COATED ORAL at 21:19

## 2017-10-16 RX ADMIN — PIPERACILLIN AND TAZOBACTAM 25 GRAM(S): 4; .5 INJECTION, POWDER, LYOPHILIZED, FOR SOLUTION INTRAVENOUS at 07:12

## 2017-10-16 RX ADMIN — WARFARIN SODIUM 1 MILLIGRAM(S): 2.5 TABLET ORAL at 18:50

## 2017-10-16 RX ADMIN — HYDROMORPHONE HYDROCHLORIDE 1 MILLIGRAM(S): 2 INJECTION INTRAMUSCULAR; INTRAVENOUS; SUBCUTANEOUS at 15:30

## 2017-10-16 RX ADMIN — PIPERACILLIN AND TAZOBACTAM 25 GRAM(S): 4; .5 INJECTION, POWDER, LYOPHILIZED, FOR SOLUTION INTRAVENOUS at 23:00

## 2017-10-16 RX ADMIN — SODIUM CHLORIDE 1000 MILLILITER(S): 9 INJECTION INTRAMUSCULAR; INTRAVENOUS; SUBCUTANEOUS at 04:00

## 2017-10-16 RX ADMIN — HYDROMORPHONE HYDROCHLORIDE 1 MILLIGRAM(S): 2 INJECTION INTRAMUSCULAR; INTRAVENOUS; SUBCUTANEOUS at 15:16

## 2017-10-16 RX ADMIN — HYDROMORPHONE HYDROCHLORIDE 1 MILLIGRAM(S): 2 INJECTION INTRAMUSCULAR; INTRAVENOUS; SUBCUTANEOUS at 21:56

## 2017-10-16 RX ADMIN — TRAMADOL HYDROCHLORIDE 50 MILLIGRAM(S): 50 TABLET ORAL at 12:07

## 2017-10-16 RX ADMIN — Medication 166.67 MILLIGRAM(S): at 05:27

## 2017-10-16 RX ADMIN — TRAMADOL HYDROCHLORIDE 50 MILLIGRAM(S): 50 TABLET ORAL at 18:49

## 2017-10-16 RX ADMIN — ZOLPIDEM TARTRATE 5 MILLIGRAM(S): 10 TABLET ORAL at 23:00

## 2017-10-16 RX ADMIN — Medication 1: at 13:50

## 2017-10-16 RX ADMIN — Medication 166.67 MILLIGRAM(S): at 21:19

## 2017-10-16 RX ADMIN — TRAMADOL HYDROCHLORIDE 50 MILLIGRAM(S): 50 TABLET ORAL at 19:34

## 2017-10-16 RX ADMIN — SODIUM CHLORIDE 75 MILLILITER(S): 9 INJECTION INTRAMUSCULAR; INTRAVENOUS; SUBCUTANEOUS at 04:54

## 2017-10-16 RX ADMIN — Medication 400 MILLIGRAM(S): at 04:54

## 2017-10-16 RX ADMIN — Medication 1000 MILLIGRAM(S): at 05:34

## 2017-10-16 RX ADMIN — HYDROMORPHONE HYDROCHLORIDE 1 MILLIGRAM(S): 2 INJECTION INTRAMUSCULAR; INTRAVENOUS; SUBCUTANEOUS at 06:33

## 2017-10-16 RX ADMIN — HYDROMORPHONE HYDROCHLORIDE 1 MILLIGRAM(S): 2 INJECTION INTRAMUSCULAR; INTRAVENOUS; SUBCUTANEOUS at 06:50

## 2017-10-16 RX ADMIN — Medication 166.67 MILLIGRAM(S): at 13:27

## 2017-10-16 NOTE — H&P ADULT - HISTORY OF PRESENT ILLNESS
57 male with history of RLE DVT after orthopedic procedure prescribed Xarelto at the time but did not take due to non-coverage by insurance, recent admission for LLE SVT s/p OR thrombectomy and heparin bridge to coumadin, now presenting with swelling and pain of LLE. Denies feves/chills. Able to walk. Denies discharge from incisions. Denies motor/sensory deficits.

## 2017-10-16 NOTE — H&P ADULT - NSHPPHYSICALEXAM_GEN_ALL_CORE
NAD, A+Ox3  Non labored breathing  Abdomen soft, non tender, non-distended  LLE with groin incision, staples in place, no discharge, some erythema at staples, does not extend more than 1 cm from incision. Some fibrinous tissue in inferior aspect of groin incision. Mild point tenderness at inferior part of groin incision. LLE medial calf/shin incision is clean and dry, staples in place, minimal erythema associated with staples does not extend beyond .5 cm from incision. No tenderness. No discharge.   Motor/sensory intact. Strength 5/5. Palpable DP/PT. Skin normal color with brisk capillary refill. 2+ non-pitting edema of LLE through middle of thigh.

## 2017-10-16 NOTE — H&P ADULT - PSH
Deep vein thrombosis (DVT) of lower extremity    H/O laser iridotomy  left-2008  R Knee Arthroplasty  Right 2003, Left 2011,  Reflux  Gastroscopy 2013

## 2017-10-16 NOTE — H&P ADULT - ATTENDING COMMENTS
recurrent lle dvt w sx while therapeutic on anticoag rx   pt is this time is not a candidate for lle re intervention or thrombolysis rx  suspect hypercoag state'  heme to re eval  leg elevation and ace wraps

## 2017-10-16 NOTE — H&P ADULT - ASSESSMENT
58 yo male with symptomatic DVT of LLE on coumadin, therapeutic, clot burden seems to be not significantly changed from previous imaging done last hospitalization.  Admit, IVF, abx given graft findings. Attending to evaluate in AM.  D/w fellow.    Peter, PGY3

## 2017-10-16 NOTE — H&P ADULT - NSHPLABSRESULTS_GEN_ALL_CORE
CBC (10-15 @ 21:00)                          11.7<L>                   8.31    )--------------(  323        49.8  % Neuts, 33.7  % Lymphs, ANC: 4.14                            34.4<L>    BMP (10-15 @ 21:00)       140     |  103     |  13    			Ca++ --      Ca 8.2<L>       ---------------------------------( 119<H>		Mg --           3.7     |  23      |  0.96  			Ph --        LFTs (10-15 @ 21:00)      TPro 7.0 / Alb 3.9 / TBili 0.3 / DBili -- / AST 14 / ALT 12 / AlkPhos 58    Coags (10-15 @ 21:00)  aPTT 39.4<H> / INR 3.77<H> / PT 43.4<H>            < from: CT Abdomen and Pelvis w/ IV Cont (10.16.17 @ 03:26) >    1.  No IVC thrombosis. Limited evaluation of theiliac bifurcation and   proximal common femoral veins due to beam hardening artifact from the   spinal hardware without gross evidence for thrombus.  Redemonstration of   left femoral vein thrombosis.    < end of copied text >    < from: US Duplex Venous Lower Ext Complete, Bilateral (10.16.17 @ 00:59) >    Acute thrombosis of the left femoral, popliteal, peroneal, posterior   tibial, and gastrocnemius veins. These findings are without significant   change from 10/9/2017.    Acute left deep venous thrombosis: above and below the knee.    No right deep venous thrombosis.    < end of copied text >

## 2017-10-17 ENCOUNTER — APPOINTMENT (OUTPATIENT)
Dept: VASCULAR SURGERY | Facility: CLINIC | Age: 57
End: 2017-10-17

## 2017-10-17 DIAGNOSIS — I82.492 ACUTE EMBOLISM AND THROMBOSIS OF OTHER SPECIFIED DEEP VEIN OF LEFT LOWER EXTREMITY: ICD-10-CM

## 2017-10-17 LAB
APTT BLD: 40.7 SEC — HIGH (ref 27.5–37.4)
BUN SERPL-MCNC: 10 MG/DL — SIGNIFICANT CHANGE UP (ref 7–23)
CALCIUM SERPL-MCNC: 8.5 MG/DL — SIGNIFICANT CHANGE UP (ref 8.4–10.5)
CEA SERPL-MCNC: 1.8 NG/ML — SIGNIFICANT CHANGE UP (ref 1–3.8)
CHLORIDE SERPL-SCNC: 102 MMOL/L — SIGNIFICANT CHANGE UP (ref 98–107)
CO2 SERPL-SCNC: 23 MMOL/L — SIGNIFICANT CHANGE UP (ref 22–31)
CREAT SERPL-MCNC: 0.74 MG/DL — SIGNIFICANT CHANGE UP (ref 0.5–1.3)
GLUCOSE BLDC GLUCOMTR-MCNC: 112 MG/DL — HIGH (ref 70–99)
GLUCOSE BLDC GLUCOMTR-MCNC: 119 MG/DL — HIGH (ref 70–99)
GLUCOSE BLDC GLUCOMTR-MCNC: 124 MG/DL — HIGH (ref 70–99)
GLUCOSE BLDC GLUCOMTR-MCNC: 88 MG/DL — SIGNIFICANT CHANGE UP (ref 70–99)
GLUCOSE SERPL-MCNC: 131 MG/DL — HIGH (ref 70–99)
HCT VFR BLD CALC: 37.1 % — LOW (ref 39–50)
HGB BLD-MCNC: 12.4 G/DL — LOW (ref 13–17)
INR BLD: 3.1 — HIGH (ref 0.88–1.17)
LDH SERPL L TO P-CCNC: 192 U/L — SIGNIFICANT CHANGE UP (ref 135–225)
LDH SERPL L TO P-CCNC: 201 U/L — SIGNIFICANT CHANGE UP (ref 135–225)
MAGNESIUM SERPL-MCNC: 1.7 MG/DL — SIGNIFICANT CHANGE UP (ref 1.6–2.6)
MCHC RBC-ENTMCNC: 31.6 PG — SIGNIFICANT CHANGE UP (ref 27–34)
MCHC RBC-ENTMCNC: 33.4 % — SIGNIFICANT CHANGE UP (ref 32–36)
MCV RBC AUTO: 94.6 FL — SIGNIFICANT CHANGE UP (ref 80–100)
NRBC # FLD: 0 — SIGNIFICANT CHANGE UP
PHOSPHATE SERPL-MCNC: 3.5 MG/DL — SIGNIFICANT CHANGE UP (ref 2.5–4.5)
PLATELET # BLD AUTO: 339 K/UL — SIGNIFICANT CHANGE UP (ref 150–400)
PMV BLD: 10 FL — SIGNIFICANT CHANGE UP (ref 7–13)
POTASSIUM SERPL-MCNC: 4.3 MMOL/L — SIGNIFICANT CHANGE UP (ref 3.5–5.3)
POTASSIUM SERPL-SCNC: 4.3 MMOL/L — SIGNIFICANT CHANGE UP (ref 3.5–5.3)
PROTHROM AB SERPL-ACNC: 35.5 SEC — HIGH (ref 9.8–13.1)
PSA FLD-MCNC: 1.06 NG/ML — SIGNIFICANT CHANGE UP (ref 0–4)
RBC # BLD: 3.92 M/UL — LOW (ref 4.2–5.8)
RBC # FLD: 12.8 % — SIGNIFICANT CHANGE UP (ref 10.3–14.5)
SODIUM SERPL-SCNC: 139 MMOL/L — SIGNIFICANT CHANGE UP (ref 135–145)
URATE SERPL-MCNC: 3 MG/DL — LOW (ref 3.4–8.8)
WBC # BLD: 7.6 K/UL — SIGNIFICANT CHANGE UP (ref 3.8–10.5)
WBC # FLD AUTO: 7.6 K/UL — SIGNIFICANT CHANGE UP (ref 3.8–10.5)

## 2017-10-17 PROCEDURE — 99223 1ST HOSP IP/OBS HIGH 75: CPT | Mod: 24,GC

## 2017-10-17 PROCEDURE — 99232 SBSQ HOSP IP/OBS MODERATE 35: CPT | Mod: 24

## 2017-10-17 PROCEDURE — 71275 CT ANGIOGRAPHY CHEST: CPT | Mod: 26

## 2017-10-17 RX ORDER — HYDROMORPHONE HYDROCHLORIDE 2 MG/ML
0.5 INJECTION INTRAMUSCULAR; INTRAVENOUS; SUBCUTANEOUS EVERY 4 HOURS
Qty: 0 | Refills: 0 | Status: DISCONTINUED | OUTPATIENT
Start: 2017-10-17 | End: 2017-10-18

## 2017-10-17 RX ORDER — WARFARIN SODIUM 2.5 MG/1
5 TABLET ORAL ONCE
Qty: 0 | Refills: 0 | Status: COMPLETED | OUTPATIENT
Start: 2017-10-17 | End: 2017-10-17

## 2017-10-17 RX ORDER — HYDROMORPHONE HYDROCHLORIDE 2 MG/ML
1 INJECTION INTRAMUSCULAR; INTRAVENOUS; SUBCUTANEOUS EVERY 4 HOURS
Qty: 0 | Refills: 0 | Status: DISCONTINUED | OUTPATIENT
Start: 2017-10-17 | End: 2017-10-18

## 2017-10-17 RX ORDER — PIPERACILLIN AND TAZOBACTAM 4; .5 G/20ML; G/20ML
3.38 INJECTION, POWDER, LYOPHILIZED, FOR SOLUTION INTRAVENOUS EVERY 8 HOURS
Qty: 0 | Refills: 0 | Status: DISCONTINUED | OUTPATIENT
Start: 2017-10-17 | End: 2017-10-19

## 2017-10-17 RX ORDER — MAGNESIUM SULFATE 500 MG/ML
2 VIAL (ML) INJECTION ONCE
Qty: 0 | Refills: 0 | Status: COMPLETED | OUTPATIENT
Start: 2017-10-17 | End: 2017-10-17

## 2017-10-17 RX ADMIN — HYDROMORPHONE HYDROCHLORIDE 1 MILLIGRAM(S): 2 INJECTION INTRAMUSCULAR; INTRAVENOUS; SUBCUTANEOUS at 17:50

## 2017-10-17 RX ADMIN — LOSARTAN POTASSIUM 100 MILLIGRAM(S): 100 TABLET, FILM COATED ORAL at 05:08

## 2017-10-17 RX ADMIN — TRAMADOL HYDROCHLORIDE 50 MILLIGRAM(S): 50 TABLET ORAL at 21:41

## 2017-10-17 RX ADMIN — PIPERACILLIN AND TAZOBACTAM 25 GRAM(S): 4; .5 INJECTION, POWDER, LYOPHILIZED, FOR SOLUTION INTRAVENOUS at 23:42

## 2017-10-17 RX ADMIN — HYDROMORPHONE HYDROCHLORIDE 1 MILLIGRAM(S): 2 INJECTION INTRAMUSCULAR; INTRAVENOUS; SUBCUTANEOUS at 11:48

## 2017-10-17 RX ADMIN — Medication 166.67 MILLIGRAM(S): at 05:08

## 2017-10-17 RX ADMIN — Medication 50 GRAM(S): at 09:02

## 2017-10-17 RX ADMIN — PANTOPRAZOLE SODIUM 40 MILLIGRAM(S): 20 TABLET, DELAYED RELEASE ORAL at 06:50

## 2017-10-17 RX ADMIN — TRAMADOL HYDROCHLORIDE 50 MILLIGRAM(S): 50 TABLET ORAL at 20:47

## 2017-10-17 RX ADMIN — SERTRALINE 100 MILLIGRAM(S): 25 TABLET, FILM COATED ORAL at 20:48

## 2017-10-17 RX ADMIN — WARFARIN SODIUM 5 MILLIGRAM(S): 2.5 TABLET ORAL at 17:37

## 2017-10-17 RX ADMIN — PIPERACILLIN AND TAZOBACTAM 25 GRAM(S): 4; .5 INJECTION, POWDER, LYOPHILIZED, FOR SOLUTION INTRAVENOUS at 15:47

## 2017-10-17 RX ADMIN — TRAMADOL HYDROCHLORIDE 50 MILLIGRAM(S): 50 TABLET ORAL at 09:06

## 2017-10-17 RX ADMIN — HYDROMORPHONE HYDROCHLORIDE 1 MILLIGRAM(S): 2 INJECTION INTRAMUSCULAR; INTRAVENOUS; SUBCUTANEOUS at 22:00

## 2017-10-17 RX ADMIN — HYDROMORPHONE HYDROCHLORIDE 1 MILLIGRAM(S): 2 INJECTION INTRAMUSCULAR; INTRAVENOUS; SUBCUTANEOUS at 17:35

## 2017-10-17 RX ADMIN — HYDROMORPHONE HYDROCHLORIDE 1 MILLIGRAM(S): 2 INJECTION INTRAMUSCULAR; INTRAVENOUS; SUBCUTANEOUS at 05:15

## 2017-10-17 RX ADMIN — HYDROMORPHONE HYDROCHLORIDE 1 MILLIGRAM(S): 2 INJECTION INTRAMUSCULAR; INTRAVENOUS; SUBCUTANEOUS at 05:30

## 2017-10-17 RX ADMIN — ZOLPIDEM TARTRATE 5 MILLIGRAM(S): 10 TABLET ORAL at 22:14

## 2017-10-17 RX ADMIN — HYDROMORPHONE HYDROCHLORIDE 1 MILLIGRAM(S): 2 INJECTION INTRAMUSCULAR; INTRAVENOUS; SUBCUTANEOUS at 21:47

## 2017-10-17 RX ADMIN — HYDROMORPHONE HYDROCHLORIDE 1 MILLIGRAM(S): 2 INJECTION INTRAMUSCULAR; INTRAVENOUS; SUBCUTANEOUS at 11:27

## 2017-10-17 RX ADMIN — TRAMADOL HYDROCHLORIDE 50 MILLIGRAM(S): 50 TABLET ORAL at 10:05

## 2017-10-17 NOTE — CONSULT NOTE ADULT - ASSESSMENT
pt is a 57 y.o M with prior DVT/ PE, and recent new extensive LLE DVT s.p thrombectomy p/w progressive clot despite INR of 4

## 2017-10-17 NOTE — PHYSICAL THERAPY INITIAL EVALUATION ADULT - CRITERIA FOR SKILLED THERAPEUTIC INTERVENTIONS
predicted duration of therapy intervention/anticipated discharge recommendation/rehab potential/therapy frequency/impairments found/anticipated equipment needs at discharge

## 2017-10-17 NOTE — PHYSICAL THERAPY INITIAL EVALUATION ADULT - ADDITIONAL COMMENTS
Pt. reports using straight cane at home as needed.    Pt. left sitting OOB in chair in NAD, left LE dressing c/d/i, +IV, call shafer in reach, RN Shiloh aware of pt. participation today.

## 2017-10-17 NOTE — PROGRESS NOTE ADULT - SUBJECTIVE AND OBJECTIVE BOX
Morning Surgical Progress Note  Patient is a 57y old  Male who presents with a chief complaint of Left leg swelling (16 Oct 2017 03:37)    SUBJECTIVE: Patient seen and examined at bedside with surgical team, patient complains of LLE swelling. denies chest pain and shortness of breath.    Vital Signs Last 24 Hrs  T(C): 36.8 (17 Oct 2017 05:07), Max: 36.9 (16 Oct 2017 18:35)  T(F): 98.3 (17 Oct 2017 05:07), Max: 98.5 (16 Oct 2017 18:35)  HR: 61 (17 Oct 2017 05:07) (61 - 75)  BP: 147/83 (17 Oct 2017 05:07) (131/79 - 149/93)  BP(mean): --  RR: 17 (17 Oct 2017 05:07) (17 - 18)  SpO2: 99% (17 Oct 2017 05:07) (97% - 100%)  I&O's Detail    16 Oct 2017 07:01  -  17 Oct 2017 07:00  --------------------------------------------------------  IN:    IV PiggyBack: 950 mL    Oral Fluid: 240 mL  Total IN: 1190 mL    OUT:    Voided: 5050 mL  Total OUT: 5050 mL    Total NET: -3860 mL        MEDICATIONS  (STANDING):  dextrose 5%. 1000 milliLiter(s) (50 mL/Hr) IV Continuous <Continuous>  dextrose 50% Injectable 12.5 Gram(s) IV Push once  dextrose 50% Injectable 25 Gram(s) IV Push once  dextrose 50% Injectable 25 Gram(s) IV Push once  insulin lispro (HumaLOG) corrective regimen sliding scale   SubCutaneous three times a day before meals  losartan 100 milliGRAM(s) Oral daily  magnesium sulfate  IVPB 2 Gram(s) IV Intermittent once  pantoprazole    Tablet 40 milliGRAM(s) Oral before breakfast  piperacillin/tazobactam IVPB. 3.375 Gram(s) IV Intermittent every 8 hours  sertraline 100 milliGRAM(s) Oral daily  warfarin 5 milliGRAM(s) Oral once    MEDICATIONS  (PRN):  dextrose Gel 1 Dose(s) Oral once PRN Blood Glucose LESS THAN 70 milliGRAM(s)/deciliter  glucagon  Injectable 1 milliGRAM(s) IntraMuscular once PRN Glucose LESS THAN 70 milligrams/deciliter  HYDROmorphone  Injectable 0.5 milliGRAM(s) IV Push every 6 hours PRN Moderate Pain (4 - 6)  HYDROmorphone  Injectable 1 milliGRAM(s) IV Push every 6 hours PRN Severe Pain (7 - 10)  traMADol 50 milliGRAM(s) Oral three times a day PRN Breakthrough pain  zolpidem 5 milliGRAM(s) Oral at bedtime PRN Insomnia      Physical Exam  General: A&Ox3, NAD  EXT: LLE in ace compression dressing, warm    LABS:                        12.4   7.60  )-----------( 339      ( 17 Oct 2017 05:55 )             37.1     10-17    139  |  102  |  10  ----------------------------<  131<H>  4.3   |  23  |  0.74    Ca    8.5      17 Oct 2017 05:55  Phos  3.5     10-17  Mg     1.7     10-17    TPro  7.0  /  Alb  3.9  /  TBili  0.3  /  DBili  x   /  AST  14  /  ALT  12  /  AlkPhos  58  10-15    PT/INR - ( 17 Oct 2017 05:55 )   PT: 35.5 SEC;   INR: 3.10          PTT - ( 17 Oct 2017 05:55 )  PTT:40.7 SEC  LIVER FUNCTIONS - ( 15 Oct 2017 21:00 )  Alb: 3.9 g/dL / Pro: 7.0 g/dL / ALK PHOS: 58 u/L / ALT: 12 u/L / AST: 14 u/L / GGT: x                 Patient is a 57y Male with LLE DVT, on coumadin, INR today 3.1  - Hem oncologist Dr. Sen called this am, will order CEA, PSA, uric acid, LDH, GURPREET 2, CT chest  Most likely not treatment failure because as per last Duplex clot unchanged from previous duplex.  - INR 3.1 today will give 5 of coumadin  - Zosyn for LLE erythema  - f/u am labs and INR tomorrow  - D/w Dr Adams and Dr. Sen Morning Surgical Progress Note  Patient is a 57y old  Male who presents with a chief complaint of Left leg swelling (16 Oct 2017 03:37)    SUBJECTIVE: Patient seen and examined at bedside with surgical team, patient complains of LLE swelling. denies chest pain and shortness of breath. pt c/o ongoing rle swelling and discomfort     Vital Signs Last 24 Hrs  T(C): 36.8 (17 Oct 2017 05:07), Max: 36.9 (16 Oct 2017 18:35)  T(F): 98.3 (17 Oct 2017 05:07), Max: 98.5 (16 Oct 2017 18:35)  HR: 61 (17 Oct 2017 05:07) (61 - 75)  BP: 147/83 (17 Oct 2017 05:07) (131/79 - 149/93)  BP(mean): --  RR: 17 (17 Oct 2017 05:07) (17 - 18)  SpO2: 99% (17 Oct 2017 05:07) (97% - 100%)  I&O's Detail    16 Oct 2017 07:01  -  17 Oct 2017 07:00  --------------------------------------------------------  IN:    IV PiggyBack: 950 mL    Oral Fluid: 240 mL  Total IN: 1190 mL    OUT:    Voided: 5050 mL  Total OUT: 5050 mL    Total NET: -3860 mL        MEDICATIONS  (STANDING):  dextrose 5%. 1000 milliLiter(s) (50 mL/Hr) IV Continuous <Continuous>  dextrose 50% Injectable 12.5 Gram(s) IV Push once  dextrose 50% Injectable 25 Gram(s) IV Push once  dextrose 50% Injectable 25 Gram(s) IV Push once  insulin lispro (HumaLOG) corrective regimen sliding scale   SubCutaneous three times a day before meals  losartan 100 milliGRAM(s) Oral daily  magnesium sulfate  IVPB 2 Gram(s) IV Intermittent once  pantoprazole    Tablet 40 milliGRAM(s) Oral before breakfast  piperacillin/tazobactam IVPB. 3.375 Gram(s) IV Intermittent every 8 hours  sertraline 100 milliGRAM(s) Oral daily  warfarin 5 milliGRAM(s) Oral once    MEDICATIONS  (PRN):  dextrose Gel 1 Dose(s) Oral once PRN Blood Glucose LESS THAN 70 milliGRAM(s)/deciliter  glucagon  Injectable 1 milliGRAM(s) IntraMuscular once PRN Glucose LESS THAN 70 milligrams/deciliter  HYDROmorphone  Injectable 0.5 milliGRAM(s) IV Push every 6 hours PRN Moderate Pain (4 - 6)  HYDROmorphone  Injectable 1 milliGRAM(s) IV Push every 6 hours PRN Severe Pain (7 - 10)  traMADol 50 milliGRAM(s) Oral three times a day PRN Breakthrough pain  zolpidem 5 milliGRAM(s) Oral at bedtime PRN Insomnia      Physical Exam  General: A&Ox3, NAD  EXT: LLE in ace compression dressing, warm  left groin incision w decreased cellulitis   lle mod edema remains     LABS:                        12.4   7.60  )-----------( 339      ( 17 Oct 2017 05:55 )             37.1     10-17    139  |  102  |  10  ----------------------------<  131<H>  4.3   |  23  |  0.74    Ca    8.5      17 Oct 2017 05:55  Phos  3.5     10-17  Mg     1.7     10-17    TPro  7.0  /  Alb  3.9  /  TBili  0.3  /  DBili  x   /  AST  14  /  ALT  12  /  AlkPhos  58  10-15    PT/INR - ( 17 Oct 2017 05:55 )   PT: 35.5 SEC;   INR: 3.10          PTT - ( 17 Oct 2017 05:55 )  PTT:40.7 SEC  LIVER FUNCTIONS - ( 15 Oct 2017 21:00 )  Alb: 3.9 g/dL / Pro: 7.0 g/dL / ALK PHOS: 58 u/L / ALT: 12 u/L / AST: 14 u/L / GGT: x             CT Chest findings reviewed    Patient is a 57y Male with LLE DVT, on coumadin, INR today 3.1  - Hem oncologist Dr. Sen called this am, will order CEA, PSA, uric acid, LDH, GURPREET 2, CT chest  Most likely not treatment failure because as per last Duplex clot unchanged from previous duplex.  - INR 3.1 today will give 5 of coumadin  - Zosyn for LLE erythema  - f/u am labs and INR tomorrow  - D/w Dr Adams and Dr. Sen

## 2017-10-17 NOTE — PHYSICAL THERAPY INITIAL EVALUATION ADULT - DIAGNOSIS, PT EVAL
Left LE edema and pain s/p previous LLE fem pop thrombectomy, Left DVT on coumadin and therapeutically managed

## 2017-10-17 NOTE — CONSULT NOTE ADULT - SUBJECTIVE AND OBJECTIVE BOX
HPI:  57 male with history of ?RLE DVT/PE ?earlier this year( perhaps at [facility]?) after orthopedic procedure prescribed Xarelto at the time, took for 3 months  but did not continue after this 2/2  the cost and waning insurance coverage. Pt had a recent admission 9/29-10/10 given new swelling of now the left LE. Given the extent of his clot he was seen by vascular surgery and went  for LLE  thrombectomy followed by heparin bridge to coumadin. Pt was discharged on 10/10 therapeutic on coumadin, however now presents with recurrent LLE swelling and pain of LLE. Upon ultrasound of LLE, it appears he has an acute thrombus that is unchanged from his 10/9 doppler prior to his discharge. It is hard to say this is a new thrombus as it was seen prior to his last discharge.   Denies fevers/chills. denies prior smoking hx, denies hx of malignancy, denies sedentary lifestyle.     PAST MEDICAL & SURGICAL HISTORY:  DVT (deep venous thrombosis)  Varicosities  Insomnia  Anxiety  Sleep apnea: had test &gt; 10 years ago--supposed to wear device.  Lost 25 pounds but never retested  Spondylisthesis  Spinal stenosis  Bulging disc  Obese  Depression  GERD (gastroesophageal reflux disease)  Hyperlipidemia  Hypertension, Essential  Diabetes Mellitus: diagnosed 10 years ago  doesn&#x27;t do fingersticks  Deep vein thrombosis (DVT) of lower extremity  Reflux: Gastroscopy 2013  H/O laser iridotomy: left-2008  R Knee Arthroplasty: Right 2003, Left 2011,      General: denies fevers, chills  Skin/Breast: denies rash   Ophthalmologic: denies blurry vision  ENMT: denies throat pain  Respiratory and Thorax: denies cough, denies shortness of breath  Cardiovascular: denies chest pain, palpitations. Denies LE swelling   Gastrointestinal: denies abdominal pain/ nausea/ vomiting/ diarrhea. Denies BRBPR/ melena   Genitourinary: Denies dysuria  Musculoskeletal: Denies mylagias   Neurological: Denies syncope  Psychiatric: Denies mood disturbance   Hematology/Lymphatics: denies bleeding/bruising. Denies skin lumps 	    MEDICATIONS  (STANDING):  dextrose 5%. 1000 milliLiter(s) (50 mL/Hr) IV Continuous <Continuous>  dextrose 50% Injectable 12.5 Gram(s) IV Push once  dextrose 50% Injectable 25 Gram(s) IV Push once  dextrose 50% Injectable 25 Gram(s) IV Push once  insulin lispro (HumaLOG) corrective regimen sliding scale   SubCutaneous three times a day before meals  losartan 100 milliGRAM(s) Oral daily  pantoprazole    Tablet 40 milliGRAM(s) Oral before breakfast  piperacillin/tazobactam IVPB. 3.375 Gram(s) IV Intermittent every 8 hours  sertraline 100 milliGRAM(s) Oral daily    MEDICATIONS  (PRN):  dextrose Gel 1 Dose(s) Oral once PRN Blood Glucose LESS THAN 70 milliGRAM(s)/deciliter  glucagon  Injectable 1 milliGRAM(s) IntraMuscular once PRN Glucose LESS THAN 70 milligrams/deciliter  HYDROmorphone  Injectable 0.5 milliGRAM(s) IV Push every 6 hours PRN Moderate Pain (4 - 6)  HYDROmorphone  Injectable 1 milliGRAM(s) IV Push every 6 hours PRN Severe Pain (7 - 10)  traMADol 50 milliGRAM(s) Oral three times a day PRN Breakthrough pain  zolpidem 5 milliGRAM(s) Oral at bedtime PRN Insomnia      Allergies    No Known Allergies    Intolerances        SOCIAL HISTORY:    FAMILY HISTORY:  Family history of essential hypertension  Family history of asthma  Family history of asthma  Family history of rheumatoid arthritis  Family history of pancreatic cancer      Vital Signs Last 24 Hrs  T(C): 36.9 (17 Oct 2017 17:19), Max: 36.9 (16 Oct 2017 18:35)  T(F): 98.4 (17 Oct 2017 17:19), Max: 98.5 (16 Oct 2017 18:35)  HR: 64 (17 Oct 2017 17:19) (60 - 72)  BP: 129/82 (17 Oct 2017 17:19) (120/70 - 149/93)  BP(mean): --  RR: 19 (17 Oct 2017 17:19) (17 - 19)  SpO2: 100% (17 Oct 2017 17:19) (97% - 100%)    PHYSICAL EXAM:    GENERAL: NAD, AAOx3   HEAD:  NC/AT  EYES: EOMI, PERRLA, no scleral icterus  HEENT: Moist mucous membranes  LUNG: Clear to auscultation bilaterally; No rales, rhonchi, wheezing, or rubs  HEART: RRR; No murmurs, rubs, or gallops  ABDOMEN: +BS, ST/ND/NT  EXTREMITIES:  2+ Peripheral Pulses, No clubbing, cyanosis, or edema  LAD: no palpable adenopathy    LABS:                        12.4   7.60  )-----------( 339      ( 17 Oct 2017 05:55 )             37.1     10-17    139  |  102  |  10  ----------------------------<  131<H>  4.3   |  23  |  0.74    Ca    8.5      17 Oct 2017 05:55  Phos  3.5     10-17  Mg     1.7     10-17    TPro  7.0  /  Alb  3.9  /  TBili  0.3  /  DBili  x   /  AST  14  /  ALT  12  /  AlkPhos  58  10-15    PT/INR - ( 17 Oct 2017 05:55 )   PT: 35.5 SEC;   INR: 3.10          PTT - ( 17 Oct 2017 05:55 )  PTT:40.7 SEC          RADIOLOGY & ADDITIONAL STUDIES:  < from: US Duplex Venous Lower Ext Complete, Bilateral (10.09.17 @ 09:36) >  EXAM:  US DPLX LWR EXT VEINS COMPL BI        PROCEDURE DATE:  Oct  9 2017         INTERPRETATION:  CLINICAL INFORMATION: Removal of DVT, leg swelling.    COMPARISON: September 28, 2017.    TECHNIQUE: Duplex sonography of the BILATERAL LOWER extremities with   color and spectral Doppler, with and without compression.      FINDINGS:    Acute deep vein thrombus in the left femoral, popliteal, gastrocnemius,   and posterior tibial veins. There is normal compressibility of the right   common femoral, femoral and popliteal veins.     IMPRESSION:     1.  Since September 28, 2017, persistent acute deep venous thrombosis in   the left lower extremity: above and below the knee.   2.  No evidence of the right lower extremity deep venous thrombosis.    Dr. Babb discussed the findings with RICHARD Yi on October 9, 2017 at 11:21   AM.  Readback was obtained.                    RAJESH BABB M.D., ATTENDING RADIOLOGIST  This document has been electronically signed. Oct  9 2017 11:22AM                  < end of copied text >  < from: US Duplex Venous Lower Ext Complete, Bilateral (10.16.17 @ 00:59) >    EXAM:  US DPLX LWR EXT VEINS COMPL BI        PROCEDURE DATE:  Oct 16 2017         INTERPRETATION:  CLINICAL INFORMATION: Left lower extremity deep venous   thrombosis status post left fem-pop vein thrombectomy 10/1/2017   presenting with increased swelling of the left lower extremity.   Reevaluate clot burden.    COMPARISON: Bilateral lower extremity duplex 10/9/2017.    TECHNIQUE: Duplex sonography of the BILATERAL LOWER extremities with   color and spectral Doppler, with and without compression.      FINDINGS:  Right lower extremity:  There is normal compressibility of the bilateral common femoral, femoral   and popliteal veins. No calf vein thrombosis is detected.    Doppler examination shows normal spontaneous and phasic flow.    Left lower extremity:  There is normal compressibility and normal spontaneous and phasic flow of   the left common femoral vein.  The left femoral and popliteal veins are noncompressible and expanded   with echogenic material. There is no detectable Dopplerflow.   The left posterior tibial, peroneal, and gastrocnemius veins are   noncompressible.    IMPRESSION:     Acute thrombosis of the left femoral, popliteal, peroneal, posterior   tibial, and gastrocnemius veins. These findings are without significant   change from 10/9/2017.    Acute left deep venous thrombosis: above and below the knee.    No right deep venous thrombosis.              MELINDA TOTH D.O., RADIOLOGY RESIDENT  This document has been electronically signed.  ARTURO THOMAS M.D., ATTENDING RADIOLOGIST  This document has been electronically signed. Oct 16 2017  3:46AM        < end of copied text >  < from: US Duplex Venous Lower Ext Complete, Bilateral (10.16.17 @ 00:59) >    EXAM:  US DPLX LWR EXT VEINS COMPL BI        PROCEDURE DATE:  Oct 16 2017         INTERPRETATION:  CLINICAL INFORMATION: Left lower extremity deep venous   thrombosis status post left fem-pop vein thrombectomy 10/1/2017   presenting with increased swelling of the left lower extremity.   Reevaluate clot burden.    COMPARISON: Bilateral lower extremity duplex 10/9/2017.    TECHNIQUE: Duplex sonography of the BILATERAL LOWER extremities with   color and spectral Doppler, with and without compression.      FINDINGS:  Right lower extremity:  There is normal compressibility of the bilateral common femoral, femoral   and popliteal veins. No calf vein thrombosis is detected.    Doppler examination shows normal spontaneous and phasic flow.    Left lower extremity:  There is normal compressibility and normal spontaneous and phasic flow of   the left common femoral vein.  The left femoral and popliteal veins are noncompressible and expanded   with echogenic material. There is no detectable Dopplerflow.   The left posterior tibial, peroneal, and gastrocnemius veins are   noncompressible.    IMPRESSION:     Acute thrombosis of the left femoral, popliteal, peroneal, posterior   tibial, and gastrocnemius veins. These findings are without significant   change from 10/9/2017.    Acute left deep venous thrombosis: above and below the knee.    No right deep venous thrombosis.              MELINDA TOTH D.O., RADIOLOGY RESIDENT  This document has been electronically signed.  ARTURO THOMAS M.D., ATTENDING RADIOLOGIST  This document has been electronically signed. Oct 16 2017  3:46AM        < end of copied text >  < from: CT Angio Chest w/ IV Cont (10.17.17 @ 15:04) >  EXAM:  CT ANGIO CHEST (W)AW IC        PROCEDURE DATE:  Oct 17 2017         INTERPRETATION:  CLINICAL INFORMATION: New left lower extremity deep vein   thrombosis. Evaluation for pulmonary embolism.    COMPARISON: CT chest January 22, 2013.    PROCEDURE:   CT Angiography of the Chest.  90 ml of Omnipaque 350 was injected intravenously. 10 ml were discarded.  Sagittal and coronal reformats were performed as well as 3D (MIP)   reconstructions.      FINDINGS:    CHEST:     LUNGS AND LARGE AIRWAYS: Patent central airways. Bibasilar dependent and   linear atelectasis. No pulmonary infarcts. No pulmonary nodules.  PLEURA: No pleural effusion.  VESSELS: No evidence of pulmonary embolism. The main pulmonary trunk is   of normal caliber.  HEART: Heart size is normal with no radiographic evidence of right heart   strain. No pericardial effusion.  MEDIASTINUM AND TITI: No lymphadenopathy.  CHEST WALL AND LOWER NECK: Within normal limits.  VISUALIZED UPPER ABDOMEN: Within normal limits.  BONES: Degenerative spondylitic changes of the visualized spine.    IMPRESSION: No evidence of pulmonary embolus.                  RITIKA DIGGS M.D., RADIOLOGY RESIDENT  This document has been electronically signed.  DARIA ODONNELL M.D., ATTENDING RADIOLOGIST  This document has been electronically signed. Oct 17 2017  5:04PM        < end of copied text >  < from: CT Abdomen and Pelvis w/ IV Cont (10.16.17 @ 03:26) >  EXAM:  CT ABDOMEN AND PELVIS IC        PROCEDURE DATE:  Oct 16 2017         INTERPRETATION:  CLINICAL INFORMATION: Status post left lower extremity   fem-pop thrombectomy. Evaluate for central venous thrombosis.    COMPARISON: CT abdomen/pelvis 9/20/2017.    PROCEDURE:   CT venogram of the Abdomen and Pelvis was performed with intravenous   contrast.   Intravenous contrast: 90 ml Omnipaque 350. 10 ml discarded.  Oral contrast: None.  Sagittal and coronal reformats were performed.    FINDINGS:    LOWER CHEST: Within normal limits.    LIVER: Within normal limits.  BILE DUCTS: Normal caliber.  GALLBLADDER: Within normal limits.  SPLEEN: Within normal limits.  PANCREAS: Within normal limits.  ADRENALS: Within normal limits.  KIDNEYS/URETERS: Within normal limits.    BLADDER: The bladder is distended.  REPRODUCTIVE ORGANS: The prostate is within normal limits.    BOWEL: Diverticulosis without acute diverticulitis. No bowel obstruction.   Normal appendix.  PERITONEUM: No ascites.  VESSELS:  The inferior vena cava is patent. Evaluation of the bifurcation   and the proximal common iliac veins is limited due to the hardening   artifact from the lumbosacral fixation hardware. The common femoral veins   are patent. In keeping with the ultrasound findings, there is expansion   and nonopacification of the left femoral vein, consistent with   thrombosis. Atherosclerotic disease of the aortoiliac vessels.  RETROPERITONEUM: No lymphadenopathy.    ABDOMINAL WALL: Postsurgical changes in the leftinguinal region with   overlying surgical clips. No subcutaneous collection.  BONES: Posterior spinal fusion of L4-L5 and L5-S1. Mild degenerative   changes of the thoracolumbar spine.    IMPRESSION:   1.  No IVC thrombosis. Limited evaluation of theiliac bifurcation and   proximal common femoral veins due to beam hardening artifact from the   spinal hardware without gross evidence for thrombus.  Redemonstration of   left femoral vein thrombosis.                MELINDA TOTH D.O., RADIOLOGY RESIDENT  This document has been electronically signed.  SKINNY GALVEZ M.D., RADIOLOGIST  This document has been electronically signed. Oct 16 2017  3:55AM        < end of copied text >

## 2017-10-17 NOTE — PHYSICAL THERAPY INITIAL EVALUATION ADULT - GENERAL OBSERVATIONS, REHAB EVAL
Consult received, chart reviewed. Patient received supine in bed, NAD, + LLE dressing c/d/i, +IV. Patient agreed to EVALUATION from Physical Therapist.

## 2017-10-17 NOTE — CONSULT NOTE ADULT - PROBLEM SELECTOR RECOMMENDATION 9
- in the setting of lupus anticoagulant his PTT/PT/INR can be falsely elevated, therefore following coag studies on this patient population can be unreliable. His INR of 4, may not be indicative of a therapeutic level because of this and we cannot definitively call him a coumadin failure.  -regarding pts prior hypercoag work up, this was done while pt was on a.c and in the setting of an acute clot, therefore it is not reliable. Protein C was low, however pt was on a/c, beta 2 glycoprotein was negative, lupus anticoagulant was positive  on DRVVt, and anticardiolipin was negative. pt may have antiphospholipid syndrome given he meets the initial criteria of clot and + lupus anticoagulant, however these need to be repeated 12 weeks from 10/1 to confirm diagnosis.   - pt was also found with an elevated PTT/PT/INR 2/2 to SLE anticoagulant, so following his coags can be confounding given the presence of this lupus anticoagulant.  would consider an anticoagulant that does not require monitoring of PT/INR or PTT such as eliquis, pradaxa or xarelto in this setting.   -  anti thrombin -neg  -unable to find factor 5 leiden mutation,and prothrombin gene mutation results, if not sent, please send as they are not affected by a/c.   - consider holding vitamin K to reverse pts INR as it may be falsly elevated.   - On pts doppler 10/9 post thrombectomy, there still was the presence of a DVT, which was also seen on doppler upon this admission of LLE. Will need repeat sono at 3 months and D DImer to ensure resolution of LLE clot.  - please obtain out pt records regarding pts initial DVT and PE as this could have been provoked from his orthopedic procedure but we do not have these records.   - ensure pt is up to date on all age appropriate cancer screening.

## 2017-10-17 NOTE — PHYSICAL THERAPY INITIAL EVALUATION ADULT - PATIENT PROFILE REVIEW, REHAB EVAL
yes/Yes- Spoke with BARTOLOME Matamoros- pt appropriate to be seen for PT today. Activity order: Ambulate as tolerated

## 2017-10-18 LAB
APTT BLD: 33.9 SEC — SIGNIFICANT CHANGE UP (ref 27.5–37.4)
GLUCOSE BLDC GLUCOMTR-MCNC: 107 MG/DL — HIGH (ref 70–99)
GLUCOSE BLDC GLUCOMTR-MCNC: 132 MG/DL — HIGH (ref 70–99)
GLUCOSE BLDC GLUCOMTR-MCNC: 90 MG/DL — SIGNIFICANT CHANGE UP (ref 70–99)
GLUCOSE BLDC GLUCOMTR-MCNC: 91 MG/DL — SIGNIFICANT CHANGE UP (ref 70–99)
INR BLD: 1.81 — HIGH (ref 0.88–1.17)
PROTHROM AB SERPL-ACNC: 20.5 SEC — HIGH (ref 9.8–13.1)

## 2017-10-18 PROCEDURE — 99232 SBSQ HOSP IP/OBS MODERATE 35: CPT | Mod: 24

## 2017-10-18 RX ORDER — SENNA PLUS 8.6 MG/1
2 TABLET ORAL AT BEDTIME
Qty: 0 | Refills: 0 | Status: DISCONTINUED | OUTPATIENT
Start: 2017-10-18 | End: 2017-10-19

## 2017-10-18 RX ORDER — HYDROMORPHONE HYDROCHLORIDE 2 MG/ML
0.5 INJECTION INTRAMUSCULAR; INTRAVENOUS; SUBCUTANEOUS ONCE
Qty: 0 | Refills: 0 | Status: DISCONTINUED | OUTPATIENT
Start: 2017-10-18 | End: 2017-10-18

## 2017-10-18 RX ORDER — RIVAROXABAN 15 MG-20MG
15 KIT ORAL
Qty: 0 | Refills: 0 | Status: DISCONTINUED | OUTPATIENT
Start: 2017-10-18 | End: 2017-10-19

## 2017-10-18 RX ORDER — HYDROMORPHONE HYDROCHLORIDE 2 MG/ML
0.5 INJECTION INTRAMUSCULAR; INTRAVENOUS; SUBCUTANEOUS EVERY 4 HOURS
Qty: 0 | Refills: 0 | Status: DISCONTINUED | OUTPATIENT
Start: 2017-10-18 | End: 2017-10-19

## 2017-10-18 RX ORDER — DOCUSATE SODIUM 100 MG
100 CAPSULE ORAL DAILY
Qty: 0 | Refills: 0 | Status: DISCONTINUED | OUTPATIENT
Start: 2017-10-18 | End: 2017-10-19

## 2017-10-18 RX ADMIN — HYDROMORPHONE HYDROCHLORIDE 1 MILLIGRAM(S): 2 INJECTION INTRAMUSCULAR; INTRAVENOUS; SUBCUTANEOUS at 06:45

## 2017-10-18 RX ADMIN — HYDROMORPHONE HYDROCHLORIDE 0.5 MILLIGRAM(S): 2 INJECTION INTRAMUSCULAR; INTRAVENOUS; SUBCUTANEOUS at 23:29

## 2017-10-18 RX ADMIN — HYDROMORPHONE HYDROCHLORIDE 1 MILLIGRAM(S): 2 INJECTION INTRAMUSCULAR; INTRAVENOUS; SUBCUTANEOUS at 10:44

## 2017-10-18 RX ADMIN — SENNA PLUS 2 TABLET(S): 8.6 TABLET ORAL at 21:02

## 2017-10-18 RX ADMIN — HYDROMORPHONE HYDROCHLORIDE 1 MILLIGRAM(S): 2 INJECTION INTRAMUSCULAR; INTRAVENOUS; SUBCUTANEOUS at 02:10

## 2017-10-18 RX ADMIN — Medication 100 MILLIGRAM(S): at 11:40

## 2017-10-18 RX ADMIN — HYDROMORPHONE HYDROCHLORIDE 1 MILLIGRAM(S): 2 INJECTION INTRAMUSCULAR; INTRAVENOUS; SUBCUTANEOUS at 01:52

## 2017-10-18 RX ADMIN — PIPERACILLIN AND TAZOBACTAM 25 GRAM(S): 4; .5 INJECTION, POWDER, LYOPHILIZED, FOR SOLUTION INTRAVENOUS at 07:17

## 2017-10-18 RX ADMIN — TRAMADOL HYDROCHLORIDE 50 MILLIGRAM(S): 50 TABLET ORAL at 22:58

## 2017-10-18 RX ADMIN — HYDROMORPHONE HYDROCHLORIDE 0.5 MILLIGRAM(S): 2 INJECTION INTRAMUSCULAR; INTRAVENOUS; SUBCUTANEOUS at 19:45

## 2017-10-18 RX ADMIN — HYDROMORPHONE HYDROCHLORIDE 0.5 MILLIGRAM(S): 2 INJECTION INTRAMUSCULAR; INTRAVENOUS; SUBCUTANEOUS at 19:24

## 2017-10-18 RX ADMIN — HYDROMORPHONE HYDROCHLORIDE 0.5 MILLIGRAM(S): 2 INJECTION INTRAMUSCULAR; INTRAVENOUS; SUBCUTANEOUS at 20:58

## 2017-10-18 RX ADMIN — PANTOPRAZOLE SODIUM 40 MILLIGRAM(S): 20 TABLET, DELAYED RELEASE ORAL at 05:18

## 2017-10-18 RX ADMIN — TRAMADOL HYDROCHLORIDE 50 MILLIGRAM(S): 50 TABLET ORAL at 22:15

## 2017-10-18 RX ADMIN — ZOLPIDEM TARTRATE 5 MILLIGRAM(S): 10 TABLET ORAL at 22:15

## 2017-10-18 RX ADMIN — PIPERACILLIN AND TAZOBACTAM 25 GRAM(S): 4; .5 INJECTION, POWDER, LYOPHILIZED, FOR SOLUTION INTRAVENOUS at 17:40

## 2017-10-18 RX ADMIN — HYDROMORPHONE HYDROCHLORIDE 1 MILLIGRAM(S): 2 INJECTION INTRAMUSCULAR; INTRAVENOUS; SUBCUTANEOUS at 11:41

## 2017-10-18 RX ADMIN — SERTRALINE 100 MILLIGRAM(S): 25 TABLET, FILM COATED ORAL at 20:58

## 2017-10-18 RX ADMIN — LOSARTAN POTASSIUM 100 MILLIGRAM(S): 100 TABLET, FILM COATED ORAL at 05:18

## 2017-10-18 RX ADMIN — HYDROMORPHONE HYDROCHLORIDE 1 MILLIGRAM(S): 2 INJECTION INTRAMUSCULAR; INTRAVENOUS; SUBCUTANEOUS at 06:20

## 2017-10-18 RX ADMIN — HYDROMORPHONE HYDROCHLORIDE 1 MILLIGRAM(S): 2 INJECTION INTRAMUSCULAR; INTRAVENOUS; SUBCUTANEOUS at 15:15

## 2017-10-18 RX ADMIN — HYDROMORPHONE HYDROCHLORIDE 0.5 MILLIGRAM(S): 2 INJECTION INTRAMUSCULAR; INTRAVENOUS; SUBCUTANEOUS at 23:50

## 2017-10-18 RX ADMIN — HYDROMORPHONE HYDROCHLORIDE 1 MILLIGRAM(S): 2 INJECTION INTRAMUSCULAR; INTRAVENOUS; SUBCUTANEOUS at 14:48

## 2017-10-18 RX ADMIN — RIVAROXABAN 15 MILLIGRAM(S): KIT at 18:48

## 2017-10-18 RX ADMIN — HYDROMORPHONE HYDROCHLORIDE 0.5 MILLIGRAM(S): 2 INJECTION INTRAMUSCULAR; INTRAVENOUS; SUBCUTANEOUS at 21:15

## 2017-10-18 NOTE — CONSULT NOTE ADULT - SUBJECTIVE AND OBJECTIVE BOX
Patient is a 57y old  Male who presents with a chief complaint of Left leg swelling (16 Oct 2017 03:37)      PAST MEDICAL & SURGICAL HISTORY:  DVT (deep venous thrombosis)  Varicosities  Insomnia  Anxiety  Sleep apnea: had test &gt; 10 years ago--supposed to wear device.  Lost 25 pounds but never retested  Spondylisthesis  Spinal stenosis  Bulging disc  Obese  Depression  GERD (gastroesophageal reflux disease)  Hyperlipidemia  Hypertension, Essential  Diabetes Mellitus: diagnosed 10 years ago  doesn&#x27;t do fingersticks  Deep vein thrombosis (DVT) of lower extremity  Reflux: Gastroscopy 2013  H/O laser iridotomy: left-2008  R Knee Arthroplasty: Right 2003, Left 2011,      No Known Allergies      FAMILY HISTORY:  Family history of essential hypertension  Family history of asthma  Family history of asthma  Family history of rheumatoid arthritis  Family history of pancreatic cancer    Patient seen this morning his left lower extremity swelling is getting better.  57yMale   -extensive lower extremity DVT.  Was diagnosed with DVT/PE earlier this year (unprovoked)  was only on ac for 2 months bc of insurance/medication cost issues.   Denies sob/chest pain  palpitations.  No lightheadedness.  No history of previous blood clots prior to this year,   no known hx of clotting disorder, per patient all routine cancer screening up to date. Patient denies any family or personal history of DVT.    ROS:  All other systems reviewed and negative    PAST MEDICAL HISTORY: HEALTH ISSUES - PROBLEM Dx:  DVT/PE      ACTIVE MEDICATION LIST: from: US Duplex Venous Lower Ext Ltd, Left (09.28.17 @ 18:02) >  IMPRESSION:     Sept 28 2017: CT Angio :Extensive acute DVT extending from the left femoral vein into the distal   lower extremity.  Review of Systems:   · General	negative	  · Skin/Breast	negative	  · Ophthalmologic	negative	  · ENMT	negative	  · Respiratory and Thorax	negative	  · Cardiovascular	negative	  · Gastrointestinal	negative	  · Genitourinary	negative	  · Negative Musculoskeletal Symptoms	LLE swelling	  · Neurological	negative	    Physical Exam:   · Constitutional	Well-developed, well nourished	  · Eyes	EOMI; PERRL; no drainage or redness	  · ENMT	No oral lesions; no gross abnormalities	  · Neck	No bruits; no thyromegaly or nodules	  · Breasts	No masses; no nipple discharge	  · Back	No deformity or limitation of movement	  · Respiratory	Breath Sounds equal & clear to percussion & auscultation, no accessory muscle use	  · Cardiovascular	Regular rate & rhythm, normal S1, S2; no murmurs, gallops or rubs; no S3, S4	  · Gastrointestinal	Soft, non-tender, no hepatosplenomegaly, normal bowel sounds	  · Genitourinary	Normal genitalia; no lesions; no discharge	  · Extremities	detailed exam	  · Extremities Comments	mild swelling LLE	  · Vascular	Equal and normal pulses (carotid, femoral, dorsalis pedis)	  · Neurological	Alert & oriented; no sensory, motor or coordination deficits, normal reflexes	  · Skin	No lesions; no rash	  · Lymph Nodes	No lymphadedenopathy	  · Musculoskeletal	No joint pain, swelling or deformity; no limitation of movement	    Assessment and Recommendation:   · Assessment		  	  58 y/o male, with PMHx of DM (on metformin), HTN (hydrochlorothiazide) right leg DVT and PE, 3,PSHx of right knee replacement in 2013 and revision in 2014, presents to the ED with left calf pain x 2 weeks now with extensive DVT left leg.  Was not on anticoagulation for over 2 months due to insurance reasons. Prior episde of DVT and PE was unprovoked without any secondary cause.recently discharged on Coumadin and comes in with left lower extremity DVT which as per vascular surgeon and imaging studies.  His anemia.  He had his extensive workup done including factor V Leiden, prothrombin gene mutation, anticardiolipin antibodies, beta-2 glycoprotein was which were all negative.  He was found to have a lupus anticoagulant in his blood tests done last visit, which was due to him being on heparin.  This is not considered antiphospholipid syndrome. till now with no obvious cause for his recurrent DVT is identified, he had his CT angiogram done this visit that did not show any PE.    Problem/Recommendation - 1:Problem: Acute deep vein thrombosis (DVT) of femoral vein of left lower extremity. Recommendation: Patient was advised that despite being on Coumadin He developed progression of left lower extremity DVT.  Hence, he should stop Coumadin and be on direct thrombin inhibitors.  House .Hematology was consulted and their opinion is highly appreciated.  They agree with the plan that patient should not be on Coumadin and will benefit on either being on Eliquis, xarelto or paradaxa.    Problem/Recommendation - 2:  ·  Problem: Deep vein thrombosis (DVT) of other vein of left lower extremity.  Recommendation: Continue xarelto.    Problem/Recommendation - 3:  ·  Problem: Diabetes Mellitus.     Problem/Recommendation - 4:  ·  Problem: Spinal stenosis.     Attending Attestation:   discussed with patient in detail, all questions answered.        Vital Signs Last 24 Hrs  T(C): 36.6 (18 Oct 2017 08:45), Max: 36.9 (17 Oct 2017 17:19)  T(F): 97.8 (18 Oct 2017 08:45), Max: 98.5 (17 Oct 2017 21:44)  HR: 59 (18 Oct 2017 08:45) (59 - 69)  BP: 134/83 (18 Oct 2017 08:45) (126/88 - 152/84)  BP(mean): --  RR: 16 (18 Oct 2017 08:45) (16 - 19)  SpO2: 98% (18 Oct 2017 08:45) (98% - 100%)                          12.4   7.60  )-----------( 339      ( 17 Oct 2017 05:55 )             37.1       10-17    139  |  102  |  10  ----------------------------<  131<H>  4.3   |  23  |  0.74    Ca    8.5      17 Oct 2017 05:55  Phos  3.5     10-17  Mg     1.7     10-17        PT/INR - ( 18 Oct 2017 05:30 )   PT: 20.5 SEC;   INR: 1.81          PTT - ( 18 Oct 2017 05:30 )  PTT:33.9 SEC    Ass/rec:

## 2017-10-18 NOTE — PROVIDER CONTACT NOTE (OTHER) - ASSESSMENT
Patient received dilaudid 0.5mg at 1924.  No non-verbal signs of pain noted.  Tramadol written for breakthrough pain.  Patient states tramadol does not help his pain.

## 2017-10-18 NOTE — PROGRESS NOTE ADULT - SUBJECTIVE AND OBJECTIVE BOX
VASCULAR DAILY PROGRESS NOTE      Subjective:  NAEO. Pain well controlled.         Objective:    PE:  General: A&Ox3, NAD  EXT: LLE in ace compression dressing, warm  left groin incision w decreased cellulitis   lle mod edema remains     Vital Signs Last 24 Hrs  T(C): 36.6 (18 Oct 2017 05:17), Max: 36.9 (17 Oct 2017 11:25)  T(F): 97.8 (18 Oct 2017 05:17), Max: 98.5 (17 Oct 2017 11:25)  HR: 64 (18 Oct 2017 05:17) (60 - 72)  BP: 144/85 (18 Oct 2017 05:17) (120/70 - 152/84)  BP(mean): --  RR: 17 (18 Oct 2017 05:17) (17 - 19)  SpO2: 98% (18 Oct 2017 05:17) (97% - 100%)    I&O's Detail    17 Oct 2017 07:01  -  18 Oct 2017 07:00  --------------------------------------------------------  IN:    IV PiggyBack: 200 mL  Total IN: 200 mL    OUT:    Voided: 6650 mL  Total OUT: 6650 mL    Total NET: -6450 mL          Daily     Daily Weight in k.8 (18 Oct 2017 01:09)    MEDICATIONS  (STANDING):  dextrose 5%. 1000 milliLiter(s) (50 mL/Hr) IV Continuous <Continuous>  dextrose 50% Injectable 12.5 Gram(s) IV Push once  dextrose 50% Injectable 25 Gram(s) IV Push once  dextrose 50% Injectable 25 Gram(s) IV Push once  docusate sodium 100 milliGRAM(s) Oral daily  insulin lispro (HumaLOG) corrective regimen sliding scale   SubCutaneous three times a day before meals  losartan 100 milliGRAM(s) Oral daily  pantoprazole    Tablet 40 milliGRAM(s) Oral before breakfast  piperacillin/tazobactam IVPB. 3.375 Gram(s) IV Intermittent every 8 hours  rivaroxaban 15 milliGRAM(s) Oral two times a day  senna 2 Tablet(s) Oral at bedtime  sertraline 100 milliGRAM(s) Oral daily    MEDICATIONS  (PRN):  dextrose Gel 1 Dose(s) Oral once PRN Blood Glucose LESS THAN 70 milliGRAM(s)/deciliter  glucagon  Injectable 1 milliGRAM(s) IntraMuscular once PRN Glucose LESS THAN 70 milligrams/deciliter  HYDROmorphone  Injectable 1 milliGRAM(s) IV Push every 4 hours PRN Severe Pain (7 - 10)  HYDROmorphone  Injectable 0.5 milliGRAM(s) IV Push every 4 hours PRN Moderate Pain (4 - 6)  traMADol 50 milliGRAM(s) Oral three times a day PRN Breakthrough pain  zolpidem 5 milliGRAM(s) Oral at bedtime PRN Insomnia      LABS:                        12.4   7.60  )-----------( 339      ( 17 Oct 2017 05:55 )             37.1     10-17    139  |  102  |  10  ----------------------------<  131<H>  4.3   |  23  |  0.74    Ca    8.5      17 Oct 2017 05:55  Phos  3.5     10-17  Mg     1.7     10-17      PT/INR - ( 18 Oct 2017 05:30 )   PT: 20.5 SEC;   INR: 1.81          PTT - ( 18 Oct 2017 05:30 )  PTT:33.9 SEC      RADIOLOGY & ADDITIONAL STUDIES: VASCULAR DAILY PROGRESS NOTE      Subjective: pt states left leg swelling and discomfort have improved   NAEO. Pain well controlled.         Objective:    PE:  General: A&Ox3, NAD  EXT: LLE in ace compression dressing, warm  left groin incision w decreased cellulitis   lle mod edema remains     Vital Signs Last 24 Hrs  T(C): 36.6 (18 Oct 2017 05:17), Max: 36.9 (17 Oct 2017 11:25)  T(F): 97.8 (18 Oct 2017 05:17), Max: 98.5 (17 Oct 2017 11:25)  HR: 64 (18 Oct 2017 05:17) (60 - 72)  BP: 144/85 (18 Oct 2017 05:17) (120/70 - 152/84)  BP(mean): --  RR: 17 (18 Oct 2017 05:17) (17 - 19)  SpO2: 98% (18 Oct 2017 05:17) (97% - 100%)    I&O's Detail    17 Oct 2017 07:01  -  18 Oct 2017 07:00  --------------------------------------------------------  IN:    IV PiggyBack: 200 mL  Total IN: 200 mL    OUT:    Voided: 6650 mL  Total OUT: 6650 mL    Total NET: -6450 mL          Daily     Daily Weight in k.8 (18 Oct 2017 01:09)    MEDICATIONS  (STANDING):  dextrose 5%. 1000 milliLiter(s) (50 mL/Hr) IV Continuous <Continuous>  dextrose 50% Injectable 12.5 Gram(s) IV Push once  dextrose 50% Injectable 25 Gram(s) IV Push once  dextrose 50% Injectable 25 Gram(s) IV Push once  docusate sodium 100 milliGRAM(s) Oral daily  insulin lispro (HumaLOG) corrective regimen sliding scale   SubCutaneous three times a day before meals  losartan 100 milliGRAM(s) Oral daily  pantoprazole    Tablet 40 milliGRAM(s) Oral before breakfast  piperacillin/tazobactam IVPB. 3.375 Gram(s) IV Intermittent every 8 hours  rivaroxaban 15 milliGRAM(s) Oral two times a day  senna 2 Tablet(s) Oral at bedtime  sertraline 100 milliGRAM(s) Oral daily    MEDICATIONS  (PRN):  dextrose Gel 1 Dose(s) Oral once PRN Blood Glucose LESS THAN 70 milliGRAM(s)/deciliter  glucagon  Injectable 1 milliGRAM(s) IntraMuscular once PRN Glucose LESS THAN 70 milligrams/deciliter  HYDROmorphone  Injectable 1 milliGRAM(s) IV Push every 4 hours PRN Severe Pain (7 - 10)  HYDROmorphone  Injectable 0.5 milliGRAM(s) IV Push every 4 hours PRN Moderate Pain (4 - 6)  traMADol 50 milliGRAM(s) Oral three times a day PRN Breakthrough pain  zolpidem 5 milliGRAM(s) Oral at bedtime PRN Insomnia      LABS:                        12.4   7.60  )-----------( 339      ( 17 Oct 2017 05:55 )             37.1     10-17    139  |  102  |  10  ----------------------------<  131<H>  4.3   |  23  |  0.74    Ca    8.5      17 Oct 2017 05:55  Phos  3.5     10-17  Mg     1.7     10-17      PT/INR - ( 18 Oct 2017 05:30 )   PT: 20.5 SEC;   INR: 1.81          PTT - ( 18 Oct 2017 05:30 )  PTT:33.9 SEC      RADIOLOGY & ADDITIONAL STUDIES:

## 2017-10-19 ENCOUNTER — TRANSCRIPTION ENCOUNTER (OUTPATIENT)
Age: 57
End: 2017-10-19

## 2017-10-19 VITALS
TEMPERATURE: 98 F | SYSTOLIC BLOOD PRESSURE: 122 MMHG | RESPIRATION RATE: 18 BRPM | OXYGEN SATURATION: 98 % | HEART RATE: 57 BPM | DIASTOLIC BLOOD PRESSURE: 76 MMHG

## 2017-10-19 LAB
GLUCOSE BLDC GLUCOMTR-MCNC: 100 MG/DL — HIGH (ref 70–99)
GLUCOSE BLDC GLUCOMTR-MCNC: 126 MG/DL — HIGH (ref 70–99)
GLUCOSE BLDC GLUCOMTR-MCNC: 138 MG/DL — HIGH (ref 70–99)

## 2017-10-19 PROCEDURE — 99232 SBSQ HOSP IP/OBS MODERATE 35: CPT | Mod: 24

## 2017-10-19 RX ORDER — FONDAPARINUX SODIUM 2.5 MG/.5ML
1 INJECTION, SOLUTION SUBCUTANEOUS
Qty: 60 | Refills: 0 | OUTPATIENT
Start: 2017-10-19 | End: 2017-11-18

## 2017-10-19 RX ORDER — RIVAROXABAN 15 MG-20MG
1 KIT ORAL
Qty: 30 | Refills: 0
Start: 2017-10-19 | End: 2017-11-18

## 2017-10-19 RX ORDER — RIVAROXABAN 15 MG-20MG
1 KIT ORAL
Qty: 40 | Refills: 0
Start: 2017-10-19 | End: 2017-11-08

## 2017-10-19 RX ORDER — FONDAPARINUX SODIUM 2.5 MG/.5ML
1 INJECTION, SOLUTION SUBCUTANEOUS
Qty: 40 | Refills: 0 | OUTPATIENT
Start: 2017-10-19 | End: 2017-11-08

## 2017-10-19 RX ORDER — RIVAROXABAN 15 MG-20MG
1 KIT ORAL
Qty: 30 | Refills: 0 | OUTPATIENT
Start: 2017-10-19 | End: 2017-11-18

## 2017-10-19 RX ADMIN — HYDROMORPHONE HYDROCHLORIDE 0.5 MILLIGRAM(S): 2 INJECTION INTRAMUSCULAR; INTRAVENOUS; SUBCUTANEOUS at 05:35

## 2017-10-19 RX ADMIN — PANTOPRAZOLE SODIUM 40 MILLIGRAM(S): 20 TABLET, DELAYED RELEASE ORAL at 05:15

## 2017-10-19 RX ADMIN — LOSARTAN POTASSIUM 100 MILLIGRAM(S): 100 TABLET, FILM COATED ORAL at 05:15

## 2017-10-19 RX ADMIN — RIVAROXABAN 15 MILLIGRAM(S): KIT at 18:42

## 2017-10-19 RX ADMIN — RIVAROXABAN 15 MILLIGRAM(S): KIT at 07:40

## 2017-10-19 RX ADMIN — HYDROMORPHONE HYDROCHLORIDE 0.5 MILLIGRAM(S): 2 INJECTION INTRAMUSCULAR; INTRAVENOUS; SUBCUTANEOUS at 10:00

## 2017-10-19 RX ADMIN — TRAMADOL HYDROCHLORIDE 50 MILLIGRAM(S): 50 TABLET ORAL at 06:19

## 2017-10-19 RX ADMIN — PIPERACILLIN AND TAZOBACTAM 25 GRAM(S): 4; .5 INJECTION, POWDER, LYOPHILIZED, FOR SOLUTION INTRAVENOUS at 09:33

## 2017-10-19 RX ADMIN — TRAMADOL HYDROCHLORIDE 50 MILLIGRAM(S): 50 TABLET ORAL at 07:00

## 2017-10-19 RX ADMIN — HYDROMORPHONE HYDROCHLORIDE 0.5 MILLIGRAM(S): 2 INJECTION INTRAMUSCULAR; INTRAVENOUS; SUBCUTANEOUS at 05:15

## 2017-10-19 RX ADMIN — PIPERACILLIN AND TAZOBACTAM 25 GRAM(S): 4; .5 INJECTION, POWDER, LYOPHILIZED, FOR SOLUTION INTRAVENOUS at 01:18

## 2017-10-19 RX ADMIN — HYDROMORPHONE HYDROCHLORIDE 0.5 MILLIGRAM(S): 2 INJECTION INTRAMUSCULAR; INTRAVENOUS; SUBCUTANEOUS at 09:32

## 2017-10-19 NOTE — PROGRESS NOTE ADULT - SUBJECTIVE AND OBJECTIVE BOX
Morning Surgical Progress Note  Patient is a 57y old  Male who presents with a chief complaint of Left leg swelling (16 Oct 2017 03:37)    SUBJECTIVE: Patient seen and examined at bedside with surgical team, patient without complaints this morning    Vital Signs Last 24 Hrs  T(C): 36.7 (19 Oct 2017 05:12), Max: 37.2 (18 Oct 2017 14:00)  T(F): 98 (19 Oct 2017 05:12), Max: 99 (18 Oct 2017 14:00)  HR: 70 (19 Oct 2017 05:12) (59 - 77)  BP: 134/85 (19 Oct 2017 05:12) (127/78 - 138/84)  BP(mean): --  RR: 16 (19 Oct 2017 05:12) (16 - 18)  SpO2: 100% (19 Oct 2017 05:12) (97% - 100%)  I&O's Detail    18 Oct 2017 07:01  -  19 Oct 2017 07:00  --------------------------------------------------------  IN:    IV PiggyBack: 100 mL  Total IN: 100 mL    OUT:    Voided: 7100 mL  Total OUT: 7100 mL    Total NET: -7000 mL        MEDICATIONS  (STANDING):  dextrose 5%. 1000 milliLiter(s) (50 mL/Hr) IV Continuous <Continuous>  dextrose 50% Injectable 12.5 Gram(s) IV Push once  dextrose 50% Injectable 25 Gram(s) IV Push once  dextrose 50% Injectable 25 Gram(s) IV Push once  docusate sodium 100 milliGRAM(s) Oral daily  insulin lispro (HumaLOG) corrective regimen sliding scale   SubCutaneous three times a day before meals  losartan 100 milliGRAM(s) Oral daily  pantoprazole    Tablet 40 milliGRAM(s) Oral before breakfast  piperacillin/tazobactam IVPB. 3.375 Gram(s) IV Intermittent every 8 hours  rivaroxaban 15 milliGRAM(s) Oral two times a day  senna 2 Tablet(s) Oral at bedtime  sertraline 100 milliGRAM(s) Oral daily    MEDICATIONS  (PRN):  dextrose Gel 1 Dose(s) Oral once PRN Blood Glucose LESS THAN 70 milliGRAM(s)/deciliter  glucagon  Injectable 1 milliGRAM(s) IntraMuscular once PRN Glucose LESS THAN 70 milligrams/deciliter  HYDROmorphone  Injectable 0.5 milliGRAM(s) IV Push every 4 hours PRN Moderate Pain (4 - 6)  traMADol 50 milliGRAM(s) Oral three times a day PRN Breakthrough pain  zolpidem 5 milliGRAM(s) Oral at bedtime PRN Insomnia      Physical Exam  General: A&Ox3, NAD  EXT: LLE in ace, removed, all staples removed, incisions are c/d/i nontender no erythema, foot warm    LABS:          PT/INR - ( 18 Oct 2017 05:30 )   PT: 20.5 SEC;   INR: 1.81          PTT - ( 18 Oct 2017 05:30 )  PTT:33.9 SEC    Patient is a 57y Male with LLE DVT on Xarelto, patient was previously on Xarelto in past but stopped taking it because he did not have medical coverage. Script for Xarelto sent by me to [facility], they do not have his insurance card, patient spoken to he does not have an insurance card for prescription meds but will ask his wife, asked me to send scripts to [facility] script sent to [facility], they were called and Xarelto cost is $760.00, patient notified and asked for [facility], will send script and call for price, patient may need a different blood thinner.  - Continue xarelto   - f/u hemology   - regular diet  - ace wrap compression to LLE  - d/w C team Morning Surgical Progress Note  Patient is a 57y old  Male who presents with a chief complaint of Left leg swelling (16 Oct 2017 03:37)    SUBJECTIVE: Patient seen and examined at bedside with surgical team, patient without complaints this morning    Vital Signs Last 24 Hrs  T(C): 36.7 (19 Oct 2017 05:12), Max: 37.2 (18 Oct 2017 14:00)  T(F): 98 (19 Oct 2017 05:12), Max: 99 (18 Oct 2017 14:00)  HR: 70 (19 Oct 2017 05:12) (59 - 77)  BP: 134/85 (19 Oct 2017 05:12) (127/78 - 138/84)  BP(mean): --  RR: 16 (19 Oct 2017 05:12) (16 - 18)  SpO2: 100% (19 Oct 2017 05:12) (97% - 100%)  I&O's Detail    18 Oct 2017 07:01  -  19 Oct 2017 07:00  --------------------------------------------------------  IN:    IV PiggyBack: 100 mL  Total IN: 100 mL    OUT:    Voided: 7100 mL  Total OUT: 7100 mL    Total NET: -7000 mL        MEDICATIONS  (STANDING):  dextrose 5%. 1000 milliLiter(s) (50 mL/Hr) IV Continuous <Continuous>  dextrose 50% Injectable 12.5 Gram(s) IV Push once  dextrose 50% Injectable 25 Gram(s) IV Push once  dextrose 50% Injectable 25 Gram(s) IV Push once  docusate sodium 100 milliGRAM(s) Oral daily  insulin lispro (HumaLOG) corrective regimen sliding scale   SubCutaneous three times a day before meals  losartan 100 milliGRAM(s) Oral daily  pantoprazole    Tablet 40 milliGRAM(s) Oral before breakfast  piperacillin/tazobactam IVPB. 3.375 Gram(s) IV Intermittent every 8 hours  rivaroxaban 15 milliGRAM(s) Oral two times a day  senna 2 Tablet(s) Oral at bedtime  sertraline 100 milliGRAM(s) Oral daily    MEDICATIONS  (PRN):  dextrose Gel 1 Dose(s) Oral once PRN Blood Glucose LESS THAN 70 milliGRAM(s)/deciliter  glucagon  Injectable 1 milliGRAM(s) IntraMuscular once PRN Glucose LESS THAN 70 milligrams/deciliter  HYDROmorphone  Injectable 0.5 milliGRAM(s) IV Push every 4 hours PRN Moderate Pain (4 - 6)  traMADol 50 milliGRAM(s) Oral three times a day PRN Breakthrough pain  zolpidem 5 milliGRAM(s) Oral at bedtime PRN Insomnia      Physical Exam  General: A&Ox3, NAD  EXT: LLE in ace, removed, all staples removed, incisions are c/d/i nontender no erythema, foot warm    LABS:          PT/INR - ( 18 Oct 2017 05:30 )   PT: 20.5 SEC;   INR: 1.81          PTT - ( 18 Oct 2017 05:30 )  PTT:33.9 SEC    Patient is a 57y Male with LLE DVT on Xarelto, patient was previously on Xarelto in past but stopped taking it because he did not have medical coverage. Script for Xarelto sent by me to [facility], they do not have his insurance card, patient spoken to he does not have an insurance card for prescription meds but will ask his wife, asked me to send scripts to [facility], they were called and Xarelto cost is $760.00, patient notified and asked for [facility] Xarelto and Eliquis both not covered by insurance, patient may need a different blood thinner.  - Continue xarelto   - f/u hemology   - regular diet  - ace wrap compression to LLE  - d/w C team Morning Surgical Progress Note  Patient is a 57y old  Male who presents with a chief complaint of Left leg swelling (16 Oct 2017 03:37)    SUBJECTIVE: Patient seen and examined at bedside with surgical team, patient without complaints this morning    Vital Signs Last 24 Hrs  T(C): 36.7 (19 Oct 2017 05:12), Max: 37.2 (18 Oct 2017 14:00)  T(F): 98 (19 Oct 2017 05:12), Max: 99 (18 Oct 2017 14:00)  HR: 70 (19 Oct 2017 05:12) (59 - 77)  BP: 134/85 (19 Oct 2017 05:12) (127/78 - 138/84)  BP(mean): --  RR: 16 (19 Oct 2017 05:12) (16 - 18)  SpO2: 100% (19 Oct 2017 05:12) (97% - 100%)  I&O's Detail    18 Oct 2017 07:01  -  19 Oct 2017 07:00  --------------------------------------------------------  IN:    IV PiggyBack: 100 mL  Total IN: 100 mL    OUT:    Voided: 7100 mL  Total OUT: 7100 mL    Total NET: -7000 mL        MEDICATIONS  (STANDING):  dextrose 5%. 1000 milliLiter(s) (50 mL/Hr) IV Continuous <Continuous>  dextrose 50% Injectable 12.5 Gram(s) IV Push once  dextrose 50% Injectable 25 Gram(s) IV Push once  dextrose 50% Injectable 25 Gram(s) IV Push once  docusate sodium 100 milliGRAM(s) Oral daily  insulin lispro (HumaLOG) corrective regimen sliding scale   SubCutaneous three times a day before meals  losartan 100 milliGRAM(s) Oral daily  pantoprazole    Tablet 40 milliGRAM(s) Oral before breakfast  piperacillin/tazobactam IVPB. 3.375 Gram(s) IV Intermittent every 8 hours  rivaroxaban 15 milliGRAM(s) Oral two times a day  senna 2 Tablet(s) Oral at bedtime  sertraline 100 milliGRAM(s) Oral daily    MEDICATIONS  (PRN):  dextrose Gel 1 Dose(s) Oral once PRN Blood Glucose LESS THAN 70 milliGRAM(s)/deciliter  glucagon  Injectable 1 milliGRAM(s) IntraMuscular once PRN Glucose LESS THAN 70 milligrams/deciliter  HYDROmorphone  Injectable 0.5 milliGRAM(s) IV Push every 4 hours PRN Moderate Pain (4 - 6)  traMADol 50 milliGRAM(s) Oral three times a day PRN Breakthrough pain  zolpidem 5 milliGRAM(s) Oral at bedtime PRN Insomnia      Physical Exam  General: A&Ox3, NAD  EXT: LLE in ace, removed, all staples removed, incisions are c/d/i nontender no erythema, foot warm    LABS:      PT/INR - ( 18 Oct 2017 05:30 )   PT: 20.5 SEC;   INR: 1.81          PTT - ( 18 Oct 2017 05:30 )  PTT:33.9 SEC    Patient is a 57y Male with LLE DVT on Xarelto, patient was previously on Xarelto in past but stopped taking it because he did not have medical coverage. Script for Xarelto sent by me to [facility], they do not have his insurance card, patient spoken to he does not have an insurance card for prescription meds but will ask his wife, asked me to send scripts to [facility] script sent to [facility], they were called and Xarelto cost is $760.00, patient notified and asked for [facility] and Eliquis both not covered by insurance, patient may need a different blood thinner.  - Continue xarelto   - f/u hemology   - regular diet  - ace wrap compression to LLE  - d/w C team    Addendum Patient getting 30 day free sample of Xarelto from [facility], confirmed by me, in meanwhile is looking for prescription coverage Morning Surgical Progress Note  Patient is a 57y old  Male who presents with a chief complaint of Left leg swelling (16 Oct 2017 03:37)    SUBJECTIVE: Patient seen and examined at bedside with surgical team, patient without complaints this morning    Vital Signs Last 24 Hrs  T(C): 36.7 (19 Oct 2017 05:12), Max: 37.2 (18 Oct 2017 14:00)  T(F): 98 (19 Oct 2017 05:12), Max: 99 (18 Oct 2017 14:00)  HR: 70 (19 Oct 2017 05:12) (59 - 77)  BP: 134/85 (19 Oct 2017 05:12) (127/78 - 138/84)  BP(mean): --  RR: 16 (19 Oct 2017 05:12) (16 - 18)  SpO2: 100% (19 Oct 2017 05:12) (97% - 100%)  I&O's Detail    18 Oct 2017 07:01  -  19 Oct 2017 07:00  --------------------------------------------------------  IN:    IV PiggyBack: 100 mL  Total IN: 100 mL    OUT:    Voided: 7100 mL  Total OUT: 7100 mL    Total NET: -7000 mL        MEDICATIONS  (STANDING):  dextrose 5%. 1000 milliLiter(s) (50 mL/Hr) IV Continuous <Continuous>  dextrose 50% Injectable 12.5 Gram(s) IV Push once  dextrose 50% Injectable 25 Gram(s) IV Push once  dextrose 50% Injectable 25 Gram(s) IV Push once  docusate sodium 100 milliGRAM(s) Oral daily  insulin lispro (HumaLOG) corrective regimen sliding scale   SubCutaneous three times a day before meals  losartan 100 milliGRAM(s) Oral daily  pantoprazole    Tablet 40 milliGRAM(s) Oral before breakfast  piperacillin/tazobactam IVPB. 3.375 Gram(s) IV Intermittent every 8 hours  rivaroxaban 15 milliGRAM(s) Oral two times a day  senna 2 Tablet(s) Oral at bedtime  sertraline 100 milliGRAM(s) Oral daily    MEDICATIONS  (PRN):  dextrose Gel 1 Dose(s) Oral once PRN Blood Glucose LESS THAN 70 milliGRAM(s)/deciliter  glucagon  Injectable 1 milliGRAM(s) IntraMuscular once PRN Glucose LESS THAN 70 milligrams/deciliter  HYDROmorphone  Injectable 0.5 milliGRAM(s) IV Push every 4 hours PRN Moderate Pain (4 - 6)  traMADol 50 milliGRAM(s) Oral three times a day PRN Breakthrough pain  zolpidem 5 milliGRAM(s) Oral at bedtime PRN Insomnia      Physical Exam  General: A&Ox3, NAD  EXT: LLE in ace, removed, all staples removed, incisions are c/d/i nontender no erythema, foot warm  left groin cellulits sig decreased    LABS:      PT/INR - ( 18 Oct 2017 05:30 )   PT: 20.5 SEC;   INR: 1.81          PTT - ( 18 Oct 2017 05:30 )  PTT:33.9 SEC    Patient is a 57y Male with LLE DVT on Xarelto, patient was previously on Xarelto in past but stopped taking it because he did not have medical coverage. Script for Xarelto sent by me to [facility], they do not have his insurance card, patient spoken to he does not have an insurance card for prescription meds but will ask his wife, asked me to send scripts to [facility] script sent to [facility], they were called and Xarelto cost is $760.00, patient notified and asked for [facility] and Eliquis both not covered by insurance, patient may need a different blood thinner.  - Continue xarelto   - f/u hemology   - regular diet  - ace wrap compression to LLE  - d/w C team    Addendum Patient getting 30 day free sample of Xarelto from [facility], confirmed by me, in meanwhile is looking for prescription coverage

## 2017-10-19 NOTE — PROGRESS NOTE ADULT - ATTENDING COMMENTS
as above
as above  heme recommendations appreciated this was d/w pt  will start xarelto and have soc worker d/w pt financial means for long term use  also d/w pt to d/w fam members  heme eval for s/o hypercoag state
as above  heme recommendations appreciated this was d/w pt  will start xarelto and have soc worker d/w pt financial means for long term use  also d/w pt to d/w fam members  heme eval for s/o hypercoag state

## 2017-10-19 NOTE — PROGRESS NOTE ADULT - PROBLEM SELECTOR PROBLEM 1
Acute deep vein thrombosis (DVT) of femoral vein of left lower extremity

## 2017-10-19 NOTE — DISCHARGE NOTE ADULT - NS AS ACTIVITY OBS
Stairs allowed/No Heavy lifting/straining/Walking-Indoors allowed/Do not drive while taking pain medications./Walking-Outdoors allowed/Do not make important decisions/Do not drive or operate machinery

## 2017-10-19 NOTE — DISCHARGE NOTE ADULT - CARE PLAN
Goal:	DVT  Instructions for follow-up, activity and diet:	Take Xarelto 15 mg twice a day until 11/9/17  On 11/10/2017 you will start taking Xarelto 20 mg once a day Principal Discharge DX:	Acute deep vein thrombosis (DVT) of femoral vein of left lower extremity  Goal:	DVT  Instructions for follow-up, activity and diet:	Take Xarelto 15 mg twice a day until 11/9/17  On 11/10/2017 you will start taking Xarelto 20 mg once a day  Secondary Diagnosis:	Depression  Goal:	Continue current treatment  Secondary Diagnosis:	Diabetes mellitus  Goal:	Continue current treatment  Secondary Diagnosis:	GERD (gastroesophageal reflux disease)  Goal:	Continue current treatment  Secondary Diagnosis:	Hyperlipidemia  Goal:	Continue current treatment  Secondary Diagnosis:	Anxiety  Goal:	Continue current treatment Principal Discharge DX:	Acute deep vein thrombosis (DVT) of femoral vein of left lower extremity  Goal:	DVT  Instructions for follow-up, activity and diet:	Take Xarelto 15 mg twice a day until 11/9/17  On 11/10/2017 you will start taking Xarelto 20 mg once a day  ACE Wrap and compression and elevation to Left leg  Secondary Diagnosis:	Depression  Goal:	Continue current treatment  Secondary Diagnosis:	Diabetes mellitus  Goal:	Continue current treatment  Secondary Diagnosis:	GERD (gastroesophageal reflux disease)  Goal:	Continue current treatment  Secondary Diagnosis:	Hyperlipidemia  Goal:	Continue current treatment  Secondary Diagnosis:	Anxiety  Goal:	Continue current treatment

## 2017-10-19 NOTE — DISCHARGE NOTE ADULT - HOSPITAL COURSE
57 male with history of RLE DVT after orthopedic procedure prescribed Xarelto at the time but did not take due to non-coverage by insurance, recent admission for LLE SVT s/p OR thrombectomy and heparin bridge to coumadin, now presenting with swelling and pain of LLE. Denies feves/chills. Able to walk. Denies discharge from incisions. Denies motor/sensory deficits.     Bilateral duplex of lower extremity revealed Acute thrombosis of the left femoral, popliteal, peroneal, posterior tibial, and gastrocnemius veins. These findings are without significant change from 10/9/2017.Acute left deep venous thrombosis: above and below the knee.No right deep venous thrombosis.    CT angio revealed IMPRESSION: 1.  No IVC thrombosis. Limited evaluation of the iliac bifurcation and proximal common femoral veins due to beam hardening artifact from the spinal hardware without gross evidence for thrombus.  Redemonstration of left femoral vein thrombosis. 57 male with history of RLE DVT after orthopedic procedure prescribed Xarelto at the time but did not take due to non-coverage by insurance, recent admission for LLE SVT s/p OR thrombectomy and heparin bridge to coumadin, now presenting with swelling and pain of LLE while on coumadin with therapeutic INR. Patient was readmitted, duplex did not reveal extension of DVT (Read: Acute thrombosis of the left femoral, popliteal, peroneal, posterior tibial, and gastrocnemius veins. These findings are without significant change from 10/9/2017.Acute left deep venous thrombosis: above and below the knee. No right deep venous thrombosis), he had a CT chest to rule out PE. Hematology was consulted, he had extensive workup done including factor V Leiden, prothrombin gene mutation, anticardiolipin antibodies, beta-2 glycoprotein was which were all negative.  He was found to have a lupus anticoagulant in his blood tests done last visit, which was due to him being on heparin gtt.  As per hematology patient to stop coumadin and restart xarleto.  His left leg was ace wrapped and kept elevated. At this time, pt is tolerating a regular diet, ambulating and voiding. Pt has been deemed stable for discharge at this time.

## 2017-10-19 NOTE — DISCHARGE NOTE ADULT - CARE PROVIDERS DIRECT ADDRESSES
[email or web address]

## 2017-10-19 NOTE — DISCHARGE NOTE ADULT - MEDICATION SUMMARY - MEDICATIONS TO TAKE
I will START or STAY ON the medications listed below when I get home from the hospital:    traMADol 50 mg oral tablet  -- 2  by mouth 3 times a day  -- Indication: For Pain    acetaminophen 325 mg oral tablet  -- 2 tab(s) by mouth every 6 hours, As needed, Mild Pain (1 - 3)  -- Indication: For Pain    losartan 100 mg oral tablet  -- 1 tab(s) by mouth once a day  -- Indication: For Hypertension    Xarelto 15 mg oral tablet  -- 1 tab(s) by mouth 2 times a day MDD:2  -- Check with your doctor before becoming pregnant.  It is very important that you take or use this exactly as directed.  Do not skip doses or discontinue unless directed by your doctor.  Obtain medical advice before taking any non-prescription drugs as some may affect the action of this medication.  Take with food.    -- Indication: For DVT, take this starting tomorrow morning (10/20), twice a day    Xarelto 20 mg oral tablet  -- 1 tab(s) by mouth once a day (in the evening) to Start on 11/10/17   -- Check with your doctor before becoming pregnant.  It is very important that you take or use this exactly as directed.  Do not skip doses or discontinue unless directed by your doctor.  Obtain medical advice before taking any non-prescription drugs as some may affect the action of this medication.  Take with food.    -- Indication: For DVT take this after completion of previous xarelto prescription     sertraline 100 mg oral tablet  -- 2 tab(s) by mouth once a day  -- Indication: For Home med    metFORMIN 500 mg oral tablet  -- 1 tab(s) by mouth 3 times a day with meals  -- Indication: For Diabetes mellitus    Jardiance 25 mg oral tablet  -- 1 tab(s) by mouth once a day (in the morning)  -- Indication: For Diabetes mellitus    zolpidem 12.5 mg oral tablet, extended release  -- 1 tab(s) by mouth once a day (at bedtime)  -- Indication: For Anxiety    Advair Diskus 500 mcg-50 mcg inhalation powder  -- 1 puff(s) inhaled 2 times a day  -- Indication: For Home med    fluticasone nasal  -- nasal once a day  -- Indication: For Allergies    Nasal Mist 0.05% nasal spray  -- 2 spray(s) into nose every 4 hours  -- Indication: For nasal spray    omeprazole 40 mg oral delayed release capsule  -- 1 cap(s) by mouth once a day  -- Indication: For GERD (gastroesophageal reflux disease)

## 2017-10-19 NOTE — DISCHARGE NOTE ADULT - CARE PROVIDER_API CALL
Lazarus Adams), Vascular Surgery  [address] [facility]  [address]  [place] [address]  Phone: [phone]  Fax: [phone] Lazarus Adams), Vascular Surgery  [address] [facility]  [address]  [place] [address]  Phone: [phone]  Fax: [phone]    Esvin Morin (BRENDEN), Hematology; HospicePalliative Medicine; Internal Medicine; Medical Oncology  [address] [place] [address]  Phone: [phone]  Fax: [phone]

## 2017-10-19 NOTE — DISCHARGE NOTE ADULT - MEDICATION SUMMARY - MEDICATIONS TO STOP TAKING
I will STOP taking the medications listed below when I get home from the hospital:    Coumadin 10 mg oral tablet  -- 1 tab(s) by mouth once a day MDD:1  -- Do not take this drug if you are pregnant.  It is very important that you take or use this exactly as directed.  Do not skip doses or discontinue unless directed by your doctor.  Obtain medical advice before taking any non-prescription drugs as some may affect the action of this medication.

## 2017-10-19 NOTE — DISCHARGE NOTE ADULT - PLAN OF CARE
DVT Take Xarelto 15 mg twice a day until 11/9/17  On 11/10/2017 you will start taking Xarelto 20 mg once a day Continue current treatment Take Xarelto 15 mg twice a day until 11/9/17  On 11/10/2017 you will start taking Xarelto 20 mg once a day  ACE Wrap and compression and elevation to Left leg

## 2017-10-24 LAB — MISCELLANEOUS - CHEM: SIGNIFICANT CHANGE UP

## 2018-04-08 NOTE — PROGRESS NOTE ADULT - ASSESSMENT
Patient is a 57y Male with LLE DVT, on coumadin, currently hemodynamically stable  - F/u Heme/Onc reccs  - Factor V leiden and Prothrombin gene mutation results normal (From 9/30, previous admission)  - Switch coumadin to Xarelto  - Zosyn for LLE erythema
56 yo male with symptomatic DVT of LLE on coumadin, therapeutic, clot burden seems to be not significantly changed from previous imaging done last hospitalization.  continue anticoag rx  f/u inr   d/w heme attending case who is requesting a 2nd opinion for house heme
Patient is a 57y Male with LLE DVT, on coumadin, currently hemodynamically stable  - F/u Heme/Onc reccs  - Factor V leiden and Prothrombin gene mutation results normal (From 9/30, previous admission)  - Switch coumadin to Xarelto  - Zosyn for LLE erythema
no
Alert and oriented, no focal deficits, no motor or sensory deficits.

## 2018-08-14 NOTE — ED PROVIDER NOTE - SKIN NEGATIVE STATEMENT, MLM
Anxiety    Gastroparesis    GERD (gastroesophageal reflux disease) no abrasions, no jaundice, no lesions, no pruritis, and no rashes.

## 2019-01-30 ENCOUNTER — APPOINTMENT (OUTPATIENT)
Dept: VASCULAR SURGERY | Facility: CLINIC | Age: 59
End: 2019-01-30
Payer: MEDICARE

## 2019-01-30 VITALS
WEIGHT: 225 LBS | BODY MASS INDEX: 31.5 KG/M2 | TEMPERATURE: 99.4 F | SYSTOLIC BLOOD PRESSURE: 123 MMHG | HEART RATE: 80 BPM | HEIGHT: 71 IN | DIASTOLIC BLOOD PRESSURE: 84 MMHG

## 2019-01-30 DIAGNOSIS — I83.819 VARICOSE VEINS OF UNSPECIFIED LOWER EXTREMITY WITH PAIN: ICD-10-CM

## 2019-01-30 DIAGNOSIS — I83.893 VARICOSE VEINS OF BILATERAL LOWER EXTREMITIES WITH OTHER COMPLICATIONS: ICD-10-CM

## 2019-01-30 DIAGNOSIS — I87.2 VENOUS INSUFFICIENCY (CHRONIC) (PERIPHERAL): ICD-10-CM

## 2019-01-30 DIAGNOSIS — I83.899 VARICOSE VEINS OF UNSPECIFIED LOWER EXTREMITY WITH OTHER COMPLICATIONS: ICD-10-CM

## 2019-01-30 PROCEDURE — 99214 OFFICE O/P EST MOD 30 MIN: CPT

## 2019-01-30 PROCEDURE — 93979 VASCULAR STUDY: CPT

## 2019-01-30 PROCEDURE — 93970 EXTREMITY STUDY: CPT

## 2019-01-30 NOTE — DATA REVIEWED
[FreeTextEntry1] : 1/30/2019 Venous Doppler Mann le no acute dvt svt \par                            RLE insuff  SFV to pop v \par                            LLE  insuff SFV, pop v and PTV , insuff LSV from spj to distal calf , chronic wall thickening  in mid SFV and pop v \par \par \par 1/30/2019 Abd Venous Duplex Venous Duplex  patent IVC, Bilateral Iliac veins  w/o dvt or svt,  no flow restrictive lesions\par \par

## 2019-01-30 NOTE — HISTORY OF PRESENT ILLNESS
[FreeTextEntry1] : pt is s/p llt and iliac vein thrombectomy jan 2017\par pt has not f/u since\par pt is compliant w comp stockings\par pt states that he is on xarelto since the op intervention \par pt c/o lle sudden swelling 1 mo ago\par pt c/o mod swelling and discomfort \par pt denies recent inj or travel

## 2019-01-30 NOTE — PHYSICAL EXAM
[Normal Breath Sounds] : Normal breath sounds [1+] : left 1+ [2+] : left 2+ [Ankle Swelling (On Exam)] : present [Ankle Swelling On The Left] : moderate [Varicose Veins Of Lower Extremities] : bilaterally [] : bilaterally [Ankle Swelling On The Right] : mild [No HSM] : no hepatosplenomegaly [No Rash or Lesion] : No rash or lesion [Alert] : alert [Oriented to Person] : oriented to person [Oriented to Place] : oriented to place [Oriented to Time] : oriented to time [Calm] : calm [JVD] : no jugular venous distention  [Right Carotid Bruit] : no bruit heard over the right carotid [Left Carotid Bruit] : no bruit heard over the left carotid [Abdomen Masses] : No abdominal masses [Tender] : was nontender [Stool Sample Taken] : No stool obtained  on rectal exam [de-identified] : nad [de-identified] : wnl [FreeTextEntry1] : Moderate bilateral right left leg venous insufficiency \par w mild  bilateral leg stasis dermatitis \par and mild rle and  moderate left leg edema \par Multiple  bilateral leg small varicose  veins and spider veins  calf and shin \par moderate left calf and thigh tenderness and discomfort w exam \par no wounds/ulcers\par  [de-identified] : wnl [de-identified] : wnl [de-identified] : Mann Cranial nerves 2-12 mann grossly intact [de-identified] : cooperative

## 2019-01-30 NOTE — ASSESSMENT
[Arterial/Venous Disease] : arterial/venous disease [Other: _____] : [unfilled] [FreeTextEntry1] : Impression symptomatic venous insuff superf and deep systems and post phleb syndrome \par \par \par Plan Med Conservative management leg elevation, knee high compression stockings 20-30mm Hg (rx given), wt loss, diet control\par Indications risks and benefits of left LSV RFA  were explained to pt who understands\par d/c xarelto\par start baby asa daily\par ov 1-2 mo to re eval  venous insuff sx \par \par Varicose veins are enlarged, twisted veins. Varicose veins are caused by increased blood pressure in the veins.  The blood moves towards the heart by 1-way valves in the veins. When the valves become weakened or damaged, blood can collect in the veins and pool in your lower legs (ankles). This causes the veins to become enlarged and incompetent with reflux. Sitting or standing for long periods can cause blood to pool in the leg veins, increasing the pressure within the veins. \par Risk factors for varicose veins or venous disease may include:  obesity, older age, standing or sitting for prolonged periods of time for several years, being female, pregnancy, taking oral contraceptive pills or hormone replacement, being inactive, and/or smoking. \par The most common symptoms of varicose veins are sensations in the legs, such as a heavy feeling, burning, and/or aching. However, each individual may experience symptoms differently.  Other symptoms may include:  color changes in the skin, sores on the legs, or rash.  Severe varicose veins or venous disease may eventually produce long-term mild swelling that can result in more serious skin and tissue problems, such as ulcers and non-healing sores.\par Varicose veins and venous disease are diagnosed by a complete medical history, physical examination, and diagnostic studies for varicose veins including duplex ultrasound and color-flow imaging.  \par Medical treatment for varicose veins and venous disease include:  compression stockings, sclerotherapy, endovenous ablation and/or surgical treatment with microphlebectomy.  \par \par

## 2019-02-11 ENCOUNTER — APPOINTMENT (OUTPATIENT)
Dept: ORTHOPEDIC SURGERY | Facility: CLINIC | Age: 59
End: 2019-02-11

## 2019-02-13 ENCOUNTER — APPOINTMENT (OUTPATIENT)
Dept: ORTHOPEDIC SURGERY | Facility: CLINIC | Age: 59
End: 2019-02-13
Payer: MEDICARE

## 2019-02-13 VITALS
SYSTOLIC BLOOD PRESSURE: 168 MMHG | HEIGHT: 71 IN | BODY MASS INDEX: 31.5 KG/M2 | DIASTOLIC BLOOD PRESSURE: 101 MMHG | HEART RATE: 84 BPM | WEIGHT: 225 LBS

## 2019-02-13 PROCEDURE — 72100 X-RAY EXAM L-S SPINE 2/3 VWS: CPT

## 2019-02-13 PROCEDURE — 99214 OFFICE O/P EST MOD 30 MIN: CPT

## 2019-02-13 NOTE — PHYSICAL EXAM
[UE/LE] : Sensory: Intact in bilateral upper & lower extremities [ALL] : dorsalis pedis, posterior tibial, femoral, popliteal, and radial 2+ and symmetric bilaterally [Poor Appearance] : well-appearing [Acute Distress] : not in acute distress [Obese] : not obese [Abl Mood] : in a normal mood [Abl Affect] : with normal affect [Poor Coordination] : normal coordination [Disorientation] : oriented x 3 [FreeTextEntry2] : 5 out of 5 motor strength, sensation is intact and symmetrical full range of motion flexion extension and rotation, no palpatory tenderness full range of motion of hips knees shoulders and elbows (all four extremities), no atrophy, negative straight leg raise, no pathological reflexes, no swelling, normal ambulation, no apparent distress skin is intact, no palpable lymph nodes, no upper or lower extremity instability, alert and oriented x3 and normal mood. Normal finger-to nose test. Incision is healed. Minimal pain over the coccyx. [de-identified] : AP/lat lumbar-L5-S1 laminectomy and fusion-mild L4-5 spondylolisthesis-reviewed with the patient. \par No obvious coccyx fracture.\par He did undergo x-rays which states T10 left rib fracture.

## 2019-02-13 NOTE — ADDENDUM
[FreeTextEntry1] : This note was authored by Misty Martinez working as a medical scribe for Dr. Gigi Paul. The note was reviewed, edited, and revised by Dr. Gigi Paul whom is in agreement with the exam findings, imaging findings, and treatment plan. 02/13/2019.

## 2019-02-13 NOTE — DISCUSSION/SUMMARY
[de-identified] : Almost 5 years Status post lumbar decompression and fusion L5-S1-doing well.\par Coccydynia.\par NSAID \par F/U prn \par All options discussed including rest, medicine, home exercise, acupuncture, Chiropractic care, Physical Therapy, Pain management, and last resort surgery. \par All questions were answered, all alternatives discussed and the patient is in complete agreement with that plan. Follow-up appointment as instructed. Any issues and the patient will call or come in sooner.

## 2019-03-13 ENCOUNTER — APPOINTMENT (OUTPATIENT)
Dept: VASCULAR SURGERY | Facility: CLINIC | Age: 59
End: 2019-03-13

## 2019-05-01 ENCOUNTER — TRANSCRIPTION ENCOUNTER (OUTPATIENT)
Age: 59
End: 2019-05-01

## 2019-05-06 ENCOUNTER — APPOINTMENT (OUTPATIENT)
Dept: ORTHOPEDIC SURGERY | Facility: CLINIC | Age: 59
End: 2019-05-06

## 2019-05-15 ENCOUNTER — APPOINTMENT (OUTPATIENT)
Dept: HEPATOLOGY | Facility: CLINIC | Age: 59
End: 2019-05-15
Payer: MEDICARE

## 2019-05-15 ENCOUNTER — LABORATORY RESULT (OUTPATIENT)
Age: 59
End: 2019-05-15

## 2019-05-15 VITALS
WEIGHT: 201 LBS | DIASTOLIC BLOOD PRESSURE: 88 MMHG | RESPIRATION RATE: 14 BRPM | SYSTOLIC BLOOD PRESSURE: 154 MMHG | HEIGHT: 71 IN | BODY MASS INDEX: 28.14 KG/M2 | HEART RATE: 79 BPM | TEMPERATURE: 98.6 F

## 2019-05-15 DIAGNOSIS — Z82.49 FAMILY HISTORY OF ISCHEMIC HEART DISEASE AND OTHER DISEASES OF THE CIRCULATORY SYSTEM: ICD-10-CM

## 2019-05-15 DIAGNOSIS — Z86.39 PERSONAL HISTORY OF OTHER ENDOCRINE, NUTRITIONAL AND METABOLIC DISEASE: ICD-10-CM

## 2019-05-15 DIAGNOSIS — Z82.5 FAMILY HISTORY OF ASTHMA AND OTHER CHRONIC LOWER RESPIRATORY DISEASES: ICD-10-CM

## 2019-05-15 DIAGNOSIS — Z80.0 FAMILY HISTORY OF MALIGNANT NEOPLASM OF DIGESTIVE ORGANS: ICD-10-CM

## 2019-05-15 PROCEDURE — 99204 OFFICE O/P NEW MOD 45 MIN: CPT

## 2019-05-15 RX ORDER — FLUTICASONE PROPIONATE AND SALMETEROL 50; 500 UG/1; UG/1
500-50 POWDER RESPIRATORY (INHALATION)
Refills: 0 | Status: ACTIVE | COMMUNITY

## 2019-05-15 NOTE — ASSESSMENT
[FreeTextEntry1] : 58 yo M with HTN, NIDDM2, and a history of obesity with subsequent weight loss, with sonogram suggestive of hepatic steatosis likely secondary to nonalcoholic fatty liver disease (NAFLD) given his risk factors. He has normal liver tests currently based on outside labs done in 4/2019, as well as a normal platelet count. \par \par Despite his reassuring laboratory tests, his NAFLD fibrosis score is calculated to be 2.885, concerning for the presence of significant hepatic fibrosis.\par \par Labs were ordered today to rule out other causes of chronic liver disease, including chronic HBV or HCV (especially given his prior piercing and tattoos).\par \par I have discussed with him at length regarding nonalcoholic fatty liver disease (NAFLD). I reviewed the natural history, evaluation and staging of the disease including FibroScan (ordered today) and potential need for liver biopsy, and prognosis, including possible risks of development of compensated cirrhosis, decompensated cirrhosis, and HCC.\par \par I have discussed with him that lifestyle modifications are crucial for management of NAFLD. He has already lost weight in the past 5 years, but I recommended continued gradual weight loss until he is at a healthy BMI and has less central adiposity, through dietary changes (including avoidance of sugary/fatty foods and high fructose corn syrup) and moderate intensity exercise for 20 minutes at least 3 times per week. These changes have been shown to lead to regression or even resolution of steatosis, inflammation, and even fibrosis in some patients.\par \par I have reviewed with him the fact that currently, there are no FDA-approved pharmacologic treatments for NAFLD, but that this may change within the next 1-2 years as a number of promising drugs are currently being investigated in clinical trials. We have discussed the possibility of clinical trial referral/enrollment, pending his FibroScan results, if there is suggestion of advanced hepatic fibrosis.\par \par He will return to clinic for follow-up in 2 months.\par

## 2019-05-15 NOTE — HISTORY OF PRESENT ILLNESS
[Tattoo] : tattoo(s) [Body Piercing] : body piercing [Infected Sexual Partner] : no infected sexual partner [Needlestick Exposure] : no needlestick exposure [IV Drug Use] : no IV drug use [Hemodialysis] : no hemodialysis [Transfusion before 1992] : no transfusion before 1992 [Transplant before 1992] : no transplant before 1992 [Alcohol Abuse] : no alcohol abuse [Incarceration] : no incarceration [Autoimmune Disorder] : no autoimmune disorder [Travel to Endemic Area] : no travel to an endemic area [Occupational Exposure] : no occupational exposure [Cocaine Use] : no cocaine use [de-identified] : Mr. 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 Max Darnell due to recent abdominal sonogram (done on 2/2/19) that showed increased echogenicity suggestive of fatty infiltration.\par \par He was first told he had "fatty liver" 3 years ago. He denies any known FH of liver disease or diabetes. He denies any history of jaundice, scleral icterus, overt GI bleeding, or abdominal distension with ascites. 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 He is  and has 3 children (ages 19, 20, and 21). He lives with his family. He used to drink up to 5 alcoholic beverages per weekend, but denies any history of weekday or daily drinking, and quit drinking altogether 3 months ago. He has a remote history of trying marijuana, but otherwise denies any history of illicit drug use. He is a non-smoker. He drinks 2 cups of coffee daily. His left ear was pierced professionally in the remote past. He has 4 tattoos (first done 40 years ago and last done 22 years ago), all reportedly done professionally.

## 2019-05-15 NOTE — CONSULT LETTER
[Dear  ___] : Dear  [unfilled], [( Thank you for referring [unfilled] for consultation for _____ )] : Thank you for referring [unfilled] for consultation for [unfilled] [Please see my note below.] : Please see my note below. [FreeTextEntry2] : Dr. Max Darnell [Consult Closing:] : Thank you very much for allowing me to participate in the care of this patient.  If you have any questions, please do not hesitate to contact me. [FreeTextEntry3] : Sincerely,\par \par Noah Benavidez M.D., Ph.D.\par [facility]\par [address]\[place] [address]\par Tel: [phone]\par Fax: [phone]\par Cell: [phone]\par E-mail: [email or web address]\par

## 2019-05-16 LAB
AFP-TM SERPL-MCNC: 3.1 NG/ML
CERULOPLASMIN SERPL-MCNC: 26 MG/DL
GGT SERPL-CCNC: 34 U/L
HBV CORE IGG+IGM SER QL: NONREACTIVE
HBV SURFACE AB SERPL IA-ACNC: <3 MIU/ML
HBV SURFACE AG SER QL: NONREACTIVE
HCV AB SER QL: NONREACTIVE
HCV S/CO RATIO: 0.2 S/CO
HEPATITIS A IGG ANTIBODY: REACTIVE
HIV1+2 AB SPEC QL IA.RAPID: NONREACTIVE
MITOCHONDRIA AB SER IF-ACNC: NORMAL
SMOOTH MUSCLE AB SER QL IF: ABNORMAL

## 2019-05-17 LAB — ANA SER IF-ACNC: NEGATIVE

## 2019-05-21 LAB
A1AT PHENOTYP SERPL-IMP: NORMAL BANDS
A1AT SERPL-MCNC: 192 MG/DL

## 2019-05-28 NOTE — HISTORY OF PRESENT ILLNESS
Yes [Pain] : pain [Improving] : improving [All Other ROS Normal] : All other review of systems are negative except as noted [Headache] : no headache [Dizziness] : no dizziness [Fainting] : no fainting [FreeTextEntry1] : post-op almost 5 years doing well\par here for coccyx pain  [FreeTextEntry2] : No fever chills sweats nausea vomiting no bowel,  no recent weight loss or gain no night pain. This history is in addition to the intake form that I personally reviewed. \par He is under pain management and is atraumatic all for occasional back\par

## 2019-07-03 ENCOUNTER — TRANSCRIPTION ENCOUNTER (OUTPATIENT)
Age: 59
End: 2019-07-03

## 2019-07-09 ENCOUNTER — APPOINTMENT (OUTPATIENT)
Dept: HEPATOLOGY | Facility: CLINIC | Age: 59
End: 2019-07-09
Payer: MEDICARE

## 2019-07-09 VITALS
RESPIRATION RATE: 14 BRPM | DIASTOLIC BLOOD PRESSURE: 77 MMHG | WEIGHT: 212 LBS | HEART RATE: 73 BPM | BODY MASS INDEX: 29.68 KG/M2 | TEMPERATURE: 97.8 F | HEIGHT: 71 IN | SYSTOLIC BLOOD PRESSURE: 152 MMHG

## 2019-07-09 PROCEDURE — ZZZZZ: CPT

## 2019-07-09 PROCEDURE — 99213 OFFICE O/P EST LOW 20 MIN: CPT | Mod: 25

## 2019-07-09 PROCEDURE — 91200 LIVER ELASTOGRAPHY: CPT

## 2019-07-09 NOTE — CONSULT LETTER
[Dear  ___] : Dear  [unfilled], [Courtesy Letter:] : I had the pleasure of seeing your patient, [unfilled], in my office today. [Please see my note below.] : Please see my note below. [Consult Closing:] : Thank you very much for allowing me to participate in the care of this patient.  If you have any questions, please do not hesitate to contact me. [FreeTextEntry2] : Dr. Max Darnell [FreeTextEntry3] : Sincerely,\par \par Noah Benavidez M.D., Ph.D.\par [facility]\par [address]\[place] [address]\par Tel: [phone]\par Fax: [phone]\par Cell: [phone]\par E-mail: [email or web address]\par

## 2019-07-09 NOTE — HISTORY OF PRESENT ILLNESS
[Tattoo] : tattoo(s) [Body Piercing] : body piercing [Needlestick Exposure] : no needlestick exposure [Infected Sexual Partner] : no infected sexual partner [IV Drug Use] : no IV drug use [Hemodialysis] : no hemodialysis [Transplant before 1992] : no transplant before 1992 [Transfusion before 1992] : no transfusion before 1992 [Incarceration] : no incarceration [Alcohol Abuse] : no alcohol abuse [Autoimmune Disorder] : no autoimmune disorder [Travel to Endemic Area] : no travel to an endemic area [Occupational Exposure] : no occupational exposure [de-identified] : Mr. 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 Max Darnell due to recent abdominal sonogram (done on 2/2/19) that showed increased echogenicity suggestive of fatty infiltration.\par \par He reports that his brother, who lives in [place] and is 61, was recently diagnosed with stage 4 esophageal cancer with liver metastases as well as Her2-john positive breast cancer, and is currently undergoing chemotherapy.\par \par He is planning to undergo EGD, colonoscopy, and CT imaging arranged by his PCP/GI.\par \par He was first told he had "fatty liver" 3 years ago. He denies any known FH of liver disease or diabetes. He denies any history of jaundice, scleral icterus, overt GI bleeding, or abdominal distension with ascites. 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 He is  and has 3 children (ages 19, 20, and 21). He lives with his family. He used to drink up to 5 alcoholic beverages per weekend, but denies any history of weekday or daily drinking, and quit drinking altogether 3 months ago. He has a remote history of trying marijuana, but otherwise denies any history of illicit drug use. He is a non-smoker. He drinks 2 cups of coffee daily. His left ear was pierced professionally in the remote past. He has 4 tattoos (first done 40 years ago and last done 22 years ago), all reportedly done professionally.\par \par He was last seen in Liver clinic on 5/15/19. He underwent laboratory work-up on that date for other possible causes of chronic liver disease (apart from nonalcoholic fatty liver disease) that was negative, aside from weakly positive ASMA with 1:20 titer. He underwent FibroScan in clinic today that showed a median liver stiffness of 5.6 kPA (consistent with F0-1 fibrosis) and CAP score of 281 dB/m (consistent with S2 steatosis). [Cocaine Use] : no cocaine use

## 2019-07-09 NOTE — ASSESSMENT
[FreeTextEntry1] : 60 yo M with HTN, NIDDM2, and a history of obesity with subsequent weight loss (currently with BMI 29.6 kg/m2), with evidence of nonalcoholic fatty liver disease (NAFLD) with sonographic evidence of hepatic steatosis as well as recent FibroScan with moderate steatosis.\par \par His liver tests and platelet count were normal on recent outside labs done in 4/2019. Laboratory work-up for other causes of chronic liver disease was negative, aside from weakly positive ASMA with 1:20 titer. \par \par Based on the results of his FibroScan today as well as prior labs, he appears to have nonalcoholic fatty liver (NAFL) rather than nonalcoholic steatohepatitis (ALBRECHT), as he does not appear to have any significant fibrosis currently. \par \par I have discussed with him at length regarding NAFL. I reviewed the natural history, evaluation and staging of the disease including FibroScan (which we will repeat annually for surveillance), and prognosis, including possible risks of development of ALBRECHT, compensated cirrhosis, decompensated cirrhosis, and HCC (rare in the absence of advanced hepatic fibrosis).\par \par I have discussed with him that lifestyle modifications are crucial for management of NAFLD. He has already lost weight in the past 5 years, but I recommended continued gradual weight loss until he is at a healthy BMI and has less central adiposity, through dietary changes (including avoidance of sugary/fatty foods and high fructose corn syrup) and moderate intensity exercise for 20 minutes at least 3 times per week. Given his prior joint problems, I advocated that he consider low-impact exercise such as swimming. These changes have been shown to lead to regression or even resolution of steatosis, inflammation, and even fibrosis in some patients.\par \par He was advised to follow-up with his PCP to ensure that he has optimal glycemic control for his diabetes.\par \par I have reviewed with him the fact that currently, there are no FDA-approved pharmacologic treatments for NAFLD, but that this may change within the next 1-2 years as a number of promising drugs are currently being investigated in clinical trials. We discussed that he is not currently eligible for clinical trial enrollment given the lack of significant fibrosis based on his FibroScan results. I am likewise not advocating for vitamin E or other pharmacologic therapies at this time given that he appears to have NAFL. The focus will be on lifestyle modifications, at least for now. He was very amenable with that plan.\par \par He is immune to HAV but non-immune to HBV. He was advised to start the HBV vaccine series and expressed desire to do so.\par \par Mr. MCCULLOUGH was counseled to: abstain from alcohol and all illicit drugs; avoid use of herbal and dietary supplements due to potential hepatotoxicity; and limit use of acetaminophen to <2 grams per day.\par \par He will return for follow-up and repeat FibroScan in 1 year.

## 2019-07-17 ENCOUNTER — APPOINTMENT (OUTPATIENT)
Dept: HEPATOLOGY | Facility: CLINIC | Age: 59
End: 2019-07-17

## 2019-07-30 ENCOUNTER — APPOINTMENT (OUTPATIENT)
Dept: HEPATOLOGY | Facility: CLINIC | Age: 59
End: 2019-07-30
Payer: MEDICARE

## 2019-07-30 PROCEDURE — G0010: CPT

## 2019-07-30 PROCEDURE — 90739 HEPB VACC 2/4 DOSE ADULT IM: CPT

## 2019-08-27 ENCOUNTER — APPOINTMENT (OUTPATIENT)
Dept: HEPATOLOGY | Facility: CLINIC | Age: 59
End: 2019-08-27

## 2019-09-07 ENCOUNTER — TRANSCRIPTION ENCOUNTER (OUTPATIENT)
Age: 59
End: 2019-09-07

## 2019-09-17 NOTE — PROGRESS NOTE ADULT - GASTROINTESTINAL
Physical Therapy Daily Treatment    Visit Count: 12                     Plan of Care: 5/17/2019 Through: 10/16/2019  Insurance Information: Worker's comp denied her claim     Current Work Status: return to work with the following restrictions: no repetitive work, no lifting more than 5 pounds. These restrictions are in effect until she is reassessed on November 6, 2019.  SUBJECTIVE   Patient here with her daughter Tory, who is translating in Maltese. Patient states her arms are feeling about the same. She was evaluated by Dr. Bundy, at [facility], is scheduled for carpal tunnel surgery next week. Also reports she had an x-ray done of her shoulder, told she has bone spurs.    Current Pain (0-10 scale): 4/10  Functional Change:     OBJECTIVE   Tightness and tenderness to proximal wrist flexors/extensors, upper trapezius    Treatment   Therapeutic Exercise:   Pulleys for shoulder ROM, x 3 minutes  UBE level 1.0 x 3 minutes forward, stopped due to increased pain  B seated UT and scalene stretches with 30 second holds  Shoulder retraction with orange band to fatigue  B wrist and forearm flexor stretch  B crossbody shoulder stretch   PROM/stretching R shoulder and elbow in supine  Posture in sitting    Manual Therapy  STM to R proximal wrist flexors and extensors, biceps, upper trapezius    Skilled input: verbal instruction/cues on positioning with hands and wrists during exercises keeping them pain free.    Home Program:   Heat to neck, ice to arms/elbows, stretching and posture     Writer verbally educated the patient and received verbal consent from the patient on hand placement, positioning of patient, and techniques to be performed today including ROM, stretching, strengthening as described above and how they are pertinent to the patient's plan of care.      Suggestions for next session as indicated: progress per plan of care, therapy on hold pending carpal tunnel surgery. Will resume therapy as  instructed by orthopedic MD.     ASSESSMENT   Patient unable to tolerate prolonged use of arms for exercises. Focused more on posture, flexibility exercises. At end of session, patient became tearful, saying she has been feeling very depressed. Discussed this with patient and recommended she set up an appointment to see PCP. Patient agreeable to this.  Pain after treatment (patient reported, 0-10 scale): 4/10  Result of above outlined education: Verbalizes understanding and Demonstrates understanding    THERAPY DAILY BILLING   Insurance: [id number] 2. N/A    Evaluation Procedures:  No evaluation codes were used on this date of service    Timed Procedures:  Manual Therapy, 15 minutes  Therapeutic Exercise, 40 minutes    Untimed Procedures:  No untimed codes were used on this date of service    Total Treatment Time: 55 minutes   negative

## 2020-06-02 NOTE — ED CDU PROVIDER NOTE - NS_EDPROVIDERDISPOUSERTYPE_ED_A_ED
Mild hyperglycemia noted on previous labs.  Patient reports he eats a fairly healthy diet and is physically active every day although he does not get regular moderate exercise.   Attending Attestation (For Attendings USE Only)...

## 2020-07-23 NOTE — ED ADULT NURSE NOTE - CCCP TRG CHIEF CMPLNT
sx site swelling/pain Normal vision: sees adequately in most situations; can see medication labels, newsprint

## 2020-09-15 NOTE — CHART NOTE - NSCHARTNOTEFT_GEN_A_CORE
Patient Education     Exercise for a Healthier Heart  You may wonder how you can improve the health of your heart. If youâre thinking about exercise, youâre on the right track. You donât need to become an athlete, but you do need a certain amount of brisk exercise to help strengthen your heart. If you have been diagnosed with a heart condition, your doctor may recommend exercise to help stabilize your condition. To help make exercise a habit, choose safe, fun activities. Exercise with a friend. When activity is fun, you're more likely to stick with it. Be sure to check with your healthcare provider before starting an exercise program.   Why exercise? Exercising regularly offers many healthy rewards. It can help you do all of the following:  Â· Improve your blood cholesterol level to help prevent further heart trouble  Â· Lower your blood pressure to help prevent a stroke or heart attack  Â· Control diabetes, or reduce your risk of getting this disease  Â· Improve your heart and lung function  Â· Reach and maintain a healthy weight  Â· Make your muscles stronger and more limber so you can stay active  Â· Prevent falls and fractures by slowing the loss of bone mass (osteoporosis)  Â· Manage stress better  Â· Reduce your blood pressure  Â· Improve your sense of self and your body image  Exercise tips  Ease into your routine. Set small goals. Then build on them. Exercise on most days. Aim for a total of 150 or more minutes of moderate toÂ  vigorous intensity activity each week. Consider 40 minutes, 3 to 4 times a week. For best results, activity should last for 40 minutes on average. It is OK to work up to the 40 minute period over time. Examples of moderate-intensity activity is walking 1 mile in 15 minutes or 30 to 45 minutes of yard work. Step up your daily activity level. Along with your exercise program, try being more active throughout the day. Walk instead of drive. Do more household tasks or yard work.   Choose Patient to be discharged on Xarelto 15 mg twice a day until 11/9. On 11/20 he will start Xarelto 20 mg once a day. He is not to take coumadin any longer.  Patient approved for 30 days worth of Xarelto. As per patient he will obtain prescription insurance coverage to continue taking Xarelto. He is aware that he is to take 15 mg bid for 20 more days and to at that point begin 20 mg qd.    Discussed with Dr. Adams and with patients [facility] [place]. Refill for duloxetine sent to patient's preferred pharmacy.    Matt Mera MD  September 15, 2020     one or more activities you enjoy. Walking is one of the easiest things you can do. You can also try swimming, riding a bike, dancing, or taking an exercise class. Stop exercising and call your doctor if you:  Â· Have chest pain or feel dizzy or lightheaded  Â· Feel burning, tightness, pressure, or heaviness in your chest, neck, shoulders, back, or arms  Â· Have unusual shortness of breath  Â· Have increased joint or muscle pain  Â· Have palpitations or an irregular heartbeat   Date Last Reviewed: 5/1/2016  Â© 2277-9274 [employer]. [address] [place] [address], [facility] [address] All rights reserved. This information is not intended as a substitute for professional medical care. Always follow your healthcare professional's instructions.

## 2021-02-08 ENCOUNTER — TRANSCRIPTION ENCOUNTER (OUTPATIENT)
Age: 61
End: 2021-02-08

## 2021-03-04 ENCOUNTER — TRANSCRIPTION ENCOUNTER (OUTPATIENT)
Age: 61
End: 2021-03-04

## 2021-03-09 ENCOUNTER — FORM ENCOUNTER (OUTPATIENT)
Age: 61
End: 2021-03-09

## 2021-03-10 ENCOUNTER — TRANSCRIPTION ENCOUNTER (OUTPATIENT)
Age: 61
End: 2021-03-10

## 2021-03-12 ENCOUNTER — OUTPATIENT (OUTPATIENT)
Dept: INPATIENT UNIT | Facility: HOSPITAL | Age: 61
LOS: 1 days | End: 2021-03-12
Payer: MEDICARE

## 2021-03-12 ENCOUNTER — APPOINTMENT (OUTPATIENT)
Dept: DISASTER EMERGENCY | Facility: HOSPITAL | Age: 61
End: 2021-03-12

## 2021-03-12 VITALS
DIASTOLIC BLOOD PRESSURE: 81 MMHG | HEART RATE: 75 BPM | TEMPERATURE: 99 F | SYSTOLIC BLOOD PRESSURE: 150 MMHG | RESPIRATION RATE: 18 BRPM | HEIGHT: 71 IN | WEIGHT: 229.94 LBS | OXYGEN SATURATION: 99 %

## 2021-03-12 VITALS
DIASTOLIC BLOOD PRESSURE: 79 MMHG | HEART RATE: 72 BPM | SYSTOLIC BLOOD PRESSURE: 145 MMHG | TEMPERATURE: 99 F | OXYGEN SATURATION: 99 % | RESPIRATION RATE: 18 BRPM

## 2021-03-12 DIAGNOSIS — I82.409 ACUTE EMBOLISM AND THROMBOSIS OF UNSPECIFIED DEEP VEINS OF UNSPECIFIED LOWER EXTREMITY: Chronic | ICD-10-CM

## 2021-03-12 DIAGNOSIS — U07.1 COVID-19: ICD-10-CM

## 2021-03-12 PROCEDURE — M0239: CPT

## 2021-03-12 RX ORDER — BAMLANIVIMAB 35 MG/ML
700 INJECTION, SOLUTION INTRAVENOUS ONCE
Refills: 0 | Status: COMPLETED | OUTPATIENT
Start: 2021-03-12 | End: 2021-03-12

## 2021-03-12 RX ORDER — SODIUM CHLORIDE 9 MG/ML
250 INJECTION INTRAMUSCULAR; INTRAVENOUS; SUBCUTANEOUS
Refills: 0 | Status: DISCONTINUED | OUTPATIENT
Start: 2021-03-12 | End: 2021-03-26

## 2021-03-12 RX ADMIN — SODIUM CHLORIDE 25 MILLILITER(S): 9 INJECTION INTRAMUSCULAR; INTRAVENOUS; SUBCUTANEOUS at 08:35

## 2021-03-12 RX ADMIN — BAMLANIVIMAB 270 MILLIGRAM(S): 35 INJECTION, SOLUTION INTRAVENOUS at 08:34

## 2021-03-12 NOTE — MONOCLONAL ANTIBODY INFUSION - HOME MEDICATIONS
Xarelto 20 mg oral tablet , 1 tab(s) orally once a day (in the evening) to Start on 11/10/17   Xarelto 15 mg oral tablet , 1 tab(s) orally 2 times a day MDD:2  acetaminophen 325 mg oral tablet , 2 tab(s) orally every 6 hours, As needed, Mild Pain (1 - 3)  sertraline 100 mg oral tablet , 2 tab(s) orally once a day  omeprazole 40 mg oral delayed release capsule , 1 cap(s) orally once a day  Nasal Mist 0.05% nasal spray , 2 spray(s) nasal every 4 hours  fluticasone nasal , nasal once a day  Advair Diskus 500 mcg-50 mcg inhalation powder , 1 puff(s) inhaled 2 times a day  zolpidem 12.5 mg oral tablet, extended release , 1 tab(s) orally once a day (at bedtime)  traMADol 50 mg oral tablet , 2  orally 3 times a day  Jardiance 25 mg oral tablet , 1 tab(s) orally once a day (in the morning)  losartan 100 mg oral tablet , 1 tab(s) orally once a day  metFORMIN 500 mg oral tablet , 1 tab(s) orally 3 times a day with meals

## 2021-03-12 NOTE — MONOCLONAL ANTIBODY INFUSION - EXAM
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 Acevedo. Today pt is complaining of cough, sore throat, body aches, loss of taste and smell. Pt states that he received the Xavier and Xavier vaccination over the weekend prior to testing positive. Pt denies fever, HA, n/v/d, SOB, loss of taste and smell.     exam/findings:  T(C): 37.4 (03-12-21 @ 07:49), Max: 37.4 (03-12-21 @ 07:49)  HR: 75 (03-12-21 @ 07:49) (75 - 75)  BP: 150/81 (03-12-21 @ 07:49) (150/81 - 150/81)  RR: 18 (03-12-21 @ 07:49) (18 - 18)  SpO2: 99% (03-12-21 @ 07:49) (99% - 99%)      PE:   Appearance: NAD	  HEENT:   Normal oral mucosa,   Lymphatic: No lymphadenopathy  Cardiovascular: Normal S1 S2, No JVD, No murmurs, No edema  Respiratory: Lungs clear to auscultation	  Gastrointestinal:  Soft, Non-tender, + BS	  Skin: warm and dry  Neurologic: Non-focal  Extremities: Normal range of motion,    ASSESSMENT:  Pt is a 59 yo male Covid + on 3/10/21 referred by Dr. Acevedo who presents to infusion center for Monoclonal antibody infusion (Bamlanivimab)  Symptoms/ Criteria: cough, sore throat, loss of taste and smell, body aches   Risk Profile includes: age>55, COPD, RADS    PLAN:  - Infusion procedure explained to patient   - Consent for monoclonal antibody infusion obtained   - Risk & benefits discussed/all questions answered  - Infuse Bamlanivimab 700mg  IV over one hour   - Observe patient for one hour post infusion        I have reviewed the Bamlanivimab Emergency Use Authorization (EAU) and I have provided the patient or patient's caregiver with the following information:  1. FDA has authorized emergency use of Bamlanivimab, which is not FDA-approved biologic product.  2. The patient or patient's caregiver has the option to accept or refuse administration of Bamlanivimab.  3. The significant known and potential risks and benefits of Bamlanivimab and the extent to which such risks and benefits are unknown.  4. Information on available alternative treatments and risks and benefits of those alternatives.    Discharge:  Patient tolerated infusion well denies complaints of chest pain/SOB/dizziness/palps  VSS / stable for d/c home  D/C instructions given / fact sheet included.  Patient to follow-up with PCP as needed.

## 2021-03-13 ENCOUNTER — TRANSCRIPTION ENCOUNTER (OUTPATIENT)
Age: 61
End: 2021-03-13

## 2021-04-25 ENCOUNTER — TRANSCRIPTION ENCOUNTER (OUTPATIENT)
Age: 61
End: 2021-04-25

## 2021-10-05 ENCOUNTER — TRANSCRIPTION ENCOUNTER (OUTPATIENT)
Age: 61
End: 2021-10-05

## 2021-12-03 NOTE — VITALS
----- Message from Radha Astorga sent at 12/3/2021  4:31 AM EST -----  Regarding: Refill and dosage request  Please refill Adderall XR and increase dosage to 25mg.  Thanks!   [de-identified] : none

## 2022-02-14 ENCOUNTER — TRANSCRIPTION ENCOUNTER (OUTPATIENT)
Age: 62
End: 2022-02-14

## 2022-12-07 ENCOUNTER — EMERGENCY (EMERGENCY)
Facility: HOSPITAL | Age: 62
LOS: 1 days | Discharge: HOME | End: 2022-12-07
Attending: EMERGENCY MEDICINE
Payer: MEDICARE

## 2022-12-07 VITALS
TEMPERATURE: 98 F | OXYGEN SATURATION: 98 % | HEART RATE: 93 BPM | RESPIRATION RATE: 20 BRPM | DIASTOLIC BLOOD PRESSURE: 95 MMHG | WEIGHT: 220.02 LBS | SYSTOLIC BLOOD PRESSURE: 179 MMHG | HEIGHT: 71 IN

## 2022-12-07 VITALS
OXYGEN SATURATION: 97 % | SYSTOLIC BLOOD PRESSURE: 173 MMHG | DIASTOLIC BLOOD PRESSURE: 92 MMHG | RESPIRATION RATE: 20 BRPM | TEMPERATURE: 98 F | HEART RATE: 70 BPM

## 2022-12-07 DIAGNOSIS — I82.409 ACUTE EMBOLISM AND THROMBOSIS OF UNSPECIFIED DEEP VEINS OF UNSPECIFIED LOWER EXTREMITY: Chronic | ICD-10-CM

## 2022-12-07 LAB
ALBUMIN SERPL ELPH-MCNC: 4.2 G/DL — SIGNIFICANT CHANGE UP (ref 3.3–5)
ALP SERPL-CCNC: 139 U/L — HIGH (ref 40–120)
ALT FLD-CCNC: 12 U/L — SIGNIFICANT CHANGE UP (ref 10–45)
ANION GAP SERPL CALC-SCNC: 11 MMOL/L — SIGNIFICANT CHANGE UP (ref 5–17)
APTT BLD: 28.2 SEC — SIGNIFICANT CHANGE UP (ref 27.5–35.5)
AST SERPL-CCNC: 18 U/L — SIGNIFICANT CHANGE UP (ref 10–40)
BASOPHILS # BLD AUTO: 0.06 K/UL — SIGNIFICANT CHANGE UP (ref 0–0.2)
BASOPHILS NFR BLD AUTO: 0.9 % — SIGNIFICANT CHANGE UP (ref 0–2)
BILIRUB SERPL-MCNC: 0.4 MG/DL — SIGNIFICANT CHANGE UP (ref 0.2–1.2)
BUN SERPL-MCNC: 12 MG/DL — SIGNIFICANT CHANGE UP (ref 7–23)
CALCIUM SERPL-MCNC: 8.9 MG/DL — SIGNIFICANT CHANGE UP (ref 8.4–10.5)
CHLORIDE SERPL-SCNC: 101 MMOL/L — SIGNIFICANT CHANGE UP (ref 96–108)
CO2 SERPL-SCNC: 27 MMOL/L — SIGNIFICANT CHANGE UP (ref 22–31)
CREAT SERPL-MCNC: 0.73 MG/DL — SIGNIFICANT CHANGE UP (ref 0.5–1.3)
EGFR: 102 ML/MIN/1.73M2 — SIGNIFICANT CHANGE UP
EOSINOPHIL # BLD AUTO: 0.23 K/UL — SIGNIFICANT CHANGE UP (ref 0–0.5)
EOSINOPHIL NFR BLD AUTO: 3.4 % — SIGNIFICANT CHANGE UP (ref 0–6)
FLUAV AG NPH QL: SIGNIFICANT CHANGE UP
FLUBV AG NPH QL: SIGNIFICANT CHANGE UP
GLUCOSE SERPL-MCNC: 255 MG/DL — HIGH (ref 70–99)
HCT VFR BLD CALC: 43.7 % — SIGNIFICANT CHANGE UP (ref 39–50)
HGB BLD-MCNC: 14 G/DL — SIGNIFICANT CHANGE UP (ref 13–17)
IMM GRANULOCYTES NFR BLD AUTO: 0.3 % — SIGNIFICANT CHANGE UP (ref 0–0.9)
INR BLD: 1.09 RATIO — SIGNIFICANT CHANGE UP (ref 0.88–1.16)
LYMPHOCYTES # BLD AUTO: 2.66 K/UL — SIGNIFICANT CHANGE UP (ref 1–3.3)
LYMPHOCYTES # BLD AUTO: 39.2 % — SIGNIFICANT CHANGE UP (ref 13–44)
MCHC RBC-ENTMCNC: 31.1 PG — SIGNIFICANT CHANGE UP (ref 27–34)
MCHC RBC-ENTMCNC: 32 GM/DL — SIGNIFICANT CHANGE UP (ref 32–36)
MCV RBC AUTO: 97.1 FL — SIGNIFICANT CHANGE UP (ref 80–100)
MONOCYTES # BLD AUTO: 0.74 K/UL — SIGNIFICANT CHANGE UP (ref 0–0.9)
MONOCYTES NFR BLD AUTO: 10.9 % — SIGNIFICANT CHANGE UP (ref 2–14)
NEUTROPHILS # BLD AUTO: 3.08 K/UL — SIGNIFICANT CHANGE UP (ref 1.8–7.4)
NEUTROPHILS NFR BLD AUTO: 45.3 % — SIGNIFICANT CHANGE UP (ref 43–77)
NRBC # BLD: 0 /100 WBCS — SIGNIFICANT CHANGE UP (ref 0–0)
NT-PROBNP SERPL-SCNC: 33 PG/ML — SIGNIFICANT CHANGE UP (ref 0–300)
PLATELET # BLD AUTO: 336 K/UL — SIGNIFICANT CHANGE UP (ref 150–400)
POTASSIUM SERPL-MCNC: 4.7 MMOL/L — SIGNIFICANT CHANGE UP (ref 3.5–5.3)
POTASSIUM SERPL-SCNC: 4.7 MMOL/L — SIGNIFICANT CHANGE UP (ref 3.5–5.3)
PROT SERPL-MCNC: 7.8 G/DL — SIGNIFICANT CHANGE UP (ref 6–8.3)
PROTHROM AB SERPL-ACNC: 12.6 SEC — SIGNIFICANT CHANGE UP (ref 10.5–13.4)
RBC # BLD: 4.5 M/UL — SIGNIFICANT CHANGE UP (ref 4.2–5.8)
RBC # FLD: 14.8 % — HIGH (ref 10.3–14.5)
RSV RNA NPH QL NAA+NON-PROBE: SIGNIFICANT CHANGE UP
SARS-COV-2 RNA SPEC QL NAA+PROBE: SIGNIFICANT CHANGE UP
SODIUM SERPL-SCNC: 139 MMOL/L — SIGNIFICANT CHANGE UP (ref 135–145)
TROPONIN T, HIGH SENSITIVITY RESULT: 13 NG/L — SIGNIFICANT CHANGE UP (ref 0–51)
WBC # BLD: 6.79 K/UL — SIGNIFICANT CHANGE UP (ref 3.8–10.5)
WBC # FLD AUTO: 6.79 K/UL — SIGNIFICANT CHANGE UP (ref 3.8–10.5)

## 2022-12-07 PROCEDURE — 85730 THROMBOPLASTIN TIME PARTIAL: CPT

## 2022-12-07 PROCEDURE — 93971 EXTREMITY STUDY: CPT

## 2022-12-07 PROCEDURE — 71275 CT ANGIOGRAPHY CHEST: CPT | Mod: MA

## 2022-12-07 PROCEDURE — 99285 EMERGENCY DEPT VISIT HI MDM: CPT | Mod: 25

## 2022-12-07 PROCEDURE — 71275 CT ANGIOGRAPHY CHEST: CPT | Mod: 26,MA

## 2022-12-07 PROCEDURE — 87637 SARSCOV2&INF A&B&RSV AMP PRB: CPT

## 2022-12-07 PROCEDURE — 99284 EMERGENCY DEPT VISIT MOD MDM: CPT

## 2022-12-07 PROCEDURE — 85610 PROTHROMBIN TIME: CPT

## 2022-12-07 PROCEDURE — 84484 ASSAY OF TROPONIN QUANT: CPT

## 2022-12-07 PROCEDURE — 83880 ASSAY OF NATRIURETIC PEPTIDE: CPT

## 2022-12-07 PROCEDURE — 85025 COMPLETE CBC W/AUTO DIFF WBC: CPT

## 2022-12-07 PROCEDURE — 80053 COMPREHEN METABOLIC PANEL: CPT

## 2022-12-07 PROCEDURE — 93971 EXTREMITY STUDY: CPT | Mod: 26,LT

## 2022-12-07 RX ORDER — RIVAROXABAN 15 MG-20MG
15 KIT ORAL ONCE
Refills: 0 | Status: COMPLETED | OUTPATIENT
Start: 2022-12-07 | End: 2022-12-07

## 2022-12-07 RX ADMIN — RIVAROXABAN 15 MILLIGRAM(S): KIT at 22:51

## 2022-12-07 NOTE — ED PROVIDER NOTE - NSICDXFAMILYHX_GEN_ALL_CORE_FT
FAMILY HISTORY:  Family history of asthma  Family history of asthma  Family history of essential hypertension  Family history of pancreatic cancer  Family history of rheumatoid arthritis

## 2022-12-07 NOTE — ED PROVIDER NOTE - CLINICAL SUMMARY MEDICAL DECISION MAKING FREE TEXT BOX
Is unclear if the context of the DVT is related to nonadherence to his Xarelto or if it is through the Xarelto.  I am going to discussed the case with hematology for further guidance regarding the care of the patient.  Given the fact that he he had chest discomfort and shortness of breath while he was not anticoagulated and that he has active DVT I am going to do a CT angio as well as troponin and proBNP at this time.  EKG.  Dispo is pending recommendations from hematology and results from further studies.  Findings from Q doc are that the patient has a DVT that might be chronic.However his symptoms make it seem like it is more likely acute. Is unclear if the context of the DVT is related to nonadherence to his Xarelto or if it is through the Xarelto.  I am going to discussed the case with hematology for further guidance regarding the care of the patient.  Given the fact that he he had chest discomfort and shortness of breath while he was not anticoagulated and that he has active DVT I am going to do a CT angio as well as troponin and proBNP at this time.  EKG.  Dispo is pending recommendations from hematology and results from further studies.  Findings from Q doc are that the patient has a DVT that might be chronic. However his symptoms make it seem like it is more likely acute.

## 2022-12-07 NOTE — ED PROVIDER NOTE - NSFOLLOWUPINSTRUCTIONS_ED_ALL_ED_FT
Please followup with your hematologist this week.     the new Xarelto prescription from your pharmacy. We sent the first 3 week supply to your pharmacy. Take the 15mg tablet twice a day for 21 days. Speak to your hematologist to get the subsequent supply after you finish the 21-day supply. This dosing will be 20mg daily.    Deep Vein Thrombosis    A deep vein thrombosis (DVT) is a blood clot (thrombus) that usually occurs in a deep, larger vein of the lower leg or the pelvis, or in an upper extremity such as the arm. These are dangerous and can lead to serious and even life-threatening complications if the clot travels to the lungs. Symptoms include swelling of the arm or leg, warmth and redness of the arm or leg, and pain. Treatment may include taking a blood thinning medication; make sure to take anything prescribed as instructed.    SEEK IMMEDIATE MEDICAL CARE IF YOU HAVE ANY OF THE FOLLOWING SYMPTOMS: shortness of breath, chest pain, rapid or irregular heartbeat, lightheadedness/dizziness, coughing up blood, or any bleeding in your vomit, stool, or urine. These symptoms may represent a serious problem that is an emergency. Do not wait to see if the symptoms will go away. Call 911 and do not drive yourself to the hospital.

## 2022-12-07 NOTE — ED PROVIDER NOTE - NSICDXPASTMEDICALHX_GEN_ALL_CORE_FT
PAST MEDICAL HISTORY:  Anxiety     Bulging disc     Depression     Diabetes Mellitus diagnosed 10 years ago  doesn't do fingersticks    DVT (deep venous thrombosis)     GERD (gastroesophageal reflux disease)     Hyperlipidemia     Hypertension, Essential     Insomnia     Obese     Sleep apnea had test > 10 years ago--supposed to wear device.  Lost 25 pounds but never retested    Spinal stenosis     Spondylisthesis     Varicosities

## 2022-12-07 NOTE — ED ADULT NURSE REASSESSMENT NOTE - NS ED NURSE REASSESS COMMENT FT1
Pt repositioned in bed, no complaints from pt of cp, sob. Pt denies complaints of pain and discomfort. Lab results pending

## 2022-12-07 NOTE — ED PROVIDER NOTE - PATIENT PORTAL LINK FT
You can access the FollowMyHealth Patient Portal offered by [facility] by registering at the following website: [email or web address]. By joining [facility]’s FollowMyHealth portal, you will also be able to view your health information using other applications (apps) compatible with our system.

## 2022-12-07 NOTE — ED PROVIDER NOTE - PROGRESS NOTE DETAILS
harini mendez , the pt's heme/onc md. she thinks this is more likely 2/2 non-adherence and chronic dvt and not a breakthru.  she rec to put the pt on the acute new dvt dosing (15 bid for 3 wks, then back to 20 q day ) and she will see him in office. pe study pending. Social work was able to get him coupon for Xarelto and states that he is likely able to get it at the outpatient pharmacy as it is a different dosage than the medication he currently has Cesar Escobar MD (PGY-3): Spoke to pt who will followup with hematology this week. Will send Xarelto prescription to pt's pharmacy. To be discharged.

## 2022-12-07 NOTE — ED ADULT TRIAGE NOTE - CHIEF COMPLAINT QUOTE
Left upper leg swelling and pain; states leg feels stiff when bending and pain increases with movement. PMH of DVT on Xarelto

## 2022-12-07 NOTE — ED PROVIDER NOTE - PHYSICAL EXAMINATION
Left lower extremity with swelling compared to the right.  There is no redness or warmth.  There is no tenderness.  He is fully able to range the hip and the knee.  As well as the ankle.  There is 2+ dorsalis pedis pulse.  There is a scar on the inner aspect of the left shin.

## 2022-12-07 NOTE — ED ADULT NURSE NOTE - NSICDXPASTMEDICALHX_GEN_ALL_CORE_FT
FREE:[LAST:[Alana],FIRST:[Barbara],PHONE:[(   )    -],FAX:[(   )    -],ADDRESS:[[phone]]] PAST MEDICAL HISTORY:  Anxiety     Bulging disc     Depression     Diabetes Mellitus diagnosed 10 years ago  doesn't do fingersticks    DVT (deep venous thrombosis)     GERD (gastroesophageal reflux disease)     Hyperlipidemia     Hypertension, Essential     Insomnia     Obese     Sleep apnea had test > 10 years ago--supposed to wear device.  Lost 25 pounds but never retested    Spinal stenosis     Spondylisthesis     Varicosities

## 2022-12-07 NOTE — ED ADULT NURSE NOTE - OBJECTIVE STATEMENT
63YO male with PMH of DVT- Xarelto, DM, HTN, COPD and GERD via walk in presenting with complaints of  left leg pain. Pt stated running out of his Xarelto medication, approx 2 weeks ago, and started to having upper left leg pain, progressively gotten worse. Is unable to bend leg fully. Pt is c/o leg pain, and numbness and tingling on medial left ankle. Pt Axox4, respirations even, & non-labored.  Abdomen non-distended. Skin warm, dry, and intact, pt has tenderness upon palpation of left upper lateral thigh. Pt denies chest pain, palpitations, shortness of breath, headache, visual disturbances, fever, chills, diaphoresis,  nausea, or vomiting. Pt placed in position of comfort. Bed in lowest position, wheels locked, appropriate side rails raised. Pt denies needs at this time.

## 2022-12-07 NOTE — ED PROVIDER NOTE - RAPID ASSESSMENT
Attending note (Hussein): 61y/o M c/o left leg swelling around thigh.  No fall/injury.  Had surgery few years ago for blood clots in leg.  On Xarelto (reports that he was off medication for 4-5 days 2 weeks ago).  h/o DVT, DM, HTN, COPD.  No chest pain or difficulty breathing.    Patient was seen as a tele QDOC patient. The patient will be seen and further worked up in the main emergency department and their care will be completed by the main emergency department team along with a thorough physical exam. Receiving team will follow up on labs, analgesia, any clinical imaging, reassess and disposition as clinically indicated, all decisions regarding the progression of care will be made at their discretion. ARTURO Savage MD.

## 2022-12-07 NOTE — ED PROVIDER NOTE - OBJECTIVE STATEMENT
This is a 62-year-old male who has a history of DVT and has had a procedure for this in the past and is on Xarelto.  Other medical problems noted below.  He states about 2 or 3 weeks ago he was missing 2-3 doses of his medicine due to the mail-order pharmacy delays.  He says that since then he has been taking the medicine as prescribed.  About when that happened he developed left leg pain and he also had some left-sided sternal symptoms with associated pleuritic/shortness of breath.  That is all gone now and has not had it for over 10 days.  No vomiting or diaphoresis or exertional symptoms.  He has been taking the medication and Xarelto as prescribed in the recent week or 2.  No recent illnesses.  No pain anywhere else.  He does not know if he has a hematologist.

## 2022-12-07 NOTE — ED PROVIDER NOTE - NSICDXPASTSURGICALHX_GEN_ALL_CORE_FT
PAST SURGICAL HISTORY:  Deep vein thrombosis (DVT) of lower extremity     H/O laser iridotomy left-2008    R Knee Arthroplasty Right 2003, Left 2011,    Reflux Gastroscopy 2013

## 2022-12-08 RX ORDER — RIVAROXABAN 15 MG-20MG
1 KIT ORAL
Qty: 42 | Refills: 0
Start: 2022-12-08 | End: 2022-12-28

## 2023-04-12 ENCOUNTER — NON-APPOINTMENT (OUTPATIENT)
Age: 63
End: 2023-04-12

## 2023-04-12 ENCOUNTER — LABORATORY RESULT (OUTPATIENT)
Age: 63
End: 2023-04-12

## 2023-04-12 ENCOUNTER — APPOINTMENT (OUTPATIENT)
Dept: ORTHOPEDIC SURGERY | Facility: CLINIC | Age: 63
End: 2023-04-12
Payer: MEDICARE

## 2023-04-12 VITALS — WEIGHT: 210 LBS | BODY MASS INDEX: 29.4 KG/M2 | HEIGHT: 71 IN

## 2023-04-12 PROCEDURE — 99203 OFFICE O/P NEW LOW 30 MIN: CPT

## 2023-04-12 PROCEDURE — 73562 X-RAY EXAM OF KNEE 3: CPT | Mod: LT

## 2023-04-12 NOTE — PHYSICAL EXAM
[de-identified] : General Exam\par \par Well developed, well nourished\par No apparent distress\par Oriented to person, place, and time\par Mood: Normal\par Affect: Normal\par Balance and coordination: Normal\par Gait: Normal\par \par left knee exam\par \par Skin: Clean, dry, intact\par Inspection: No obvious malalignment, no masses, + swelling, + effusion.\par Tenderness: + MJLT.+ LJLT. No tenderness over the medial and lateral patella facets. No ttp medial/lateral epicondyle, patella tendon, tibial tubercle, pes anserinus, or proximal fibula.\par ROM: 10 to 80° painful\par Strength: 5/5 Q/H/TA/GS/EHL, no atrophy\par Neuro: In tact to light touch throughout in dp/sp/tib/madeline/saph nerve districutions, DTR's normal\par Pulses: 2+ DP/PT pulses.\par  [de-identified] : \par The following radiographs were ordered and read by me during this patients visit. I reviewed each radiograph in detail with the patient and discussed the findings as highlighted below. \par \par 3 views of the left knee were obtained today.  There is a revision type total knee arthroplasty with stemmed femur and tibial components.  There is no evidence of fracture or loosening

## 2023-04-12 NOTE — HISTORY OF PRESENT ILLNESS
[de-identified] : 62yo male presents complaining of left knee pain for about 5 days.  Status post knee replacement and revision 17 and 18 years ago, by Dr. Tamayo.  He states he was changing brakes and tires on his car when he fell hitting his forearm and his possibly his knee.  He developed forearm pain and swelling but this has improved.  His main issue is his left knee.  He reports limited motion, extreme pain inability to weight-bear currently.  He reports swelling.  He states he has had some night sweats.  He is currently in a wheelchair.  Denies numbness tingling\par \par The patient's past medical history, past surgical history, medications, allergies, and social history were reviewed by me today with the patient and documented accordingly. In addition, the patient's family history, which is noncontributory to this visit, was also reviewed.\par

## 2023-04-13 ENCOUNTER — INPATIENT (INPATIENT)
Facility: HOSPITAL | Age: 63
LOS: 4 days | Discharge: NURSING, REHAB OR LONG-TERM CARE | End: 2023-04-18
Attending: ORTHOPAEDIC SURGERY | Admitting: ORTHOPAEDIC SURGERY
Payer: MEDICARE

## 2023-04-13 ENCOUNTER — TRANSCRIPTION ENCOUNTER (OUTPATIENT)
Age: 63
End: 2023-04-13

## 2023-04-13 VITALS
WEIGHT: 225.09 LBS | DIASTOLIC BLOOD PRESSURE: 74 MMHG | HEART RATE: 122 BPM | HEIGHT: 71 IN | SYSTOLIC BLOOD PRESSURE: 123 MMHG | TEMPERATURE: 101 F | OXYGEN SATURATION: 97 % | RESPIRATION RATE: 20 BRPM

## 2023-04-13 DIAGNOSIS — I82.409 ACUTE EMBOLISM AND THROMBOSIS OF UNSPECIFIED DEEP VEINS OF UNSPECIFIED LOWER EXTREMITY: Chronic | ICD-10-CM

## 2023-04-13 DIAGNOSIS — E11.9 TYPE 2 DIABETES MELLITUS WITHOUT COMPLICATIONS: ICD-10-CM

## 2023-04-13 DIAGNOSIS — T84.59XA INFECTION AND INFLAMMATORY REACTION DUE TO OTHER INTERNAL JOINT PROSTHESIS, INITIAL ENCOUNTER: ICD-10-CM

## 2023-04-13 DIAGNOSIS — I10 ESSENTIAL (PRIMARY) HYPERTENSION: ICD-10-CM

## 2023-04-13 DIAGNOSIS — N17.9 ACUTE KIDNEY FAILURE, UNSPECIFIED: ICD-10-CM

## 2023-04-13 DIAGNOSIS — E87.6 HYPOKALEMIA: ICD-10-CM

## 2023-04-13 DIAGNOSIS — F41.9 ANXIETY DISORDER, UNSPECIFIED: ICD-10-CM

## 2023-04-13 LAB
ALBUMIN SERPL ELPH-MCNC: 3.2 G/DL — LOW (ref 3.3–5)
ALP SERPL-CCNC: 78 U/L — SIGNIFICANT CHANGE UP (ref 40–120)
ALT FLD-CCNC: 23 U/L — SIGNIFICANT CHANGE UP (ref 12–78)
ANION GAP SERPL CALC-SCNC: 6 MMOL/L — SIGNIFICANT CHANGE UP (ref 5–17)
APPEARANCE UR: CLEAR — SIGNIFICANT CHANGE UP
APTT BLD: 39.4 SEC — HIGH (ref 27.5–35.5)
AST SERPL-CCNC: 18 U/L — SIGNIFICANT CHANGE UP (ref 15–37)
B PERT IGG+IGM PNL SER: ABNORMAL
BACTERIA # UR AUTO: ABNORMAL
BASOPHILS # BLD AUTO: 0.02 K/UL — SIGNIFICANT CHANGE UP (ref 0–0.2)
BASOPHILS NFR BLD AUTO: 0.2 % — SIGNIFICANT CHANGE UP (ref 0–2)
BILIRUB SERPL-MCNC: 1.2 MG/DL — SIGNIFICANT CHANGE UP (ref 0.2–1.2)
BILIRUB UR-MCNC: NEGATIVE — SIGNIFICANT CHANGE UP
BLD GP AB SCN SERPL QL: SIGNIFICANT CHANGE UP
BUN SERPL-MCNC: 26 MG/DL — HIGH (ref 7–23)
CALCIUM SERPL-MCNC: 9.7 MG/DL — SIGNIFICANT CHANGE UP (ref 8.5–10.1)
CHLORIDE SERPL-SCNC: 95 MMOL/L — LOW (ref 96–108)
CO2 SERPL-SCNC: 27 MMOL/L — SIGNIFICANT CHANGE UP (ref 22–31)
COLOR FLD: YELLOW — SIGNIFICANT CHANGE UP
COLOR SPEC: YELLOW — SIGNIFICANT CHANGE UP
CREAT SERPL-MCNC: 1.31 MG/DL — HIGH (ref 0.5–1.3)
CRP SERPL-MCNC: 314 MG/L
CRP SERPL-MCNC: 326 MG/L — HIGH
DIFF PNL FLD: ABNORMAL
EGFR: 61 ML/MIN/1.73M2 — SIGNIFICANT CHANGE UP
EOSINOPHIL # BLD AUTO: 0 K/UL — SIGNIFICANT CHANGE UP (ref 0–0.5)
EOSINOPHIL NFR BLD AUTO: 0 % — SIGNIFICANT CHANGE UP (ref 0–6)
EPI CELLS # UR: SIGNIFICANT CHANGE UP
ERYTHROCYTE [SEDIMENTATION RATE] IN BLOOD BY WESTERGREN METHOD: 90 MM/HR
ERYTHROCYTE [SEDIMENTATION RATE] IN BLOOD: 79 MM/HR — HIGH (ref 0–20)
FLUAV AG NPH QL: SIGNIFICANT CHANGE UP
FLUBV AG NPH QL: SIGNIFICANT CHANGE UP
GLUCOSE BLDC GLUCOMTR-MCNC: 224 MG/DL — HIGH (ref 70–99)
GLUCOSE SERPL-MCNC: 262 MG/DL — HIGH (ref 70–99)
GLUCOSE UR QL: 100 MG/DL
GRAM STN FLD: SIGNIFICANT CHANGE UP
HCT VFR BLD CALC: 41.4 % — SIGNIFICANT CHANGE UP (ref 39–50)
HGB BLD-MCNC: 14.3 G/DL — SIGNIFICANT CHANGE UP (ref 13–17)
IMM GRANULOCYTES NFR BLD AUTO: 0.4 % — SIGNIFICANT CHANGE UP (ref 0–0.9)
INR BLD: 2.09 RATIO — HIGH (ref 0.88–1.16)
KETONES UR-MCNC: ABNORMAL
LACTATE SERPL-SCNC: 1.7 MMOL/L — SIGNIFICANT CHANGE UP (ref 0.7–2)
LEUKOCYTE ESTERASE UR-ACNC: ABNORMAL
LYMPHOCYTES # BLD AUTO: 0.28 K/UL — LOW (ref 1–3.3)
LYMPHOCYTES # BLD AUTO: 2.3 % — LOW (ref 13–44)
MCHC RBC-ENTMCNC: 30.3 PG — SIGNIFICANT CHANGE UP (ref 27–34)
MCHC RBC-ENTMCNC: 34.5 G/DL — SIGNIFICANT CHANGE UP (ref 32–36)
MCV RBC AUTO: 87.7 FL — SIGNIFICANT CHANGE UP (ref 80–100)
MONOCYTES # BLD AUTO: 0.96 K/UL — HIGH (ref 0–0.9)
MONOCYTES NFR BLD AUTO: 8 % — SIGNIFICANT CHANGE UP (ref 2–14)
MONOS+MACROS # FLD: 27 % — SIGNIFICANT CHANGE UP
NEUTROPHILS # BLD AUTO: 10.75 K/UL — HIGH (ref 1.8–7.4)
NEUTROPHILS NFR BLD AUTO: 89.1 % — HIGH (ref 43–77)
NEUTROPHILS-BODY FLUID: 73 % — SIGNIFICANT CHANGE UP
NITRITE UR-MCNC: NEGATIVE — SIGNIFICANT CHANGE UP
NRBC # BLD: 0 /100 WBCS — SIGNIFICANT CHANGE UP (ref 0–0)
PH UR: 6 — SIGNIFICANT CHANGE UP (ref 5–8)
PLATELET # BLD AUTO: 249 K/UL — SIGNIFICANT CHANGE UP (ref 150–400)
POTASSIUM SERPL-MCNC: 3 MMOL/L — LOW (ref 3.5–5.3)
POTASSIUM SERPL-SCNC: 3 MMOL/L — LOW (ref 3.5–5.3)
PROT SERPL-MCNC: 8.5 GM/DL — HIGH (ref 6–8.3)
PROT UR-MCNC: 500 MG/DL
PROTHROM AB SERPL-ACNC: 25.3 SEC — HIGH (ref 10.5–13.4)
RBC # BLD: 4.72 M/UL — SIGNIFICANT CHANGE UP (ref 4.2–5.8)
RBC # FLD: 13.6 % — SIGNIFICANT CHANGE UP (ref 10.3–14.5)
RBC CASTS # UR COMP ASSIST: ABNORMAL /HPF (ref 0–4)
RCV VOL RI: HIGH /UL (ref 0–5)
SARS-COV-2 RNA SPEC QL NAA+PROBE: SIGNIFICANT CHANGE UP
SODIUM SERPL-SCNC: 128 MMOL/L — LOW (ref 135–145)
SP GR SPEC: 1.01 — SIGNIFICANT CHANGE UP (ref 1.01–1.02)
SPECIMEN SOURCE: SIGNIFICANT CHANGE UP
SYNOVIAL CRYSTALS CLARITY: ABNORMAL
SYNOVIAL CRYSTALS COLOR: YELLOW
SYNOVIAL CRYSTALS ID: ABNORMAL
SYNOVIAL CRYSTALS TUBE: SIGNIFICANT CHANGE UP
TOTAL NUCLEATED CELL COUNT, BODY FLUID: SIGNIFICANT CHANGE UP /UL
TUBE TYPE: SIGNIFICANT CHANGE UP
UROBILINOGEN FLD QL: NEGATIVE MG/DL — SIGNIFICANT CHANGE UP
WBC # BLD: 12.06 K/UL — HIGH (ref 3.8–10.5)
WBC # FLD AUTO: 12.06 K/UL — HIGH (ref 3.8–10.5)
WBC UR QL: SIGNIFICANT CHANGE UP

## 2023-04-13 PROCEDURE — 71045 X-RAY EXAM CHEST 1 VIEW: CPT | Mod: 26

## 2023-04-13 PROCEDURE — 99222 1ST HOSP IP/OBS MODERATE 55: CPT | Mod: 25,57

## 2023-04-13 PROCEDURE — 99285 EMERGENCY DEPT VISIT HI MDM: CPT | Mod: FS

## 2023-04-13 PROCEDURE — 99221 1ST HOSP IP/OBS SF/LOW 40: CPT

## 2023-04-13 RX ORDER — DEXTROSE 50 % IN WATER 50 %
12.5 SYRINGE (ML) INTRAVENOUS ONCE
Refills: 0 | Status: DISCONTINUED | OUTPATIENT
Start: 2023-04-13 | End: 2023-04-14

## 2023-04-13 RX ORDER — OXYCODONE HYDROCHLORIDE 5 MG/1
5 TABLET ORAL EVERY 4 HOURS
Refills: 0 | Status: DISCONTINUED | OUTPATIENT
Start: 2023-04-13 | End: 2023-04-14

## 2023-04-13 RX ORDER — SODIUM CHLORIDE 9 MG/ML
1000 INJECTION, SOLUTION INTRAVENOUS
Refills: 0 | Status: DISCONTINUED | OUTPATIENT
Start: 2023-04-13 | End: 2023-04-14

## 2023-04-13 RX ORDER — ZOLPIDEM TARTRATE 10 MG/1
5 TABLET ORAL AT BEDTIME
Refills: 0 | Status: DISCONTINUED | OUTPATIENT
Start: 2023-04-13 | End: 2023-04-14

## 2023-04-13 RX ORDER — LOSARTAN POTASSIUM 100 MG/1
100 TABLET, FILM COATED ORAL DAILY
Refills: 0 | Status: DISCONTINUED | OUTPATIENT
Start: 2023-04-13 | End: 2023-04-14

## 2023-04-13 RX ORDER — ACETAMINOPHEN 500 MG
650 TABLET ORAL EVERY 6 HOURS
Refills: 0 | Status: DISCONTINUED | OUTPATIENT
Start: 2023-04-13 | End: 2023-04-14

## 2023-04-13 RX ORDER — POTASSIUM CHLORIDE 20 MEQ
40 PACKET (EA) ORAL ONCE
Refills: 0 | Status: COMPLETED | OUTPATIENT
Start: 2023-04-13 | End: 2023-04-13

## 2023-04-13 RX ORDER — CEFTRIAXONE 500 MG/1
1000 INJECTION, POWDER, FOR SOLUTION INTRAMUSCULAR; INTRAVENOUS ONCE
Refills: 0 | Status: COMPLETED | OUTPATIENT
Start: 2023-04-13 | End: 2023-04-13

## 2023-04-13 RX ORDER — RIVAROXABAN 15 MG-20MG
15 KIT ORAL ONCE
Refills: 0 | Status: DISCONTINUED | OUTPATIENT
Start: 2023-04-13 | End: 2023-04-13

## 2023-04-13 RX ORDER — SERTRALINE 25 MG/1
100 TABLET, FILM COATED ORAL
Refills: 0 | Status: DISCONTINUED | OUTPATIENT
Start: 2023-04-13 | End: 2023-04-14

## 2023-04-13 RX ORDER — INSULIN LISPRO 100/ML
VIAL (ML) SUBCUTANEOUS AT BEDTIME
Refills: 0 | Status: DISCONTINUED | OUTPATIENT
Start: 2023-04-13 | End: 2023-04-14

## 2023-04-13 RX ORDER — OXYCODONE HYDROCHLORIDE 5 MG/1
10 TABLET ORAL EVERY 4 HOURS
Refills: 0 | Status: DISCONTINUED | OUTPATIENT
Start: 2023-04-13 | End: 2023-04-14

## 2023-04-13 RX ORDER — INSULIN LISPRO 100/ML
VIAL (ML) SUBCUTANEOUS
Refills: 0 | Status: DISCONTINUED | OUTPATIENT
Start: 2023-04-13 | End: 2023-04-14

## 2023-04-13 RX ORDER — SODIUM CHLORIDE 9 MG/ML
1000 INJECTION INTRAMUSCULAR; INTRAVENOUS; SUBCUTANEOUS
Refills: 0 | Status: DISCONTINUED | OUTPATIENT
Start: 2023-04-13 | End: 2023-04-14

## 2023-04-13 RX ORDER — DEXTROSE 50 % IN WATER 50 %
15 SYRINGE (ML) INTRAVENOUS ONCE
Refills: 0 | Status: DISCONTINUED | OUTPATIENT
Start: 2023-04-13 | End: 2023-04-14

## 2023-04-13 RX ORDER — GLUCAGON INJECTION, SOLUTION 0.5 MG/.1ML
1 INJECTION, SOLUTION SUBCUTANEOUS ONCE
Refills: 0 | Status: DISCONTINUED | OUTPATIENT
Start: 2023-04-13 | End: 2023-04-14

## 2023-04-13 RX ORDER — SODIUM CHLORIDE 9 MG/ML
1000 INJECTION INTRAMUSCULAR; INTRAVENOUS; SUBCUTANEOUS ONCE
Refills: 0 | Status: COMPLETED | OUTPATIENT
Start: 2023-04-13 | End: 2023-04-13

## 2023-04-13 RX ORDER — DEXTROSE 50 % IN WATER 50 %
25 SYRINGE (ML) INTRAVENOUS ONCE
Refills: 0 | Status: DISCONTINUED | OUTPATIENT
Start: 2023-04-13 | End: 2023-04-14

## 2023-04-13 RX ORDER — VANCOMYCIN HCL 1 G
1000 VIAL (EA) INTRAVENOUS ONCE
Refills: 0 | Status: COMPLETED | OUTPATIENT
Start: 2023-04-13 | End: 2023-04-13

## 2023-04-13 RX ORDER — PANTOPRAZOLE SODIUM 20 MG/1
40 TABLET, DELAYED RELEASE ORAL
Refills: 0 | Status: DISCONTINUED | OUTPATIENT
Start: 2023-04-13 | End: 2023-04-14

## 2023-04-13 RX ORDER — ACETAMINOPHEN 500 MG
650 TABLET ORAL ONCE
Refills: 0 | Status: COMPLETED | OUTPATIENT
Start: 2023-04-13 | End: 2023-04-13

## 2023-04-13 RX ADMIN — SERTRALINE 100 MILLIGRAM(S): 25 TABLET, FILM COATED ORAL at 16:50

## 2023-04-13 RX ADMIN — Medication 650 MILLIGRAM(S): at 15:47

## 2023-04-13 RX ADMIN — CEFTRIAXONE 100 MILLIGRAM(S): 500 INJECTION, POWDER, FOR SOLUTION INTRAMUSCULAR; INTRAVENOUS at 16:51

## 2023-04-13 RX ADMIN — Medication 650 MILLIGRAM(S): at 14:47

## 2023-04-13 RX ADMIN — Medication 250 MILLIGRAM(S): at 17:13

## 2023-04-13 RX ADMIN — SODIUM CHLORIDE 1000 MILLILITER(S): 9 INJECTION INTRAMUSCULAR; INTRAVENOUS; SUBCUTANEOUS at 14:55

## 2023-04-13 RX ADMIN — OXYCODONE HYDROCHLORIDE 10 MILLIGRAM(S): 5 TABLET ORAL at 17:12

## 2023-04-13 RX ADMIN — LOSARTAN POTASSIUM 100 MILLIGRAM(S): 100 TABLET, FILM COATED ORAL at 16:51

## 2023-04-13 RX ADMIN — OXYCODONE HYDROCHLORIDE 10 MILLIGRAM(S): 5 TABLET ORAL at 18:02

## 2023-04-13 RX ADMIN — Medication 2: at 17:49

## 2023-04-13 RX ADMIN — Medication 40 MILLIEQUIVALENT(S): at 15:40

## 2023-04-13 RX ADMIN — SODIUM CHLORIDE 1000 MILLILITER(S): 9 INJECTION INTRAMUSCULAR; INTRAVENOUS; SUBCUTANEOUS at 14:47

## 2023-04-13 NOTE — ED ADULT NURSE NOTE - OBJECTIVE STATEMENT
Patient alert and oriented x4, not able to ambulate at this time due to pain, baseline ambulates with no assistive device. c/o severe pain to L knee, radiates to upper leg and hip. Patient reports pain stared 7 days ago. Patient reports chill, sweating and fever at home. Patient reports going to PCP yesterday, X ray to L knee was performed, patient states PCP reported he suspects an infection to L knee and was instructed to go to ED. L knee with noted swelling and warm to touch.

## 2023-04-13 NOTE — ED ADULT TRIAGE NOTE - CHIEF COMPLAINT QUOTE
Left knee pain, swelling and warmth for a couple a days, this am temperature of 103 and took tylenols at 0600, saw ortho yesterday was given tramadol for pain and advised ed visit to r/o infection in the knee. hx b/l knee replacements, dm, htn.

## 2023-04-13 NOTE — ED PROVIDER NOTE - PHYSICAL EXAMINATION
GEN: Awake, alert, interactive, NAD.  HEAD AND NECK: NC/AT. Airway patent. Neck supple.   EYES:  Clear b/l. EOMI. PERRL.   ENT: Moist mucus membranes.   CARDIAC: Regular rate, regular rhythm. No evident pedal edema.    RESP/CHEST: Normal respiratory effort with no use of accessory muscles or retractions. Clear throughout on auscultation.  ABD: soft, non-distended, non-tender. No rebound, no guarding.   BACK: No midline spinal TTP. No CVAT.   EXTREMITIES: LLE: (+) swelling to the left knee, (+) warm to touch, (+) unable to flex, (-) calf tenderness, (+) pulses intact.  Moving all extremities with no apparent deformities.   SKIN: Warm, dry, intact normal color. No rash.   NEURO: AOx3, CN II-XII grossly intact, no focal deficits.   PSYCH: Appropriate mood and affect.

## 2023-04-13 NOTE — ED PROVIDER NOTE - CLINICAL SUMMARY MEDICAL DECISION MAKING FREE TEXT BOX
64 y/o male with DM, HTn, history DVT on xarelto, history bilateral knee replacement here with left knee pain and swelling x 1 week with fever.   Will check labs, ivf, tylenol, xray left knee, ortho consult r/o septic knee.    Discussed with ortho already aware of patient, does not need knee xray as it was already done in office. 62 y/o male with DM, HTn, history DVT on xarelto, history bilateral knee replacement here with left knee pain and swelling x 1 week with fever.   Will check labs, ivf, tylenol, xray left knee, ortho consult r/o septic knee.    Discussed with ortho already aware of patient, does not need knee xray as it was already done in office and to hold off abx until they tap the knee.   Labs reviewed and wbc 12. Potassium 3.0 ,  will replete with PO potassium.   Vancomycin and rocephin ordered.   Discussed with ortho and recommended admission to Dr Marcos. 62 y/o male with DM, HTn, history DVT on xarelto, history bilateral knee replacement here with left knee pain and swelling x 1 week with fever.   Will check labs, ivf, tylenol, xray left knee, ortho consult r/o septic knee.    Discussed with ortho already aware of patient, does not need knee xray as it was already done in office and to hold off abx until they tap the knee.   Labs reviewed and wbc 12. Potassium 3.0 ,  will replete with PO potassium.   Cell count shows [id number] total nucleated cell count. RBC count [id number]  Vancomycin and rocephin ordered.   Discussed with ortho and recommended admission to Dr Marcos.

## 2023-04-13 NOTE — ED PROVIDER NOTE - OBJECTIVE STATEMENT
62 y/o female with history dvt on xarelto, dm,  htn, history bilateral knee replacement here with left knee pain and swelling x 1 week. Pt denies any trauma. Pt reports pain with flexing the knee and fever of 103 at home. Pt saw orthopedics yesterday and had xray done which was negative and told today that his blood work was abnormal. Pt was told to come to the ED for evaluation. Denies numbness, tingling, chest pain, dyspnea.

## 2023-04-13 NOTE — H&P ADULT - HISTORY OF PRESENT ILLNESS
63y Male presents with atraumatic L Knee pain x7 days. Community ambulator w/o assistance at baseline. Hx of L TKA in 2011, R TKA in 2003. Pt states about 7 days ago, he noticed gradual onset L knee pain and swelling, which has now worsened and is unable to ambulate secondary to pain. Endorses Fevers at home, Tmax 103.1F (Oral). Pt seen in office, ESR/CRP: 90/314, and sent to ER for further evaluation. On Xarelto at home for Hx of DVT, last dose last night. Denies CP, SOB, numbness/tingling, weakness, nausea, vomiting or any other complaints.

## 2023-04-13 NOTE — H&P ADULT - ATTENDING COMMENTS
possible infection of tka.  will monitor blood work, aspiration and culture.  possible washout tomorrow possible infection of tka.  will monitor blood work, aspiration and culture.  possible washout tomorrow    I&D today

## 2023-04-13 NOTE — H&P ADULT - NSHPPHYSICALEXAM_GEN_ALL_CORE
PHYSICAL EXAM  General: NAD, Awake and Alert  LLE:   Skin intact  Warmth noted at L knee diffusely, no redness, no ecchymosis  Moderate effusion of knee noted  Diffuse TTP at knee  No TTP elsewhere along extremity  AROM/PROM limited secondary to pain  No pain w/ micromotion  SILT L2-S1  2+ DP  Compartments soft and compressible  No Calf TTP

## 2023-04-13 NOTE — PATIENT PROFILE ADULT - FALL HARM RISK - RISK INTERVENTIONS
Assistance OOB with selected safe patient handling equipment/Assistance with ambulation/Communicate Fall Risk and Risk Factors to all staff, patient, and family/Discuss with provider need for PT consult/Monitor gait and stability/Provide patient with walking aids - walker, cane, crutches/Reinforce activity limits and safety measures with patient and family/Sit up slowly, dangle for a short time, stand at bedside before walking/Use of alarms - bed, chair and/or voice tab/Visual Cue: Yellow wristband/Bed in lowest position, wheels locked, appropriate side rails in place/Call bell, personal items and telephone in reach/Instruct patient to call for assistance before getting out of bed or chair/Non-slip footwear when patient is out of bed/[place] to call system/Physically safe environment - no spills, clutter or unnecessary equipment/Purposeful Proactive Rounding/Room/bathroom lighting operational, light cord in reach

## 2023-04-13 NOTE — H&P ADULT - ASSESSMENT
Assessment/Plan:  63y Male with L knee pain/swelling/warmth x7 days. R/o L knee PJI.     - Plan for OR tomorrow for L Knee I&D and Poly exchange.   - FU aspiration culture, gram stain, crystals. CC: 29k.   - NPO after midnight, IVF while NPO  - FU Preop labs and imaging  -Pain control as needed  -DVT ppx: SCDs, please hold chemical DVT PPx at this time given possible OR.   - LLE NWB  - Medical management appreciated, PLEASE DOCUMENT MEDICAL CLEARANCE FOR OR  -Discussed plan w/ Dr. Marcos who agrees.

## 2023-04-13 NOTE — CONSULT NOTE ADULT - SUBJECTIVE AND OBJECTIVE BOX
Patient is a 63M with a PMH of DVT on xarelto, DM, HTN, L TKA in , R TKA in , chronic anxiety who presents to the ED for L knee pain.  Patient was evaulated by Ortho who performed and arthrocentesis of the joint.  Admitted to ortho service who plan to do washout tomorrow AM.  Hospital medicine consulted for medical clearance.      Patient seen and examined in the ED  Patient has no active complaints, however reports significant, sharp L knee pain with any movement.    States his pain has been present for around 7 days, has progressively worsened  Also noted fevers at home.  Denies history of nausea, vomiting, or diarrhea.  At baseline, patient ambulates without assistive devices.    Walks his dog around his neighborhood daily    Denies history of CP or dyspnea with activity.   survivor, takes ambien at night.    Febrile in ED - as high as 102.8.  Labs show leukocytosis, elevated ESR and electrolyte abnormalities.      Physical exam:  General: patient in no acute distress, resting comfortably  Head:  Atraumatic, Normocephalic  Eyes: EOMI, PERRLA, clear sclera  Neck: Supple, thyroid nontender, non enlarged  Cardio: S1/S2 +ve, regular rate and rhythm, no M/G/R  Resp: clear to ausculation bilaterally, no rales or wheezes  GI: abdomen soft, nontender, non distended, no guarding, BS +ve x 4  Ext: pain on light palpation of L knee.  Significantly limited movement due to pain.  Neuro: CN 2-12 intact, no significant motor or sensory deficits.  Skin: No rashes or lesions     Recent Vitals  T(C): 36.9 (23 @ 17:04), Max: 39.3 (23 @ 15:32)  HR: 98 (23 @ 17:04) (98 - 122)  BP: 138/70 (23 @ 17:04) (123/74 - 157/66)  RR: 18 (23 @ 17:04) (17 - 20)  SpO2: 97% (23 @ 17:04) (97% - 98%)                        14.3   12.06 )-----------( 249      ( 2023 14:30 )             41.4         128<L>  |  95<L>  |  26<H>  ----------------------------<  262<H>  3.0<L>   |  27  |  1.31<H>    Ca    9.7      2023 14:30    TPro  8.5<H>  /  Alb  3.2<L>  /  TBili  1.2  /  DBili  x   /  AST  18  /  ALT  23  /  AlkPhos  78  04-13    PT/INR - ( 2023 14:30 )   PT: 25.3 sec;   INR: 2.09 ratio         PTT - ( 2023 14:30 )  PTT:39.4 sec  LIVER FUNCTIONS - ( 2023 14:30 )  Alb: 3.2 g/dL / Pro: 8.5 gm/dL / ALK PHOS: 78 U/L / ALT: 23 U/L / AST: 18 U/L / GGT: x           Urinalysis Basic - ( 2023 14:50 )    Color: Yellow / Appearance: Clear / S.015 / pH: x  Gluc: x / Ketone: Large  / Bili: Negative / Urobili: Negative mg/dL   Blood: x / Protein: 500 mg/dL / Nitrite: Negative   Leuk Esterase: Trace / RBC: 3-5 /HPF / WBC 0-2   Sq Epi: x / Non Sq Epi: x / Bacteria: Many        Home Medications:  acetaminophen 325 mg oral tablet: 2 tab(s) orally every 6 hours, As needed, Mild Pain (1 - 3) (10 Oct 2017 13:12)  Advair Diskus 500 mcg-50 mcg inhalation powder: 1 puff(s) inhaled 2 times a day (29 Sep 2017 15:42)  fluticasone nasal: nasal once a day (29 Sep 2017 15:42)  Jardiance 25 mg oral tablet: 1 tab(s) orally once a day (in the morning) (29 Sep 2017 15:42)  losartan 100 mg oral tablet: 1 tab(s) orally once a day (29 Sep 2017 15:42)  metFORMIN 500 mg oral tablet: 1 tab(s) orally 3 times a day with meals (29 Sep 2017 15:42)  Nasal Mist 0.05% nasal spray: 2 spray(s) nasal every 4 hours (29 Sep 2017 15:42)  omeprazole 40 mg oral delayed release capsule: 1 cap(s) orally once a day (29 Sep 2017 15:42)  sertraline 100 mg oral tablet: 2 tab(s) orally once a day (29 Sep 2017 15:42)  traMADol 50 mg oral tablet: 2  orally 3 times a day (29 Sep 2017 15:42)  zolpidem 12.5 mg oral tablet, extended release: 1 tab(s) orally once a day (at bedtime) (29 Sep 2017 15:42)

## 2023-04-13 NOTE — CONSULT NOTE ADULT - SUBJECTIVE AND OBJECTIVE BOX
63y Male presents with L Knee pain x7 days. Community ambulator w/o assistance at baseline. Hx of L TKA in . No new trauma or falls. Pt states about 7 days ago, he noticed gradual onset L knee pain and swelling, which has now worsened and is unable to ambulate secondary to pain. Endorses Fevers at home, Tmax 103.1F (Oral). Pt seen in office, ESR/CRP: 90/314, and sent to ER for further evaluation. On Xarelto at home for Hx of DVT, last dose last night. Denies CP, SOB, numbness/tingling, weakness, nausea, vomiting or any other complaints.     PAST MEDICAL & SURGICAL HISTORY:  Diabetes Mellitus  diagnosed 10 years ago  doesn't do fingersticks      Hypertension, Essential      Hyperlipidemia      GERD (gastroesophageal reflux disease)      Depression      Obese      Bulging disc      Spinal stenosis      Spondylisthesis      Sleep apnea  had test > 10 years ago--supposed to wear device.  Lost 25 pounds but never retested      Anxiety      Insomnia      Varicosities      DVT (deep venous thrombosis)      R Knee Arthroplasty  Right , Left ,      H/O laser iridotomy  left-      Reflux  Gastroscopy 2013      Deep vein thrombosis (DVT) of lower extremity        Home Medications:  acetaminophen 325 mg oral tablet: 2 tab(s) orally every 6 hours, As needed, Mild Pain (1 - 3) (10 Oct 2017 13:12)  Advair Diskus 500 mcg-50 mcg inhalation powder: 1 puff(s) inhaled 2 times a day (29 Sep 2017 15:42)  fluticasone nasal: nasal once a day (29 Sep 2017 15:42)  Jardiance 25 mg oral tablet: 1 tab(s) orally once a day (in the morning) (29 Sep 2017 15:42)  losartan 100 mg oral tablet: 1 tab(s) orally once a day (29 Sep 2017 15:42)  metFORMIN 500 mg oral tablet: 1 tab(s) orally 3 times a day with meals (29 Sep 2017 15:42)  Nasal Mist 0.05% nasal spray: 2 spray(s) nasal every 4 hours (29 Sep 2017 15:42)  omeprazole 40 mg oral delayed release capsule: 1 cap(s) orally once a day (29 Sep 2017 15:42)  sertraline 100 mg oral tablet: 2 tab(s) orally once a day (29 Sep 2017 15:42)  traMADol 50 mg oral tablet: 2  orally 3 times a day (29 Sep 2017 15:42)  zolpidem 12.5 mg oral tablet, extended release: 1 tab(s) orally once a day (at bedtime) (29 Sep 2017 15:42)    Allergies    No Known Allergies    Intolerances                              14.3   12.06 )-----------( 249      ( 2023 14:30 )             41.4     04-13    128<L>  |  95<L>  |  26<H>  ----------------------------<  262<H>  3.0<L>   |  27  |  1.31<H>    Ca    9.7      2023 14:30    TPro  x   /  Alb  3.2<L>  /  TBili  x   /  DBili  x   /  AST  18  /  ALT  23  /  AlkPhos  x   04-13    PT/INR - ( 2023 14:30 )   PT: 25.3 sec;   INR: 2.09 ratio         PTT - ( 2023 14:30 )  PTT:39.4 sec  Urinalysis Basic - ( 2023 14:50 )    Color: Yellow / Appearance: Clear / S.015 / pH: x  Gluc: x / Ketone: Large  / Bili: Negative / Urobili: Negative mg/dL   Blood: x / Protein: 500 mg/dL / Nitrite: Negative   Leuk Esterase: Trace / RBC: x / WBC x   Sq Epi: x / Non Sq Epi: x / Bacteria: x      Vital Signs Last 24 Hrs  T(C): 38.1 (2023 12:06), Max: 38.1 (2023 12:06)  T(F): 100.6 (2023 12:06), Max: 100.6 (2023 12:06)  HR: 122 (2023 12:06) (122 - 122)  BP: 123/74 (2023 12:06) (123/74 - 123/74)  BP(mean): --  RR: 20 (2023 12:06) (20 - 20)  SpO2: 97% (2023 12:06) (97% - 97%)    Parameters below as of 2023 12:06  Patient On (Oxygen Delivery Method): room air      PHYSICAL EXAM  General: NAD, Awake and Alert  LLE:   Skin intact  Warmth noted at L knee diffusely, no redness, no ecchymosis  Moderate effusion of knee noted  Diffuse TTP at knee  No TTP elsewhere along extremity  AROM/PROM limited secondary to pain  No pain w/ micromotion  SILT L2-S1  2+ DP  Compartments soft and compressible  No Calf TTP    Procedure:   Procedure explained and risks, benefits, and alternatives to procedure discussed with patient at bedside. Patient expressed understanding and all questions were answered. Under sterile technique, the LEFT EXTREMITY was prepped and 80cc of straw-colored fluid was aspirated through lateral suprapatellar approach. Samples of fluid were sent for gram stain, culture, crystals, and cell count. Patient tolerated procedure well and there were no complications. Patient was placed in a compressive dressing following the procedure.     Assessment/Plan:  63y Male with L knee pain/swelling/warmth x7 days. R/o L knee PJI.     - FU aspiration culture, gram stain, crystals and cell count  - NPO until Cell count results  - FU Preop labs and imaging  - Recommend ID Consult for Abx recs.   -Pain control as needed  -DVT ppx: SCDs, please hold chemical DVT PPx at this time given possible OR.   - LLE NWB  -Discussed plan w/ Dr. Marcos who agrees.

## 2023-04-13 NOTE — H&P ADULT - NSHPLABSRESULTS_GEN_ALL_CORE
Procedure:   Procedure explained and risks, benefits, and alternatives to procedure discussed with patient at bedside. Patient expressed understanding and all questions were answered. Under sterile technique, the LEFT EXTREMITY was prepped and 80cc of straw-colored fluid was aspirated through lateral suprapatellar approach. Samples of fluid were sent for gram stain, culture, crystals, and cell count. Patient tolerated procedure well and there were no complications. Patient was placed in a compressive dressing following the procedure.

## 2023-04-13 NOTE — CONSULT NOTE ADULT - PROBLEM SELECTOR RECOMMENDATION 9
Ortho on board,   Pain control, washout in AM  Due to age and comorbidities, patient is a low risk for perioperative cardiovascular mortality, can proceed to procedure  antibiotics as per ID

## 2023-04-13 NOTE — ED ADULT NURSE NOTE - NSIMPLEMENTINTERV_GEN_ALL_ED
Implemented All Fall Risk Interventions:  [place] to call system. Call bell, personal items and telephone within reach. Instruct patient to call for assistance. Room bathroom lighting operational. Non-slip footwear when patient is off stretcher. Physically safe environment: no spills, clutter or unnecessary equipment. Stretcher in lowest position, wheels locked, appropriate side rails in place. Provide visual cue, wrist band, yellow gown, etc. Monitor gait and stability. Monitor for mental status changes and reorient to person, place, and time. Review medications for side effects contributing to fall risk. Reinforce activity limits and safety measures with patient and family. Implemented All Fall with Harm Risk Interventions:  [place] to call system. Call bell, personal items and telephone within reach. Instruct patient to call for assistance. Room bathroom lighting operational. Non-slip footwear when patient is off stretcher. Physically safe environment: no spills, clutter or unnecessary equipment. Stretcher in lowest position, wheels locked, appropriate side rails in place. Provide visual cue, wrist band, yellow gown, etc. Monitor gait and stability. Monitor for mental status changes and reorient to person, place, and time. Review medications for side effects contributing to fall risk. Reinforce activity limits and safety measures with patient and family. Provide visual clues: red socks.

## 2023-04-14 ENCOUNTER — TRANSCRIPTION ENCOUNTER (OUTPATIENT)
Age: 63
End: 2023-04-14

## 2023-04-14 LAB
A1C WITH ESTIMATED AVERAGE GLUCOSE RESULT: 7.4 % — HIGH (ref 4–5.6)
ANION GAP SERPL CALC-SCNC: 7 MMOL/L — SIGNIFICANT CHANGE UP (ref 5–17)
ANION GAP SERPL CALC-SCNC: 8 MMOL/L — SIGNIFICANT CHANGE UP (ref 5–17)
APTT BLD: 33.6 SEC — SIGNIFICANT CHANGE UP (ref 27.5–35.5)
BLD GP AB SCN SERPL QL: SIGNIFICANT CHANGE UP
BUN SERPL-MCNC: 17 MG/DL — SIGNIFICANT CHANGE UP (ref 7–23)
BUN SERPL-MCNC: 20 MG/DL — SIGNIFICANT CHANGE UP (ref 7–23)
CALCIUM SERPL-MCNC: 8.2 MG/DL — LOW (ref 8.5–10.1)
CALCIUM SERPL-MCNC: 8.9 MG/DL — SIGNIFICANT CHANGE UP (ref 8.5–10.1)
CHLORIDE SERPL-SCNC: 103 MMOL/L — SIGNIFICANT CHANGE UP (ref 96–108)
CHLORIDE SERPL-SCNC: 98 MMOL/L — SIGNIFICANT CHANGE UP (ref 96–108)
CO2 SERPL-SCNC: 21 MMOL/L — LOW (ref 22–31)
CO2 SERPL-SCNC: 26 MMOL/L — SIGNIFICANT CHANGE UP (ref 22–31)
CREAT SERPL-MCNC: 0.88 MG/DL — SIGNIFICANT CHANGE UP (ref 0.5–1.3)
CREAT SERPL-MCNC: 1.07 MG/DL — SIGNIFICANT CHANGE UP (ref 0.5–1.3)
EGFR: 77 ML/MIN/1.73M2 — SIGNIFICANT CHANGE UP
EGFR: 96 ML/MIN/1.73M2 — SIGNIFICANT CHANGE UP
ESTIMATED AVERAGE GLUCOSE: 166 MG/DL — HIGH (ref 68–114)
GLUCOSE BLDC GLUCOMTR-MCNC: 157 MG/DL — HIGH (ref 70–99)
GLUCOSE BLDC GLUCOMTR-MCNC: 174 MG/DL — HIGH (ref 70–99)
GLUCOSE BLDC GLUCOMTR-MCNC: 183 MG/DL — HIGH (ref 70–99)
GLUCOSE SERPL-MCNC: 179 MG/DL — HIGH (ref 70–99)
GLUCOSE SERPL-MCNC: 214 MG/DL — HIGH (ref 70–99)
GRAM STN FLD: SIGNIFICANT CHANGE UP
GRAM STN FLD: SIGNIFICANT CHANGE UP
HCT VFR BLD CALC: 34.1 % — LOW (ref 39–50)
HCT VFR BLD CALC: 41.1 % — SIGNIFICANT CHANGE UP (ref 39–50)
HGB BLD-MCNC: 11.7 G/DL — LOW (ref 13–17)
HGB BLD-MCNC: 13.7 G/DL — SIGNIFICANT CHANGE UP (ref 13–17)
INR BLD: 1.17 RATIO — HIGH (ref 0.88–1.16)
MCHC RBC-ENTMCNC: 29.6 PG — SIGNIFICANT CHANGE UP (ref 27–34)
MCHC RBC-ENTMCNC: 30.2 PG — SIGNIFICANT CHANGE UP (ref 27–34)
MCHC RBC-ENTMCNC: 33.3 G/DL — SIGNIFICANT CHANGE UP (ref 32–36)
MCHC RBC-ENTMCNC: 34.3 G/DL — SIGNIFICANT CHANGE UP (ref 32–36)
MCV RBC AUTO: 87.9 FL — SIGNIFICANT CHANGE UP (ref 80–100)
MCV RBC AUTO: 88.8 FL — SIGNIFICANT CHANGE UP (ref 80–100)
METHOD TYPE: SIGNIFICANT CHANGE UP
MSSA DNA SPEC QL NAA+PROBE: SIGNIFICANT CHANGE UP
NRBC # BLD: 0 /100 WBCS — SIGNIFICANT CHANGE UP (ref 0–0)
NRBC # BLD: 0 /100 WBCS — SIGNIFICANT CHANGE UP (ref 0–0)
PLATELET # BLD AUTO: 191 K/UL — SIGNIFICANT CHANGE UP (ref 150–400)
PLATELET # BLD AUTO: 227 K/UL — SIGNIFICANT CHANGE UP (ref 150–400)
POTASSIUM SERPL-MCNC: 3.1 MMOL/L — LOW (ref 3.5–5.3)
POTASSIUM SERPL-MCNC: 3.8 MMOL/L — SIGNIFICANT CHANGE UP (ref 3.5–5.3)
POTASSIUM SERPL-SCNC: 3.1 MMOL/L — LOW (ref 3.5–5.3)
POTASSIUM SERPL-SCNC: 3.8 MMOL/L — SIGNIFICANT CHANGE UP (ref 3.5–5.3)
PROTHROM AB SERPL-ACNC: 14 SEC — HIGH (ref 10.5–13.4)
RBC # BLD: 3.88 M/UL — LOW (ref 4.2–5.8)
RBC # BLD: 4.63 M/UL — SIGNIFICANT CHANGE UP (ref 4.2–5.8)
RBC # FLD: 13.9 % — SIGNIFICANT CHANGE UP (ref 10.3–14.5)
RBC # FLD: 13.9 % — SIGNIFICANT CHANGE UP (ref 10.3–14.5)
SODIUM SERPL-SCNC: 131 MMOL/L — LOW (ref 135–145)
SODIUM SERPL-SCNC: 132 MMOL/L — LOW (ref 135–145)
SPECIMEN SOURCE: SIGNIFICANT CHANGE UP
SPECIMEN SOURCE: SIGNIFICANT CHANGE UP
WBC # BLD: 7.49 K/UL — SIGNIFICANT CHANGE UP (ref 3.8–10.5)
WBC # BLD: 8.43 K/UL — SIGNIFICANT CHANGE UP (ref 3.8–10.5)
WBC # FLD AUTO: 7.49 K/UL — SIGNIFICANT CHANGE UP (ref 3.8–10.5)
WBC # FLD AUTO: 8.43 K/UL — SIGNIFICANT CHANGE UP (ref 3.8–10.5)

## 2023-04-14 PROCEDURE — 99223 1ST HOSP IP/OBS HIGH 75: CPT

## 2023-04-14 PROCEDURE — 27486 REVISE/REPLACE KNEE JOINT: CPT | Mod: 52,LT

## 2023-04-14 PROCEDURE — 73560 X-RAY EXAM OF KNEE 1 OR 2: CPT | Mod: 26,LT

## 2023-04-14 DEVICE — IMPLANTABLE DEVICE: Type: IMPLANTABLE DEVICE | Status: FUNCTIONAL

## 2023-04-14 RX ORDER — FLUTICASONE PROPIONATE AND SALMETEROL 50; 250 UG/1; UG/1
1 POWDER ORAL; RESPIRATORY (INHALATION)
Qty: 0 | Refills: 0 | DISCHARGE

## 2023-04-14 RX ORDER — ACETAMINOPHEN 500 MG
1000 TABLET ORAL ONCE
Refills: 0 | Status: COMPLETED | OUTPATIENT
Start: 2023-04-14 | End: 2023-04-14

## 2023-04-14 RX ORDER — CEFAZOLIN SODIUM 1 G
2000 VIAL (EA) INJECTION EVERY 8 HOURS
Refills: 0 | Status: DISCONTINUED | OUTPATIENT
Start: 2023-04-14 | End: 2023-04-18

## 2023-04-14 RX ORDER — ZOLPIDEM TARTRATE 10 MG/1
5 TABLET ORAL AT BEDTIME
Refills: 0 | Status: DISCONTINUED | OUTPATIENT
Start: 2023-04-14 | End: 2023-04-18

## 2023-04-14 RX ORDER — HYDROMORPHONE HYDROCHLORIDE 2 MG/ML
0.5 INJECTION INTRAMUSCULAR; INTRAVENOUS; SUBCUTANEOUS
Refills: 0 | Status: DISCONTINUED | OUTPATIENT
Start: 2023-04-14 | End: 2023-04-14

## 2023-04-14 RX ORDER — SODIUM CHLORIDE 9 MG/ML
1000 INJECTION, SOLUTION INTRAVENOUS
Refills: 0 | Status: DISCONTINUED | OUTPATIENT
Start: 2023-04-14 | End: 2023-04-14

## 2023-04-14 RX ORDER — EMPAGLIFLOZIN 10 MG/1
1 TABLET, FILM COATED ORAL
Qty: 0 | Refills: 0 | DISCHARGE

## 2023-04-14 RX ORDER — RIVAROXABAN 15 MG-20MG
15 KIT ORAL
Refills: 0 | Status: DISCONTINUED | OUTPATIENT
Start: 2023-04-15 | End: 2023-04-18

## 2023-04-14 RX ORDER — METFORMIN HYDROCHLORIDE 850 MG/1
1 TABLET ORAL
Qty: 0 | Refills: 0 | DISCHARGE

## 2023-04-14 RX ORDER — ONDANSETRON 8 MG/1
4 TABLET, FILM COATED ORAL ONCE
Refills: 0 | Status: DISCONTINUED | OUTPATIENT
Start: 2023-04-14 | End: 2023-04-14

## 2023-04-14 RX ORDER — SERTRALINE 25 MG/1
2 TABLET, FILM COATED ORAL
Qty: 0 | Refills: 0 | DISCHARGE

## 2023-04-14 RX ORDER — CEFAZOLIN SODIUM 1 G
2000 VIAL (EA) INJECTION EVERY 8 HOURS
Refills: 0 | Status: DISCONTINUED | OUTPATIENT
Start: 2023-04-14 | End: 2023-04-14

## 2023-04-14 RX ORDER — TIOTROPIUM BROMIDE 18 UG/1
2 CAPSULE ORAL; RESPIRATORY (INHALATION) DAILY
Refills: 0 | Status: DISCONTINUED | OUTPATIENT
Start: 2023-04-14 | End: 2023-04-18

## 2023-04-14 RX ORDER — OXYCODONE HYDROCHLORIDE 5 MG/1
10 TABLET ORAL EVERY 4 HOURS
Refills: 0 | Status: DISCONTINUED | OUTPATIENT
Start: 2023-04-14 | End: 2023-04-18

## 2023-04-14 RX ORDER — FLUTICASONE PROPIONATE 50 MCG
0 SPRAY, SUSPENSION NASAL
Qty: 0 | Refills: 0 | DISCHARGE

## 2023-04-14 RX ORDER — FLUTICASONE PROPIONATE 50 MCG
2 SPRAY, SUSPENSION NASAL
Refills: 0 | Status: DISCONTINUED | OUTPATIENT
Start: 2023-04-14 | End: 2023-04-18

## 2023-04-14 RX ORDER — TRAMADOL HYDROCHLORIDE 50 MG/1
2 TABLET ORAL
Qty: 0 | Refills: 0 | DISCHARGE

## 2023-04-14 RX ORDER — BUDESONIDE AND FORMOTEROL FUMARATE DIHYDRATE 160; 4.5 UG/1; UG/1
2 AEROSOL RESPIRATORY (INHALATION)
Refills: 0 | Status: DISCONTINUED | OUTPATIENT
Start: 2023-04-14 | End: 2023-04-18

## 2023-04-14 RX ORDER — SERTRALINE 25 MG/1
100 TABLET, FILM COATED ORAL
Refills: 0 | Status: DISCONTINUED | OUTPATIENT
Start: 2023-04-14 | End: 2023-04-18

## 2023-04-14 RX ORDER — LOSARTAN POTASSIUM 100 MG/1
100 TABLET, FILM COATED ORAL DAILY
Refills: 0 | Status: DISCONTINUED | OUTPATIENT
Start: 2023-04-14 | End: 2023-04-18

## 2023-04-14 RX ORDER — OXYCODONE HYDROCHLORIDE 5 MG/1
5 TABLET ORAL EVERY 4 HOURS
Refills: 0 | Status: DISCONTINUED | OUTPATIENT
Start: 2023-04-14 | End: 2023-04-18

## 2023-04-14 RX ORDER — HYDROMORPHONE HYDROCHLORIDE 2 MG/ML
1 INJECTION INTRAMUSCULAR; INTRAVENOUS; SUBCUTANEOUS
Refills: 0 | Status: DISCONTINUED | OUTPATIENT
Start: 2023-04-14 | End: 2023-04-14

## 2023-04-14 RX ORDER — ACETAMINOPHEN 500 MG
650 TABLET ORAL EVERY 6 HOURS
Refills: 0 | Status: DISCONTINUED | OUTPATIENT
Start: 2023-04-14 | End: 2023-04-18

## 2023-04-14 RX ORDER — LOSARTAN POTASSIUM 100 MG/1
1 TABLET, FILM COATED ORAL
Qty: 0 | Refills: 0 | DISCHARGE

## 2023-04-14 RX ORDER — ALBUTEROL 90 UG/1
2 AEROSOL, METERED ORAL EVERY 6 HOURS
Refills: 0 | Status: DISCONTINUED | OUTPATIENT
Start: 2023-04-14 | End: 2023-04-18

## 2023-04-14 RX ORDER — INSULIN LISPRO 100/ML
VIAL (ML) SUBCUTANEOUS AT BEDTIME
Refills: 0 | Status: DISCONTINUED | OUTPATIENT
Start: 2023-04-14 | End: 2023-04-18

## 2023-04-14 RX ORDER — PANTOPRAZOLE SODIUM 20 MG/1
40 TABLET, DELAYED RELEASE ORAL
Refills: 0 | Status: DISCONTINUED | OUTPATIENT
Start: 2023-04-14 | End: 2023-04-18

## 2023-04-14 RX ORDER — BUPROPION HYDROCHLORIDE 150 MG/1
150 TABLET, EXTENDED RELEASE ORAL DAILY
Refills: 0 | Status: DISCONTINUED | OUTPATIENT
Start: 2023-04-14 | End: 2023-04-18

## 2023-04-14 RX ADMIN — Medication 1: at 11:26

## 2023-04-14 RX ADMIN — HYDROMORPHONE HYDROCHLORIDE 0.5 MILLIGRAM(S): 2 INJECTION INTRAMUSCULAR; INTRAVENOUS; SUBCUTANEOUS at 18:39

## 2023-04-14 RX ADMIN — SODIUM CHLORIDE 100 MILLILITER(S): 9 INJECTION INTRAMUSCULAR; INTRAVENOUS; SUBCUTANEOUS at 01:21

## 2023-04-14 RX ADMIN — SERTRALINE 100 MILLIGRAM(S): 25 TABLET, FILM COATED ORAL at 19:58

## 2023-04-14 RX ADMIN — Medication 650 MILLIGRAM(S): at 11:22

## 2023-04-14 RX ADMIN — Medication 650 MILLIGRAM(S): at 11:18

## 2023-04-14 RX ADMIN — Medication 100 MILLIGRAM(S): at 22:41

## 2023-04-14 RX ADMIN — SERTRALINE 100 MILLIGRAM(S): 25 TABLET, FILM COATED ORAL at 05:41

## 2023-04-14 RX ADMIN — OXYCODONE HYDROCHLORIDE 10 MILLIGRAM(S): 5 TABLET ORAL at 09:14

## 2023-04-14 RX ADMIN — OXYCODONE HYDROCHLORIDE 10 MILLIGRAM(S): 5 TABLET ORAL at 07:44

## 2023-04-14 RX ADMIN — Medication 400 MILLIGRAM(S): at 22:40

## 2023-04-14 RX ADMIN — Medication 1000 MILLIGRAM(S): at 23:20

## 2023-04-14 RX ADMIN — SODIUM CHLORIDE 100 MILLILITER(S): 9 INJECTION INTRAMUSCULAR; INTRAVENOUS; SUBCUTANEOUS at 11:08

## 2023-04-14 RX ADMIN — OXYCODONE HYDROCHLORIDE 10 MILLIGRAM(S): 5 TABLET ORAL at 23:49

## 2023-04-14 RX ADMIN — Medication 1: at 08:19

## 2023-04-14 RX ADMIN — LOSARTAN POTASSIUM 100 MILLIGRAM(S): 100 TABLET, FILM COATED ORAL at 19:59

## 2023-04-14 RX ADMIN — PANTOPRAZOLE SODIUM 40 MILLIGRAM(S): 20 TABLET, DELAYED RELEASE ORAL at 05:41

## 2023-04-14 RX ADMIN — LOSARTAN POTASSIUM 100 MILLIGRAM(S): 100 TABLET, FILM COATED ORAL at 05:42

## 2023-04-14 RX ADMIN — OXYCODONE HYDROCHLORIDE 10 MILLIGRAM(S): 5 TABLET ORAL at 13:49

## 2023-04-14 RX ADMIN — SODIUM CHLORIDE 100 MILLILITER(S): 9 INJECTION, SOLUTION INTRAVENOUS at 18:26

## 2023-04-14 RX ADMIN — OXYCODONE HYDROCHLORIDE 10 MILLIGRAM(S): 5 TABLET ORAL at 15:58

## 2023-04-14 RX ADMIN — Medication 650 MILLIGRAM(S): at 01:29

## 2023-04-14 RX ADMIN — Medication 1 TABLET(S): at 11:30

## 2023-04-14 RX ADMIN — HYDROMORPHONE HYDROCHLORIDE 0.5 MILLIGRAM(S): 2 INJECTION INTRAMUSCULAR; INTRAVENOUS; SUBCUTANEOUS at 18:24

## 2023-04-14 RX ADMIN — OXYCODONE HYDROCHLORIDE 10 MILLIGRAM(S): 5 TABLET ORAL at 02:22

## 2023-04-14 NOTE — PROGRESS NOTE ADULT - SUBJECTIVE AND OBJECTIVE BOX
Patient seen and examined at bedside, resting comfortably. No complaints at this time. Pt notes pain initially resolved for about 2 hours after aspiration, but has now returned. Denies numbness/tingling, weakness, CP, SOB or any other complaints.     LABS:                        14.3   12.06 )-----------( 249      ( 2023 14:30 )             41.4         128<L>  |  95<L>  |  26<H>  ----------------------------<  262<H>  3.0<L>   |  27  |  1.31<H>    Ca    9.7      2023 14:30    TPro  8.5<H>  /  Alb  3.2<L>  /  TBili  1.2  /  DBili  x   /  AST  18  /  ALT  23  /  AlkPhos  78      PT/INR - ( 2023 14:30 )   PT: 25.3 sec;   INR: 2.09 ratio         PTT - ( 2023 14:30 )  PTT:39.4 sec  Urinalysis Basic - ( 2023 14:50 )    Color: Yellow / Appearance: Clear / S.015 / pH: x  Gluc: x / Ketone: Large  / Bili: Negative / Urobili: Negative mg/dL   Blood: x / Protein: 500 mg/dL / Nitrite: Negative   Leuk Esterase: Trace / RBC: 3-5 /HPF / WBC 0-2   Sq Epi: x / Non Sq Epi: x / Bacteria: Many      Culture - Joint Fluid (collected 23 @ 15:00)  Source: Joint Fl Joint Fluid  Gram Stain (23 @ 23:11):    Few polymorphonuclear leukocytes per low power field    Few Gram positive cocci in pairs per oil power field      VITAL SIGNS:  T(C): 37.2 (23 @ 05:17), Max: 39.4 (23 @ 23:48)  HR: 81 (23 @ 05:17) (81 - 122)  BP: 106/68 (23 @ 05:17) (106/68 - 157/66)  RR: 18 (23 @ 05:17) (17 - 20)  SpO2: 98% (23 @ 05:17) (95% - 98%)    Physical Exam: PHYSICAL EXAM  General: NAD, Awake and Alert  LLE:   Dressing c/d/i  Warmth noted at L knee diffusely, no redness, no ecchymosis  Mild effusion of knee noted  Diffuse TTP at knee  No TTP elsewhere along extremity  AROM/PROM limited secondary to pain  No pain w/ micromotion  SILT L2-S1  2+ DP  Compartments soft and compressible  No Calf TTP    63y Male with L knee pain/swelling/warmth x7. R/o L knee PJI.     - Plan for OR today for L Knee I&D and Poly exchange.   - FU aspiration culture. Gram stain: GPC; crystals: Positive; CC: 29k.   - NPO, VF while NPO  - FU Preop labs and imaging  -Pain control as needed  -DVT ppx: SCDs, please hold chemical DVT PPx at this time given possible OR.   - LLE NWB  - Medical management appreciated, PLEASE DOCUMENT MEDICAL CLEARANCE FOR OR  -Discussed plan w/ Dr. Marcos who agrees.

## 2023-04-14 NOTE — PROVIDER CONTACT NOTE (CRITICAL VALUE NOTIFICATION) - TEST AND RESULT REPORTED:
joint fluid culture few gram positive cocci in pairs per oil powerfield
Blood culture -Gram positive cocci in clustures

## 2023-04-14 NOTE — DISCHARGE NOTE PROVIDER - NSDCCPTREATMENT_GEN_ALL_CORE_FT
PRINCIPAL PROCEDURE  Procedure: Incision and drainage of knee with polyethylene liner exchange following total arthroplasty  Findings and Treatment:

## 2023-04-14 NOTE — DISCHARGE NOTE PROVIDER - NSDCMRMEDTOKEN_GEN_ALL_CORE_FT
acetaminophen 325 mg oral tablet: 2 tab(s) orally every 6 hours, As needed, Mild Pain (1 - 3)  Advair Diskus 250 mcg-50 mcg inhalation powder: 1 puff(s) inhaled 2 times a day  ALPRAZolam 0.5 mg oral tablet: 1 tab(s) orally 2 times a day  buPROPion 150 mg/24 hours (XL) oral tablet, extended release: 1 tab(s) orally once a day  Combivent Respimat 20 mcg-100 mcg/inh inhalation aerosol: 1 puff(s) inhaled every 6 hours  DULoxetine 60 mg oral delayed release capsule: 2 cap(s) orally once a day  fluticasone 50 mcg/inh nasal spray: 2 spray(s) in each nostril once a day  Nasal Mist 0.05% nasal spray: 2 spray(s) nasal every 4 hours  omeprazole 40 mg oral delayed release capsule: 1 cap(s) orally once a day  traMADol 50 mg oral tablet: 1 tab(s) orally 4 times a day  Xarelto 15 mg oral tablet: 1 tab(s) orally 2 times a day   zolpidem 12.5 mg oral tablet, extended release: 1 tab(s) orally once a day (at bedtime)   acetaminophen 325 mg oral tablet: 2 tab(s) orally every 6 hours, As needed, Mild Pain (1 - 3)  Advair Diskus 250 mcg-50 mcg inhalation powder: 1 puff(s) inhaled 2 times a day  albuterol 90 mcg/inh inhalation aerosol: 2 puff(s) inhaled every 6 hours  ALPRAZolam 0.5 mg oral tablet: 1 tab(s) orally 2 times a day  aluminum hydroxide-magnesium hydroxide 200 mg-200 mg/5 mL oral suspension: 30 milliliter(s) orally every 4 hours As needed Dyspepsia  budesonide-formoterol 160 mcg-4.5 mcg/inh inhalation aerosol: inhaled once a day  buPROPion 150 mg/24 hours (XL) oral tablet, extended release: 1 tab(s) orally once a day  ceFAZolin 2 g injection: 2 gram(s) intravenously every 8 hours Last dose on 5/26/2023  weekly lab work: cbc with diff, cmp, esr, crp - fax to Dr. Bay at [phone]  follow up with ID in the office  remove picc line upon completion of the course of abx  flushes  Combivent Respimat 20 mcg-100 mcg/inh inhalation aerosol: 1 puff(s) inhaled every 6 hours  DULoxetine 60 mg oral delayed release capsule: 2 cap(s) orally once a day  fluticasone 50 mcg/inh nasal spray: 2 spray(s) in each nostril once a day  losartan 100 mg oral tablet: 1 tab(s) orally once a day  melatonin 3 mg oral tablet: 1 tab(s) orally once a day (at bedtime)  Multiple Vitamins oral tablet: 1 tab(s) orally once a day  Nasal Mist 0.05% nasal spray: 2 spray(s) nasal every 4 hours  omeprazole 40 mg oral delayed release capsule: 1 cap(s) orally once a day  oxyCODONE 10 mg oral tablet: 1 tab(s) orally every 4 hours As needed Severe Pain (7 - 10)  oxyCODONE 5 mg oral tablet: 1 tab(s) orally every 4 hours As needed Moderate Pain (4 - 6)  pantoprazole 40 mg oral delayed release tablet: 1 tab(s) orally once a day (before a meal)  sertraline 100 mg oral tablet: 1 tab(s) orally 2 times a day  tiotropium 2.5 mcg/inh inhalation aerosol: 2 puff(s) inhaled once a day  traMADol 50 mg oral tablet: 1 tab(s) orally 4 times a day  Xarelto 15 mg oral tablet: 1 tab(s) orally 2 times a day   zolpidem 5 mg oral tablet: 1 tab(s) orally once a day (at bedtime) As needed Insomnia

## 2023-04-14 NOTE — PROGRESS NOTE ADULT - SUBJECTIVE AND OBJECTIVE BOX
Orthopedics Post-op Check:    This is a 62y/o Male s/p Left Knee Irrigation and Debridement and polyethylene exchange POD 0.  Pain is controlled. Pt feeling well. No nausea or vomiting.     Vital Signs Last 24 Hrs  T(C): 39.3 (04-14-23 @ 23:33), Max: 39.4 (04-14-23 @ 11:17)  T(F): 102.7 (04-14-23 @ 23:33), Max: 103 (04-14-23 @ 11:17)  HR: 97 (04-14-23 @ 23:33) (78 - 98)  BP: 130/69 (04-14-23 @ 23:33) (106/68 - 156/70)  BP(mean): 98 (04-14-23 @ 20:00) (97 - 98)  RR: 16 (04-14-23 @ 23:33) (14 - 19)  SpO2: 94% (04-14-23 @ 23:33) (94% - 99%)      PT/INR - ( 14 Apr 2023 06:00 )   PT: 14.0 sec;   INR: 1.17 ratio         PTT - ( 14 Apr 2023 06:00 )  PTT:33.6 sec    Exam:  NAD AAOx3.    LLE  Dressing clean, dry and intact.  SCDs in place.  HMVx2 in place  Calves are soft and nontender.  Moving all toes and ankle, +EHL/FHL/TA/GS.  SILT throughout.  DP and PT pulses 2+.    A/P:  63M, Stable, POD 0 sp Left Knee Irrigation and Debridement and polyethylene exchange s/p PJI  -Analgesia  -LLE elevation  -Ice application  -ANCEF q8hr  -DVT PE prophylaxis - Xarleto  -Incentive spirometry  -OOB PT WBAT LLE w/ knee immobilizer while drains are in   -ID recs appreciated - TTE, Bcx x48hrs until clear, trend CRP, fu PICC

## 2023-04-14 NOTE — PROVIDER CONTACT NOTE (CRITICAL VALUE NOTIFICATION) - SITUATION
4/13/23 blood culture growth-Gram positive cocci in clustures-1st, 2nd blood culture-Gram positive cocci in clustures.

## 2023-04-14 NOTE — CONSULT NOTE ADULT - SUBJECTIVE AND OBJECTIVE BOX
[facility]  INFECTIOUS DISEASES   [address] [place] [address]  Tel: [phone]     Fax: [phone]  ======================================================  Ramirez MD Jonathan Garellek, DO Clau Tejeda, AZIZA   ======================================================    SKYLAR MCCULLOUGH  MRN-[id number]  Male  63y        Patient is a 63y old  Male who presents with a chief complaint of Prosthetic Joint infection (2023 18:22)      HPI:  63y Male presents with atraumatic L Knee pain x7 days. Community ambulator w/o assistance at baseline. Hx of L TKA in , R TKA in . Pt states about 7 days ago, he noticed gradual onset L knee pain and swelling, which has now worsened and is unable to ambulate secondary to pain. Endorses Fevers at home, Tmax 103.1F (Oral). Pt seen in office, ESR/CRP: 90/314, and sent to ER for further evaluation. On Xarelto at home for Hx of DVT, last dose last night. Denies CP, SOB, numbness/tingling, weakness, nausea, vomiting or any other complaints.  (2023 16:27)      ID consulted for workup and antibiotic management     PAST MEDICAL & SURGICAL HISTORY:  Diabetes Mellitus  diagnosed 10 years ago  doesn't do fingersticks      Hypertension, Essential      Hyperlipidemia      GERD (gastroesophageal reflux disease)      Depression      Obese      Bulging disc      Spinal stenosis      Spondylisthesis      Sleep apnea  had test > 10 years ago--supposed to wear device.  Lost 25 pounds but never retested      Anxiety      Insomnia      Varicosities      DVT (deep venous thrombosis)      R Knee Arthroplasty  Right , Left ,      H/O laser iridotomy  left-      Reflux  Gastroscopy       Deep vein thrombosis (DVT) of lower extremity          Allergies  No Known Allergies        ANTIMICROBIALS:      MEDICATIONS  (STANDING):  cefTRIAXone   IVPB   100 mL/Hr IV Intermittent (23 @ 16:51)    vancomycin  IVPB.   250 mL/Hr IV Intermittent (23 @ 17:13)        OTHER MEDS: MEDICATIONS  (STANDING):  acetaminophen     Tablet .. 650 every 6 hours PRN  acetaminophen     Tablet .. 650 every 6 hours PRN  dextrose 50% Injectable 25 once  dextrose 50% Injectable 12.5 once  dextrose 50% Injectable 25 once  dextrose Oral Gel 15 once PRN  glucagon  Injectable 1 once  insulin lispro (ADMELOG) corrective regimen sliding scale  three times a day before meals  insulin lispro (ADMELOG) corrective regimen sliding scale  at bedtime  losartan 100 daily  oxyCODONE    IR 10 every 4 hours PRN  oxyCODONE    IR 5 every 4 hours PRN  pantoprazole    Tablet 40 before breakfast  sertraline 100 two times a day  zolpidem 5 at bedtime PRN  zolpidem 5 at bedtime PRN      SOCIAL HISTORY:     Smoking Cigarettes [ ]Active [ ] Former [ ]Denies   ETOH [ ]denies [ ]Former [ ]Current Use denies   Drug Use [ ]Never [ ] Former [ ] Active     FAMILY HISTORY:  Family history of pancreatic cancer    Family history of rheumatoid arthritis    Family history of asthma    Family history of asthma    Family history of essential hypertension        REVIEW OF SYSTEMS  [  ] ROS unobtainable because:    [  ] All other systems negative except as noted below:	    Constitutional:  [ ] fever [ ] chills  [ ] weight loss  [ ] weakness  Skin:  [ ] rash [ ] phlebitis	  Eyes: [ ] icterus [ ] pain  [ ] discharge	  ENMT: [ ] sore throat  [ ] thrush [ ] ulcers [ ] exudates  Respiratory: [ ] dyspnea [ ] hemoptysis [ ] cough [ ] sputum	  Cardiovascular:  [ ] chest pain [ ] palpitations [ ] edema	  Gastrointestinal:  [ ] nausea [ ] vomiting [ ] diarrhea [ ] constipation [ ] pain	  Genitourinary:  [ ] dysuria [ ] frequency [ ] hematuria [ ] discharge [ ] flank pain  [ ] incontinence  Musculoskeletal:  [ ] myalgias [ ] arthralgias [ ] arthritis  [ ] back pain  Neurological:  [ ] headache [ ] seizures  [ ] confusion/altered mental status  Psychiatric:  [ ] anxiety [ ] depression	  Hematology/Lymphatics:  [ ] lymphadenopathy  Endocrine:  [ ] adrenal [ ] thyroid  Allergic/Immunologic:	 [ ] transplant [ ] seasonal    Vital Signs Last 24 Hrs  T(F): 99 (23 @ 09:02), Max: 103 (23 @ 23:48)    Vital Signs Last 24 Hrs  HR: 81 (23 @ 05:17) (81 - 122)  BP: 106/68 (23 @ 05:17) (106/68 - 157/66)  RR: 18 (23 @ 05:17)  SpO2: 98% (23 @ 05:17) (95% - 98%)  Wt(kg): --    PHYSICAL EXAM:  Constitutional: non-toxic, no distress  HEAD/EYES: anicteric, no conjunctival injection  ENT:  supple, no thrush  Cardiovascular:   normal S1, S2, no murmur, no edema  Respiratory:  clear BS bilaterally, no wheezes, no rales  GI:  soft, non-tender, normal bowel sounds  :  no benton, no CVA tenderness  Musculoskeletal:  no synovitis, normal ROM  Neurologic: awake and alert, normal strength, no focal findings  Skin:  no rash, no erythema, no phlebitis  Heme/Onc: no lymphadenopathy   Psychiatric:  awake, alert, appropriate mood          WBC Count: 8.43 K/uL ( @ 06:00)  WBC Count: 12.06 K/uL ( @ 14:30)      Auto Neutrophil %: 89.1 % *H* (23 @ 14:30)  Auto Neutrophil #: 10.75 K/uL *H* (23 @ 14:30)                            13.7   8.43  )-----------( 227      ( 2023 06:00 )             41.1       -    131<L>  |  98  |  20  ----------------------------<  214<H>  3.8   |  26  |  1.07    Ca    8.9      2023 06:00    TPro  8.5<H>  /  Alb  3.2<L>  /  TBili  1.2  /  DBili  x   /  AST  18  /  ALT  23  /  AlkPhos  78  04-13      Creatinine Trend: 1.07<--, 1.31<--    Urinalysis Basic - ( 2023 14:50 )    Color: Yellow / Appearance: Clear / S.015 / pH: x  Gluc: x / Ketone: Large  / Bili: Negative / Urobili: Negative mg/dL   Blood: x / Protein: 500 mg/dL / Nitrite: Negative   Leuk Esterase: Trace / RBC: 3-5 /HPF / WBC 0-2   Sq Epi: x / Non Sq Epi: x / Bacteria: Many        Lactate, Blood: 1.7 mmol/L (23 @ 14:30)      MICROBIOLOGY:  Joint Fl Joint Fluid  23 --  --    Few polymorphonuclear leukocytes per low power field  Few Gram positive cocci in pairs per oil power field      .Blood Blood-Peripheral  23   Growth in aerobic bottle: Gram Positive Cocci in Clusters  --    Growth in aerobic bottle: Gram Positive Cocci in Clusters      .Blood Blood-Peripheral  23   Growth in aerobic bottle: Gram Positive Cocci in Clusters  ***Blood Panel PCR results on this specimen are available  approximately 3 hours after the Gram stain result.***  Gram stain, PCR, and/or culture results may not always  correspond due to difference in methodologies.  ************************************************************  This PCR assay was performed by multiplex PCR. This  Assay tests for 66 bacterial and resistance gene targets.  Please refer to the [facility] test directory  at [email or web address] for details.  --    Growth in aerobic bottle: Gram Positive Cocci in Clusters    C-Reactive Protein, Serum: 326 ()      SARS-CoV-2 Result: NotDetec (23 @ 14:30)      RADIOLOGY:  < from: Xray Chest 1 View- PORTABLE-Urgent (Xray Chest 1 View- PORTABLE-Urgent .) (23 @ 16:53) >  IMPRESSION: No acute finding or change.    < end of copied text >    I have personally reviewed the above imaging    [facility]  INFECTIOUS DISEASES   [address] [place] [address]  Tel: [phone]     Fax: [phone]  ======================================================  Ramirez MD Jonathan Garellek, DO Clau Tejeda, AZIZA   ======================================================    SKYLAR MCCULLOUGH  MRN-[id number]  Male  63y        Patient is a 63y old  Male who presents with a chief complaint of Prosthetic Joint infection (2023 18:22)      HPI:  63y Male presents with atraumatic L Knee pain x7 days. Community ambulator w/o assistance at baseline. Hx of L TKA in , R TKA in . Pt states about 7 days ago, he noticed gradual onset L knee pain and swelling, which has now worsened and is unable to ambulate secondary to pain. Endorses Fevers at home, Tmax 103.1F (Oral). Pt seen in office, ESR/CRP: 90/314, and sent to ER for further evaluation. On Xarelto at home for Hx of DVT, last dose last night. Denies CP, SOB, numbness/tingling, weakness, nausea, vomiting or any other complaints.  (2023 16:27)      ID consulted for workup and antibiotic management     PAST MEDICAL & SURGICAL HISTORY:  Diabetes Mellitus  diagnosed 10 years ago  doesn't do fingersticks      Hypertension, Essential      Hyperlipidemia      GERD (gastroesophageal reflux disease)      Depression      Obese      Bulging disc      Spinal stenosis      Spondylisthesis      Sleep apnea  had test > 10 years ago--supposed to wear device.  Lost 25 pounds but never retested      Anxiety      Insomnia      Varicosities      DVT (deep venous thrombosis)      R Knee Arthroplasty  Right , Left ,      H/O laser iridotomy  left-      Reflux  Gastroscopy       Deep vein thrombosis (DVT) of lower extremity          Allergies  No Known Allergies        ANTIMICROBIALS:      MEDICATIONS  (STANDING):  cefTRIAXone   IVPB   100 mL/Hr IV Intermittent (23 @ 16:51)    vancomycin  IVPB.   250 mL/Hr IV Intermittent (23 @ 17:13)        OTHER MEDS: MEDICATIONS  (STANDING):  acetaminophen     Tablet .. 650 every 6 hours PRN  acetaminophen     Tablet .. 650 every 6 hours PRN  dextrose 50% Injectable 25 once  dextrose 50% Injectable 12.5 once  dextrose 50% Injectable 25 once  dextrose Oral Gel 15 once PRN  glucagon  Injectable 1 once  insulin lispro (ADMELOG) corrective regimen sliding scale  three times a day before meals  insulin lispro (ADMELOG) corrective regimen sliding scale  at bedtime  losartan 100 daily  oxyCODONE    IR 10 every 4 hours PRN  oxyCODONE    IR 5 every 4 hours PRN  pantoprazole    Tablet 40 before breakfast  sertraline 100 two times a day  zolpidem 5 at bedtime PRN  zolpidem 5 at bedtime PRN      SOCIAL HISTORY:     Smoking Cigarettes [ ]Active [x ] Former [ ]Denies   ETOH [x ]denies [ ]Former [ ]Current Use denies   Drug Use [ x]Never [ ] Former [ ] Active     FAMILY HISTORY:  Family history of pancreatic cancer    Family history of rheumatoid arthritis    Family history of asthma    Family history of asthma    Family history of essential hypertension        REVIEW OF SYSTEMS  [  ] ROS unobtainable because:    [x  ] All other systems negative except as noted below:	    Constitutional:  [x ] fever [ x] chills  [ ] weight loss  [ ] weakness  Skin:  [ ] rash [ ] phlebitis	  Eyes: [ ] icterus [ ] pain  [ ] discharge	  ENMT: [ ] sore throat  [ ] thrush [ ] ulcers [ ] exudates  Respiratory: [ ] dyspnea [ ] hemoptysis [ ] cough [ ] sputum	  Cardiovascular:  [ ] chest pain [ ] palpitations [ ] edema	  Gastrointestinal:  [ ] nausea [ ] vomiting [ ] diarrhea [ ] constipation [ ] pain	  Genitourinary:  [ ] dysuria [ ] frequency [ ] hematuria [ ] discharge [ ] flank pain  [ ] incontinence  Musculoskeletal:  [ ] myalgias [ ] arthralgias [ x] arthritis  [ ] back pain  Neurological:  [ ] headache [ ] seizures  [ ] confusion/altered mental status  Psychiatric:  [ ] anxiety [ ] depression	  Hematology/Lymphatics:  [ ] lymphadenopathy  Endocrine:  [ ] adrenal [ ] thyroid  Allergic/Immunologic:	 [ ] transplant [ ] seasonal    Vital Signs Last 24 Hrs  T(F): 99 (23 @ 09:02), Max: 103 (23 @ 23:48)    Vital Signs Last 24 Hrs  HR: 81 (23 @ 05:17) (81 - 122)  BP: 106/68 (23 @ 05:17) (106/68 - 157/66)  RR: 18 (23 @ 05:17)  SpO2: 98% (23 @ 05:17) (95% - 98%)  Wt(kg): --    PHYSICAL EXAM:  Constitutional: non-toxic, no distress  HEAD/EYES: anicteric, no conjunctival injection  ENT:  supple, no thrush  Cardiovascular:   normal S1, S2, no murmur, no edema  Respiratory:  clear BS bilaterally, no wheezes, no rales  GI:  soft, non-tender, normal bowel sounds  :  no benton, no CVA tenderness  Musculoskeletal:  left knee wrapped, unable to flex knee due to pain   Neurologic: awake and alert, normal strength, no focal findings  Skin:  no rash, no erythema, no phlebitis  Heme/Onc: no lymphadenopathy   Psychiatric:  awake, alert, appropriate mood          WBC Count: 8.43 K/uL ( @ 06:00)  WBC Count: 12.06 K/uL ( @ 14:30)      Auto Neutrophil %: 89.1 % *H* (23 @ 14:30)  Auto Neutrophil #: 10.75 K/uL *H* (23 @ 14:30)                            13.7   8.43  )-----------( 227      ( 2023 06:00 )             41.1       -    131<L>  |  98  |  20  ----------------------------<  214<H>  3.8   |  26  |  1.07    Ca    8.9      2023 06:00    TPro  8.5<H>  /  Alb  3.2<L>  /  TBili  1.2  /  DBili  x   /  AST  18  /  ALT  23  /  AlkPhos  78  -      Creatinine Trend: 1.07<--, 1.31<--    Urinalysis Basic - ( 2023 14:50 )    Color: Yellow / Appearance: Clear / S.015 / pH: x  Gluc: x / Ketone: Large  / Bili: Negative / Urobili: Negative mg/dL   Blood: x / Protein: 500 mg/dL / Nitrite: Negative   Leuk Esterase: Trace / RBC: 3-5 /HPF / WBC 0-2   Sq Epi: x / Non Sq Epi: x / Bacteria: Many        Lactate, Blood: 1.7 mmol/L (23 @ 14:30)      MICROBIOLOGY:  Joint Fl Joint Fluid  23 --  --    Few polymorphonuclear leukocytes per low power field  Few Gram positive cocci in pairs per oil power field      .Blood Blood-Peripheral  23   Growth in aerobic bottle: Gram Positive Cocci in Clusters  --    Growth in aerobic bottle: Gram Positive Cocci in Clusters      .Blood Blood-Peripheral  23   Growth in aerobic bottle: Gram Positive Cocci in Clusters  ***Blood Panel PCR results on this specimen are available  approximately 3 hours after the Gram stain result.***  Gram stain, PCR, and/or culture results may not always  correspond due to difference in methodologies.  ************************************************************  This PCR assay was performed by multiplex PCR. This  Assay tests for 66 bacterial and resistance gene targets.  Please refer to the [facility] test directory  at [email or web address] for details.  --    Growth in aerobic bottle: Gram Positive Cocci in Clusters    C-Reactive Protein, Serum: 326 ()      SARS-CoV-2 Result: NotDetec (23 @ 14:30)    Culture - Joint Fluid (23 @ 15:00)    Gram Stain:   Few polymorphonuclear leukocytes per low power field  Few Gram positive cocci in pairs per oil power field   Specimen Source: Joint Fl Joint Fluid    Cell Count, Body Fluid (23 @ 15:00)    Tube Type: Lavender   Body Fluid Appearance: Cloudy   Color - Body Fluid: Yellow   Total Nucleated Cell Count, Body Fluid: 22991: Reference Ranges:  Synovial Fluid  Total nucleated cells < 150 cells/uL  Neutrophils <25%  Lymphocytes 10-15%  Monocytes/Macrophages </=70%  Other parameters- No established reference ranges.  Bronchoalveolar lavage  Macrophages >85%  Lymphocytes 10-15%  Neutrophils <3%  Eosinophils <1%  Other parameters- No established reference ranges.  Peritoneal/pleural/pericardial fluid/other body fluid types  No established reference ranges. /uL   Total RBC Count: 03366 /uL   Neutrophils-Body Fluid: 73 %   Monocyte/Macrophage Count - Body Fluid: 27 %        RADIOLOGY:  < from: Xray Chest 1 View- PORTABLE-Urgent (Xray Chest 1 View- PORTABLE-Urgent .) (23 @ 16:53) >  IMPRESSION: No acute finding or change.    < end of copied text >    I have personally reviewed the above imaging

## 2023-04-14 NOTE — BRIEF OPERATIVE NOTE - NSICDXBRIEFPROCEDURE_GEN_ALL_CORE_FT
PROCEDURES:  Incision and drainage of knee with polyethylene liner exchange following total arthroplasty 14-Apr-2023 23:04:35  Carter Ibanez

## 2023-04-14 NOTE — DISCHARGE NOTE PROVIDER - HOSPITAL COURSE
The patient is a 63y Male status post left knee irrigation and debridement with polyethylene exchange for a prosthetic joint infection. Patient presented to [facility] after being medically cleared for surgical procedure. Medical and infectious disease teams were consulted for comanagement. Prophylactic antibiotics were started before the procedure and continued postoperatively per the recommendations of the infectious disease team. There were no complications during the procedure and patient tolerated the procedure well. The patient was transferred to the recovery room in stable condition and subsequently to the surgical floor. The patient was resumed on his home dose of Xarelto on postoperative day 1. All home medications were continued. Drains were removed on postoperative day??????????????? The patient received physical therapy daily and daily labs were followed. The dressing was kept clean, dry, intact. The rest of the hospital stay was unremarkable.   The patient is a 63y Male status post left knee irrigation and debridement with polyethylene exchange for a prosthetic joint infection. Patient presented to [facility] after being medically cleared for surgical procedure. Medical and infectious disease teams were consulted for comanagement. Prophylactic antibiotics were started before the procedure and continued postoperatively per the recommendations of the infectious disease team. There were no complications during the procedure and patient tolerated the procedure well. The patient was transferred to the recovery room in stable condition and subsequently to the surgical floor. The patient was resumed on his home dose of Xarelto on postoperative day 1. All home medications were continued. Drains were removed on postoperative day 4. The patient received physical therapy daily and daily labs were followed. The dressing was kept clean, dry, intact. The rest of the hospital stay was unremarkable.

## 2023-04-14 NOTE — DISCHARGE NOTE PROVIDER - NSDCCPCAREPLAN_GEN_ALL_CORE_FT
PRINCIPAL DISCHARGE DIAGNOSIS  Diagnosis: Infected prosthetic knee joint  Assessment and Plan of Treatment: Left

## 2023-04-14 NOTE — DISCHARGE NOTE PROVIDER - NSDCFUADDINST_GEN_ALL_CORE_FT
Discharge Instructions for Total Knee Arthroplasty    1.  Diet: Resume previous diet  2. Activity: WBAT, Rolling walker, Daily PT. Gentle ROM 0-full as tolerated. Walk plenty.   3. Call with: fever over 101, wound redness, drainage or open area, calf pain/calf swelling  4. Wound Care: Remove Aquacel dressing after 10 days  5. OK to Shower with Aquacel; Must be and Aquacel bandage to shower.  Avoid direct water beating on bandage.  Continue ICE packs to knee.  6. DVT PE Prophylaxis for 30 days after surgery. See med rec for further instructions.  7. Follow Up in office in 2 weeks. Call to schedule appointment.  8. Pain medications:  If going home, eRX sent to your pharmacy for .  Discharge Instructions for Total Knee Arthroplasty    1.  Diet: Resume previous diet  2. Activity: WBAT, Rolling walker, Daily PT. Gentle ROM 0-full as tolerated. Walk plenty.   3. Call with: fever over 101, wound redness, drainage or open area, calf pain/calf swelling  4. Wound Care: Remove Aquacel dressing after 10 days  5. OK to Shower with Aquacel; Must be and Aquacel bandage to shower.  Avoid direct water beating on bandage.  Continue ICE packs to knee.  6. DVT PE Prophylaxis for 30 days after surgery. See med rec for further instructions.  7. Follow Up in office in 2 weeks. Call to schedule appointment.  8. Pain medications:  If going home, eRX sent to your pharmacy for .   9. Continue course of IV antibiotics. Please see med rec for instructions.

## 2023-04-14 NOTE — DISCHARGE NOTE PROVIDER - CARE PROVIDER_API CALL
Nahid Marcos)  Orthopaedic Surgery  [address] [facility], [address]  [place] [address]  Phone: [phone]  Fax: [phone]  Follow Up Time:

## 2023-04-14 NOTE — CONSULT NOTE ADULT - ASSESSMENT
------INCOMPLETE NOTE- RECOMMENDATIONS TO FOLLOW---  63M with MSSA (methicillin-susceptible Staphylococcus aureus) bacteremia and PJI   wbc from arthrocentesis >20k   blood cultures with MSSA (methicillin-susceptible Staphylococcus aureus)    MSSA (methicillin-susceptible Staphylococcus aureus) bacteremia  PJI   Fever     Plan:  MSSA bacteremia with likely source from joint infection   Start Cefazolin 2g q8hrs    Repeat blood cx q48hrs until clear  F/u blood cx   Check TTE for infective endocarditis  Will eventually need PICC line and long term antibiotics   follow cultures from arthrocentesis and from OR   joint hardware should be removed, spacer placed with eventual new joint placed after infection resolved  refrain from using rifampin->will interact with xarelto and IDSA not recommmended when hardware fully removed  trend esr and crp       Discussed with Ortho team    Glenn Bay, DO  Infectious Disease Attending  Reachable via Microsoft Teams or ID office: [phone]  After 5pm/weekends please call [phone] for all inquiries and new consults

## 2023-04-15 LAB
-  AMPICILLIN/SULBACTAM: SIGNIFICANT CHANGE UP
-  CEFAZOLIN: SIGNIFICANT CHANGE UP
-  CLINDAMYCIN: SIGNIFICANT CHANGE UP
-  ERYTHROMYCIN: SIGNIFICANT CHANGE UP
-  GENTAMICIN: SIGNIFICANT CHANGE UP
-  OXACILLIN: SIGNIFICANT CHANGE UP
-  PENICILLIN: SIGNIFICANT CHANGE UP
-  RIFAMPIN: SIGNIFICANT CHANGE UP
-  TETRACYCLINE: SIGNIFICANT CHANGE UP
-  TRIMETHOPRIM/SULFAMETHOXAZOLE: SIGNIFICANT CHANGE UP
-  VANCOMYCIN: SIGNIFICANT CHANGE UP
ANION GAP SERPL CALC-SCNC: 7 MMOL/L — SIGNIFICANT CHANGE UP (ref 5–17)
BASOPHILS # BLD AUTO: 0.05 K/UL
BASOPHILS NFR BLD AUTO: 0.3 %
BUN SERPL-MCNC: 11 MG/DL — SIGNIFICANT CHANGE UP (ref 7–23)
CALCIUM SERPL-MCNC: 8 MG/DL — LOW (ref 8.5–10.1)
CHLORIDE SERPL-SCNC: 99 MMOL/L — SIGNIFICANT CHANGE UP (ref 96–108)
CO2 SERPL-SCNC: 24 MMOL/L — SIGNIFICANT CHANGE UP (ref 22–31)
CREAT SERPL-MCNC: 0.84 MG/DL — SIGNIFICANT CHANGE UP (ref 0.5–1.3)
CRP SERPL-MCNC: 282 MG/L — HIGH
EGFR: 97 ML/MIN/1.73M2 — SIGNIFICANT CHANGE UP
EOSINOPHIL # BLD AUTO: 0.04 K/UL
EOSINOPHIL NFR BLD AUTO: 0.2 %
GLUCOSE BLDC GLUCOMTR-MCNC: 251 MG/DL — HIGH (ref 70–99)
GLUCOSE BLDC GLUCOMTR-MCNC: 277 MG/DL — HIGH (ref 70–99)
GLUCOSE BLDC GLUCOMTR-MCNC: 309 MG/DL — HIGH (ref 70–99)
GLUCOSE BLDC GLUCOMTR-MCNC: 330 MG/DL — HIGH (ref 70–99)
GLUCOSE SERPL-MCNC: 252 MG/DL — HIGH (ref 70–99)
GRAM STN FLD: SIGNIFICANT CHANGE UP
HCT VFR BLD CALC: 34.3 % — LOW (ref 39–50)
HCT VFR BLD CALC: 41.9 %
HGB BLD-MCNC: 11.6 G/DL — LOW (ref 13–17)
HGB BLD-MCNC: 13.8 G/DL
IMM GRANULOCYTES NFR BLD AUTO: 0.7 %
LYMPHOCYTES # BLD AUTO: 1.18 K/UL
LYMPHOCYTES NFR BLD AUTO: 6.5 %
MAN DIFF?: NORMAL
MCHC RBC-ENTMCNC: 30.1 PG
MCHC RBC-ENTMCNC: 30.1 PG — SIGNIFICANT CHANGE UP (ref 27–34)
MCHC RBC-ENTMCNC: 32.9 GM/DL
MCHC RBC-ENTMCNC: 33.8 G/DL — SIGNIFICANT CHANGE UP (ref 32–36)
MCV RBC AUTO: 88.9 FL — SIGNIFICANT CHANGE UP (ref 80–100)
MCV RBC AUTO: 91.5 FL
METHOD TYPE: SIGNIFICANT CHANGE UP
MONOCYTES # BLD AUTO: 1.74 K/UL
MONOCYTES NFR BLD AUTO: 9.5 %
NEUTROPHILS # BLD AUTO: 15.12 K/UL
NEUTROPHILS NFR BLD AUTO: 82.8 %
NRBC # BLD: 0 /100 WBCS — SIGNIFICANT CHANGE UP (ref 0–0)
PLATELET # BLD AUTO: 194 K/UL — SIGNIFICANT CHANGE UP (ref 150–400)
PLATELET # BLD AUTO: 207 K/UL
POTASSIUM SERPL-MCNC: 3.3 MMOL/L — LOW (ref 3.5–5.3)
POTASSIUM SERPL-SCNC: 3.3 MMOL/L — LOW (ref 3.5–5.3)
RBC # BLD: 3.86 M/UL — LOW (ref 4.2–5.8)
RBC # BLD: 4.58 M/UL
RBC # FLD: 13.8 % — SIGNIFICANT CHANGE UP (ref 10.3–14.5)
RBC # FLD: 14.4 %
SODIUM SERPL-SCNC: 130 MMOL/L — LOW (ref 135–145)
SPECIMEN SOURCE: SIGNIFICANT CHANGE UP
WBC # BLD: 7.53 K/UL — SIGNIFICANT CHANGE UP (ref 3.8–10.5)
WBC # FLD AUTO: 18.25 K/UL
WBC # FLD AUTO: 7.53 K/UL — SIGNIFICANT CHANGE UP (ref 3.8–10.5)

## 2023-04-15 PROCEDURE — 93306 TTE W/DOPPLER COMPLETE: CPT | Mod: 26

## 2023-04-15 PROCEDURE — 99232 SBSQ HOSP IP/OBS MODERATE 35: CPT

## 2023-04-15 RX ORDER — DEXTROSE 50 % IN WATER 50 %
25 SYRINGE (ML) INTRAVENOUS ONCE
Refills: 0 | Status: DISCONTINUED | OUTPATIENT
Start: 2023-04-15 | End: 2023-04-18

## 2023-04-15 RX ORDER — POTASSIUM CHLORIDE 20 MEQ
40 PACKET (EA) ORAL EVERY 4 HOURS
Refills: 0 | Status: COMPLETED | OUTPATIENT
Start: 2023-04-15 | End: 2023-04-15

## 2023-04-15 RX ORDER — IBUPROFEN 200 MG
600 TABLET ORAL ONCE
Refills: 0 | Status: COMPLETED | OUTPATIENT
Start: 2023-04-15 | End: 2023-04-15

## 2023-04-15 RX ORDER — INSULIN LISPRO 100/ML
VIAL (ML) SUBCUTANEOUS
Refills: 0 | Status: DISCONTINUED | OUTPATIENT
Start: 2023-04-15 | End: 2023-04-18

## 2023-04-15 RX ORDER — INSULIN LISPRO 100/ML
VIAL (ML) SUBCUTANEOUS AT BEDTIME
Refills: 0 | Status: DISCONTINUED | OUTPATIENT
Start: 2023-04-15 | End: 2023-04-15

## 2023-04-15 RX ORDER — ACETAMINOPHEN 500 MG
650 TABLET ORAL EVERY 6 HOURS
Refills: 0 | Status: DISCONTINUED | OUTPATIENT
Start: 2023-04-15 | End: 2023-04-18

## 2023-04-15 RX ORDER — POTASSIUM CHLORIDE 20 MEQ
40 PACKET (EA) ORAL ONCE
Refills: 0 | Status: COMPLETED | OUTPATIENT
Start: 2023-04-15 | End: 2023-04-15

## 2023-04-15 RX ORDER — SODIUM CHLORIDE 9 MG/ML
1000 INJECTION INTRAMUSCULAR; INTRAVENOUS; SUBCUTANEOUS
Refills: 0 | Status: DISCONTINUED | OUTPATIENT
Start: 2023-04-15 | End: 2023-04-16

## 2023-04-15 RX ADMIN — OXYCODONE HYDROCHLORIDE 10 MILLIGRAM(S): 5 TABLET ORAL at 05:06

## 2023-04-15 RX ADMIN — OXYCODONE HYDROCHLORIDE 10 MILLIGRAM(S): 5 TABLET ORAL at 22:21

## 2023-04-15 RX ADMIN — Medication 600 MILLIGRAM(S): at 06:25

## 2023-04-15 RX ADMIN — Medication 100 MILLIGRAM(S): at 14:04

## 2023-04-15 RX ADMIN — Medication 100 MILLIGRAM(S): at 05:07

## 2023-04-15 RX ADMIN — TIOTROPIUM BROMIDE 2 PUFF(S): 18 CAPSULE ORAL; RESPIRATORY (INHALATION) at 14:03

## 2023-04-15 RX ADMIN — BUDESONIDE AND FORMOTEROL FUMARATE DIHYDRATE 2 PUFF(S): 160; 4.5 AEROSOL RESPIRATORY (INHALATION) at 17:14

## 2023-04-15 RX ADMIN — SERTRALINE 100 MILLIGRAM(S): 25 TABLET, FILM COATED ORAL at 17:12

## 2023-04-15 RX ADMIN — Medication 650 MILLIGRAM(S): at 02:54

## 2023-04-15 RX ADMIN — BUDESONIDE AND FORMOTEROL FUMARATE DIHYDRATE 2 PUFF(S): 160; 4.5 AEROSOL RESPIRATORY (INHALATION) at 05:06

## 2023-04-15 RX ADMIN — Medication 100 MILLIGRAM(S): at 21:53

## 2023-04-15 RX ADMIN — Medication 4: at 11:30

## 2023-04-15 RX ADMIN — Medication 2 SPRAY(S): at 09:35

## 2023-04-15 RX ADMIN — RIVAROXABAN 15 MILLIGRAM(S): KIT at 05:07

## 2023-04-15 RX ADMIN — OXYCODONE HYDROCHLORIDE 10 MILLIGRAM(S): 5 TABLET ORAL at 03:00

## 2023-04-15 RX ADMIN — Medication 40 MILLIEQUIVALENT(S): at 01:10

## 2023-04-15 RX ADMIN — Medication 1 TABLET(S): at 12:08

## 2023-04-15 RX ADMIN — BUPROPION HYDROCHLORIDE 150 MILLIGRAM(S): 150 TABLET, EXTENDED RELEASE ORAL at 12:08

## 2023-04-15 RX ADMIN — OXYCODONE HYDROCHLORIDE 10 MILLIGRAM(S): 5 TABLET ORAL at 10:32

## 2023-04-15 RX ADMIN — Medication 650 MILLIGRAM(S): at 17:23

## 2023-04-15 RX ADMIN — Medication 40 MILLIEQUIVALENT(S): at 05:07

## 2023-04-15 RX ADMIN — OXYCODONE HYDROCHLORIDE 10 MILLIGRAM(S): 5 TABLET ORAL at 12:15

## 2023-04-15 RX ADMIN — Medication 1 MILLIGRAM(S): at 23:46

## 2023-04-15 RX ADMIN — Medication 4: at 17:12

## 2023-04-15 RX ADMIN — RIVAROXABAN 15 MILLIGRAM(S): KIT at 17:12

## 2023-04-15 RX ADMIN — PANTOPRAZOLE SODIUM 40 MILLIGRAM(S): 20 TABLET, DELAYED RELEASE ORAL at 05:11

## 2023-04-15 RX ADMIN — Medication 650 MILLIGRAM(S): at 16:15

## 2023-04-15 RX ADMIN — SODIUM CHLORIDE 100 MILLILITER(S): 9 INJECTION INTRAMUSCULAR; INTRAVENOUS; SUBCUTANEOUS at 10:58

## 2023-04-15 RX ADMIN — ALBUTEROL 2 PUFF(S): 90 AEROSOL, METERED ORAL at 17:14

## 2023-04-15 RX ADMIN — Medication 40 MILLIEQUIVALENT(S): at 10:51

## 2023-04-15 RX ADMIN — ALBUTEROL 2 PUFF(S): 90 AEROSOL, METERED ORAL at 05:06

## 2023-04-15 RX ADMIN — SERTRALINE 100 MILLIGRAM(S): 25 TABLET, FILM COATED ORAL at 05:10

## 2023-04-15 RX ADMIN — Medication 1: at 21:54

## 2023-04-15 RX ADMIN — ALBUTEROL 2 PUFF(S): 90 AEROSOL, METERED ORAL at 14:02

## 2023-04-15 RX ADMIN — OXYCODONE HYDROCHLORIDE 10 MILLIGRAM(S): 5 TABLET ORAL at 00:30

## 2023-04-15 NOTE — OCCUPATIONAL THERAPY INITIAL EVALUATION ADULT - GENERAL OBSERVATIONS, REHAB EVAL
Charts reviewed and events noted to date. Pt encountered semi supine in bed with L knee immobilizer, +cardiac monitor, +pneumatic TEDs, +IV infusing and HemoVacs x 2. +IV removed by RN prior to eval; +hep lock in place.

## 2023-04-15 NOTE — OCCUPATIONAL THERAPY INITIAL EVALUATION ADULT - ADDITIONAL COMMENTS
Wife and children in private home with 4 steps to enter with handrail B HR's to enter the house, inside has 12 stairs with L HR to access bedroom and bathroom. pt was independent, pt owns a cane, rolling walkers.

## 2023-04-15 NOTE — OCCUPATIONAL THERAPY INITIAL EVALUATION ADULT - BALANCE TRAINING, PT EVAL
Pt will increase dynamic standing balance to good minus in 2 weeks in order to increase participation in hygiene/grooming task.

## 2023-04-15 NOTE — PHYSICAL THERAPY INITIAL EVALUATION ADULT - PATIENT PROFILE REVIEW, REHAB EVAL
Yes- Spoke with BARTOLOME Matamoros- pt appropriate to be seen for PT today. Activity order: Ambulate as tolerated/yes

## 2023-04-15 NOTE — PROGRESS NOTE ADULT - SUBJECTIVE AND OBJECTIVE BOX
This is a 64y/o Male s/p Left Knee Irrigation and Debridement and polyethylene exchange  Pain is controlled. Pt feeling well. No nausea or vomiting.     LABS:                        11.7   7.49  )-----------( 191      ( 2023 18:06 )             34.1     04-14    132<L>  |  103  |  17  ----------------------------<  179<H>  3.1<L>   |  21<L>  |  0.88    Ca    8.2<L>      2023 18:06    TPro  8.5<H>  /  Alb  3.2<L>  /  TBili  1.2  /  DBili  x   /  AST  18  /  ALT  23  /  AlkPhos  78  04-13    PT/INR - ( 2023 06:00 )   PT: 14.0 sec;   INR: 1.17 ratio         PTT - ( 2023 06:00 )  PTT:33.6 sec  Urinalysis Basic - ( 2023 14:50 )    Color: Yellow / Appearance: Clear / S.015 / pH: x  Gluc: x / Ketone: Large  / Bili: Negative / Urobili: Negative mg/dL   Blood: x / Protein: 500 mg/dL / Nitrite: Negative   Leuk Esterase: Trace / RBC: 3-5 /HPF / WBC 0-2   Sq Epi: x / Non Sq Epi: x / Bacteria: Many        VITAL SIGNS:  T(C): 39 (04-15-23 @ 04:47), Max: 39.4 (23 @ 11:17)  HR: 107 (04-15-23 @ 04:47) (78 - 107)  BP: 162/85 (04-15-23 @ 04:47) (130/68 - 162/85)  RR: 18 (04-15-23 @ 04:47) (14 - 19)  SpO2: 96% (04-15-23 @ 04:47) (94% - 99%)    Exam:  NAD AAOx3.    LLE  Dressing clean, dry and intact.  SCDs in place.  HMVx2 in place  Calves are soft and nontender.  Moving all toes and ankle, +EHL/FHL/TA/GS.  SILT throughout.  DP and PT pulses 2+.    A/P:  63M, Stable, POD 1 sp Left Knee Irrigation and Debridement and polyethylene exchange s/p PJI    - Hemovac drains present; Please record output  -Analgesia  -LLE elevation  -Ice application  -ANCEF q8hr  -DVT PE prophylaxis - Xarleto  -Incentive spirometry  -OOB PT WBAT LLE w/ knee immobilizer while drains are in   -ID recs appreciated - TTE, Bcx x48hrs until clear, trend CRP, fu PICC  - Dispo: Pending PT Eval

## 2023-04-15 NOTE — PROGRESS NOTE ADULT - SUBJECTIVE AND OBJECTIVE BOX
Patient is a 63y old  Male who presents with a chief complaint of Prosthetic Joint infection (2023 18:22)    INTERVAL HPI/OVERNIGHT EVENTS:    MEDICATIONS  (STANDING):  albuterol    90 MICROgram(s) HFA Inhaler 2 Puff(s) Inhalation every 6 hours  budesonide 160 MICROgram(s)/formoterol 4.5 MICROgram(s) Inhaler 2 Puff(s) Inhalation two times a day  buPROPion XL (24-Hour) . 150 milliGRAM(s) Oral daily  ceFAZolin   IVPB 2000 milliGRAM(s) IV Intermittent every 8 hours  dextrose 50% Injectable 25 Gram(s) IV Push once  fluticasone propionate 50 MICROgram(s)/spray Nasal Spray 2 Spray(s) Both Nostrils <User Schedule>  insulin lispro (ADMELOG) corrective regimen sliding scale   SubCutaneous three times a day before meals  insulin lispro (ADMELOG) corrective regimen sliding scale   SubCutaneous at bedtime  losartan 100 milliGRAM(s) Oral daily  multivitamin 1 Tablet(s) Oral daily  pantoprazole    Tablet 40 milliGRAM(s) Oral before breakfast  potassium chloride    Tablet ER 40 milliEquivalent(s) Oral once  rivaroxaban 15 milliGRAM(s) Oral two times a day  sertraline 100 milliGRAM(s) Oral two times a day  sodium chloride 0.9%. 1000 milliLiter(s) (100 mL/Hr) IV Continuous <Continuous>  tiotropium 2.5 MICROgram(s) Inhaler 2 Puff(s) Inhalation daily    MEDICATIONS  (PRN):  acetaminophen     Tablet .. 650 milliGRAM(s) Oral every 6 hours PRN Temp greater or equal to 38C (100.4F), Mild Pain (1 - 3)  acetaminophen     Tablet .. 650 milliGRAM(s) Oral every 6 hours PRN Temp greater or equal to 38C (100.4F), Mild Pain (1 - 3)  oxyCODONE    IR 10 milliGRAM(s) Oral every 4 hours PRN Severe Pain (7 - 10)  oxyCODONE    IR 5 milliGRAM(s) Oral every 4 hours PRN Moderate Pain (4 - 6)  zolpidem 5 milliGRAM(s) Oral at bedtime PRN Insomnia    Allergies    No Known Allergies    Intolerances      REVIEW OF SYSTEMS:  All other systems reviewed and are negative    Vital Signs Last 24 Hrs  T(C): 36.4 (15 Apr 2023 09:08), Max: 39.4 (2023 11:17)  T(F): 97.5 (15 Apr 2023 09:08), Max: 103 (2023 11:17)  HR: 69 (15 Apr 2023 09:08) (69 - 107)  BP: 105/63 (15 Apr 2023 09:08) (105/63 - 162/85)  BP(mean): 98 (2023 20:00) (97 - 98)  RR: 17 (15 Apr 2023 09:08) (14 - 19)  SpO2: 98% (15 Apr 2023 09:08) (94% - 99%)    Parameters below as of 15 Apr 2023 09:08  Patient On (Oxygen Delivery Method): room air      Daily     Daily   I&O's Summary    2023 07:01  -  15 Apr 2023 07:00  --------------------------------------------------------  IN: 0 mL / OUT: 1120 mL / NET: -1120 mL      CAPILLARY BLOOD GLUCOSE      POCT Blood Glucose.: 251 mg/dL (15 Apr 2023 07:38)  POCT Blood Glucose.: 183 mg/dL (2023 22:49)  POCT Blood Glucose.: 157 mg/dL (2023 11:23)    PHYSICAL EXAM:  GENERAL: NAD,    HEAD:  Atraumatic, Normocephalic  EYES: EOMI, PERRLA, conjunctiva and sclera clear  ENMT: No tonsillar erythema, exudates, or enlargement; Moist mucous membranes, Good dentition, No lesions  NECK: Supple, No JVD, Normal thyroid  NERVOUS SYSTEM:  Alert & Oriented X3, Good concentration; Motor Strength 5/5 B/L upper and lower extremities; DTRs 2+ intact and symmetric  CHEST/LUNG: Clear to percussion bilaterally; No rales, rhonchi, wheezing, or rubs  HEART: Regular rate and rhythm; No murmurs, rubs, or gallops  ABDOMEN: Soft, Nontender, Nondistended; Bowel sounds present  EXTREMITIES: L knee drissing intact, 2 drains w blood   LYMPH: No lymphadenopathy noted  SKIN: No rashes or lesions    Labs                          11.6   7.53  )-----------( 194      ( 15 Apr 2023 06:50 )             34.3     04-15    130<L>  |  99  |  11  ----------------------------<  252<H>  3.3<L>   |  24  |  0.84    Ca    8.0<L>      15 Apr 2023 06:50    TPro  8.5<H>  /  Alb  3.2<L>  /  TBili  1.2  /  DBili  x   /  AST  18  /  ALT  23  /  AlkPhos  78  04-13    PT/INR - ( 2023 06:00 )   PT: 14.0 sec;   INR: 1.17 ratio         PTT - ( 2023 06:00 )  PTT:33.6 sec      Urinalysis Basic - ( 2023 14:50 )    Color: Yellow / Appearance: Clear / S.015 / pH: x  Gluc: x / Ketone: Large  / Bili: Negative / Urobili: Negative mg/dL   Blood: x / Protein: 500 mg/dL / Nitrite: Negative   Leuk Esterase: Trace / RBC: 3-5 /HPF / WBC 0-2   Sq Epi: x / Non Sq Epi: x / Bacteria: Many        Culture - Joint Fluid (collected 2023 15:00)  Source: Joint Fl Joint Fluid  Gram Stain (prelim) (2023 21:57):    Few polymorphonuclear leukocytes per low power field    Few Gram positive cocci in pairs per oil power field  Preliminary Report (2023 21:57):    Moderate Staphylococcus aureus    Culture - Blood (collected 2023 14:30)  Source: .Blood Blood-Peripheral  Gram Stain (prelim) (2023 09:52):    Growth in aerobic bottle: Gram Positive Cocci in Clusters  Preliminary Report (2023 14:49):    Growth in aerobic bottle: Gram Positive Cocci in Clusters    Growth in anaerobic bottle: Gram Positive Cocci in Clusters    Culture - Blood (collected 2023 14:08)  Source: .Blood Blood-Peripheral  Gram Stain (prelim) (2023 11:05):    Growth in aerobic bottle: Gram Positive Cocci in Clusters    Growth in anaerobic bottle: Gram Positive Cocci in Clusters  Preliminary Report (2023 11:06):    Growth in aerobic bottle: Gram Positive Cocci in Clusters    Growth in anaerobic bottle: Gram Positive Cocci in Clusters    ***Blood Panel PCR results on this specimen are available    approximately 3 hours after the Gram stain result.***    Gram stain, PCR, and/or culture results may not always    correspond due to difference in methodologies.    ************************************************************    This PCR assay was performed by multiplex PCR. This    Assay tests for 66 bacterial and resistance gene targets.    Please refer to the [facility] test directory    at [email or web address] for details.  Organism: Blood Culture PCR (2023 10:56)  Organism: Blood Culture PCR (2023 10:56)                DVT prophylaxis: > Lovenox 40mg SQ daily  > Heparin   > SCD's

## 2023-04-15 NOTE — PHYSICAL THERAPY INITIAL EVALUATION ADULT - GENERAL OBSERVATIONS, REHAB EVAL
Consult received, chart reviewed. Patient received supine in bed, NAD, KI (while drains are in), + cardiac monitor.

## 2023-04-15 NOTE — PHYSICAL THERAPY INITIAL EVALUATION ADULT - GAIT TRAINING, PT EVAL
To be able to perform ambulation independently using rolling walker for 200 feet, using proper technique using AD, with proper posture and functional distance at home in 4 weeks.

## 2023-04-15 NOTE — PROGRESS NOTE ADULT - ASSESSMENT
63M with a PMH of DVT on xarelto, DM, HTN, L TKA in 2011, R TKA in 2003, chronic anxiety who presents to the ED for L knee pain.  Patient was evaulated by Ortho who performed and arthrocentesis of the joint.  Admitted to ortho service who plan to do washout tomorrow AM.  Hospital medicine consulted for medical clearance.             Problem/Recommendation - 1:  ·  Problem: Infected prosthetic knee joint.  Sepsis POA  ·  Recommendation: Ortho on board,   s/p washout  antibiotics as per ID. follow cultures      Problem/Recommendation - 2:  ·  Problem: Hypokalemia.   ·  Recommendation: oral replacement, trend labs.     Problem/Recommendation - 3:  ·  Problem: GAURAV (acute kidney injury).   ·  Recommendation: light hydration.     Problem/Recommendation - 4:  ·  Problem: Chronic anxiety.   ·  Recommendation: ambien 10 prn nightly, wellbutrin 150 daily.     Problem/Recommendation - 5:  ·  Problem: Essential hypertension.   ·  Recommendation: Losartan.     Problem/Recommendation - 6:  ·  Problem: Diabetes mellitus.   ·  Recommendation: insulin corrective scale.

## 2023-04-16 LAB
-  AMPICILLIN/SULBACTAM: SIGNIFICANT CHANGE UP
-  CEFAZOLIN: SIGNIFICANT CHANGE UP
-  CLINDAMYCIN: SIGNIFICANT CHANGE UP
-  ERYTHROMYCIN: SIGNIFICANT CHANGE UP
-  GENTAMICIN: SIGNIFICANT CHANGE UP
-  OXACILLIN: SIGNIFICANT CHANGE UP
-  PENICILLIN: SIGNIFICANT CHANGE UP
-  RIFAMPIN: SIGNIFICANT CHANGE UP
-  TETRACYCLINE: SIGNIFICANT CHANGE UP
-  TRIMETHOPRIM/SULFAMETHOXAZOLE: SIGNIFICANT CHANGE UP
-  VANCOMYCIN: SIGNIFICANT CHANGE UP
ANION GAP SERPL CALC-SCNC: 7 MMOL/L — SIGNIFICANT CHANGE UP (ref 5–17)
BUN SERPL-MCNC: 9 MG/DL — SIGNIFICANT CHANGE UP (ref 7–23)
CALCIUM SERPL-MCNC: 8.4 MG/DL — LOW (ref 8.5–10.1)
CHLORIDE SERPL-SCNC: 97 MMOL/L — SIGNIFICANT CHANGE UP (ref 96–108)
CO2 SERPL-SCNC: 26 MMOL/L — SIGNIFICANT CHANGE UP (ref 22–31)
CREAT SERPL-MCNC: 0.8 MG/DL — SIGNIFICANT CHANGE UP (ref 0.5–1.3)
CULTURE RESULTS: SIGNIFICANT CHANGE UP
CULTURE RESULTS: SIGNIFICANT CHANGE UP
EGFR: 99 ML/MIN/1.73M2 — SIGNIFICANT CHANGE UP
GLUCOSE BLDC GLUCOMTR-MCNC: 180 MG/DL — HIGH (ref 70–99)
GLUCOSE BLDC GLUCOMTR-MCNC: 221 MG/DL — HIGH (ref 70–99)
GLUCOSE BLDC GLUCOMTR-MCNC: 244 MG/DL — HIGH (ref 70–99)
GLUCOSE BLDC GLUCOMTR-MCNC: 277 MG/DL — HIGH (ref 70–99)
GLUCOSE SERPL-MCNC: 195 MG/DL — HIGH (ref 70–99)
GRAM STN FLD: SIGNIFICANT CHANGE UP
GRAM STN FLD: SIGNIFICANT CHANGE UP
HCT VFR BLD CALC: 33.1 % — LOW (ref 39–50)
HGB BLD-MCNC: 11.5 G/DL — LOW (ref 13–17)
MCHC RBC-ENTMCNC: 29.8 PG — SIGNIFICANT CHANGE UP (ref 27–34)
MCHC RBC-ENTMCNC: 34.7 G/DL — SIGNIFICANT CHANGE UP (ref 32–36)
MCV RBC AUTO: 85.8 FL — SIGNIFICANT CHANGE UP (ref 80–100)
METHOD TYPE: SIGNIFICANT CHANGE UP
NRBC # BLD: 0 /100 WBCS — SIGNIFICANT CHANGE UP (ref 0–0)
ORGANISM # SPEC MICROSCOPIC CNT: SIGNIFICANT CHANGE UP
PLATELET # BLD AUTO: 209 K/UL — SIGNIFICANT CHANGE UP (ref 150–400)
POTASSIUM SERPL-MCNC: 3.4 MMOL/L — LOW (ref 3.5–5.3)
POTASSIUM SERPL-SCNC: 3.4 MMOL/L — LOW (ref 3.5–5.3)
RBC # BLD: 3.86 M/UL — LOW (ref 4.2–5.8)
RBC # FLD: 13.7 % — SIGNIFICANT CHANGE UP (ref 10.3–14.5)
SODIUM SERPL-SCNC: 130 MMOL/L — LOW (ref 135–145)
SPECIMEN SOURCE: SIGNIFICANT CHANGE UP
SPECIMEN SOURCE: SIGNIFICANT CHANGE UP
WBC # BLD: 5.63 K/UL — SIGNIFICANT CHANGE UP (ref 3.8–10.5)
WBC # FLD AUTO: 5.63 K/UL — SIGNIFICANT CHANGE UP (ref 3.8–10.5)

## 2023-04-16 PROCEDURE — 99232 SBSQ HOSP IP/OBS MODERATE 35: CPT

## 2023-04-16 RX ORDER — SODIUM CHLORIDE 9 MG/ML
1000 INJECTION INTRAMUSCULAR; INTRAVENOUS; SUBCUTANEOUS
Refills: 0 | Status: DISCONTINUED | OUTPATIENT
Start: 2023-04-16 | End: 2023-04-18

## 2023-04-16 RX ORDER — POTASSIUM CHLORIDE 20 MEQ
40 PACKET (EA) ORAL EVERY 4 HOURS
Refills: 0 | Status: COMPLETED | OUTPATIENT
Start: 2023-04-16 | End: 2023-04-16

## 2023-04-16 RX ADMIN — BUDESONIDE AND FORMOTEROL FUMARATE DIHYDRATE 2 PUFF(S): 160; 4.5 AEROSOL RESPIRATORY (INHALATION) at 17:56

## 2023-04-16 RX ADMIN — BUPROPION HYDROCHLORIDE 150 MILLIGRAM(S): 150 TABLET, EXTENDED RELEASE ORAL at 11:17

## 2023-04-16 RX ADMIN — ALBUTEROL 2 PUFF(S): 90 AEROSOL, METERED ORAL at 23:36

## 2023-04-16 RX ADMIN — Medication 100 MILLIGRAM(S): at 06:02

## 2023-04-16 RX ADMIN — SODIUM CHLORIDE 125 MILLILITER(S): 9 INJECTION INTRAMUSCULAR; INTRAVENOUS; SUBCUTANEOUS at 22:16

## 2023-04-16 RX ADMIN — Medication 2: at 08:50

## 2023-04-16 RX ADMIN — BUDESONIDE AND FORMOTEROL FUMARATE DIHYDRATE 2 PUFF(S): 160; 4.5 AEROSOL RESPIRATORY (INHALATION) at 06:05

## 2023-04-16 RX ADMIN — Medication 2 SPRAY(S): at 08:52

## 2023-04-16 RX ADMIN — Medication 650 MILLIGRAM(S): at 17:36

## 2023-04-16 RX ADMIN — ALBUTEROL 2 PUFF(S): 90 AEROSOL, METERED ORAL at 11:19

## 2023-04-16 RX ADMIN — ALBUTEROL 2 PUFF(S): 90 AEROSOL, METERED ORAL at 06:37

## 2023-04-16 RX ADMIN — Medication 3: at 17:55

## 2023-04-16 RX ADMIN — SERTRALINE 100 MILLIGRAM(S): 25 TABLET, FILM COATED ORAL at 17:36

## 2023-04-16 RX ADMIN — LOSARTAN POTASSIUM 100 MILLIGRAM(S): 100 TABLET, FILM COATED ORAL at 06:01

## 2023-04-16 RX ADMIN — Medication 1 TABLET(S): at 11:17

## 2023-04-16 RX ADMIN — OXYCODONE HYDROCHLORIDE 10 MILLIGRAM(S): 5 TABLET ORAL at 11:13

## 2023-04-16 RX ADMIN — ALBUTEROL 2 PUFF(S): 90 AEROSOL, METERED ORAL at 17:56

## 2023-04-16 RX ADMIN — PANTOPRAZOLE SODIUM 40 MILLIGRAM(S): 20 TABLET, DELAYED RELEASE ORAL at 06:02

## 2023-04-16 RX ADMIN — TIOTROPIUM BROMIDE 2 PUFF(S): 18 CAPSULE ORAL; RESPIRATORY (INHALATION) at 11:17

## 2023-04-16 RX ADMIN — Medication 40 MILLIEQUIVALENT(S): at 13:27

## 2023-04-16 RX ADMIN — Medication 2: at 11:26

## 2023-04-16 RX ADMIN — RIVAROXABAN 15 MILLIGRAM(S): KIT at 06:02

## 2023-04-16 RX ADMIN — SERTRALINE 100 MILLIGRAM(S): 25 TABLET, FILM COATED ORAL at 06:02

## 2023-04-16 RX ADMIN — Medication 100 MILLIGRAM(S): at 13:27

## 2023-04-16 RX ADMIN — OXYCODONE HYDROCHLORIDE 10 MILLIGRAM(S): 5 TABLET ORAL at 00:39

## 2023-04-16 RX ADMIN — Medication 100 MILLIGRAM(S): at 22:17

## 2023-04-16 RX ADMIN — RIVAROXABAN 15 MILLIGRAM(S): KIT at 17:36

## 2023-04-16 RX ADMIN — Medication 40 MILLIEQUIVALENT(S): at 11:22

## 2023-04-16 NOTE — PROGRESS NOTE ADULT - SUBJECTIVE AND OBJECTIVE BOX
Patient is a 63y old  Male who presents with a chief complaint of Prosthetic Joint infection (13 Apr 2023 18:22)    INTERVAL HPI/OVERNIGHT EVENTS: fevers    MEDICATIONS  (STANDING):  albuterol    90 MICROgram(s) HFA Inhaler 2 Puff(s) Inhalation every 6 hours  budesonide 160 MICROgram(s)/formoterol 4.5 MICROgram(s) Inhaler 2 Puff(s) Inhalation two times a day  buPROPion XL (24-Hour) . 150 milliGRAM(s) Oral daily  ceFAZolin   IVPB 2000 milliGRAM(s) IV Intermittent every 8 hours  dextrose 50% Injectable 25 Gram(s) IV Push once  fluticasone propionate 50 MICROgram(s)/spray Nasal Spray 2 Spray(s) Both Nostrils <User Schedule>  insulin lispro (ADMELOG) corrective regimen sliding scale   SubCutaneous at bedtime  insulin lispro (ADMELOG) corrective regimen sliding scale   SubCutaneous three times a day before meals  losartan 100 milliGRAM(s) Oral daily  multivitamin 1 Tablet(s) Oral daily  pantoprazole    Tablet 40 milliGRAM(s) Oral before breakfast  potassium chloride    Tablet ER 40 milliEquivalent(s) Oral every 4 hours  rivaroxaban 15 milliGRAM(s) Oral two times a day  sertraline 100 milliGRAM(s) Oral two times a day  sodium chloride 0.9%. 1000 milliLiter(s) (125 mL/Hr) IV Continuous <Continuous>  tiotropium 2.5 MICROgram(s) Inhaler 2 Puff(s) Inhalation daily    MEDICATIONS  (PRN):  acetaminophen     Tablet .. 650 milliGRAM(s) Oral every 6 hours PRN Temp greater or equal to 38C (100.4F), Mild Pain (1 - 3)  acetaminophen     Tablet .. 650 milliGRAM(s) Oral every 6 hours PRN Temp greater or equal to 38C (100.4F), Mild Pain (1 - 3)  oxyCODONE    IR 10 milliGRAM(s) Oral every 4 hours PRN Severe Pain (7 - 10)  oxyCODONE    IR 5 milliGRAM(s) Oral every 4 hours PRN Moderate Pain (4 - 6)  zolpidem 5 milliGRAM(s) Oral at bedtime PRN Insomnia    Allergies    No Known Allergies    Intolerances      REVIEW OF SYSTEMS:  All other systems reviewed and are negative    Vital Signs Last 24 Hrs  T(C): 37.8 (16 Apr 2023 05:06), Max: 38.2 (15 Apr 2023 16:29)  T(F): 100 (16 Apr 2023 05:06), Max: 100.7 (15 Apr 2023 16:29)  HR: 97 (16 Apr 2023 08:58) (82 - 116)  BP: 146/77 (16 Apr 2023 05:06) (108/69 - 146/77)  BP(mean): --  RR: 18 (16 Apr 2023 05:06) (16 - 18)  SpO2: 95% (16 Apr 2023 05:06) (94% - 97%)    Parameters below as of 15 Apr 2023 11:06  Patient On (Oxygen Delivery Method): room air      Daily     Daily   I&O's Summary    15 Apr 2023 07:01  -  16 Apr 2023 07:00  --------------------------------------------------------  IN: 990 mL / OUT: 1690 mL / NET: -700 mL      CAPILLARY BLOOD GLUCOSE      POCT Blood Glucose.: 221 mg/dL (16 Apr 2023 08:42)  POCT Blood Glucose.: 277 mg/dL (15 Apr 2023 21:03)  POCT Blood Glucose.: 309 mg/dL (15 Apr 2023 16:56)  POCT Blood Glucose.: 330 mg/dL (15 Apr 2023 11:07)    PHYSICAL EXAM:  GENERAL: NAD,    HEAD:  Atraumatic, Normocephalic  EYES: EOMI, PERRLA, conjunctiva and sclera clear  ENMT: No tonsillar erythema, exudates, or enlargement; Moist mucous membranes, Good dentition, No lesions  NECK: Supple, No JVD, Normal thyroid  NERVOUS SYSTEM:  Alert & Oriented X3, Good concentration; Motor Strength 5/5 B/L upper and lower extremities; DTRs 2+ intact and symmetric  CHEST/LUNG: Clear to percussion bilaterally; No rales, rhonchi, wheezing, or rubs  HEART: Regular rate and rhythm; No murmurs, rubs, or gallops  ABDOMEN: Soft, Nontender, Nondistended; Bowel sounds present  EXTREMITIES: L knee drissing intact, 2 drains w blood   LYMPH: No lymphadenopathy noted  SKIN: No rashes or lesions      Labs                          11.5   5.63  )-----------( 209      ( 16 Apr 2023 07:30 )             33.1     04-16    130<L>  |  97  |  9   ----------------------------<  195<H>  3.4<L>   |  26  |  0.80    Ca    8.4<L>      16 Apr 2023 07:30                Culture - Tissue with Gram Stain (collected 14 Apr 2023 16:45)  Source: .Tissue left knee c/s #3  Gram Stain (15 Apr 2023 16:17):    No polymorphonuclear cells seen per low power field    No organisms seen per oil power field    Culture - Tissue with Gram Stain (collected 14 Apr 2023 16:45)  Source: .Tissue left knee c/s #2  Gram Stain (15 Apr 2023 16:18):    No polymorphonuclear cells seen per low power field    No organisms seen per oil power field    Culture - Tissue with Gram Stain (collected 14 Apr 2023 16:45)  Source: .Tissue left knee c/s #1  Gram Stain (15 Apr 2023 16:18):    No polymorphonuclear cells seen per low power field    No organisms seen per oil power field    Culture - Joint Fluid (collected 13 Apr 2023 15:00)  Source: Joint Fl Joint Fluid  Gram Stain (prelim) (14 Apr 2023 21:57):    Few polymorphonuclear leukocytes per low power field    Few Gram positive cocci in pairs per oil power field  Preliminary Report (14 Apr 2023 21:57):    Moderate Staphylococcus aureus  Organism: Staphylococcus aureus (15 Apr 2023 16:00)  Organism: Staphylococcus aureus (15 Apr 2023 16:00)    Culture - Blood (collected 13 Apr 2023 14:30)  Source: .Blood Blood-Peripheral  Gram Stain (16 Apr 2023 07:22):    Growth in aerobic bottle: Gram Positive Cocci in Clusters  Final Report (16 Apr 2023 07:22):    Growth in aerobic and anaerobic bottles: Staphylococcus aureus    See previous culture [id number]    Culture - Blood (collected 13 Apr 2023 14:08)  Source: .Blood Blood-Peripheral  Gram Stain (16 Apr 2023 07:21):    Growth in aerobic bottle: Gram Positive Cocci in Clusters    Growth in anaerobic bottle: Gram Positive Cocci in Clusters  Final Report (16 Apr 2023 07:21):    Growth in aerobic and anaerobic bottles: Staphylococcus aureus    ***Blood Panel PCR results on this specimen are available    approximately 3 hours after the Gram stain result.***    Gram stain, PCR, and/or culture results may not always    correspond dueto difference in methodologies.    ************************************************************    This PCR assay was performed by multiplex PCR. This    Assay tests for 66 bacterial and resistance gene targets.    Please refer to the [facility] test directory    at [email or web address] for details.  Organism: Blood Culture PCR  Staphylococcus aureus (16 Apr 2023 07:21)  Organism: Staphylococcus aureus (16 Apr 2023 07:21)  Organism: Blood Culture PCR (16 Apr 2023 07:21)                DVT prophylaxis: > Lovenox 40mg SQ daily  > Heparin   > SCD's

## 2023-04-16 NOTE — PROVIDER CONTACT NOTE (CHANGE IN STATUS NOTIFICATION) - SITUATION
patient became confused and very agitated, want to go home now, stating he can walk home. attempted to calm him down by providing reorientation and have him speak to his daughter, with no change in behavior. security at bedside

## 2023-04-16 NOTE — PROGRESS NOTE ADULT - SUBJECTIVE AND OBJECTIVE BOX
Pain is controlled. Pain controlled. Patient with intermittent fevers and tachycardia still with maximum temperature of 102.2 yesterday AM. Episode of confusion and agitation overnight. Patient given single dose of Lorazepam with resolution. Patient aaox3 this AM though still appears mildly confused.     Vital Signs Last 24 Hrs  T(C): 37.8 (16 Apr 2023 05:06), Max: 38.2 (15 Apr 2023 16:29)  T(F): 100 (16 Apr 2023 05:06), Max: 100.7 (15 Apr 2023 16:29)  HR: 96 (16 Apr 2023 05:06) (69 - 116)  BP: 146/77 (16 Apr 2023 05:06) (105/63 - 146/77)  BP(mean): --  RR: 18 (16 Apr 2023 05:06) (16 - 18)  SpO2: 95% (16 Apr 2023 05:06) (94% - 98%)    Parameters below as of 15 Apr 2023 11:06  Patient On (Oxygen Delivery Method): room air      Exam:  NAD AAOx3.    LLE  Dressing clean, dry and intact.  SCDs in place.  HMVx2 in place; superior with no output and inferior with 50 SS output   Calves are soft and nontender.  +EHL/FHL/TA/GS.  SILT dp/sp/saph/sural  +DP    A/P:  63M, Stable, POD2 sp Left Knee Irrigation and Debridement and polyethylene exchange s/p PJI    - Hemovac drains present; Please record output  -Will plan to DC superior drain and leave inferior for time being  -WBAT and KI in place while drains are present   -Aspiration cultures growing MSSA  -Bcx from admission with MSSA awaiting blood culture results from 4/15; will continue blood cultures q48hrs until clear next culture 4/17  -Analgesia  -LLE elevation  -Ice application  -ANCEF q8hr per ID recs; will fu ID with final abx recommendations and need for PICC  -TTE performed which showed no vegetations   -Trend CRP q48hrs; currently improving 326>282  -DVT PE prophylaxis - home Xarleto  -Incentive spirometry  - Dispo: DYLAN pending drain removal, negative blood cultures, and ID final recommendations         Pain is controlled. Pain controlled. Patient with intermittent fevers and tachycardia still with maximum temperature of 102.2 yesterday AM. Episode of confusion and agitation overnight. Patient given single dose of Lorazepam with resolution. Patient aaox3 this AM though still appears mildly confused.     Vital Signs Last 24 Hrs  T(C): 37.8 (16 Apr 2023 05:06), Max: 38.2 (15 Apr 2023 16:29)  T(F): 100 (16 Apr 2023 05:06), Max: 100.7 (15 Apr 2023 16:29)  HR: 96 (16 Apr 2023 05:06) (69 - 116)  BP: 146/77 (16 Apr 2023 05:06) (105/63 - 146/77)  BP(mean): --  RR: 18 (16 Apr 2023 05:06) (16 - 18)  SpO2: 95% (16 Apr 2023 05:06) (94% - 98%)    Parameters below as of 15 Apr 2023 11:06  Patient On (Oxygen Delivery Method): room air      Exam:  NAD AAOx3.    LLE  Dressing clean, dry and intact.  SCDs in place.  HMVx2 in place; superior with no output and inferior with 50 SS output   Calves are soft and nontender.  +EHL/FHL/TA/GS.  SILT dp/sp/saph/sural  +DP    A/P:  63M, Stable, POD2 sp Left Knee Irrigation and Debridement and polyethylene exchange s/p PJI    -Confusion and agitation overnight s/p dose of ativan with resolution; this am aaox3 but still appears mildly confused no concerning symptoms with symmetric facial expressions and no slurred speech; will get utox, though patient with many reasons to be slightly confused (anesthesia, fevers); will monitor   - Hemovac drains present; Please record output  -Will plan to DC superior drain and leave inferior for time being  -WBAT and KI in place while drains are present   -Aspiration cultures growing MSSA  -Bcx from admission with MSSA awaiting blood culture results from 4/15; will continue blood cultures q48hrs until clear next culture 4/17  -Analgesia  -LLE elevation  -Ice application  -ANCEF q8hr per ID recs; will fu ID with final abx recommendations and need for PICC  -TTE performed which showed no vegetations   -Trend CRP q48hrs; currently improving 326>282  -DVT PE prophylaxis - home Xarleto  -Incentive spirometry  - Dispo: DYLAN pending drain removal, negative blood cultures, and ID final recommendations         Pain is controlled. Pain controlled. Patient with intermittent fevers and tachycardia still with maximum temperature of 102.2 yesterday AM. Episode of confusion and agitation overnight. Patient given single dose of Lorazepam with resolution. Patient aaox3 this AM though still appears mildly confused.     Vital Signs Last 24 Hrs  T(C): 37.8 (16 Apr 2023 05:06), Max: 38.2 (15 Apr 2023 16:29)  T(F): 100 (16 Apr 2023 05:06), Max: 100.7 (15 Apr 2023 16:29)  HR: 96 (16 Apr 2023 05:06) (69 - 116)  BP: 146/77 (16 Apr 2023 05:06) (105/63 - 146/77)  BP(mean): --  RR: 18 (16 Apr 2023 05:06) (16 - 18)  SpO2: 95% (16 Apr 2023 05:06) (94% - 98%)    Parameters below as of 15 Apr 2023 11:06  Patient On (Oxygen Delivery Method): room air      Exam:  NAD AAOx3.    LLE  Dressing clean, dry and intact.  SCDs in place.  HMVx2 in place; superior with no output and inferior with 50 SS output   Calves are soft and nontender.  +EHL/FHL/TA/GS.  SILT dp/sp/saph/sural  +DP    A/P:  63M, Stable, POD2 sp Left Knee Irrigation and Debridement and polyethylene exchange s/p PJI    -Confusion and agitation overnight s/p dose of ativan with resolution; this am aaox3 but still appears mildly confused no concerning symptoms with symmetric facial expressions and no slurred speech; will get utox, though patient with many reasons to be slightly confused (anesthesia, fevers); will monitor   - Hemovac drains present; Please record output  - Superficial Drain Dc'd 4/16  - Deep Drain in place, please record drain outputs.   -WBAT and KI in place while drains are present   -Aspiration cultures growing MSSA  -Bcx from admission with MSSA awaiting blood culture results from 4/15; will continue blood cultures q48hrs until clear next culture 4/17  -Analgesia  -LLE elevation  -Ice application  -ANCEF q8hr per ID recs; will fu ID with final abx recommendations and need for PICC  -TTE performed which showed no vegetations   -Trend CRP q48hrs; currently improving 326>282  -DVT PE prophylaxis - home Xarleto  -Incentive spirometry  - Dispo: DYLAN pending drain removal, negative blood cultures, and ID final recommendations

## 2023-04-17 LAB
-  AMPICILLIN/SULBACTAM: SIGNIFICANT CHANGE UP
-  CEFAZOLIN: SIGNIFICANT CHANGE UP
-  CLINDAMYCIN: SIGNIFICANT CHANGE UP
-  ERYTHROMYCIN: SIGNIFICANT CHANGE UP
-  GENTAMICIN: SIGNIFICANT CHANGE UP
-  OXACILLIN: SIGNIFICANT CHANGE UP
-  PENICILLIN: SIGNIFICANT CHANGE UP
-  RIFAMPIN: SIGNIFICANT CHANGE UP
-  TETRACYCLINE: SIGNIFICANT CHANGE UP
-  TRIMETHOPRIM/SULFAMETHOXAZOLE: SIGNIFICANT CHANGE UP
-  VANCOMYCIN: SIGNIFICANT CHANGE UP
ANION GAP SERPL CALC-SCNC: 5 MMOL/L — SIGNIFICANT CHANGE UP (ref 5–17)
BUN SERPL-MCNC: 9 MG/DL — SIGNIFICANT CHANGE UP (ref 7–23)
CALCIUM SERPL-MCNC: 8.1 MG/DL — LOW (ref 8.5–10.1)
CHLORIDE SERPL-SCNC: 100 MMOL/L — SIGNIFICANT CHANGE UP (ref 96–108)
CO2 SERPL-SCNC: 26 MMOL/L — SIGNIFICANT CHANGE UP (ref 22–31)
CREAT SERPL-MCNC: 0.68 MG/DL — SIGNIFICANT CHANGE UP (ref 0.5–1.3)
CRP SERPL-MCNC: 226 MG/L — HIGH
EGFR: 104 ML/MIN/1.73M2 — SIGNIFICANT CHANGE UP
GLUCOSE BLDC GLUCOMTR-MCNC: 226 MG/DL — HIGH (ref 70–99)
GLUCOSE BLDC GLUCOMTR-MCNC: 232 MG/DL — HIGH (ref 70–99)
GLUCOSE BLDC GLUCOMTR-MCNC: 248 MG/DL — HIGH (ref 70–99)
GLUCOSE BLDC GLUCOMTR-MCNC: 314 MG/DL — HIGH (ref 70–99)
GLUCOSE SERPL-MCNC: 220 MG/DL — HIGH (ref 70–99)
HCT VFR BLD CALC: 30 % — LOW (ref 39–50)
HGB BLD-MCNC: 10.3 G/DL — LOW (ref 13–17)
MCHC RBC-ENTMCNC: 29.9 PG — SIGNIFICANT CHANGE UP (ref 27–34)
MCHC RBC-ENTMCNC: 34.3 G/DL — SIGNIFICANT CHANGE UP (ref 32–36)
MCV RBC AUTO: 87.2 FL — SIGNIFICANT CHANGE UP (ref 80–100)
METHOD TYPE: SIGNIFICANT CHANGE UP
NRBC # BLD: 0 /100 WBCS — SIGNIFICANT CHANGE UP (ref 0–0)
PLATELET # BLD AUTO: 231 K/UL — SIGNIFICANT CHANGE UP (ref 150–400)
POTASSIUM SERPL-MCNC: 3.5 MMOL/L — SIGNIFICANT CHANGE UP (ref 3.5–5.3)
POTASSIUM SERPL-SCNC: 3.5 MMOL/L — SIGNIFICANT CHANGE UP (ref 3.5–5.3)
RBC # BLD: 3.44 M/UL — LOW (ref 4.2–5.8)
RBC # FLD: 13.8 % — SIGNIFICANT CHANGE UP (ref 10.3–14.5)
SODIUM SERPL-SCNC: 131 MMOL/L — LOW (ref 135–145)
WBC # BLD: 6.44 K/UL — SIGNIFICANT CHANGE UP (ref 3.8–10.5)
WBC # FLD AUTO: 6.44 K/UL — SIGNIFICANT CHANGE UP (ref 3.8–10.5)

## 2023-04-17 PROCEDURE — 99232 SBSQ HOSP IP/OBS MODERATE 35: CPT | Mod: FS

## 2023-04-17 PROCEDURE — 99232 SBSQ HOSP IP/OBS MODERATE 35: CPT

## 2023-04-17 RX ORDER — ACETAMINOPHEN 500 MG
1000 TABLET ORAL ONCE
Refills: 0 | Status: DISCONTINUED | OUTPATIENT
Start: 2023-04-17 | End: 2023-04-18

## 2023-04-17 RX ORDER — SODIUM CHLORIDE 9 MG/ML
1000 INJECTION INTRAMUSCULAR; INTRAVENOUS; SUBCUTANEOUS
Refills: 0 | Status: DISCONTINUED | OUTPATIENT
Start: 2023-04-17 | End: 2023-04-18

## 2023-04-17 RX ORDER — LANOLIN ALCOHOL/MO/W.PET/CERES
3 CREAM (GRAM) TOPICAL AT BEDTIME
Refills: 0 | Status: DISCONTINUED | OUTPATIENT
Start: 2023-04-17 | End: 2023-04-18

## 2023-04-17 RX ORDER — BENZOCAINE AND MENTHOL 5; 1 G/100ML; G/100ML
1 LIQUID ORAL THREE TIMES A DAY
Refills: 0 | Status: DISCONTINUED | OUTPATIENT
Start: 2023-04-17 | End: 2023-04-18

## 2023-04-17 RX ADMIN — PANTOPRAZOLE SODIUM 40 MILLIGRAM(S): 20 TABLET, DELAYED RELEASE ORAL at 05:22

## 2023-04-17 RX ADMIN — BUDESONIDE AND FORMOTEROL FUMARATE DIHYDRATE 2 PUFF(S): 160; 4.5 AEROSOL RESPIRATORY (INHALATION) at 05:23

## 2023-04-17 RX ADMIN — Medication 2 SPRAY(S): at 08:43

## 2023-04-17 RX ADMIN — SODIUM CHLORIDE 125 MILLILITER(S): 9 INJECTION INTRAMUSCULAR; INTRAVENOUS; SUBCUTANEOUS at 22:05

## 2023-04-17 RX ADMIN — Medication 2: at 08:42

## 2023-04-17 RX ADMIN — ALBUTEROL 2 PUFF(S): 90 AEROSOL, METERED ORAL at 22:08

## 2023-04-17 RX ADMIN — SODIUM CHLORIDE 125 MILLILITER(S): 9 INJECTION INTRAMUSCULAR; INTRAVENOUS; SUBCUTANEOUS at 05:21

## 2023-04-17 RX ADMIN — Medication 650 MILLIGRAM(S): at 18:15

## 2023-04-17 RX ADMIN — SODIUM CHLORIDE 125 MILLILITER(S): 9 INJECTION INTRAMUSCULAR; INTRAVENOUS; SUBCUTANEOUS at 08:41

## 2023-04-17 RX ADMIN — BUPROPION HYDROCHLORIDE 150 MILLIGRAM(S): 150 TABLET, EXTENDED RELEASE ORAL at 11:26

## 2023-04-17 RX ADMIN — ALBUTEROL 2 PUFF(S): 90 AEROSOL, METERED ORAL at 11:28

## 2023-04-17 RX ADMIN — Medication 2: at 11:27

## 2023-04-17 RX ADMIN — Medication 1 TABLET(S): at 11:26

## 2023-04-17 RX ADMIN — Medication 100 MILLIGRAM(S): at 13:35

## 2023-04-17 RX ADMIN — Medication 2: at 17:06

## 2023-04-17 RX ADMIN — SODIUM CHLORIDE 125 MILLILITER(S): 9 INJECTION INTRAMUSCULAR; INTRAVENOUS; SUBCUTANEOUS at 11:27

## 2023-04-17 RX ADMIN — Medication 100 MILLIGRAM(S): at 05:20

## 2023-04-17 RX ADMIN — SERTRALINE 100 MILLIGRAM(S): 25 TABLET, FILM COATED ORAL at 17:06

## 2023-04-17 RX ADMIN — Medication 650 MILLIGRAM(S): at 08:48

## 2023-04-17 RX ADMIN — BUDESONIDE AND FORMOTEROL FUMARATE DIHYDRATE 2 PUFF(S): 160; 4.5 AEROSOL RESPIRATORY (INHALATION) at 17:05

## 2023-04-17 RX ADMIN — LOSARTAN POTASSIUM 100 MILLIGRAM(S): 100 TABLET, FILM COATED ORAL at 05:24

## 2023-04-17 RX ADMIN — TIOTROPIUM BROMIDE 2 PUFF(S): 18 CAPSULE ORAL; RESPIRATORY (INHALATION) at 11:27

## 2023-04-17 RX ADMIN — Medication 650 MILLIGRAM(S): at 17:06

## 2023-04-17 RX ADMIN — RIVAROXABAN 15 MILLIGRAM(S): KIT at 17:06

## 2023-04-17 RX ADMIN — RIVAROXABAN 15 MILLIGRAM(S): KIT at 05:23

## 2023-04-17 RX ADMIN — Medication 650 MILLIGRAM(S): at 05:23

## 2023-04-17 RX ADMIN — ALBUTEROL 2 PUFF(S): 90 AEROSOL, METERED ORAL at 05:22

## 2023-04-17 RX ADMIN — Medication 100 MILLIGRAM(S): at 22:04

## 2023-04-17 RX ADMIN — SERTRALINE 100 MILLIGRAM(S): 25 TABLET, FILM COATED ORAL at 05:23

## 2023-04-17 RX ADMIN — Medication 3 MILLIGRAM(S): at 22:07

## 2023-04-17 RX ADMIN — Medication 2: at 22:15

## 2023-04-17 RX ADMIN — SODIUM CHLORIDE 125 MILLILITER(S): 9 INJECTION INTRAMUSCULAR; INTRAVENOUS; SUBCUTANEOUS at 17:07

## 2023-04-17 RX ADMIN — ALBUTEROL 2 PUFF(S): 90 AEROSOL, METERED ORAL at 17:05

## 2023-04-17 NOTE — PROGRESS NOTE ADULT - ASSESSMENT
63M with MSSA (methicillin-susceptible Staphylococcus aureus) bacteremia and PJI   wbc from arthrocentesis >20k   blood cultures with MSSA (methicillin-susceptible Staphylococcus aureus)    4/17: still having elevated temperatures, 102.7 yesterday evening, Low grade temp this morning, RA, no WBC, Cr ok, repeat BCs with no growth to date, initial BCs and knee culture grew MSSA, TTE - no vegetation, Cefazolin IV continued.  Two more sets of BCs were collected and pending result. Once new two sets of BCs are back and negative x 48 hrs, ok to place a picc line, pt will need six weeks of iv abx.     MSSA (methicillin-susceptible Staphylococcus aureus) bacteremia  PJI   Fever     Plan:  MSSA bacteremia with likely source from joint infection   continue Cefazolin 2g q8hrs    Repeat blood cx q48hrs until clear  F/u blood cx   TTE - negative for IE  Will eventually need PICC line and long term antibiotics   follow cultures from arthrocentesis and from OR   joint hardware should be removed, spacer placed with eventual new joint placed after infection resolved  refrain from using rifampin->will interact with xarelto and IDSA not recommmended when hardware fully removed  trend esr and crp  Awaiting for two more sets of BCs collected yesterday  Wait to place a picc line until last two sets of BCs with no growth for 48 hrs     When ready for discharge:  Place a picc line  continue cefazolin IV 2 g q 8 hrs - last dose 5/26/2023  weekly lab work: cbc with diff, cmp, esr, crp - fax to Dr. Bay at [phone]  follow up with ID   [address] [place], [place] [id number] [phone]  remove picc line upon completion of the course of abx     Discussed with Dr. Bay   Discussed with the pt     63M with MSSA (methicillin-susceptible Staphylococcus aureus) bacteremia and PJI   wbc from arthrocentesis >20k   blood cultures with MSSA (methicillin-susceptible Staphylococcus aureus)    4/17: still having elevated temperatures, 102.7 yesterday evening, Low grade temp this morning, RA, no WBC, Cr ok, repeat BCs with no growth to date, initial BCs and knee culture grew MSSA, TTE - no vegetation, Cefazolin IV continued.  Two more sets of BCs were collected and pending result. Once new two sets of BCs are back and negative x 48 hrs, ok to place a picc line, pt will need six weeks of iv abx.     MSSA (methicillin-susceptible Staphylococcus aureus) bacteremia  PJI   Fever     Plan:  MSSA bacteremia associated with prosthetic  joint infection   continue Cefazolin 2g q8hrs    Repeat blood cx q48hrs until clear  F/u blood cx   TTE - negative for IE  Will eventually need PICC line and long term antibiotics   follow cultures from arthrocentesis and from OR   refrain from using rifampin for now->will interact with xarelto  trend esr and crp  Awaiting for two more sets of BCs collected   Wait to place a picc line until last two sets of BCs with no growth for 48 hrs     When ready for discharge:  Place a picc line  continue cefazolin IV 2 g q 8 hrs - tentative last dose 5/26/2023  weekly lab work: cbc with diff, cmp, esr, crp - fax to Dr. Bay at [phone]  follow up with [address] [place] [address]  [phone]  remove picc line upon completion of the course of abx     Discussed with Dr. Bay   Discussed with the pt

## 2023-04-17 NOTE — PROGRESS NOTE ADULT - SUBJECTIVE AND OBJECTIVE BOX
Patient seen and examined  remains  febrile  IV ancef as per ID      REVIEW OF SYSTEMS:  All other systems reviewed and are negative    ICU Vital Signs Last 24 Hrs  T(C): 36.4 (17 Apr 2023 08:48), Max: 39.3 (16 Apr 2023 17:26)  T(F): 97.6 (17 Apr 2023 08:48), Max: 102.7 (16 Apr 2023 17:26)  HR: 84 (17 Apr 2023 05:09) (84 - 100)  BP: 134/71 (17 Apr 2023 05:09) (125/71 - 169/82)  BP(mean): 99 (16 Apr 2023 17:26) (99 - 99)  ABP: --  ABP(mean): --  RR: 16 (17 Apr 2023 05:09) (16 - 19)  SpO2: 96% (17 Apr 2023 05:09) (93% - 98%)    PHYSICAL EXAM:  GENERAL: NAD,    HEAD:  Atraumatic, Normocephalic  EYES: EOMI, PERRLA, conjunctiva and sclera clear  ENMT: No tonsillar erythema, exudates, or enlargement; Moist mucous membranes, Good dentition, No lesions  NECK: Supple, No JVD, Normal thyroid  NERVOUS SYSTEM:  Alert & Oriented X3, Good concentration; Motor Strength 5/5 B/L upper and lower extremities; DTRs 2+ intact and symmetric  CHEST/LUNG: Clear to percussion bilaterally; No rales, rhonchi, wheezing, or rubs  HEART: Regular rate and rhythm; No murmurs, rubs, or gallops  ABDOMEN: Soft, Nontender, Nondistended; Bowel sounds present  EXTREMITIES: L knee drissing intact, 2 drains w blood   LYMPH: No lymphadenopathy noted  SKIN: No rashes or lesions    Labs:                          10.3   6.44  )-----------( 231      ( 17 Apr 2023 05:30 )             30.0     04-17    131<L>  |  100  |  9   ----------------------------<  220<H>  3.5   |  26  |  0.68    Ca    8.1<L>      17 Apr 2023 05:30              Active Medications  MEDICATIONS  (STANDING):  albuterol    90 MICROgram(s) HFA Inhaler 2 Puff(s) Inhalation every 6 hours  budesonide 160 MICROgram(s)/formoterol 4.5 MICROgram(s) Inhaler 2 Puff(s) Inhalation two times a day  buPROPion XL (24-Hour) . 150 milliGRAM(s) Oral daily  ceFAZolin   IVPB 2000 milliGRAM(s) IV Intermittent every 8 hours  dextrose 50% Injectable 25 Gram(s) IV Push once  fluticasone propionate 50 MICROgram(s)/spray Nasal Spray 2 Spray(s) Both Nostrils <User Schedule>  insulin lispro (ADMELOG) corrective regimen sliding scale   SubCutaneous at bedtime  insulin lispro (ADMELOG) corrective regimen sliding scale   SubCutaneous three times a day before meals  losartan 100 milliGRAM(s) Oral daily  multivitamin 1 Tablet(s) Oral daily  pantoprazole    Tablet 40 milliGRAM(s) Oral before breakfast  rivaroxaban 15 milliGRAM(s) Oral two times a day  sertraline 100 milliGRAM(s) Oral two times a day  sodium chloride 0.9%. 1000 milliLiter(s) (125 mL/Hr) IV Continuous <Continuous>  sodium chloride 0.9%. 1000 milliLiter(s) (125 mL/Hr) IV Continuous <Continuous>  tiotropium 2.5 MICROgram(s) Inhaler 2 Puff(s) Inhalation daily    MEDICATIONS  (PRN):  acetaminophen     Tablet .. 650 milliGRAM(s) Oral every 6 hours PRN Temp greater or equal to 38C (100.4F), Mild Pain (1 - 3)  acetaminophen     Tablet .. 650 milliGRAM(s) Oral every 6 hours PRN Temp greater or equal to 38C (100.4F), Mild Pain (1 - 3)  oxyCODONE    IR 5 milliGRAM(s) Oral every 4 hours PRN Moderate Pain (4 - 6)  oxyCODONE    IR 10 milliGRAM(s) Oral every 4 hours PRN Severe Pain (7 - 10)  zolpidem 5 milliGRAM(s) Oral at bedtime PRN Insomnia

## 2023-04-17 NOTE — PROGRESS NOTE ADULT - SUBJECTIVE AND OBJECTIVE BOX
Pain is controlled. Pain controlled. Patient with continued intermittent fevers with maximum temperature of 102.7 yesterday evening at 1726. Patient denies HA/N/V/CP/SOB. No other complaints. Superficial hemovac discontinued.        Exam:  NAD AAOx3.    LLE  Dressing clean, dry and intact.  SCDs in place.  Deep HMV in place;   Calves are soft and nontender.  +EHL/FHL/TA/GS.  SILT dp/sp/saph/sural  +DP    A/P:  63M, Stable, POD3 sp Left Knee Irrigation and Debridement and polyethylene exchange s/p PJI    -Superficial hemovac DCd 4/16  - Deep Hemovac drain present; Please record outpu  -WBAT and KI in place while drains are present   -Aspiration cultures growing MSSA  -Bcx from admission with MSSA awaiting blood culture results from 4/15; will continue blood cultures q48hrs until clear next culture 4/17  -Analgesia  -LLE elevation  -Ice application  -ANCEF q8hr per ID recs; will fu ID with final abx recommendations and need for PICC  -TTE performed which showed no vegetations   -Trend CRP q48hrs; currently improving 326>282  -DVT PE prophylaxis - home Xarleto  -Incentive spirometry  - Dispo: DYLAN pending drain removal, negative blood cultures, and ID final recommendations

## 2023-04-17 NOTE — PROGRESS NOTE ADULT - SUBJECTIVE AND OBJECTIVE BOX
SKYLAR MCCULLOUGH  MRN-[id number]    Follow Up:  PJI, MSSA bacteremia     Interval History: the pt was seen and examined earlier, no acute distress, no new complaints. Pt had a temp of 102.7 yesterday evening, low grade temp today in the morning, no leukocytosis.     PAST MEDICAL & SURGICAL HISTORY:  Diabetes Mellitus  diagnosed 10 years ago  doesn't do fingersticks      Hypertension, Essential      Hyperlipidemia      GERD (gastroesophageal reflux disease)      Depression      Obese      Bulging disc      Spinal stenosis      Spondylisthesis      Sleep apnea  had test > 10 years ago--supposed to wear device.  Lost 25 pounds but never retested      Anxiety      Insomnia      Varicosities      DVT (deep venous thrombosis)      R Knee Arthroplasty  Right 2003, Left 2011,      H/O laser iridotomy  left-2008      Reflux  Gastroscopy 2013      Deep vein thrombosis (DVT) of lower extremity          ROS:    [ ] Unobtainable because:  [x ] All other systems negative    Constitutional: + fever  Head: no trauma  Eyes: no vision changes, no eye pain  ENT:  no sore throat, no rhinorrhea  Cardiovascular:  no chest pain, no palpitation  Respiratory:  no SOB, no cough  GI:  no abd pain, no vomiting, no diarrhea  urinary: no dysuria, no hematuria, no flank pain  musculoskeletal:  post op pain is controlled   skin:  no rash  neurology:  no headache, no seizure, no change in mental status  psych: no anxiety, no depression         Allergies  No Known Allergies        ANTIMICROBIALS:  ceFAZolin   IVPB 2000 every 8 hours      OTHER MEDS:  acetaminophen     Tablet .. 650 milliGRAM(s) Oral every 6 hours PRN  acetaminophen     Tablet .. 650 milliGRAM(s) Oral every 6 hours PRN  albuterol    90 MICROgram(s) HFA Inhaler 2 Puff(s) Inhalation every 6 hours  budesonide 160 MICROgram(s)/formoterol 4.5 MICROgram(s) Inhaler 2 Puff(s) Inhalation two times a day  buPROPion XL (24-Hour) . 150 milliGRAM(s) Oral daily  dextrose 50% Injectable 25 Gram(s) IV Push once  fluticasone propionate 50 MICROgram(s)/spray Nasal Spray 2 Spray(s) Both Nostrils <User Schedule>  insulin lispro (ADMELOG) corrective regimen sliding scale   SubCutaneous at bedtime  insulin lispro (ADMELOG) corrective regimen sliding scale   SubCutaneous three times a day before meals  losartan 100 milliGRAM(s) Oral daily  multivitamin 1 Tablet(s) Oral daily  oxyCODONE    IR 5 milliGRAM(s) Oral every 4 hours PRN  oxyCODONE    IR 10 milliGRAM(s) Oral every 4 hours PRN  pantoprazole    Tablet 40 milliGRAM(s) Oral before breakfast  rivaroxaban 15 milliGRAM(s) Oral two times a day  sertraline 100 milliGRAM(s) Oral two times a day  sodium chloride 0.9%. 1000 milliLiter(s) IV Continuous <Continuous>  sodium chloride 0.9%. 1000 milliLiter(s) IV Continuous <Continuous>  tiotropium 2.5 MICROgram(s) Inhaler 2 Puff(s) Inhalation daily  zolpidem 5 milliGRAM(s) Oral at bedtime PRN      Vital Signs Last 24 Hrs  T(C): 37.2 (17 Apr 2023 16:13), Max: 39.3 (16 Apr 2023 17:26)  T(F): 99 (17 Apr 2023 16:13), Max: 102.7 (16 Apr 2023 17:26)  HR: 85 (17 Apr 2023 16:13) (76 - 100)  BP: 130/79 (17 Apr 2023 16:13) (124/75 - 169/82)  BP(mean): 99 (16 Apr 2023 17:26) (99 - 99)  RR: 18 (17 Apr 2023 16:13) (16 - 19)  SpO2: 98% (17 Apr 2023 16:13) (94% - 98%)        Physical Exam:  Constitutional: non-toxic, no distress  HEAD/EYES: anicteric, no conjunctival injection  ENT:  supple, no thrush  Cardiovascular:   normal S1, S2, no murmur, no edema  Respiratory:  clear BS bilaterally, no wheezes, no rales  GI:  soft, non-tender, normal bowel sounds  :  no benton, no CVA tenderness  Musculoskeletal:  left knee wrapped, brace is on   Neurologic: awake and alert, normal strength, no focal findings  Skin:  no rash, no erythema, no phlebitis  Heme/Onc: no lymphadenopathy   Psychiatric:  awake, alert, appropriate mood    WBC Count: 6.44 K/uL (04-17 @ 05:30)  WBC Count: 5.63 K/uL (04-16 @ 07:30)  WBC Count: 7.53 K/uL (04-15 @ 06:50)  WBC Count: 7.49 K/uL (04-14 @ 18:06)  WBC Count: 8.43 K/uL (04-14 @ 06:00)  WBC Count: 12.06 K/uL (04-13 @ 14:30)                            10.3   6.44  )-----------( 231      ( 17 Apr 2023 05:30 )             30.0       04-17    131<L>  |  100  |  9   ----------------------------<  220<H>  3.5   |  26  |  0.68    Ca    8.1<L>      17 Apr 2023 05:30            Creatinine Trend: 0.68<--, 0.80<--, 0.84<--, 0.88<--, 1.07<--, 1.31<--      MICROBIOLOGY:  v  .Blood Blood-Peripheral  04-15-23   No growth to date.  --  --      .Blood Blood-Peripheral  04-15-23   No growth to date.  --  --      .Tissue left knee c/s #1  04-14-23   Moderate Staphylococcus aureus  See previous culture [id number] for susceptibility  --  Staphylococcus aureus      Joint Fl Joint Fluid  04-13-23   Moderate Staphylococcus aureus  --  Staphylococcus aureus      .Blood Blood-Peripheral  04-13-23   Growth in aerobic and anaerobic bottles: Staphylococcus aureus  See previous culture [id number]  --    Growth in aerobic bottle: Gram Positive Cocci in Clusters      .Blood Blood-Peripheral  04-13-23   Growth in aerobic and anaerobic bottles: Staphylococcus aureus  ***Blood Panel PCR results on this specimen are available  approximately 3 hours after the Gram stain result.***  Gram stain, PCR, and/or culture results may not always  correspond dueto difference in methodologies.  ************************************************************  This PCR assay was performed by multiplex PCR. This  Assay tests for 66 bacterial and resistance gene targets.  Please refer to the [facility] test directory  at [email or web address] for details.  --  Blood Culture PCR  Staphylococcus aureus    C-Reactive Protein, Serum: 226 (04-17)  C-Reactive Protein, Serum: 282 (04-15)  C-Reactive Protein, Serum: 326 (04-13)    SARS-CoV-2 Result: NotDetec (04-13-23 @ 14:30)    RADIOLOGY:

## 2023-04-18 ENCOUNTER — TRANSCRIPTION ENCOUNTER (OUTPATIENT)
Age: 63
End: 2023-04-18

## 2023-04-18 VITALS
TEMPERATURE: 100 F | RESPIRATION RATE: 18 BRPM | SYSTOLIC BLOOD PRESSURE: 149 MMHG | OXYGEN SATURATION: 99 % | DIASTOLIC BLOOD PRESSURE: 90 MMHG | HEART RATE: 78 BPM

## 2023-04-18 LAB
ANION GAP SERPL CALC-SCNC: 5 MMOL/L — SIGNIFICANT CHANGE UP (ref 5–17)
BUN SERPL-MCNC: 10 MG/DL — SIGNIFICANT CHANGE UP (ref 7–23)
CALCIUM SERPL-MCNC: 7.9 MG/DL — LOW (ref 8.5–10.1)
CHLORIDE SERPL-SCNC: 103 MMOL/L — SIGNIFICANT CHANGE UP (ref 96–108)
CO2 SERPL-SCNC: 25 MMOL/L — SIGNIFICANT CHANGE UP (ref 22–31)
CREAT SERPL-MCNC: 0.6 MG/DL — SIGNIFICANT CHANGE UP (ref 0.5–1.3)
EGFR: 108 ML/MIN/1.73M2 — SIGNIFICANT CHANGE UP
FLUAV AG NPH QL: SIGNIFICANT CHANGE UP
FLUBV AG NPH QL: SIGNIFICANT CHANGE UP
GLUCOSE BLDC GLUCOMTR-MCNC: 226 MG/DL — HIGH (ref 70–99)
GLUCOSE BLDC GLUCOMTR-MCNC: 226 MG/DL — HIGH (ref 70–99)
GLUCOSE BLDC GLUCOMTR-MCNC: 242 MG/DL — HIGH (ref 70–99)
GLUCOSE BLDC GLUCOMTR-MCNC: 258 MG/DL — HIGH (ref 70–99)
GLUCOSE SERPL-MCNC: 247 MG/DL — HIGH (ref 70–99)
HCT VFR BLD CALC: 30.3 % — LOW (ref 39–50)
HGB BLD-MCNC: 10.3 G/DL — LOW (ref 13–17)
MCHC RBC-ENTMCNC: 30 PG — SIGNIFICANT CHANGE UP (ref 27–34)
MCHC RBC-ENTMCNC: 34 G/DL — SIGNIFICANT CHANGE UP (ref 32–36)
MCV RBC AUTO: 88.3 FL — SIGNIFICANT CHANGE UP (ref 80–100)
NRBC # BLD: 0 /100 WBCS — SIGNIFICANT CHANGE UP (ref 0–0)
PLATELET # BLD AUTO: 271 K/UL — SIGNIFICANT CHANGE UP (ref 150–400)
POTASSIUM SERPL-MCNC: 3.6 MMOL/L — SIGNIFICANT CHANGE UP (ref 3.5–5.3)
POTASSIUM SERPL-SCNC: 3.6 MMOL/L — SIGNIFICANT CHANGE UP (ref 3.5–5.3)
RBC # BLD: 3.43 M/UL — LOW (ref 4.2–5.8)
RBC # FLD: 14 % — SIGNIFICANT CHANGE UP (ref 10.3–14.5)
SARS-COV-2 RNA SPEC QL NAA+PROBE: SIGNIFICANT CHANGE UP
SODIUM SERPL-SCNC: 133 MMOL/L — LOW (ref 135–145)
WBC # BLD: 8.33 K/UL — SIGNIFICANT CHANGE UP (ref 3.8–10.5)
WBC # FLD AUTO: 8.33 K/UL — SIGNIFICANT CHANGE UP (ref 3.8–10.5)

## 2023-04-18 PROCEDURE — 99232 SBSQ HOSP IP/OBS MODERATE 35: CPT

## 2023-04-18 PROCEDURE — 99232 SBSQ HOSP IP/OBS MODERATE 35: CPT | Mod: FS

## 2023-04-18 PROCEDURE — 36573 INSJ PICC RS&I 5 YR+: CPT

## 2023-04-18 RX ORDER — LOSARTAN POTASSIUM 100 MG/1
1 TABLET, FILM COATED ORAL
Qty: 0 | Refills: 0 | DISCHARGE
Start: 2023-04-18

## 2023-04-18 RX ORDER — CEFAZOLIN SODIUM 1 G
2 VIAL (EA) INJECTION
Qty: 222 | Refills: 0
Start: 2023-04-18 | End: 2023-05-24

## 2023-04-18 RX ORDER — LANOLIN ALCOHOL/MO/W.PET/CERES
1 CREAM (GRAM) TOPICAL
Qty: 0 | Refills: 0 | DISCHARGE
Start: 2023-04-18

## 2023-04-18 RX ORDER — CHLORHEXIDINE GLUCONATE 213 G/1000ML
1 SOLUTION TOPICAL
Refills: 0 | Status: DISCONTINUED | OUTPATIENT
Start: 2023-04-18 | End: 2023-04-18

## 2023-04-18 RX ORDER — BUDESONIDE AND FORMOTEROL FUMARATE DIHYDRATE 160; 4.5 UG/1; UG/1
0 AEROSOL RESPIRATORY (INHALATION)
Qty: 0 | Refills: 0 | DISCHARGE
Start: 2023-04-18

## 2023-04-18 RX ORDER — ZOLPIDEM TARTRATE 10 MG/1
1 TABLET ORAL
Qty: 0 | Refills: 0 | DISCHARGE
Start: 2023-04-18

## 2023-04-18 RX ORDER — PANTOPRAZOLE SODIUM 20 MG/1
1 TABLET, DELAYED RELEASE ORAL
Qty: 0 | Refills: 0 | DISCHARGE
Start: 2023-04-18

## 2023-04-18 RX ORDER — OXYCODONE HYDROCHLORIDE 5 MG/1
1 TABLET ORAL
Qty: 0 | Refills: 0 | DISCHARGE
Start: 2023-04-18

## 2023-04-18 RX ORDER — SODIUM CHLORIDE 9 MG/ML
10 INJECTION INTRAMUSCULAR; INTRAVENOUS; SUBCUTANEOUS
Refills: 0 | Status: DISCONTINUED | OUTPATIENT
Start: 2023-04-18 | End: 2023-04-18

## 2023-04-18 RX ORDER — SERTRALINE 25 MG/1
1 TABLET, FILM COATED ORAL
Qty: 0 | Refills: 0 | DISCHARGE
Start: 2023-04-18

## 2023-04-18 RX ORDER — ALBUTEROL 90 UG/1
2 AEROSOL, METERED ORAL
Qty: 0 | Refills: 0 | DISCHARGE
Start: 2023-04-18

## 2023-04-18 RX ORDER — ZOLPIDEM TARTRATE 10 MG/1
1 TABLET ORAL
Qty: 0 | Refills: 0 | DISCHARGE

## 2023-04-18 RX ORDER — TIOTROPIUM BROMIDE 18 UG/1
2 CAPSULE ORAL; RESPIRATORY (INHALATION)
Qty: 0 | Refills: 0 | DISCHARGE
Start: 2023-04-18

## 2023-04-18 RX ADMIN — Medication 2: at 17:38

## 2023-04-18 RX ADMIN — Medication 2: at 11:51

## 2023-04-18 RX ADMIN — BUPROPION HYDROCHLORIDE 150 MILLIGRAM(S): 150 TABLET, EXTENDED RELEASE ORAL at 11:52

## 2023-04-18 RX ADMIN — TIOTROPIUM BROMIDE 2 PUFF(S): 18 CAPSULE ORAL; RESPIRATORY (INHALATION) at 11:54

## 2023-04-18 RX ADMIN — ALBUTEROL 2 PUFF(S): 90 AEROSOL, METERED ORAL at 17:39

## 2023-04-18 RX ADMIN — Medication 2: at 08:28

## 2023-04-18 RX ADMIN — Medication 2 SPRAY(S): at 08:29

## 2023-04-18 RX ADMIN — SERTRALINE 100 MILLIGRAM(S): 25 TABLET, FILM COATED ORAL at 17:37

## 2023-04-18 RX ADMIN — BUDESONIDE AND FORMOTEROL FUMARATE DIHYDRATE 2 PUFF(S): 160; 4.5 AEROSOL RESPIRATORY (INHALATION) at 17:38

## 2023-04-18 RX ADMIN — Medication 100 MILLIGRAM(S): at 13:49

## 2023-04-18 RX ADMIN — ALBUTEROL 2 PUFF(S): 90 AEROSOL, METERED ORAL at 11:53

## 2023-04-18 RX ADMIN — Medication 1 TABLET(S): at 11:53

## 2023-04-18 RX ADMIN — PANTOPRAZOLE SODIUM 40 MILLIGRAM(S): 20 TABLET, DELAYED RELEASE ORAL at 05:55

## 2023-04-18 RX ADMIN — RIVAROXABAN 15 MILLIGRAM(S): KIT at 17:37

## 2023-04-18 NOTE — PROGRESS NOTE ADULT - PROVIDER SPECIALTY LIST ADULT
Hospitalist
Hospitalist
Orthopedics
Infectious Disease
Internal Medicine
Orthopedics
Orthopedics
Infectious Disease
Internal Medicine

## 2023-04-18 NOTE — DISCHARGE NOTE NURSING/CASE MANAGEMENT/SOCIAL WORK - PATIENT PORTAL LINK FT
You can access the FollowMyHealth Patient Portal offered by [facility] by registering at the following website: [email or web address]. By joining [employer]’s FollowMyHealth portal, you will also be able to view your health information using other applications (apps) compatible with our system.

## 2023-04-18 NOTE — PROGRESS NOTE ADULT - SUBJECTIVE AND OBJECTIVE BOX
Pain is controlled. Pain controlled. Patient denies HA/N/V/CP/SOB. No other complaints. Superficial hemovac discontinued.    Exam:  NAD AAOx3.    LLE  Dressing clean, dry and intact.  SCDs in place.  Deep HMV in place;   Calves are soft and nontender.  +EHL/FHL/TA/GS.  SILT dp/sp/saph/sural  +DP    A/P:  63M, Stable, POD4 sp Left Knee Irrigation and Debridement and polyethylene exchange s/p PJI    -Superficial hemovac DCd 4/16  - Deep Hemovac drain present; Please record outpu  -WBAT and KI in place while drains are present   -Aspiration cultures growing MSSA  -Bcx from admission with MSSA awaiting blood culture results from 4/15; will continue blood cultures q48hrs until clear next culture 4/17  -Analgesia  -LLE elevation  -Ice application  -ANCEF q8hr per ID recs; will fu ID with final abx recommendations and need for PICC  -TTE performed which showed no vegetations   -Trend CRP q48hrs; currently improving 326>282  -DVT PE prophylaxis - home Xarleto  -Incentive spirometry  - Dispo: DYLAN pending drain removal, negative blood cultures, and ID final recommendations         Pain is controlled. Pain controlled. Patient denies HA/N/V/CP/SOB. No other complaints. Superficial hemovac discontinued.    Exam:  NAD AAOx3.    LLE  Dressing clean, dry and intact.  SCDs in place.  Deep HMV in place;   Calves are soft and nontender.  +EHL/FHL/TA/GS.  SILT dp/sp/saph/sural  +DP    A/P:  63M, Stable, POD4 sp Left Knee Irrigation and Debridement and polyethylene exchange s/p PJI    -Superficial hemovac DCd 4/16  - Deep Hemovac DC'd 4/18  -WBAT and KI in place   -Aspiration cultures growing MSSA  -Bcx from admission with MSSA; BCx (4/15) with NGTD, will continue blood cultures q48hrs until clear next culture 4/17  -Analgesia  -LLE elevation  -Ice application  -ANCEF q8hr per ID recs; will fu ID with final abx recommendations and need for PICC  - PICC placement on 4/19 Per ID  -TTE performed which showed no vegetations   -Trend CRP q48hrs; currently improving 326>282  -DVT PE prophylaxis - home Xarleto  -Incentive spirometry  - Dispo: DYLAN pending drain removal, negative blood cultures, and ID final recommendations

## 2023-04-18 NOTE — PROGRESS NOTE ADULT - SUBJECTIVE AND OBJECTIVE BOX
SKYLAR MCCULLOUGH  MRN-[id number]    Follow Up:  PJI, bacteremia     Interval History: the pt was seen and examined earlier, no acute distress, no new complaints, afebrile, RA, no WBC.     PAST MEDICAL & SURGICAL HISTORY:  Diabetes Mellitus  diagnosed 10 years ago  doesn't do fingersticks      Hypertension, Essential      Hyperlipidemia      GERD (gastroesophageal reflux disease)      Depression      Obese      Bulging disc      Spinal stenosis      Spondylisthesis      Sleep apnea  had test > 10 years ago--supposed to wear device.  Lost 25 pounds but never retested      Anxiety      Insomnia      Varicosities      DVT (deep venous thrombosis)      R Knee Arthroplasty  Right 2003, Left 2011,      H/O laser iridotomy  left-2008      Reflux  Gastroscopy 2013      Deep vein thrombosis (DVT) of lower extremity          ROS:    [ ] Unobtainable because:  [x ] All other systems negative    Constitutional: no fever, no chills  Head: no trauma  Eyes: no vision changes, no eye pain  ENT:  no sore throat, no rhinorrhea  Cardiovascular:  no chest pain, no palpitation  Respiratory:  no SOB, no cough  GI:  no abd pain, no vomiting, no diarrhea  urinary: no dysuria, no hematuria, no flank pain  musculoskeletal:  left knee pain is controlled   skin:  no rash  neurology:  no headache, no seizure, no change in mental status  psych: no anxiety, no depression         Allergies  No Known Allergies        ANTIMICROBIALS:  ceFAZolin   IVPB 2000 every 8 hours      OTHER MEDS:  acetaminophen     Tablet .. 650 milliGRAM(s) Oral every 6 hours PRN  acetaminophen     Tablet .. 650 milliGRAM(s) Oral every 6 hours PRN  acetaminophen   IVPB .. 1000 milliGRAM(s) IV Intermittent once  albuterol    90 MICROgram(s) HFA Inhaler 2 Puff(s) Inhalation every 6 hours  aluminum hydroxide/magnesium hydroxide/simethicone Suspension 30 milliLiter(s) Oral every 4 hours PRN  benzocaine/menthol Lozenge 1 Lozenge Oral three times a day PRN  budesonide 160 MICROgram(s)/formoterol 4.5 MICROgram(s) Inhaler 2 Puff(s) Inhalation two times a day  buPROPion XL (24-Hour) . 150 milliGRAM(s) Oral daily  chlorhexidine 2% Cloths 1 Application(s) Topical <User Schedule>  dextrose 50% Injectable 25 Gram(s) IV Push once  fluticasone propionate 50 MICROgram(s)/spray Nasal Spray 2 Spray(s) Both Nostrils <User Schedule>  insulin lispro (ADMELOG) corrective regimen sliding scale   SubCutaneous at bedtime  insulin lispro (ADMELOG) corrective regimen sliding scale   SubCutaneous three times a day before meals  losartan 100 milliGRAM(s) Oral daily  melatonin 3 milliGRAM(s) Oral at bedtime  multivitamin 1 Tablet(s) Oral daily  oxyCODONE    IR 10 milliGRAM(s) Oral every 4 hours PRN  oxyCODONE    IR 5 milliGRAM(s) Oral every 4 hours PRN  pantoprazole    Tablet 40 milliGRAM(s) Oral before breakfast  rivaroxaban 15 milliGRAM(s) Oral two times a day  sertraline 100 milliGRAM(s) Oral two times a day  sodium chloride 0.9% lock flush 10 milliLiter(s) IV Push every 1 hour PRN  sodium chloride 0.9%. 1000 milliLiter(s) IV Continuous <Continuous>  sodium chloride 0.9%. 1000 milliLiter(s) IV Continuous <Continuous>  tiotropium 2.5 MICROgram(s) Inhaler 2 Puff(s) Inhalation daily  zolpidem 5 milliGRAM(s) Oral at bedtime PRN      Vital Signs Last 24 Hrs  T(C): 36.7 (18 Apr 2023 10:52), Max: 37.2 (18 Apr 2023 04:50)  T(F): 98 (18 Apr 2023 10:52), Max: 99 (18 Apr 2023 04:50)  HR: 78 (18 Apr 2023 10:52) (61 - 85)  BP: 151/79 (18 Apr 2023 10:52) (122/64 - 151/79)  BP(mean): --  RR: 18 (18 Apr 2023 10:52) (18 - 18)  SpO2: 94% (18 Apr 2023 10:52) (93% - 98%)    Parameters below as of 18 Apr 2023 10:52  Patient On (Oxygen Delivery Method): room air        Physical Exam:  Constitutional: non-toxic, no distress  HEAD/EYES: anicteric, no conjunctival injection  ENT:  supple, no thrush  Cardiovascular:   normal S1, S2, no murmur, no edema  Respiratory:  clear BS bilaterally, no wheezes, no rales  GI:  soft, non-tender, normal bowel sounds  :  no benton, no CVA tenderness  Musculoskeletal:  left knee wrapped, brace is on, drain removed   Neurologic: awake and alert, normal strength, no focal findings  Skin:  no rash, no erythema, no phlebitis  Heme/Onc: no lymphadenopathy   Psychiatric:  awake, alert, appropriate mood    WBC Count: 8.33 K/uL (04-18 @ 07:38)  WBC Count: 6.44 K/uL (04-17 @ 05:30)  WBC Count: 5.63 K/uL (04-16 @ 07:30)  WBC Count: 7.53 K/uL (04-15 @ 06:50)  WBC Count: 7.49 K/uL (04-14 @ 18:06)  WBC Count: 8.43 K/uL (04-14 @ 06:00)  WBC Count: 12.06 K/uL (04-13 @ 14:30)                            10.3   8.33  )-----------( 271      ( 18 Apr 2023 07:38 )             30.3       04-18    133<L>  |  103  |  10  ----------------------------<  247<H>  3.6   |  25  |  0.60    Ca    7.9<L>      18 Apr 2023 07:38            Creatinine Trend: 0.60<--, 0.68<--, 0.80<--, 0.84<--, 0.88<--, 1.07<--      MICROBIOLOGY:  v  .Blood Blood  04-17-23   No growth to date.  --  --      .Blood Blood  04-17-23   No growth to date.  --  --      .Blood Blood-Peripheral  04-15-23   No growth to date.  --  --      .Blood Blood-Peripheral  04-15-23   No growth to date.  --  --      .Tissue left knee c/s #1  04-14-23   Moderate Staphylococcus aureus  See previous culture [id number] for susceptibility  --  Staphylococcus aureus      Joint Fl Joint Fluid  04-13-23   Moderate Staphylococcus aureus  --  Staphylococcus aureus      .Blood Blood-Peripheral  04-13-23   Growth in aerobic and anaerobic bottles: Staphylococcus aureus  See previous culture [id number]  --    Growth in aerobic bottle: Gram Positive Cocci in Clusters      .Blood Blood-Peripheral  04-13-23   Growth in aerobic and anaerobic bottles: Staphylococcus aureus  ***Blood Panel PCR results on this specimen are available  approximately 3 hours after the Gram stain result.***  Gram stain, PCR, and/or culture results may not always  correspond dueto difference in methodologies.  ************************************************************  This PCR assay was performed by multiplex PCR. This  Assay tests for 66 bacterial and resistance gene targets.  Please refer to the [facility] test directory  at [email or web address] for details.  --  Blood Culture PCR  Staphylococcus aureus    C-Reactive Protein, Serum: 226 (04-17)  C-Reactive Protein, Serum: 282 (04-15)  C-Reactive Protein, Serum: 326 (04-13)    SARS-CoV-2 Result: NotDetec (04-18-23 @ 12:20)      RADIOLOGY:

## 2023-04-18 NOTE — PROGRESS NOTE ADULT - ASSESSMENT
63M with MSSA (methicillin-susceptible Staphylococcus aureus) bacteremia and PJI   wbc from arthrocentesis >20k   blood cultures with MSSA (methicillin-susceptible Staphylococcus aureus)    4/17: still having elevated temperatures, 102.7 yesterday evening, Low grade temp this morning, RA, no WBC, Cr ok, repeat BCs with no growth to date, initial BCs and knee culture grew MSSA, TTE - no vegetation, Cefazolin IV continued.  Two more sets of BCs were collected and pending result. Once new two sets of BCs are back and negative x 48 hrs, ok to place a picc line, pt will need six weeks of iv abx.   Attending addendum:  agree with above  continue antibiotics   follow all cultures   eventual picc line for long term antibiotics   spoke to ortho attending and patients wife regarding all updates and potential plans    4/18: no fever, RA, no wbc, Cr ok, repeat BCs remain with no growth, all culture data reviewed, two more sets of BCs are pending result and if negative, ok to place a picc line. Rx for long term IV abx provided to the , discussed the plan with the pt.     MSSA (methicillin-susceptible Staphylococcus aureus) bacteremia  PJI   Fever     Plan:  MSSA bacteremia associated with prosthetic  joint infection   continue Cefazolin 2g q8hrs    Repeat blood cx q48hrs until clear  F/u blood cx, two more sets are pending result   TTE - negative for IE  Will eventually need PICC line and long term antibiotics - if two last sets of BCs with no growth, ok to place a picc line tomorrow 4/19  follow cultures from arthrocentesis and from OR   refrain from using rifampin for now->will interact with xarelto  trend esr and crp    When ready for discharge:  Place a picc line tomorrow 4/19 if last two sets of BCs with no growth, pending   continue cefazolin IV 2 g q 8 hrs - tentative last dose 5/26/2023 - Rx created   weekly lab work: cbc with diff, cmp, esr, crp - fax to Dr. Bay at [phone]  follow up with [address] [place] [address]  [phone]  remove picc line upon completion of the course of abx     Discussed with Dr. Bay   Discussed with the pt  Discussed with the       63M with MSSA (methicillin-susceptible Staphylococcus aureus) bacteremia and PJI   wbc from arthrocentesis >20k   blood cultures with MSSA (methicillin-susceptible Staphylococcus aureus)    4/17: still having elevated temperatures, 102.7 yesterday evening, Low grade temp this morning, RA, no WBC, Cr ok, repeat BCs with no growth to date, initial BCs and knee culture grew MSSA, TTE - no vegetation, Cefazolin IV continued.  Two more sets of BCs were collected and pending result. Once new two sets of BCs are back and negative x 48 hrs, ok to place a picc line, pt will need six weeks of iv abx.   Attending addendum:  agree with above  continue antibiotics   follow all cultures   eventual picc line for long term antibiotics   spoke to ortho attending and patients wife regarding all updates and potential plans    4/18: no fever, RA, no wbc, Cr ok, repeat BCs remain with no growth, all culture data reviewed, two more sets of BCs are thus far negative, picc line was placed today Rx for long term IV abx provided to the , discussed the plan with the pt.     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 Bay at [phone]  follow up with [address] [place] [address]  [phone]      Discussed with Dr. Bay   Discussed with the pt  Discussed with the

## 2023-04-18 NOTE — DISCHARGE NOTE NURSING/CASE MANAGEMENT/SOCIAL WORK - NSDCPEFALRISK_GEN_ALL_CORE
Patient resting in bed, remains free of falls and injuries this shift. VSS. No obvious s/sx of distress noted. Pain managed with PRN medications. Wound care performed as ordered. Special contact precautions remain in place as ordered. POC reviewed with the patient, verbalized understanding. No further needs at this time, call light within reach. Continue POC as ordered, chart check completed and all orders reviewed.    For information on Fall & Injury Prevention, visit: [email or web address] OR  [email or web address] OR  https://www.cdc.gov/steadi/patient.html

## 2023-04-18 NOTE — PROGRESS NOTE ADULT - SUBJECTIVE AND OBJECTIVE BOX
Patient seen and examined  no overnight events  vitals stable  LOW GRADE temp 99  on IV abx  ID following'  Repeat bcx x 2 ordered  needs PICC    Review of Systems:  General:denies fever chills, headache, weakness  HEENT: denies blurry vision,diffculty swallowing, difficulty hearing, tinnitus  Cardiovascular: denies chest pain  ,palpitations  Pulmonary:denies shortness of breath, cough, wheezing, hemoptysis  Gastrointestinal: denies abdominal pain, constipation, diarrhea,nausea , vomiting, hematochezia  : denies hematuria, dysuria, or incontinence  Neurological: denies weakness, numbness , tingling, dizziness, tremors  MSK: denies muscle pain, difficulty ambulating, swelling, back pain  skin: denies skin rash, itching, burning, or  skin lesions  Psychiatrical: denies mood disturbances, anxierty, feeling depressed, depression , or difficulty sleeping    Objective:  Vitals  T(C): 37.2 (04-18-23 @ 04:50), Max: 37.2 (04-17-23 @ 16:13)  HR: 77 (04-18-23 @ 04:50) (61 - 86)  BP: 122/64 (04-18-23 @ 04:50) (122/64 - 133/69)  RR: 18 (04-18-23 @ 04:50) (18 - 18)  SpO2: 93% (04-18-23 @ 04:50) (93% - 98%)    Physical Exam:  General: comfortable, no acute distress  HEENT: Atraumatic, no LAD, trachea midline, PERRLA  Cardiovascular: normal s1s2, no murmurs, gallops or fricition rubs  Pulmonary: clear to ausculation Bilaterally, no wheezing , rhonchi  Gastrointestinal: soft non tender non distended, no masses felt, no organomegally  Muscloskeletal: no lower extremity edema, intact bilateral lower extremity pulses  Neurological: CN II-12 intact. No focal weakness  Psychiatrical: normal mood, cooperative  SKIN: no rash, lesions or ulcers    Labs:                          10.3   8.33  )-----------( 271      ( 18 Apr 2023 07:38 )             30.3     04-18    133<L>  |  103  |  10  ----------------------------<  247<H>  3.6   |  25  |  0.60    Ca    7.9<L>      18 Apr 2023 07:38              Active Medications  MEDICATIONS  (STANDING):  acetaminophen   IVPB .. 1000 milliGRAM(s) IV Intermittent once  albuterol    90 MICROgram(s) HFA Inhaler 2 Puff(s) Inhalation every 6 hours  budesonide 160 MICROgram(s)/formoterol 4.5 MICROgram(s) Inhaler 2 Puff(s) Inhalation two times a day  buPROPion XL (24-Hour) . 150 milliGRAM(s) Oral daily  ceFAZolin   IVPB 2000 milliGRAM(s) IV Intermittent every 8 hours  dextrose 50% Injectable 25 Gram(s) IV Push once  fluticasone propionate 50 MICROgram(s)/spray Nasal Spray 2 Spray(s) Both Nostrils <User Schedule>  insulin lispro (ADMELOG) corrective regimen sliding scale   SubCutaneous at bedtime  insulin lispro (ADMELOG) corrective regimen sliding scale   SubCutaneous three times a day before meals  losartan 100 milliGRAM(s) Oral daily  melatonin 3 milliGRAM(s) Oral at bedtime  multivitamin 1 Tablet(s) Oral daily  pantoprazole    Tablet 40 milliGRAM(s) Oral before breakfast  rivaroxaban 15 milliGRAM(s) Oral two times a day  sertraline 100 milliGRAM(s) Oral two times a day  sodium chloride 0.9%. 1000 milliLiter(s) (125 mL/Hr) IV Continuous <Continuous>  sodium chloride 0.9%. 1000 milliLiter(s) (125 mL/Hr) IV Continuous <Continuous>  tiotropium 2.5 MICROgram(s) Inhaler 2 Puff(s) Inhalation daily    MEDICATIONS  (PRN):  acetaminophen     Tablet .. 650 milliGRAM(s) Oral every 6 hours PRN Temp greater or equal to 38C (100.4F), Mild Pain (1 - 3)  acetaminophen     Tablet .. 650 milliGRAM(s) Oral every 6 hours PRN Temp greater or equal to 38C (100.4F), Mild Pain (1 - 3)  aluminum hydroxide/magnesium hydroxide/simethicone Suspension 30 milliLiter(s) Oral every 4 hours PRN Dyspepsia  benzocaine/menthol Lozenge 1 Lozenge Oral three times a day PRN Sore Throat  oxyCODONE    IR 10 milliGRAM(s) Oral every 4 hours PRN Severe Pain (7 - 10)  oxyCODONE    IR 5 milliGRAM(s) Oral every 4 hours PRN Moderate Pain (4 - 6)  zolpidem 5 milliGRAM(s) Oral at bedtime PRN Insomnia

## 2023-04-18 NOTE — PROGRESS NOTE ADULT - NS ATTEND AMEND GEN_ALL_CORE FT
agree with above  continue antibiotics   follow all cultures   eventual picc line for long term antibiotics   spoke to ortho attending and patients wife regarding all updates and potential plans    Glenn Bay, DO  Infectious Disease Attending  Reachable via [employer] Teams or ID office: [phone]  After 5pm/weekends please call [phone] for all inquiries and new consults
agree with above  cultures negative and afebrile   picc line for long term antibiotics   cefazolin for 6 weeks minimum   may need suppressive therapy post IV antibiotics   needs to follow up in our ID office ideally within the next 4 weeks     Discussed with medical team     Glenn Bay,   Infectious Disease Attending  Reachable via [employer] Teams or ID office: [phone]  After 5pm/weekends please call [phone] for all inquiries and new consults

## 2023-04-20 LAB
AMPHET UR-MCNC: NEGATIVE NG/ML — SIGNIFICANT CHANGE UP
BARBITURATES UR QL SCN: NEGATIVE NG/ML — SIGNIFICANT CHANGE UP
BARBITURATES UR-MCNC: NEGATIVE NG/ML — SIGNIFICANT CHANGE UP
BENZODIAZ UR-MCNC: NEGATIVE NG/ML — SIGNIFICANT CHANGE UP
COCAINE METAB.OTHER UR-MCNC: NEGATIVE NG/ML — SIGNIFICANT CHANGE UP
CREATININE, URINE THERAPEUTIC: 41.3 MG/DL — SIGNIFICANT CHANGE UP (ref 20–300)
CULTURE RESULTS: SIGNIFICANT CHANGE UP
CULTURE RESULTS: SIGNIFICANT CHANGE UP
FENTANYL RESULT, UR: NEGATIVE NG/ML — SIGNIFICANT CHANGE UP
FENTANYL UR QL SCN: NEGATIVE NG/ML — SIGNIFICANT CHANGE UP
Lab: SIGNIFICANT CHANGE UP
METHADONE UR-MCNC: NEGATIVE NG/ML — SIGNIFICANT CHANGE UP
OPIATES UR-MCNC: NEGATIVE NG/ML — SIGNIFICANT CHANGE UP
OXYCODONE UR QL SCN: SIGNIFICANT CHANGE UP NG/ML
PCP UR-MCNC: NEGATIVE NG/ML — SIGNIFICANT CHANGE UP
PH, URINE RESULT: 5.4 — SIGNIFICANT CHANGE UP (ref 4.5–8.9)
SPECIMEN SOURCE: SIGNIFICANT CHANGE UP
SPECIMEN SOURCE: SIGNIFICANT CHANGE UP
THC UR QL: NEGATIVE NG/ML — SIGNIFICANT CHANGE UP

## 2023-04-22 DIAGNOSIS — Z20.822 CONTACT WITH AND (SUSPECTED) EXPOSURE TO COVID-19: ICD-10-CM

## 2023-04-22 DIAGNOSIS — E87.6 HYPOKALEMIA: ICD-10-CM

## 2023-04-22 DIAGNOSIS — F41.9 ANXIETY DISORDER, UNSPECIFIED: ICD-10-CM

## 2023-04-22 DIAGNOSIS — G47.00 INSOMNIA, UNSPECIFIED: ICD-10-CM

## 2023-04-22 DIAGNOSIS — T84.54XA INFECTION AND INFLAMMATORY REACTION DUE TO INTERNAL LEFT KNEE PROSTHESIS, INITIAL ENCOUNTER: ICD-10-CM

## 2023-04-22 DIAGNOSIS — Z86.718 PERSONAL HISTORY OF OTHER VENOUS THROMBOSIS AND EMBOLISM: ICD-10-CM
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
## 2023-04-22 DIAGNOSIS — Z79.01 LONG TERM (CURRENT) USE OF ANTICOAGULANTS: ICD-10-CM

## 2023-04-22 DIAGNOSIS — E11.9 TYPE 2 DIABETES MELLITUS WITHOUT COMPLICATIONS: ICD-10-CM

## 2023-04-22 DIAGNOSIS — Z82.61 FAMILY HISTORY OF ARTHRITIS: ICD-10-CM

## 2023-04-22 DIAGNOSIS — I10 ESSENTIAL (PRIMARY) HYPERTENSION: ICD-10-CM

## 2023-04-22 DIAGNOSIS — R78.81 BACTEREMIA: ICD-10-CM

## 2023-04-22 DIAGNOSIS — B95.61 METHICILLIN SUSCEPTIBLE STAPHYLOCOCCUS AUREUS INFECTION AS THE CAUSE OF DISEASES CLASSIFIED ELSEWHERE: ICD-10-CM

## 2023-04-22 DIAGNOSIS — Z80.7 FAMILY HISTORY OF OTHER MALIGNANT NEOPLASMS OF LYMPHOID, HEMATOPOIETIC AND RELATED TISSUES: ICD-10-CM
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
## 2023-04-22 DIAGNOSIS — Z79.84 LONG TERM (CURRENT) USE OF ORAL HYPOGLYCEMIC DRUGS: ICD-10-CM

## 2023-04-22 DIAGNOSIS — N17.9 ACUTE KIDNEY FAILURE, UNSPECIFIED: ICD-10-CM

## 2023-04-22 DIAGNOSIS — Z82.5 FAMILY HISTORY OF ASTHMA AND OTHER CHRONIC LOWER RESPIRATORY DISEASES: ICD-10-CM

## 2023-04-22 DIAGNOSIS — Z82.49 FAMILY HISTORY OF ISCHEMIC HEART DISEASE AND OTHER DISEASES OF THE CIRCULATORY SYSTEM: ICD-10-CM

## 2023-04-22 DIAGNOSIS — Z87.891 PERSONAL HISTORY OF NICOTINE DEPENDENCE: ICD-10-CM

## 2023-04-22 LAB
CULTURE RESULTS: SIGNIFICANT CHANGE UP
CULTURE RESULTS: SIGNIFICANT CHANGE UP
SPECIMEN SOURCE: SIGNIFICANT CHANGE UP
SPECIMEN SOURCE: SIGNIFICANT CHANGE UP

## 2023-04-24 ENCOUNTER — OUTPATIENT (OUTPATIENT)
Dept: OUTPATIENT SERVICES | Facility: HOSPITAL | Age: 63
LOS: 1 days | Discharge: HOME | End: 2023-04-24
Payer: MEDICARE

## 2023-04-24 DIAGNOSIS — I82.409 ACUTE EMBOLISM AND THROMBOSIS OF UNSPECIFIED DEEP VEINS OF UNSPECIFIED LOWER EXTREMITY: Chronic | ICD-10-CM

## 2023-04-24 DIAGNOSIS — I82.402 ACUTE EMBOLISM AND THROMBOSIS OF UNSPECIFIED DEEP VEINS OF LEFT LOWER EXTREMITY: ICD-10-CM

## 2023-04-24 PROCEDURE — 93971 EXTREMITY STUDY: CPT | Mod: 26

## 2023-04-27 LAB
CULTURE RESULTS: SIGNIFICANT CHANGE UP
GRAM STN FLD: SIGNIFICANT CHANGE UP
ORGANISM # SPEC MICROSCOPIC CNT: SIGNIFICANT CHANGE UP
ORGANISM # SPEC MICROSCOPIC CNT: SIGNIFICANT CHANGE UP
SPECIMEN SOURCE: SIGNIFICANT CHANGE UP

## 2023-05-15 ENCOUNTER — EMERGENCY (EMERGENCY)
Facility: HOSPITAL | Age: 63
LOS: 0 days | Discharge: HOME | End: 2023-05-15
Attending: EMERGENCY MEDICINE
Payer: MEDICARE

## 2023-05-15 VITALS
SYSTOLIC BLOOD PRESSURE: 126 MMHG | HEART RATE: 88 BPM | RESPIRATION RATE: 17 BRPM | TEMPERATURE: 98 F | DIASTOLIC BLOOD PRESSURE: 83 MMHG | HEIGHT: 71 IN | OXYGEN SATURATION: 97 %

## 2023-05-15 DIAGNOSIS — I82.409 ACUTE EMBOLISM AND THROMBOSIS OF UNSPECIFIED DEEP VEINS OF UNSPECIFIED LOWER EXTREMITY: Chronic | ICD-10-CM

## 2023-05-15 DIAGNOSIS — Z79.01 LONG TERM (CURRENT) USE OF ANTICOAGULANTS: ICD-10-CM

## 2023-05-15 DIAGNOSIS — Z87.39 PERSONAL HISTORY OF OTHER DISEASES OF THE MUSCULOSKELETAL SYSTEM AND CONNECTIVE TISSUE: ICD-10-CM

## 2023-05-15 DIAGNOSIS — Z00.8 ENCOUNTER FOR OTHER GENERAL EXAMINATION: ICD-10-CM

## 2023-05-15 PROCEDURE — 99283 EMERGENCY DEPT VISIT LOW MDM: CPT | Mod: FS

## 2023-05-15 NOTE — ED ADULT NURSE NOTE - OBJECTIVE STATEMENT
BIBA for evaluation of single lumen PICC line on upper left arm. Takes IV antibiotics for infection from knee surgery.

## 2023-05-15 NOTE — ED PROVIDER NOTE - OBJECTIVE STATEMENT
64 y/o M with recent knee infection on iv abx via picc presents for eval of picc line s/p visiting rn said it wasn't pulling back and that she wasn't sure if it was working.  no complaints.

## 2023-05-15 NOTE — ED PROVIDER NOTE - CLINICAL SUMMARY MEDICAL DECISION MAKING FREE TEXT BOX
concern for picc malfunction--> no evidence of picc malfunction.   Reviewed  necessity for follow up. Counseled on red flags and to return for them.  Patient appears well on discharge.

## 2023-05-15 NOTE — ED PROVIDER NOTE - PATIENT PORTAL LINK FT
You can access the FollowMyHealth Patient Portal offered by [facility] by registering at the following website: [email or web address]. By joining [facility]’s FollowMyHealth portal, you will also be able to view your health information using other applications (apps) compatible with our system.

## 2023-05-15 NOTE — ED PROVIDER NOTE - PHYSICAL EXAMINATION
PHYSICAL EXAM:    GENERAL: Alert, appears stated age, well appearing, non-toxic  SKIN: Warm, pink and dry. MMM.   HEAD: NC, AT   EYE: Normal lids/conjunctiva  ENT: Normal hearing, patent oropharynx  Pulm: Bilateral BS, normal resp effort, no wheezes, stridor, or retractions  CV: RRR, no M/R/G, 2+and = radial pulses  Mskel: no erythema, cyanosis, edema. no calf tenderness L arm PICC in place without redness/swelling/warmth/evidence of infiltration of fluid.   Neuro: AAOx3, normal gait.

## 2023-05-15 NOTE — ED PROVIDER NOTE - NS ED ROS FT
Review of Systems    Constitutional: (-) fever  Cardiovascular: (-) chest pain, (-) syncope (-) palpitations  Respiratory: (-) cough, (-) shortness of breath  Gastrointestinal: (-) vomiting, (-) abdominal pain  Genitourinary:  (-) dysuria   Musculoskeletal: (-) neck pain  Integumentary: (-) rash, (-) edema

## 2023-05-15 NOTE — ED PROVIDER NOTE - PROGRESS NOTE DETAILS
RICHARD COLUNGA: unclamped picc and able to pull fluid back. no pain on flushing line.  also under POCUS, able to see agitated saline flushing into appropriate vessel.

## 2023-05-15 NOTE — ED ADULT TRIAGE NOTE - CHIEF COMPLAINT QUOTE
BIBA for evaluation of PICC line on left arm. Takes IV antibiotics for infection from knee surgery. Denies fever or chills. PMH DM, COPD, GERD, asthma

## 2023-05-15 NOTE — ED ADULT NURSE NOTE - NSFALLRISKINTERV_ED_ALL_ED

## 2023-05-15 NOTE — ED PROVIDER NOTE - ATTENDING APP SHARED VISIT CONTRIBUTION OF CARE
This patient is a 63 year old man hx of right knee infection currently receiving abx through PICC line sent to the ER by his home nurse.  Patient states that he has been managing his medications on his own and administering his abx through the PICC line daily.  Today was the first day that the nurse came to the home and she states that although it was flushing she was unable to get pull back of blood from the line.  The nurse told the patient he needed to come back to the ER and have a new PICC line placed.  Patient has no complaints no arm pain.    PICC line placement verified to be in place by ultrasound.  Drawback of blood was achieved without difficulty twice from the line.  Patient was shown that the clamps need to be unlocked.  Patient discharged.

## 2023-05-15 NOTE — ED PROVIDER NOTE - IV ALTEPLASE EXCL REL HIDDEN
2020       Titi Holm DO  [address] [facility] [address]  VIA In Basket      Patient: Rocco Felder   YOB: 1953   Date of Visit: 2020       Dear Dr. Holm:    Thank you for referring Rocco Felder to me for evaluation. Below are my notes for this visit with him.    If you have questions, please do not hesitate to call me. I look forward to following your patient along with you.      Sincerely,        Nando Galan MD        CC: No Recipients  Nando Galan MD  2020  2:33 PM  Sign when Signing Visit  Patient Name: Rocco Felder  MRN: [id number]  : 1953    Referring Provider  Titi Holm DO    HPI:   This 66 -year-old male underwent radiofrequency cavotricuspid isthmus ablation on 18. Typical atrial flutter terminated during the ablation and bidirectional block was established across the isthmus, and block persisted despite an isoproterenol infusion. He has been diagnosed with sleep apnea, and he uses a BiPAP mask.  He is is less fatigued during the day since beginning that therapy.  He is now being treated for diabetes.  He is not physically active.  He is anticoagulated.  He denies excess bleeding, falling, palpitations, chest pain, lightheadedness or dyspnea.   An EKG obtained on 16 revealed normal sinus rhythm. A routine EKG obtained on 17 revealed typical atrial flutter with a ventricular response of 125 beats per minute. He was placed on apixaban and metoprolol. Another EKG obtained on 18 revealed typical atrial flutter with a ventricular response of 96 beats per minute. A TTE obtained on 18 revealed an ejection fraction of a 55% with a M-mode left atrial diameter of 5.67 cm, mild TR and mild MR. He drinks approximately 2 beers per day.     Coronary risk factors: Hypertension, diabetes, and hyperlipidemia.     Electrophysiology family history: Negative     Social history: . Children. 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 Nonsmoker.     Lab Results   Component Value Date    WBC 6.7 12/16/2019    HGB 13.6 12/16/2019    HCT 42.5 12/16/2019     12/16/2019    MCV 96.4 12/16/2019     Lab Results   Component Value Date    CO2 28 12/16/2019    BUN 24 (H) 12/16/2019    GLUCOSE 150 (H) 12/16/2019     Lab Results   Component Value Date    AST 17 12/16/2019     No results found for: PT, INR  No results found for: FERRITIN, TIBC, IRON  Lab Results   Component Value Date    TSH 1.254 12/11/2018     No components found for: A1C    Review of Systems:   Review of Systems   Constitutional: Negative.    HENT: Negative.    Eyes: Negative.    Respiratory: Negative.    Cardiovascular:        As per HPI   Gastrointestinal: Negative.    Endocrine: Negative.    Genitourinary: Negative.    Musculoskeletal: Negative.    Skin: Negative.    Allergic/Immunologic: Negative.    Neurological: Negative.    Hematological: Negative.    Psychiatric/Behavioral: Negative.        Physical Examination:   There were no vitals filed for this visit.   Physical Exam   Constitutional: He is oriented to person, place, and time. He appears well-developed.   Overweight   HENT:   Head: Normocephalic and atraumatic.   Right Ear: External ear normal.   Left Ear: External ear normal.   Nose: Nose normal.   Mouth/Throat: Oropharynx is clear and moist.   Eyes: Pupils are equal, round, and reactive to light. Conjunctivae and EOM are normal.   Neck: Normal range of motion. Neck supple.   Cardiovascular: Normal rate, regular rhythm, normal heart sounds and intact distal pulses.   Pulmonary/Chest: Effort normal and breath sounds normal.   Abdominal: Soft. Bowel sounds are normal. Musculoskeletal: Normal range of motion.     Neurological: He is alert and oriented to person, place, and time. He has normal reflexes.   Skin: Skin is warm.   Psychiatric: He has a normal mood and affect. His behavior is normal. Judgment and thought content normal.       Current Outpatient  Medications   Medication Sig Dispense Refill   • atorvastatin (LIPITOR) 40 MG tablet TAKE ONE TABLET BY MOUTH ONE TIME DAILY  30 tablet 4   • apixaBAN (ELIQUIS) 5 MG Tab Take 1 tablet by mouth every 12 hours. 60 tablet 3   • DAVID CONTOUR NEXT TEST test strip Test blood sugar 3 times daily as directed. Diagnosis: E11.9. Meter: Contour Next One 100 strip 11   • Lancets (MICROLET) Misc Use to test blood sugar 3 times per day as directed. 100 each 11   • metFORMIN (GLUCOPHAGE) 500 MG tablet Take 1 tablet by mouth 2 times daily (with meals). 60 tablet 1   • fluticasone (FLONASE) 50 MCG/ACT nasal spray Spray 2 sprays in each nostril daily. 16 g 1   • lisinopril (ZESTRIL) 20 MG tablet TAKE ONE TABLET BY MOUTH ONE TIME DAILY. 30 tablet 4   • hydrochlorothiazide (HYDRODIURIL) 12.5 MG tablet TAKE ONE TABLET BY MOUTH ONE TIME DAILY. 30 tablet 4   • Carboxymethylcellulose Sodium (EYE DROPS OP) Indications: For moisture     • sildenafil (VIAGRA) 50 MG tablet TAKE 1 TABLET DAILY 1 HOUR BEFORE NEEDED     • cetirizine (ZYRTEC) 10 MG tablet nightly.        No current facility-administered medications for this visit.      ALLERGIES:  No Known Allergies  Results for orders placed or performed in visit on 02/27/20   ELECTROCARDIOGRAM 12-LEAD    Impression    Normal sinus rhythm      Assessment/Plan:     No problem-specific Assessment & Plan notes found for this encounter.      Typical atrial flutter (CMS/HCC)   \"Typical\" right atrial flutter is unlikely to recur. Nevertheless, he remains at increased      lifetime risk of developing atrial fibrillation.     At risk for stroke       The patient's [id number] score is \"2\" and he has a very large left atrium. The risk/      benefit favors lifelong anticoagulation. The patient understands that apixaban increases      his bleeding risk.     Essential hypertension  Although his blood pressure is relatively low today, he reports that it is typically normal when checked elsewhere.  He is not  lightheaded.  He is on an ACE inhibitor.     Morbid obesity (CMS/HCC)      Obesity, independent of its association with sleep apnea, increases the risk for atrial      tachyarrhythmias.         Plan:         Diet and exercise are encouraged.     Obstructive sleep apnea       Untreated sleep apnea is a potent risk factor for atrial tachyarrhythmias. Fortunately, he is      now being treated. Weight loss remains encouraged.     Nando Galan MD            show

## 2023-05-16 ENCOUNTER — NON-APPOINTMENT (OUTPATIENT)
Age: 63
End: 2023-05-16

## 2023-05-24 ENCOUNTER — APPOINTMENT (OUTPATIENT)
Dept: ORTHOPEDIC SURGERY | Facility: CLINIC | Age: 63
End: 2023-05-24
Payer: MEDICARE

## 2023-05-24 VITALS — HEIGHT: 71 IN | WEIGHT: 190 LBS | BODY MASS INDEX: 26.6 KG/M2

## 2023-05-24 PROCEDURE — 99215 OFFICE O/P EST HI 40 MIN: CPT

## 2023-05-24 PROCEDURE — 73562 X-RAY EXAM OF KNEE 3: CPT | Mod: LT

## 2023-05-24 NOTE — HISTORY OF PRESENT ILLNESS
[de-identified] : SKYLAR MCCULLOUGH 63 year old male presents for initial evaluation of LEFT revision TKR and s/p irrigation debridement done by Dr. Marcos following a joint infection. He would like to continue further care and treatment here, He is taking his antibiotics per ID. He is getting weekly labs per ID. He denies any recent fevers or chills. He is c/o cramps and pain on his anterior thigh.

## 2023-05-24 NOTE — PHYSICAL EXAM
[de-identified] : General appearance: Pic line in his left upper extremity; well nourished and hydrated, pleasant, alert and oriented x 3, cooperative.\par HEENT: Normocephalic, EOM intact, Nasal septum midline, Oral cavity clear, External auditory canal clear.\par Cardiovascular: no apparent abnormalities, mild lower leg edema, chronic venous changes, pedal pulses are palpable.\par Lymphatics Lymph nodes: none palpated, Lymphedema: not present.\par Neurologic: sensation is normal, no muscle weakness in upper or lower extremities, patella tendon reflexes intact .\par Dermatologic no apparent skin lesions, moist, warm, no rash.\par Spine:cervical spine healed surgical incision, with limited motion to lumbosacral spine, thoracic spine appears normal and moves freely.\par Gait: nonantalgic.\par \par Left knee\par Inspection: no effusion or erythema, moderate soft tissue swelling \par Wounds: healed midline incision, no drainage; healed surgical incision medial aspect left calf\par Alignment: normal.\par Palpation: no specific tenderness on palpation.\par ROM active (in degrees): 0-60\par Ligamentous laxity: all ligaments appear stable,, negative ant. drawer test, negative post. drawer test, stable to varus stress test, stable to valgus stress test. \par Patellofemoral Alignment Test: Q angle-, normal.\par Muscle Test: good quad strength.\par Leg examination: calf is soft and non-tender.\par \par Right knee\par Inspection: no effusion or erythema.\par Wounds: healed midline incision \par Alignment: normal.\par Palpation: no specific tenderness on palpation.\par ROM active (in degrees): 0-125\par Ligamentous laxity: all ligaments appear stable,, negative ant. drawer test, negative post. drawer test, stable to varus stress test, stable to valgus stress test. \par Patellofemoral Alignment Test: Q angle-, normal.\par Muscle Test: good quad strength.\par Leg examination: calf is soft and non-tender.\par \par Left hip\par Inspection: No swelling or ecchymosis.\par Wounds: none.\par Palpation: non-tender.\par Stability: no instability.\par Strength: 5/5 all motor groups.\par ROM: no pain with FROM.\par Leg length: equal.\par \par Right hip\par Inspection: No swelling or ecchymosis.\par Wounds: none.\par Palpation: non-tender.\par Stability: no instability.\par Strength: 5/5 all motor groups.\par ROM: no pain with FROM.\par Leg length: equal.\par \par Left ankle\par Inspection: no erythema noted, no swelling noted.\par Palpation: no pain on palpation .\par ROM: FROM without crepitus.\par Muscle strength: 5/5.\par Stability: no instability noted.\par \par Right ankle\par Inspection: no erythema noted, no swelling noted.\par ROM: FROM without crepitus.\par Palpation: no pain on palpation .\par Muscle strength: 5/5.\par Stability: no instability noted.\par \par Left foot\par Inspection: color, texture and turgor are normal.\par ROM: full range of motion of all joints without pain or crepitus.\par Palpation: no tenderness.\par Stability: no instability noted.\par \par Right foot\par Inspection: color, texture and turgor are normal.\par ROM: full range of motion of all joints without pain or crepitus.\par Palpation: no tenderness.\par Stability: no instability noted.\par \par Left shoulder\par Inspection: no muscle asymmetry, no atrophy.\par Palpation: no tenderness noted, ACJ non-tender.\par ROM: full active ROM, full passive ROM.\par Strength testing): anterior deltoid, supraspinatus, infraspinatus, subscapularis all 5/5.\par Stability test: ant. apprehension negative, post. apprehension negative, relocation test negative.\par Impingement Test: negative NEER.\par \par Right shoulder\par Inspection: no muscle asymmetry, no atrophy.\par Palpation: no tenderness noted, ACJ non-tender.\par ROM: full active ROM, full passive ROM.\par Strength testing): anterior deltoid, supraspinatus, infraspinatus, subscapularis all 5/5.\par Stability test: ant. apprehension negative, post. apprehension negative, relocation test negative.\par Impingement Test: negative NEER.\par Surgical Wounds: none.\par \par Left elbow\par Inspection: negative swelling.\par Wounds: none.\par Palpation: non-tender.\par ROM: full ROM.\par Strength: 5/5 all groups.\par Stability: no instability.\par Mass: none.\par \par Right elbow\par Inspection: negative swelling.\par Wounds: none.\par Palpation: non-tender.\par ROM: full ROM.\par Strength: 5/5 all groups.\par Stability: no instability.\par Mass: none.\par \par Left wrist\par Inspection: negative swelling.\par Wound: none.\par Palpation (bone): no tenderness.\par ROM: full ROM.\par Strength: full , good.\par \par Right wrist\par Inspection: negative swelling.\par Wound: none.\par Palpation (bone): no tenderness.\par ROM: full ROM.\par Strength: full , good.\par \par Left hand \par Inspection: no skin changes, normal appearance.\par Wounds: none.Strength: full , able to make full fist.\par Sensation: light touch intact all fingers and thumb.\par Vascular: good capillary refill < 3 seconds, all fingers and thumb.\par Mass: none.\par \par Right hand \par Inspection: no skin changes, normal appearance.\par Wounds: none.Strength: full , able to make full fist.\par Sensation: light touch intact all fingers and thumb.\par Vascular: good capillary refill < 3 seconds, all fingers and thumb.\par Mass: none. [de-identified] : Left knee x-ray, AP, lateral, merchant view taken at the office today demonstrates a revision total knee replacement in satisfactory position and alignment. No evidence of loosening. The patella sits in a central position.\par \par Right knee x-ray merchant view taken at the office today demonstrates a total knee replacement with the patella in a central position.\par \par Labs Left knee: brought in and reviewed, see attached report.

## 2023-05-24 NOTE — ADDENDUM
[FreeTextEntry1] : This note was written by Romeo Marrufo on 05/24/2023 acting as scribe for Dr. Noé Tamayo M.D.\par \par I, Dr. Noé Tamayo, have read and attest that all the information, medical decision making and discharge instructions within are true and accurate.\par \par This note was written by NUVIA MCKEON on 05/24/2023 acting as scribe for Dr. Noé Tamayo M.D.\par \par I, Dr. Noé Tamayo, have read and attest that all the information, medical decision making and discharge instructions within are true and accurate.

## 2023-05-24 NOTE — DISCUSSION/SUMMARY
[de-identified] : Discussed at length the nature of the patient’s condition. Their left knee symptoms appear secondary to an infected left TKR. The cultures were positive with staph aureus as noted above, report is on the chart. His recent inflammatory markers are trending downward. He is completing the IV antibiotics as per infectious disease and I placed him on Keflex. I suggested he continue with PT to regain motion and strength. I explained that he may be on a prolonged course of antibiotics followed by suppression. If there is any recurrence of infection, he would need a 2 stage procedure with removal of antibiotics cement spacer. \par \par He is on Xarelto for DVT and will continue.\par \par Patient can continue activities as tolerated. All questions answered, understanding verbalized. Patient in agreement with plan of care. \par  \par I will see them back in 6-8 weeks.

## 2023-05-25 ENCOUNTER — NON-APPOINTMENT (OUTPATIENT)
Age: 63
End: 2023-05-25

## 2023-05-25 ENCOUNTER — INPATIENT (INPATIENT)
Facility: HOSPITAL | Age: 63
LOS: 11 days | Discharge: NURSING, REHAB OR LONG-TERM CARE | End: 2023-06-06
Attending: STUDENT IN AN ORGANIZED HEALTH CARE EDUCATION/TRAINING PROGRAM | Admitting: STUDENT IN AN ORGANIZED HEALTH CARE EDUCATION/TRAINING PROGRAM
Payer: MEDICARE

## 2023-05-25 VITALS
DIASTOLIC BLOOD PRESSURE: 78 MMHG | HEART RATE: 82 BPM | TEMPERATURE: 97 F | OXYGEN SATURATION: 97 % | WEIGHT: 199.96 LBS | SYSTOLIC BLOOD PRESSURE: 142 MMHG | RESPIRATION RATE: 18 BRPM | HEIGHT: 71 IN

## 2023-05-25 DIAGNOSIS — I82.409 ACUTE EMBOLISM AND THROMBOSIS OF UNSPECIFIED DEEP VEINS OF UNSPECIFIED LOWER EXTREMITY: Chronic | ICD-10-CM

## 2023-05-25 LAB
ALBUMIN SERPL ELPH-MCNC: 2.5 G/DL — LOW (ref 3.3–5)
ALP SERPL-CCNC: 80 U/L — SIGNIFICANT CHANGE UP (ref 40–120)
ALT FLD-CCNC: 8 U/L — LOW (ref 12–78)
ANION GAP SERPL CALC-SCNC: 5 MMOL/L — SIGNIFICANT CHANGE UP (ref 5–17)
AST SERPL-CCNC: 19 U/L — SIGNIFICANT CHANGE UP (ref 15–37)
BASOPHILS # BLD AUTO: 0.06 K/UL — SIGNIFICANT CHANGE UP (ref 0–0.2)
BASOPHILS NFR BLD AUTO: 0.9 % — SIGNIFICANT CHANGE UP (ref 0–2)
BILIRUB SERPL-MCNC: 0.3 MG/DL — SIGNIFICANT CHANGE UP (ref 0.2–1.2)
BUN SERPL-MCNC: 5 MG/DL — LOW (ref 7–23)
CALCIUM SERPL-MCNC: 8.4 MG/DL — LOW (ref 8.5–10.1)
CHLORIDE SERPL-SCNC: 109 MMOL/L — HIGH (ref 96–108)
CO2 SERPL-SCNC: 28 MMOL/L — SIGNIFICANT CHANGE UP (ref 22–31)
CREAT SERPL-MCNC: 0.62 MG/DL — SIGNIFICANT CHANGE UP (ref 0.5–1.3)
EGFR: 107 ML/MIN/1.73M2 — SIGNIFICANT CHANGE UP
EOSINOPHIL # BLD AUTO: 0.32 K/UL — SIGNIFICANT CHANGE UP (ref 0–0.5)
EOSINOPHIL NFR BLD AUTO: 4.5 % — SIGNIFICANT CHANGE UP (ref 0–6)
GLUCOSE SERPL-MCNC: 69 MG/DL — LOW (ref 70–99)
HCT VFR BLD CALC: 28.2 % — LOW (ref 39–50)
HGB BLD-MCNC: 8.9 G/DL — LOW (ref 13–17)
IMM GRANULOCYTES NFR BLD AUTO: 0.3 % — SIGNIFICANT CHANGE UP (ref 0–0.9)
LYMPHOCYTES # BLD AUTO: 2.14 K/UL — SIGNIFICANT CHANGE UP (ref 1–3.3)
LYMPHOCYTES # BLD AUTO: 30.4 % — SIGNIFICANT CHANGE UP (ref 13–44)
MCHC RBC-ENTMCNC: 30 PG — SIGNIFICANT CHANGE UP (ref 27–34)
MCHC RBC-ENTMCNC: 31.6 G/DL — LOW (ref 32–36)
MCV RBC AUTO: 94.9 FL — SIGNIFICANT CHANGE UP (ref 80–100)
MONOCYTES # BLD AUTO: 0.73 K/UL — SIGNIFICANT CHANGE UP (ref 0–0.9)
MONOCYTES NFR BLD AUTO: 10.4 % — SIGNIFICANT CHANGE UP (ref 2–14)
NEUTROPHILS # BLD AUTO: 3.77 K/UL — SIGNIFICANT CHANGE UP (ref 1.8–7.4)
NEUTROPHILS NFR BLD AUTO: 53.5 % — SIGNIFICANT CHANGE UP (ref 43–77)
NRBC # BLD: 0 /100 WBCS — SIGNIFICANT CHANGE UP (ref 0–0)
PLATELET # BLD AUTO: 369 K/UL — SIGNIFICANT CHANGE UP (ref 150–400)
POTASSIUM SERPL-MCNC: 3.8 MMOL/L — SIGNIFICANT CHANGE UP (ref 3.5–5.3)
POTASSIUM SERPL-SCNC: 3.8 MMOL/L — SIGNIFICANT CHANGE UP (ref 3.5–5.3)
PROT SERPL-MCNC: 7.1 GM/DL — SIGNIFICANT CHANGE UP (ref 6–8.3)
RBC # BLD: 2.97 M/UL — LOW (ref 4.2–5.8)
RBC # FLD: 15.9 % — HIGH (ref 10.3–14.5)
SODIUM SERPL-SCNC: 142 MMOL/L — SIGNIFICANT CHANGE UP (ref 135–145)
WBC # BLD: 7.04 K/UL — SIGNIFICANT CHANGE UP (ref 3.8–10.5)
WBC # FLD AUTO: 7.04 K/UL — SIGNIFICANT CHANGE UP (ref 3.8–10.5)

## 2023-05-25 PROCEDURE — 99285 EMERGENCY DEPT VISIT HI MDM: CPT

## 2023-05-25 PROCEDURE — 73701 CT LOWER EXTREMITY W/DYE: CPT | Mod: 26,LT,MA

## 2023-05-25 RX ORDER — CEFAZOLIN SODIUM 1 G
2000 VIAL (EA) INJECTION ONCE
Refills: 0 | Status: COMPLETED | OUTPATIENT
Start: 2023-05-25 | End: 2023-05-25

## 2023-05-25 RX ADMIN — Medication 100 MILLIGRAM(S): at 23:06

## 2023-05-25 RX ADMIN — Medication 2000 MILLIGRAM(S): at 23:49

## 2023-05-25 NOTE — ED PROVIDER NOTE - CROS ED ROS STATEMENT
Message from MyChart:  Original authorizing provider: MD Jeff Membreno GEORGETTEKelly Donohueon would like a refill of the following medications:  oxyCODONE (ROXICODONE) 15 MG IR tablet [Jordyn Loredo MD]    Preferred pharmacy: [facility] [address] [place] [address] [facility] [address] [facility]    Comment:     all other ROS negative except as per HPI

## 2023-05-25 NOTE — ED PROVIDER NOTE - PROGRESS NOTE DETAILS
Pt was seen and treated by Dr. Foy waiting for ortho dispo, Again ortho is aware the pt. Pt is alert and oriented x 3 speaking in clear full sentences sitting in the stretcher smiling appears very comfortable, + hot to touch the left upper leg and left knee. ortho called back sts admit pt to Dr. Bob ortho service now.

## 2023-05-25 NOTE — ED PROVIDER NOTE - SKIN, MLM
Skin normal color for race, warm, dry and intact. No evidence of rash.  Healed incision to left knee.

## 2023-05-25 NOTE — ED PROVIDER NOTE - CLINICAL SUMMARY MEDICAL DECISION MAKING FREE TEXT BOX
Patient with PICC getting IV abx fo knee infection c/o worsening thigh pain.  R/O Soft tissue infection will check labs, CT give medication for pain and Re-eval. Patient with PICC getting IV abx fo knee infection c/o worsening thigh pain.  R/O Soft tissue infection will check labs, CT give medication for pain and Re-eval.    Patient signed out to oncoming attending pending labs and CT. Patient with PICC getting IV abx fo knee infection c/o worsening thigh pain.  R/O Soft tissue infection will check labs, CT give medication for pain and Re-eval.    Patient signed out to oncoming attending pending labs and CT.    Hattieter: Pt care signed out to me at change of shift, CT w/ + large L thigh intramuscular abscess, cannot exclude underlying osteomyelitis. Add on ESR / CRP. Ortho consulted. Pt care signed out at change of shift, pending Ortho recommendations / admission.

## 2023-05-25 NOTE — ED ADULT NURSE NOTE - NSFALLRISKASMTTYPE_ED_ALL_ED
Gianluca Taylor verified by name and .  Consultation appointment has been scheduled.    Initial (On Arrival)

## 2023-05-25 NOTE — ED ADULT NURSE NOTE - OBJECTIVE STATEMENT
Patient received A&O x4 breathing unlabored on room complaining of pain and swelling to L knee, pt state he had surgery to site recently sent home from [facility] with IV antibiotic treatment. Last he fell due ro increase weakness and is on blood thinner Xarelto. No redness noted to site, weak pulse to LL extremity. mild discoloration to site. pt states he has good sensation to site. Picc line to LL upper arm, no s/s of infection noted.

## 2023-05-25 NOTE — ED ADULT TRIAGE NOTE - CHIEF COMPLAINT QUOTE
patient BIBA c/o of L knee pain and swelling patient has a pick line L upper arm taking antibiotics for infection L knee , c/o of weakness started yesterday  patient denied chest pain no difficulty breathing , moving all extremities no facial droop clear at this time

## 2023-05-25 NOTE — ED ADULT NURSE REASSESSMENT NOTE - NS ED NURSE REASSESS COMMENT FT1
patient alert and oriented x4. pt serum glucose 69, dr. rojas aware. as per dr. rojas, pt ok to drink. pt given apple juice and orange juice. no s/s of hypoglycemia noted.

## 2023-05-25 NOTE — ED ADULT TRIAGE NOTE - WEIGHT IN LBS
Hematology Inpatient Consult    Subjective:     Jennifer Haro is a 38 y.o., [address] female, who is being seen for factor VIII. Past Medical History:   Diagnosis Date    Abdominal pain     Anemia NEC     sickle cell trait    C. difficile colitis     Crohn's disease (HCC)     Gastrointestinal disorder     Crohns    Herpes zoster     Hx of cocaine abuse     Hyperkalemia     Hypertension     Iron deficiency anemia     MRSA (methicillin resistant Staphylococcus aureus)     Obesity     Pain management     Sickle cell trait (HCC)      Past Surgical History:   Procedure Laterality Date    HX GYN      tubal ligation    HX TUBAL LIGATION        History reviewed. No pertinent family history.   Social History   Substance Use Topics    Smoking status: Former Smoker    Smokeless tobacco: Not on file    Alcohol use No      Current Facility-Administered Medications   Medication Dose Route Frequency Provider Last Rate Last Dose    0.9% sodium chloride infusion  100 mL/hr IntraVENous CONTINUOUS Melanie Villarreal  mL/hr at 03/17/17 1501 100 mL/hr at 03/17/17 1501    midazolam (VERSED) injection 0.5-5 mg  0.5-5 mg IntraVENous Multiple Melanie Villarreal MD   1 mg at 03/17/17 1537    fentaNYL citrate (PF) injection 100 mcg  100 mcg IntraVENous Multiple Melanie Villarreal MD   50 mcg at 03/17/17 1543    naloxone (NARCAN) injection 0.4 mg  0.4 mg IntraVENous Multiple Melanie Villarreal MD        flumazenil (ROMAZICON) 0.1 mg/mL injection 0.2 mg  0.2 mg IntraVENous Multiple Melanie Villarreal MD        mesalamine (PENTASA) CR capsule 1,000 mg  1 g Oral QID Melanie Villarreal MD        predniSONE (DELTASONE) tablet 40 mg  40 mg Oral DAILY WITH BREAKFAST Melanie Villarreal MD   40 mg at 03/17/17 2371    ferrous sulfate tablet 325 mg  1 Tab Oral BID WITH MEALS Deni Don MD   325 mg at 03/17/17 0939    HYDROmorphone (PF) (DILAUDID) injection 0.5 mg  0.5 mg IntraVENous Q4H PRN Tariq Collado MD   0.5 mg at 03/17/17 1315    gabapentin (NEURONTIN) capsule 300 mg  300 mg Oral TID Tariq Collado MD   300 mg at 03/17/17 5784    benzocaine (HURRICANE) 20 % spray   Mucous Membrane PRN Luis Armando Crowe MD   [address] [place] at 03/15/17 1202    0.9% sodium chloride with KCl 20 mEq/L infusion   IntraVENous CONTINUOUS Tariq Collado MD 50 mL/hr at 03/16/17 1721      potassium chloride (KAON 10%) 20 mEq/15 mL oral liquid 20 mEq  20 mEq Oral DAILY Tariq Collado MD   20 mEq at 03/17/17 2234    multivitamin, tx-iron-ca-min (THERA-M w/ IRON) tablet 1 Tab  1 Tab Oral DAILY Tariq Collado MD   1 Tab at 03/17/17 0939    pantoprazole (PROTONIX) tablet 40 mg  40 mg Oral ACB Tariq Collado MD   40 mg at 03/17/17 0654    sodium chloride (NS) flush 5-10 mL  5-10 mL IntraVENous Q8H Tariq Collado MD   10 mL at 03/17/17 1315    sodium chloride (NS) flush 5-10 mL  5-10 mL IntraVENous PRN Tariq Collado MD        ondansetron [facility]) injection 4 mg  4 mg IntraVENous Q6H PRN Tariq Collado MD   4 mg at 03/13/17 1721    acetaminophen (TYLENOL) tablet 650 mg  650 mg Oral Q4H PRN Tariq Collado MD        enoxaparin (LOVENOX) injection 40 mg  40 mg SubCUTAneous Q24H Tariq Collado MD   Stopped at 03/17/17 0900    ELECTROLYTE REPLACEMENT PROTOCOL - Potassium  1 Each Other PRN Tariq Collado MD        ELECTROLYTE REPLACEMENT PROTOCOL - Magnesium  1 Each Other PRN Tariq Collado MD        atorvastatin (LIPITOR) tablet 80 mg  80 mg Oral QHS Donnova Mace Genette Sheets, DO   80 mg at 03/16/17 2118    morphine injection 2 mg  2 mg IntraVENous Q4H PRN Asa Dobson MD   2 mg at 03/17/17 0654        No Known Allergies     Review of Systems:      Objective:     Patient Vitals for the past 8 hrs:   BP Temp Pulse Resp SpO2   03/17/17 1605 (!) 152/102 - (!) 104 21 97 %   03/17/17 1600 (!) 147/97 - (!) 112 21 98 %   03/17/17 1555 (!) 138/92 - (!) 101 17 98 %   03/17/17 1550 (!) 142/95 - (!) 102 18 99 %   03/17/17 1545 126/86 - (!) 103 20 98 %   17 1540 (!) 135/94 - (!) 110 17 98 %   17 1535 117/79 - (!) 102 16 98 %   17 1530 (!) 131/93 - (!) 106 22 100 %   17 1525 130/87 - 99 18 100 %   17 1520 130/85 - 99 20 96 %   17 1204 105/62 98.1 °F (36.7 °C) (!) 108 18 100 %   17 0838 114/61 99.2 °F (37.3 °C) (!) 107 18 98 %     Temp (24hrs), Av.4 °F (36.9 °C), Min:98.1 °F (36.7 °C), Max:99.2 °F (37.3 °C)         Physical Exam:   Down in infusion suite    Lab/Data Review:  No results found for this or any previous visit (from the past 24 hour(s)). Assessment:     Principal Problem:    Stroke ([facility] [address] (3/10/2017)    Active Problems:    Chronic pain syndrome (2012)      Crohn disease ([facility] [address] (2012)      Hypokalemia (2015)      Sickle cell trait (HCC) ()      Hypertension ()      Hx of cocaine abuse ()      Embolic stroke ([facility] [address] (3/90/4242)      Acute blood loss anemia (3/15/2017)      Neuropathic pain (3/15/2017)      DVT (deep venous thrombosis) ([facility] [address] (3/16/2017)        Plan:     S/p IV iron with apparent good response on hgb. DVT without ability to be on anticoagulation. Meka filter  Elevated factor VIII and CVA.   Unable to anticoagulate  Crohn's management: per GI       Signed By: Jenny Samano MD     2017 199.9

## 2023-05-25 NOTE — ED PROVIDER NOTE - OBJECTIVE STATEMENT
This patient is a 63 year old man who presents to the ER c/o left thigh pain.  He is currently being treated with IV abx via PICC line for a left knee infection.  Patient reports that although the knee pain is improving he has been having increasing pain in the left thigh currently 10/10 in severity.  He denies fever.  Patient states that he is concerned about the infection spreading in his leg.

## 2023-05-25 NOTE — ED PROVIDER NOTE - CROS ED SKIN ALL NEG
SUBJECTIVE:  This is a 9-year-old female who presents to the Urgent Care with right ear pain.  For the past week the patient has had right ear pain, tender to touch. 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  It does not sound like this doctor actually looked in the ear with the otoscope, just looked at it and noticed that there was some swelling of the canal.  This doctor then prescribed oral amoxicillin.  She is on day 6 of the amoxicillin and it does not seem to be helping.  She feels that the ears extremely painful, she has been applying warm compresses but it does not seem to help.  She did have some nasal congestion and coughing prior to symptoms developing but nothing in the past six days.  No fevers.  She can hear from the ear.     Past Medical History:  Problem list: Reviewed and updated.  Medications:  Reviewed and updated  Allergies: NKDA    Social history:  The patient is not exposed to second hand smoke    OBJECTIVE:  VITALS: Reviewed in chart, afebrile  GENERAL: Alert, active, comfortable, not toxic, and in no acute distress  HEAD: Head is normocephalic without tics or tremors. No masses, lesions, tenderness or abnormalities.  NOSE: No rhinorrhea or nasal flare  EYES: Conjunctivae and sclerae are without erythema or discharge.  EARS: External ears are normal.  The right ear canal is extremely swollen, there is a little bit a wax in the middle of the canal and I am unable to see through it.  Patient is extremely tender with light palpation of the tragus.  She barely tolerated me looking in the ear.  I am unable to visualize the TM.  The left ear canals normal, no swelling or exudate.  TM normal early reflex present.  THROAT: Normal without exudates or petechia  NECK: Supple without lymphadenopathy or masses.  No pain to palpation around the ear involving the skull or facial bones.  Absolutely no swollen lymph nodes around the  ear.    ASSESSMENT:  1) Otitis externa - right   2) Cerumen impaction    PLAN:  1) the patient's right ear canal is extremely swollen and almost looks to be swollen shut.  Patient can not hear from the ear.  She does have some wax. She is barely able to let me look in the ear, I do not think she is going to tolerate flushing well at all.  I recommend she follow-up with ENT.  If the patient completely loses hearing in this ear she needs to see ENT as soon as possible.  Otherwise I am hopeful that ENT can suction out that wax.  She should continue on the amoxicillin.  However I did prescribe Cipro drops, 4 drops twice daily into the right ear I am hopeful this will help loosen up the wax and open up the ear canal.  Mom will follow-up with ENT as needed.  Recommend ibuprofen and continue with warm compresses.      Benita Youssef PA-C  Working under the direct supervision of Dr. Rayo Hunter       negative...

## 2023-05-26 LAB
B PERT IGG+IGM PNL SER: ABNORMAL
COLOR FLD: SIGNIFICANT CHANGE UP
CRP SERPL-MCNC: 38 MG/L — HIGH
GLUCOSE BLDC GLUCOMTR-MCNC: 100 MG/DL — HIGH (ref 70–99)
GLUCOSE BLDC GLUCOMTR-MCNC: 124 MG/DL — HIGH (ref 70–99)
MONOS+MACROS # FLD: 4 % — SIGNIFICANT CHANGE UP
NEUTROPHILS-BODY FLUID: 96 % — SIGNIFICANT CHANGE UP
RCV VOL RI: HIGH /UL (ref 0–5)
TOTAL NUCLEATED CELL COUNT, BODY FLUID: SIGNIFICANT CHANGE UP /UL
TUBE TYPE: SIGNIFICANT CHANGE UP

## 2023-05-26 PROCEDURE — 70450 CT HEAD/BRAIN W/O DYE: CPT | Mod: 26

## 2023-05-26 PROCEDURE — 99222 1ST HOSP IP/OBS MODERATE 55: CPT

## 2023-05-26 PROCEDURE — 10030 IMG GID FLU COLL DRG SFT TIS: CPT

## 2023-05-26 PROCEDURE — 99223 1ST HOSP IP/OBS HIGH 75: CPT

## 2023-05-26 PROCEDURE — 73560 X-RAY EXAM OF KNEE 1 OR 2: CPT | Mod: 26,LT

## 2023-05-26 PROCEDURE — 20610 DRAIN/INJ JOINT/BURSA W/O US: CPT | Mod: 78,LT

## 2023-05-26 RX ORDER — BUDESONIDE AND FORMOTEROL FUMARATE DIHYDRATE 160; 4.5 UG/1; UG/1
2 AEROSOL RESPIRATORY (INHALATION)
Refills: 0 | Status: DISCONTINUED | OUTPATIENT
Start: 2023-05-26 | End: 2023-06-02

## 2023-05-26 RX ORDER — DEXTROSE 50 % IN WATER 50 %
25 SYRINGE (ML) INTRAVENOUS ONCE
Refills: 0 | Status: DISCONTINUED | OUTPATIENT
Start: 2023-05-26 | End: 2023-06-02

## 2023-05-26 RX ORDER — ALPRAZOLAM 0.25 MG
0.5 TABLET ORAL
Refills: 0 | Status: DISCONTINUED | OUTPATIENT
Start: 2023-05-26 | End: 2023-06-02

## 2023-05-26 RX ORDER — ENOXAPARIN SODIUM 100 MG/ML
40 INJECTION SUBCUTANEOUS EVERY 24 HOURS
Refills: 0 | Status: COMPLETED | OUTPATIENT
Start: 2023-05-26 | End: 2023-06-01

## 2023-05-26 RX ORDER — DEXTROSE 50 % IN WATER 50 %
12.5 SYRINGE (ML) INTRAVENOUS ONCE
Refills: 0 | Status: DISCONTINUED | OUTPATIENT
Start: 2023-05-26 | End: 2023-06-02

## 2023-05-26 RX ORDER — DULOXETINE HYDROCHLORIDE 30 MG/1
120 CAPSULE, DELAYED RELEASE ORAL DAILY
Refills: 0 | Status: DISCONTINUED | OUTPATIENT
Start: 2023-05-26 | End: 2023-06-02

## 2023-05-26 RX ORDER — PANTOPRAZOLE SODIUM 20 MG/1
40 TABLET, DELAYED RELEASE ORAL
Refills: 0 | Status: DISCONTINUED | OUTPATIENT
Start: 2023-05-26 | End: 2023-06-02

## 2023-05-26 RX ORDER — ONDANSETRON 8 MG/1
4 TABLET, FILM COATED ORAL EVERY 6 HOURS
Refills: 0 | Status: DISCONTINUED | OUTPATIENT
Start: 2023-05-26 | End: 2023-06-02

## 2023-05-26 RX ORDER — MAGNESIUM HYDROXIDE 400 MG/1
30 TABLET, CHEWABLE ORAL DAILY
Refills: 0 | Status: DISCONTINUED | OUTPATIENT
Start: 2023-05-26 | End: 2023-06-02

## 2023-05-26 RX ORDER — POLYETHYLENE GLYCOL 3350 17 G/17G
17 POWDER, FOR SOLUTION ORAL AT BEDTIME
Refills: 0 | Status: DISCONTINUED | OUTPATIENT
Start: 2023-05-26 | End: 2023-06-02

## 2023-05-26 RX ORDER — ALBUTEROL 90 UG/1
2 AEROSOL, METERED ORAL EVERY 6 HOURS
Refills: 0 | Status: DISCONTINUED | OUTPATIENT
Start: 2023-05-26 | End: 2023-06-02

## 2023-05-26 RX ORDER — GLUCAGON INJECTION, SOLUTION 0.5 MG/.1ML
1 INJECTION, SOLUTION SUBCUTANEOUS ONCE
Refills: 0 | Status: DISCONTINUED | OUTPATIENT
Start: 2023-05-26 | End: 2023-06-02

## 2023-05-26 RX ORDER — LANOLIN ALCOHOL/MO/W.PET/CERES
3 CREAM (GRAM) TOPICAL AT BEDTIME
Refills: 0 | Status: DISCONTINUED | OUTPATIENT
Start: 2023-05-26 | End: 2023-06-02

## 2023-05-26 RX ORDER — TIOTROPIUM BROMIDE 18 UG/1
2 CAPSULE ORAL; RESPIRATORY (INHALATION) DAILY
Refills: 0 | Status: DISCONTINUED | OUTPATIENT
Start: 2023-05-26 | End: 2023-06-02

## 2023-05-26 RX ORDER — LOSARTAN POTASSIUM 100 MG/1
100 TABLET, FILM COATED ORAL DAILY
Refills: 0 | Status: DISCONTINUED | OUTPATIENT
Start: 2023-05-26 | End: 2023-06-02

## 2023-05-26 RX ORDER — OXYCODONE HYDROCHLORIDE 5 MG/1
10 TABLET ORAL EVERY 6 HOURS
Refills: 0 | Status: DISCONTINUED | OUTPATIENT
Start: 2023-05-26 | End: 2023-05-28

## 2023-05-26 RX ORDER — OXYCODONE HYDROCHLORIDE 5 MG/1
10 TABLET ORAL ONCE
Refills: 0 | Status: DISCONTINUED | OUTPATIENT
Start: 2023-05-26 | End: 2023-05-26

## 2023-05-26 RX ORDER — DEXTROSE 50 % IN WATER 50 %
15 SYRINGE (ML) INTRAVENOUS ONCE
Refills: 0 | Status: DISCONTINUED | OUTPATIENT
Start: 2023-05-26 | End: 2023-06-02

## 2023-05-26 RX ORDER — SERTRALINE 25 MG/1
100 TABLET, FILM COATED ORAL
Refills: 0 | Status: DISCONTINUED | OUTPATIENT
Start: 2023-05-26 | End: 2023-06-02

## 2023-05-26 RX ORDER — OXYCODONE HYDROCHLORIDE 5 MG/1
5 TABLET ORAL EVERY 6 HOURS
Refills: 0 | Status: DISCONTINUED | OUTPATIENT
Start: 2023-05-26 | End: 2023-05-28

## 2023-05-26 RX ORDER — ACETAMINOPHEN 500 MG
650 TABLET ORAL EVERY 6 HOURS
Refills: 0 | Status: DISCONTINUED | OUTPATIENT
Start: 2023-05-26 | End: 2023-06-02

## 2023-05-26 RX ORDER — SODIUM CHLORIDE 9 MG/ML
1000 INJECTION, SOLUTION INTRAVENOUS
Refills: 0 | Status: DISCONTINUED | OUTPATIENT
Start: 2023-05-26 | End: 2023-06-02

## 2023-05-26 RX ORDER — DULOXETINE HYDROCHLORIDE 30 MG/1
60 CAPSULE, DELAYED RELEASE ORAL
Refills: 0 | Status: DISCONTINUED | OUTPATIENT
Start: 2023-05-26 | End: 2023-05-26

## 2023-05-26 RX ORDER — CEFEPIME 1 G/1
1000 INJECTION, POWDER, FOR SOLUTION INTRAMUSCULAR; INTRAVENOUS EVERY 8 HOURS
Refills: 0 | Status: DISCONTINUED | OUTPATIENT
Start: 2023-05-26 | End: 2023-05-28

## 2023-05-26 RX ORDER — BUPROPION HYDROCHLORIDE 150 MG/1
150 TABLET, EXTENDED RELEASE ORAL DAILY
Refills: 0 | Status: DISCONTINUED | OUTPATIENT
Start: 2023-05-26 | End: 2023-06-02

## 2023-05-26 RX ORDER — SENNA PLUS 8.6 MG/1
2 TABLET ORAL AT BEDTIME
Refills: 0 | Status: DISCONTINUED | OUTPATIENT
Start: 2023-05-26 | End: 2023-06-02

## 2023-05-26 RX ORDER — INSULIN LISPRO 100/ML
VIAL (ML) SUBCUTANEOUS
Refills: 0 | Status: DISCONTINUED | OUTPATIENT
Start: 2023-05-26 | End: 2023-06-02

## 2023-05-26 RX ORDER — VANCOMYCIN HCL 1 G
1250 VIAL (EA) INTRAVENOUS EVERY 12 HOURS
Refills: 0 | Status: COMPLETED | OUTPATIENT
Start: 2023-05-26 | End: 2023-06-02

## 2023-05-26 RX ADMIN — OXYCODONE HYDROCHLORIDE 10 MILLIGRAM(S): 5 TABLET ORAL at 07:00

## 2023-05-26 RX ADMIN — OXYCODONE HYDROCHLORIDE 10 MILLIGRAM(S): 5 TABLET ORAL at 02:23

## 2023-05-26 RX ADMIN — Medication 166.67 MILLIGRAM(S): at 20:13

## 2023-05-26 RX ADMIN — SERTRALINE 100 MILLIGRAM(S): 25 TABLET, FILM COATED ORAL at 21:37

## 2023-05-26 RX ADMIN — OXYCODONE HYDROCHLORIDE 10 MILLIGRAM(S): 5 TABLET ORAL at 03:30

## 2023-05-26 RX ADMIN — Medication 1 TABLET(S): at 11:35

## 2023-05-26 RX ADMIN — Medication 3 MILLIGRAM(S): at 21:36

## 2023-05-26 RX ADMIN — DULOXETINE HYDROCHLORIDE 120 MILLIGRAM(S): 30 CAPSULE, DELAYED RELEASE ORAL at 20:12

## 2023-05-26 RX ADMIN — LOSARTAN POTASSIUM 100 MILLIGRAM(S): 100 TABLET, FILM COATED ORAL at 20:11

## 2023-05-26 RX ADMIN — CEFEPIME 100 MILLIGRAM(S): 1 INJECTION, POWDER, FOR SOLUTION INTRAMUSCULAR; INTRAVENOUS at 21:38

## 2023-05-26 RX ADMIN — SENNA PLUS 2 TABLET(S): 8.6 TABLET ORAL at 21:36

## 2023-05-26 RX ADMIN — BUPROPION HYDROCHLORIDE 150 MILLIGRAM(S): 150 TABLET, EXTENDED RELEASE ORAL at 11:34

## 2023-05-26 RX ADMIN — POLYETHYLENE GLYCOL 3350 17 GRAM(S): 17 POWDER, FOR SOLUTION ORAL at 21:37

## 2023-05-26 NOTE — CONSULT NOTE ADULT - ATTENDING COMMENTS
Patient presents with persistent pain and swelling of left knee s/p I&D and exchange of poly liner 6 weeks ago for acute PJI. Primary TKA in 2010, and revision TKA in 2014 performed for instability and aseptic failure. Treated with ancef for past 6 weeks. Denies fever/chills. Also reports a slip on steps 2 weeks ago followed by increase in knee pain.    vitals/labs reviewed    Gen: awake, alert, AO x3  Left knee:  midline surgical incision healed  healed incision medial calf  small effusion and swelling  nontender to palpation at knee, nontender thigh  calf soft  ROM 10-60, stable varus/valgus  intact EHL/FHL/GS/TA, SILT sp/dp/t  Palp DP  Ambulates in room with walker    Knee aspiration on 5/26 with 45cc of blood and elevated synovial WBC of 30K  Drain placed by IR  ESR and CRP elevated, but downtrending since prior admission    63M with high suspicion for persistent knee PJI s/p knee I&D 6 weeks ago  WBAT LLE  monitor drain output  f/u knee cultures  discussed with patient likelihood of needing 2 stage revision and possibility of knee fusion  currently afebrile and not septic  Continue antibiotics with ID consult  OR planning pending cultures

## 2023-05-26 NOTE — CONSULT NOTE ADULT - CONSULT REASON
left thigh abscess
L Thigh fluid collection concerning for abscess sp L TKA I&D with poly exchange 4/14/23
Abscess
medical comanagement/ preop eval

## 2023-05-26 NOTE — PATIENT PROFILE ADULT - SURGICAL SITE DRAIN
Subjective:       Patient ID: Lydia Thomas is a 39 y.o. female.    Chief Complaint: No chief complaint on file.    The patient location is: [place]  The chief complaint leading to consultation is: axially pain    Visit type: audiovisual    Face to Face time with patient: 10 min  10 minutes of total time spent on the encounter, which includes face to face time and non-face to face time preparing to see the patient (eg, review of tests), Obtaining and/or reviewing separately obtained history, Documenting clinical information in the electronic or other health record, Independently interpreting results (not separately reported) and communicating results to the patient/family/caregiver, or Care coordination (not separately reported).         Each patient to whom he or she provides medical services by telemedicine is:  (1) informed of the relationship between the physician and patient and the respective role of any other health care provider with respect to management of the patient; and (2) notified that he or she may decline to receive medical services by telemedicine and may withdraw from such care at any time.    Notes:       HPI: 40 yo female presents for under arm pain. Right side. Past few days. Pain and swelling. No drainage.     Review of Systems   Constitutional:  Positive for activity change. Negative for unexpected weight change.   HENT:  Positive for hearing loss. Negative for rhinorrhea and trouble swallowing.    Eyes:  Positive for visual disturbance. Negative for discharge.   Respiratory:  Negative for chest tightness and wheezing.    Cardiovascular:  Negative for chest pain and palpitations.   Gastrointestinal:  Negative for blood in stool, constipation, diarrhea and vomiting.   Endocrine: Positive for polydipsia. Negative for polyuria.   Genitourinary:  Negative for difficulty urinating, dysuria, hematuria and menstrual problem.   Musculoskeletal:  Positive for neck pain. Negative for  arthralgias and joint swelling.   Neurological:  Positive for weakness and headaches.   Psychiatric/Behavioral:  Positive for confusion. Negative for dysphoric mood.        Objective:      Physical Exam  Constitutional:       Appearance: Normal appearance.   HENT:      Head: Normocephalic and atraumatic.   Skin:         Neurological:      Mental Status: She is alert.   Psychiatric:         Mood and Affect: Mood normal.         Behavior: Behavior normal.       Assessment:       Problem List Items Addressed This Visit    None  Visit Diagnoses       Axillary abscess    -  Primary    Relevant Medications    sulfamethoxazole-trimethoprim 800-160mg (BACTRIM DS) 800-160 mg Tab    Chronic nonintractable headache, unspecified headache type        Relevant Medications    sumatriptan (IMITREX STATDOSE) 6 mg/0.5 mL kit            Plan:         Diagnoses and all orders for this visit:    Axillary abscess  -     sulfamethoxazole-trimethoprim 800-160mg (BACTRIM DS) 800-160 mg Tab; Take 1 tablet by mouth 2 (two) times daily. for 7 days  -     if cysts persist refer to gen surg for removal    Chronic nonintractable headache, unspecified headache type  -     sumatriptan (IMITREX STATDOSE) 6 mg/0.5 mL kit; To use max twice within 24 hours, 2 hours apart.           no

## 2023-05-26 NOTE — PROCEDURE NOTE - PROCEDURE FINDINGS AND DETAILS
US drainage left thigh hematoma yielded approx 5 cc of old blood - specimen sent.  8 Spanish drain left in place for further liquefaction/drainage.  Full report to follow.

## 2023-05-26 NOTE — PATIENT PROFILE ADULT - FALL HARM RISK - HARM RISK INTERVENTIONS

## 2023-05-26 NOTE — CONSULT NOTE ADULT - ASSESSMENT
63y Male with history of DM II, HTN, HLD L, DVTs on Xarelto, GERD, Depression, Anxiety, Insomnia, Spinal stenosis, Spondyloschises, R Knee Arthroplasty in 2003 and L Knee Arthroplasty in 2011 s/p L TKA I&D with poly exchange 4/14/23 w/ MSSA on OR cultures requiring Ancef via PICC [id number] p/w left thigh fluid collection concerning for PJI.     PJI w/ fluid collection concerning for abscess  - CT showed a large intramuscular abscess in the mid to distal left vastus musculature w/ a periosteal reaction in the anterior and medial distal left femur adjacent to the intramuscular abscess which could be reactive in nature; however, an underlying osteomyelitis cannot be excluded. There was also an enlarged left iliac lymph node and enlarged left inguinal lymph nodes   - ortho aspirated the joint  - follow up   - IR to aspirated 5 cc of old blood on 5/26 - specimen sent and an 8 [place] drain was left in place for further liquefaction/drainage  - Ortho planing on taking patient to OR for I&D of left knee Vs major revision surgery   - c/w antibiotics as per ortho   - recommend ID consult    - pain deferred to primary     Hx of DVT  - Xarelto on hold by ortho in anticipation of taking patient to OR    DM II  - start ISS  - Hgb A1C in April was 7.4     HTN  - c/w losartan     Depression/ Anxiety/Insomnia  - c/w Zoloft, Cymbalta, Wellbutrin & Xanax     GERD  - c/w Protonix    Constipation  - c/w Senna & Miralax    63y Male with history of DM II, HTN, HLD L, DVTs on Xarelto, GERD, Depression, Anxiety, Insomnia, Spinal stenosis, Spondyloschises, R Knee Arthroplasty in 2003 and L Knee Arthroplasty in 2011 s/p L TKA I&D with poly exchange 4/14/23 w/ MSSA on OR cultures requiring Ancef via PICC [id number] p/w left thigh fluid collection concerning for PJI.     PJI w/ fluid collection concerning for abscess  - CT showed a large intramuscular abscess in the mid to distal left vastus musculature w/ a periosteal reaction in the anterior and medial distal left femur adjacent to the intramuscular abscess which could be reactive in nature; however, an underlying osteomyelitis cannot be excluded. There was also an enlarged left iliac lymph node and enlarged left inguinal lymph nodes   - ortho aspirated the joint  - follow up   - IR to aspirated 5 cc of old blood on 5/26 - specimen sent and an 8 [place] drain was left in place for further liquefaction/drainage  - Ortho planing on taking patient to OR for I&D of left knee Vs major revision surgery   - c/w antibiotics as per ortho   - recommend ID consult    - pain deferred to primary   - patient is not on insulin, has no history of CHF, CAD or cerebrovascular disease. Echo from April showed normal global left ventricular systolic function w/ no evidence of valve disease. EKG is pending. If it comes back normal, patient can proceed to OR without further work up and is at moderate cardiac risk for a moderate risk surgery    Hx of DVT  - Xarelto on hold by ortho in anticipation of taking patient to OR    COPD  - patient reports it is secondary to exposure to dust at 9/11 site   - c/w Spiriva and Symbicort     DM II  - start ISS  - Hgb A1C in April was 7.4     HTN  - c/w losartan     Depression/ Anxiety/Insomnia  - c/w Zoloft, Cymbalta, Wellbutrin & Xanax     GERD  - c/w Protonix    Constipation  - c/w Senna & Miralax

## 2023-05-26 NOTE — CONSULT NOTE ADULT - SUBJECTIVE AND OBJECTIVE BOX
Full note to follow  Previously known to our group, on Rx for MSSA Septicemia/PJI  Office received a call that patient seemed very confused and had been falling.   Given that the patient is on anticoagulation and it was not clear what was going on, Dr. Bay had spoke with the patient's wife and advised him to be taken to the ER and have a workup including a CT head.  Presently he is much mor lucid, though still slightly confused, and confirms he has been falling ("mostly on my butt").   Patient also complained of swelling and pain in the L thigh, though is vague with respect to onset and other symptoms.   L thigh is red, warm and tender on exam.  IR drainage- old blood, cx pending  Knee aspirate- 29k WBC, PMN predominance  Will broaden antibiotics pending clinical course, though most likely still MSSA  Thank you for the courtesy of this referral.  I will be available via Teams for any issues that may arise over the weekend.    Lonnie Gonzalez MD  Attending Physician  [facility]  Division of Infectious Diseases  [phone]  -------------  [facility]  Division of Infectious Diseases  [phone]    NIRMALON SKYLAR  63y, Male  [id number]    HPI--      PMH/PSH--  Diabetes Mellitus    Hypertension, Essential    Hyperlipidemia    GERD (gastroesophageal reflux disease)    Depression    Obese    Bulging disc    Spinal stenosis    Spondylisthesis    WILSON (obstructive sleep apnea)    Sleep apnea    Anxiety    Insomnia    Varicosities    DVT (deep venous thrombosis)    R Knee Arthroplasty    H/O laser iridotomy    Reflux    Deep vein thrombosis (DVT) of lower extremity        Allergies--  No Known Allergies      Medications--  Antibiotics:   Immunologic:   Other: acetaminophen     Tablet ..  albuterol    90 MICROgram(s) HFA Inhaler PRN  ALPRAZolam PRN  aluminum hydroxide/magnesium hydroxide/simethicone Suspension PRN  budesonide 160 MICROgram(s)/formoterol 4.5 MICROgram(s) Inhaler  buPROPion XL (24-Hour)  DULoxetine  losartan  magnesium hydroxide Suspension PRN  melatonin  multivitamin  ondansetron Injectable PRN  oxyCODONE    IR PRN  oxyCODONE    IR PRN  pantoprazole    Tablet  polyethylene glycol 3350  senna  sertraline  tiotropium 2.5 MICROgram(s) Inhaler    Antimicrobials last 90 days per EMR: MEDICATIONS  (STANDING):  ceFAZolin   IVPB   100 mL/Hr IV Intermittent (05-25-23 @ 23:06)        Social History--  EtOH: denies   Tobacco: denies   Drug Use: denies     Family/Marital History--  Family history of pancreatic cancer    Family history of rheumatoid arthritis    Family history of asthma    Family history of asthma    Family history of essential hypertension          Travel/Environmental/Occupational History:      Review of Systems:  A >=10-point review of systems was obtained.     Pertinent positives and negatives--  Constitutional: No fevers. No Chills. No Rigors.   Eyes:  ENMT:  Cardiovascular: No chest pain. No palpitations.  Respiratory: No shortness of breath. No cough.  Gastrointestinal: No nausea or vomiting. No diarrhea or constipation.   Genitourinary:  Musculoskeletal:  Skin:  Neurologic:  Psychiatric: Pleasant. Appropriate affect.  Endocrine:  Heme/Lymphatic:  Allergy/Immunologic:    Review of systems otherwise negative except as previously noted.    Physical Exam--  Vital Signs: T(F): 98.4 (05-26-23 @ 19:15), Max: 98.9 (05-26-23 @ 07:28)  HR: 88 (05-26-23 @ 19:15)  BP: 161/86 (05-26-23 @ 19:15)  RR: 18 (05-26-23 @ 19:15)  SpO2: 99% (05-26-23 @ 19:15)  Wt(kg): --  General: Nontoxic-appearing Male in no acute distress.  HEENT: AT/NC. PERRL. EOMI. Anicteric. Conjunctiva pink and moist. Oropharynx clear. Dentition fair.  Neck: Not rigid. No sense of mass.  Nodes: None palpable.  Lungs: Clear bilaterally without rales, wheezing or rhonchi  Heart: Regular rate and rhythm. No Murmur. No rub. No gallop. No palpable thrill.  Abdomen: Bowel sounds present and normoactive. Soft. Nondistended. Nontender. No sense of mass. No organomegaly.  Back: No spinal tenderness. No costovertebral angle tenderness.   Extremities: No cyanosis or clubbing. No edema.   Skin: Warm. Dry. Good turgor. No rash. No vasculitic stigmata.  Psychiatric: Appropriate affect and mood for situation.         Laboratory & Imaging Data--  CBC                        8.9    7.04  )-----------( 369      ( 25 May 2023 20:20 )             28.2       Chemistries  05-25    142  |  109<H>  |  5<L>  ----------------------------<  69<L>  3.8   |  28  |  0.62    Ca    8.4<L>      25 May 2023 20:20    TPro  7.1  /  Alb  2.5<L>  /  TBili  0.3  /  DBili  x   /  AST  19  /  ALT  8<L>  /  AlkPhos  80  05-25      Cell Count, Body Fluid (05.26.23 @ 14:00)    Tube Type: Lavender   Body Fluid Appearance: Sl Bloody   Color - Body Fluid: Red   Total Nucleated Cell Count, Body Fluid: 22017: Reference Ranges:  Synovial Fluid  Total nucleated cells < 150 cells/uL  Neutrophils <25%  Lymphocytes 10-15%  Monocytes/Macrophages </=70%  Other parameters- No established reference ranges.  Bronchoalveolar lavage  Macrophages >85%  Lymphocytes 10-15%  Neutrophils <3%  Eosinophils <1%  Other parameters- No established reference ranges.  Peritoneal/pleural/pericardial fluid/other body fluid types  No established reference ranges. /uL   Total RBC Count: 350906 /uL   Neutrophils-Body Fluid: 96 %   Monocyte/Macrophage Count - Body Fluid: 4 %      Culture Data  None as of yet      < from: CT Lower Extremity w/ IV Cont, Left (05.25.23 @ 22:13) >  ACC: [id number] EXAM:  CT LWR EXT IC LT   ORDERED BY: YEYO MCBRIDE   PROCEDURE DATE:  05/25/2023    INTERPRETATION:  CLINICAL INFORMATION: Left thigh pain being treated via   PICC line for infection. Wound check.  TECHNIQUE: CT of the leftlower extremity from the hip to the ankle was   performed in bone and soft tissue windows with coronal and sagittal   reformats after the uncomplicated administration of intravenous contrast.   90 cc of Omnipaque 350 was administered and 10 cc was discarded.    COMPARISON: No similar prior studies are available for comparison.    FINDINGS:    Bone: The patient is status post a total left knee arthroplasty with long   stem femoral and tibial components. No periprosthetic fracture or   dislocation is demonstrated. No periprosthetic lucency is seen to suggest   loosening. A partially visualized screw is noted in the left sacrum. An   osseous excrescence is seen emanating from the anterior mid femur. Mild   periosteal reaction is noted in the anterior and medial distal femur. An   osseous excrescence is seen emanating from the posterior proximal tibia.    Soft tissues: A multilobulated rim-enhancing fluid collection measuring   maximally 4.7 x 8.4 x 18.7 cm (AP x TRV x CC) is noted in the midto   distal vastus musculature compatible with an intramuscular abscess.   Heterotopic ossification is seen posterior to the proximal fibula. An   enlarged left iliac lymph node is noted measuring 2.3 x 2.3 cm. Enlarged   left inguinal lymph nodes are seen measuring up to 2.1 x 1.5 cm. Mild   subcutaneous inflammatory change is noted throughout the anterior left   thigh extending circumferentially in the distal thigh, knee and calf.    IMPRESSION:    Large intramuscular abscess in the mid to distal left vastus musculature.   Periosteal reaction in the anterior and medial distal left femur adjacent   to the intramuscular abscess which could be reactive in nature; however,   an underlying osteomyelitis cannot be excluded. If clinically warranted,   a follow-up MRI of the left femur could be performed for further   evaluation.    --- End of Report ---            ANNIE PHILIP MD; Attending Radiologist  This document has been electronically signed. May 25 2023 11:10PM    < end of copied text >   Full note to follow  Previously known to our group, on Rx for MSSA Septicemia/PJI  Office received a call that patient seemed very confused and had been falling.   Given that the patient is on anticoagulation and it was not clear what was going on, Dr. Bay had spoke with the patient's wife and advised him to be taken to the ER and have a workup including a CT head.  Presently he is much mor lucid, though still slightly confused, and confirms he has been falling ("mostly on my butt").   Patient also complained of swelling and pain in the L thigh, though is vague with respect to onset and other symptoms.   L thigh is red, warm and tender on exam.  IR drainage- old blood, cx pending  Knee aspirate- 29k WBC, PMN predominance  Will broaden antibiotics pending clinical course, though most likely still MSSA  Thank you for the courtesy of this referral.  I will be available via Teams for any issues that may arise over the weekend.    Lonnie Gonzalez MD  Attending Physician  [facility]  Division of Infectious Diseases  [phone]  -------------  [facility]  Division of Infectious Diseases  [phone]    BHAVIN MCCULLOUGHPIERRE  63y, Male  [id number]    HPI--  This is a 63-year-old man followed by our group recently discharged from the hospital for prosthetic joint infection and methicillin sensitive Staphylococcus aureus septicemia. Received a call that the patient was.  And falling.  Given that he was on anticoagulation, he was advised that he be sent to the emergency room.  Here extensive by swelling was noted and imaging was concerning for an infectious process.  The patient has had arthrocentesis and aspiration of the thigh collection, culture data which is pending but cell counts are elevated concerning for infectious process.    The patient is lucid but still slightly confused.  His speech and logic are circular at times, but he is oriented to self and hospital.  He confirms that he has been falling, "mostly on my butt" but has been falling "backwards instead of forwards".  Denies any definite head trauma but cannot say with certainty.  States the left thigh has been tender and it has become painful to ambulate.  He denies fevers chills or rigors.  He has no shortness of breath or chest pain.  He denies any abdominal discomfort.    PMH/PSH--  Diabetes Mellitus    Hypertension, Essential    Hyperlipidemia    GERD (gastroesophageal reflux disease)    Depression    Obese    Bulging disc    Spinal stenosis    Spondylisthesis    WILSON (obstructive sleep apnea)    Sleep apnea    Anxiety    Insomnia    Varicosities    DVT (deep venous thrombosis)    R Knee Arthroplasty    H/O laser iridotomy    Reflux    Deep vein thrombosis (DVT) of lower extremity        Allergies--  No Known Allergies      Medications--  Antibiotics:   Immunologic:   Other: acetaminophen     Tablet ..  albuterol    90 MICROgram(s) HFA Inhaler PRN  ALPRAZolam PRN  aluminum hydroxide/magnesium hydroxide/simethicone Suspension PRN  budesonide 160 MICROgram(s)/formoterol 4.5 MICROgram(s) Inhaler  buPROPion XL (24-Hour)  DULoxetine  losartan  magnesium hydroxide Suspension PRN  melatonin  multivitamin  ondansetron Injectable PRN  oxyCODONE    IR PRN  oxyCODONE    IR PRN  pantoprazole    Tablet  polyethylene glycol 3350  senna  sertraline  tiotropium 2.5 MICROgram(s) Inhaler    Antimicrobials last 90 days per EMR: MEDICATIONS  (STANDING):  ceFAZolin   IVPB   100 mL/Hr IV Intermittent (05-25-23 @ 23:06)        Social History--  EtOH: denies   Tobacco: prior  Drug Use: denies     Family/Marital History--  Family history of pancreatic cancer    Family history of rheumatoid arthritis    Family history of asthma    Family history of asthma    Family history of essential hypertension        Review of Systems:  A >=10-point review of systems was obtained.   Review of systems otherwise negative except as previously noted.    Physical Exam--  Vital Signs: T(F): 98.4 (05-26-23 @ 19:15), Max: 98.9 (05-26-23 @ 07:28)  HR: 88 (05-26-23 @ 19:15)  BP: 161/86 (05-26-23 @ 19:15)  RR: 18 (05-26-23 @ 19:15)  SpO2: 99% (05-26-23 @ 19:15)  Wt(kg): --  General: Nontoxic-appearing Male in no acute distress.  HEENT: AT/NC.  Anicteric. Conjunctiva pink and moist. Oropharynx clear.   Neck: Not rigid. No sense of mass.  Nodes: None palpable.  Lungs: Clear bilaterally without rales, wheezing or rhonchi  Heart: Regular rate and rhythm. No Murmur. No rub. No gallop. No palpable thrill.  Abdomen: Bowel sounds present and normoactive. Soft. Nondistended. Nontender. No sense of mass. No organomegaly.  Back: No spinal tenderness. No costovertebral angle tenderness.   Extremities: No cyanosis or clubbing. Left thigh warm, red, edematous, tender.  Plan is extending down to the left knee but are more prominent in the thigh than in the knee.  There is pain with range of motion of the knee.  No drainage from prior incision line.  No crepitus fluctuance or devitalized appearing tissue.  Skin: Warm. Dry. Good turgor. No rash. No vasculitic stigmata.  Psychiatric: Appropriate affect and mood for situation.         Laboratory & Imaging Data--  CBC                        8.9    7.04  )-----------( 369      ( 25 May 2023 20:20 )             28.2       Chemistries  05-25    142  |  109<H>  |  5<L>  ----------------------------<  69<L>  3.8   |  28  |  0.62    Ca    8.4<L>      25 May 2023 20:20    TPro  7.1  /  Alb  2.5<L>  /  TBili  0.3  /  DBili  x   /  AST  19  /  ALT  8<L>  /  AlkPhos  80  05-25      Cell Count, Body Fluid (05.26.23 @ 14:00)    Tube Type: Lavender   Body Fluid Appearance: Sl Bloody   Color - Body Fluid: Red   Total Nucleated Cell Count, Body Fluid: 39907: Reference Ranges:  Synovial Fluid  Total nucleated cells < 150 cells/uL  Neutrophils <25%  Lymphocytes 10-15%  Monocytes/Macrophages </=70%  Other parameters- No established reference ranges.  Bronchoalveolar lavage  Macrophages >85%  Lymphocytes 10-15%  Neutrophils <3%  Eosinophils <1%  Other parameters- No established reference ranges.  Peritoneal/pleural/pericardial fluid/other body fluid types  No established reference ranges. /uL   Total RBC Count: 712650 /uL   Neutrophils-Body Fluid: 96 %   Monocyte/Macrophage Count - Body Fluid: 4 %      Culture Data  None as of yet      < from: CT Lower Extremity w/ IV Cont, Left (05.25.23 @ 22:13) >  ACC: [id number] EXAM:  CT LWR EXT IC LT   ORDERED BY: YEYO MCBRIDE   PROCEDURE DATE:  05/25/2023    INTERPRETATION:  CLINICAL INFORMATION: Left thigh pain being treated via   PICC line for infection. Wound check.  TECHNIQUE: CT of the leftlower extremity from the hip to the ankle was   performed in bone and soft tissue windows with coronal and sagittal   reformats after the uncomplicated administration of intravenous contrast.   90 cc of Omnipaque 350 was administered and 10 cc was discarded.    COMPARISON: No similar prior studies are available for comparison.    FINDINGS:    Bone: The patient is status post a total left knee arthroplasty with long   stem femoral and tibial components. No periprosthetic fracture or   dislocation is demonstrated. No periprosthetic lucency is seen to suggest   loosening. A partially visualized screw is noted in the left sacrum. An   osseous excrescence is seen emanating from the anterior mid femur. Mild   periosteal reaction is noted in the anterior and medial distal femur. An   osseous excrescence is seen emanating from the posterior proximal tibia.    Soft tissues: A multilobulated rim-enhancing fluid collection measuring   maximally 4.7 x 8.4 x 18.7 cm (AP x TRV x CC) is noted in the midto   distal vastus musculature compatible with an intramuscular abscess.   Heterotopic ossification is seen posterior to the proximal fibula. An   enlarged left iliac lymph node is noted measuring 2.3 x 2.3 cm. Enlarged   left inguinal lymph nodes are seen measuring up to 2.1 x 1.5 cm. Mild   subcutaneous inflammatory change is noted throughout the anterior left   thigh extending circumferentially in the distal thigh, knee and calf.    IMPRESSION:    Large intramuscular abscess in the mid to distal left vastus musculature.   Periosteal reaction in the anterior and medial distal left femur adjacent   to the intramuscular abscess which could be reactive in nature; however,   an underlying osteomyelitis cannot be excluded. If clinically warranted,   a follow-up MRI of the left femur could be performed for further   evaluation.    --- End of Report ---            ANNIE PHILIP MD; Attending Radiologist  This document has been electronically signed. May 25 2023 11:10PM    < end of copied text >

## 2023-05-26 NOTE — H&P ADULT - HISTORY OF PRESENT ILLNESS
63yMale c/o L thigh pain/swelling. L Thigh fluid collection concerning for abscess sp L TKA I&D with poly exchange 4/14/23. L TKA done in 2011.  Patient sent in by ID office for concern of increased L thigh swelling and pain. Patient endorses that he had thigh pain which began about a week after the most recent poly exchange on 4/14 Endorses that while in rehab he noted that he was less able to flex his knee because of pressure in his thigh. Denies drainage from the incision site. Denies fevers/chills presently or for the past 6 weeks. Denies recent infections  Patient still ambulating with walker. History of DVTs, on xarelto. Patient grew MSSA during last admission from OR cultures; patient was placed on ancef via PICC [id number]    PAST MEDICAL & SURGICAL HISTORY:  Diabetes Mellitus  diagnosed 10 years ago  doesn't do fingersticks      Hypertension, Essential      Hyperlipidemia      GERD (gastroesophageal reflux disease)      Depression      Obese      Bulging disc      Spinal stenosis      Spondylisthesis      Sleep apnea  had test > 10 years ago--supposed to wear device.  Lost 25 pounds but never retested      Anxiety      Insomnia      Varicosities      DVT (deep venous thrombosis)      R Knee Arthroplasty  Right 2003, Left 2011,      H/O laser iridotomy  left-2008      Reflux  Gastroscopy 2013      Deep vein thrombosis (DVT) of lower extremity      Allergies    No Known Allergies    Intolerances      Vital Signs Last 24 Hrs  T(C): 37.1 (25 May 2023 23:12), Max: 37.1 (25 May 2023 23:12)  T(F): 98.7 (25 May 2023 23:12), Max: 98.7 (25 May 2023 23:12)  HR: 84 (25 May 2023 23:12) (82 - 84)  BP: 158/79 (25 May 2023 23:12) (128/- - 159/92)  BP(mean): --  RR: 17 (25 May 2023 23:12) (17 - 18)  SpO2: 99% (25 May 2023 23:12) (97% - 99%)    Parameters below as of 25 May 2023 23:12  Patient On (Oxygen Delivery Method): room air        PE  Gen: NAD, resting comfortably  LLE:   Anterior knee Incision well healed, no erythema or drainage  TTP over anterior mid to distal thigh, increased swelling/no erythema  AROM 0-30 knee, PROM 0-45, no pain on micromotion; pain past that arc of motion  +TA/EHL/FHL/GS  SILT L2-S1  DP+  Compartments soft and compressible  Medial calf incision, well healed      ACC: [id number] EXAM:  CT LWR EXT IC LT   ORDERED BY: YEYO MCBRIDE     PROCEDURE DATE:  05/25/2023          INTERPRETATION:  CLINICAL INFORMATION: Left thigh pain being treated via   PICC line for infection. Wound check.    TECHNIQUE: CT of the leftlower extremity from the hip to the ankle was   performed in bone and soft tissue windows with coronal and sagittal   reformats after the uncomplicated administration of intravenous contrast.   90 cc of Omnipaque 350 was administered and 10 cc was discarded.    COMPARISON: No similar prior studies are available for comparison.    FINDINGS:    Bone: The patient is status post a total left knee arthroplasty with long   stem femoral and tibial components. No periprosthetic fracture or   dislocation is demonstrated. No periprosthetic lucency is seen to suggest   loosening. A partially visualized screw is noted in the left sacrum. An   osseous excrescence is seen emanating from the anterior mid femur. Mild   periosteal reaction is noted in the anterior and medial distal femur. An   osseous excrescence is seen emanating from the posterior proximal tibia.    Soft tissues: A multilobulated rim-enhancing fluid collection measuring   maximally 4.7 x 8.4 x 18.7 cm (AP x TRV x CC) is noted in the midto   distal vastus musculature compatible with an intramuscular abscess.   Heterotopic ossification is seen posterior to the proximal fibula. An   enlarged left iliac lymph node is noted measuring 2.3 x 2.3 cm. Enlarged   left inguinal lymph nodes are seen measuring up to 2.1 x 1.5 cm. Mild   subcutaneous inflammatory change is noted throughout the anterior left   thigh extending circumferentially in the distal thigh, knee and calf.    IMPRESSION:    Large intramuscular abscess in the mid to distal left vastus musculature.   Periosteal reaction in the anterior and medial distal left femur adjacent   to the intramuscular abscess which could be reactive in nature; however,   an underlying osteomyelitis cannot be excluded. If clinically warranted,   a follow-up MRI of the left femur could be performed for further   evaluation.    --- End of Report ---            ANNIE PHILIP MD; Attending Radiologist  This document has been electronically signed. May 25 2023 11:10PM        A/P 63yMale with L Thigh fluid collection concerning for abscess sp L TKA I&D with poly exchange 4/14/23    -Recent I&D with poly enchange, unclear if fluid collection extends to joint, but in close proximity concerning for further prosthetic infection  -ESR 77; FU CRP  -Patient not septic at this time  -Continue home medications  -Patient grew MSSA during last admission from OR cultures; patient was placed on ancef via PICC [id number]; patient still developed infection while on abx treatment  -Unclear plan at this time for further I&D vs revsion vs IR drainage with chronic abx suppression  -Plan for IR guided aspiration of abscess visualized on CT for further evaluation of fluid collection   - Plan for possible operative intervention during this hospitalization, but no plan for OR today 5/26  -Hold dvt ppx for possible OR/IR procedure  - D/w Dr Bob, will update plan accordingly    63yMale c/o L thigh pain/swelling. L Thigh fluid collection concerning for abscess sp L TKA I&D with poly exchange 4/14/23. Primary L TKA done in 2010.  Patient sent in by ID office for concern of increased L thigh swelling and pain. Patient endorses that he had thigh pain which began about a week after the most recent poly exchange on 4/14 Endorses that while in rehab he noted that he was less able to flex his knee because of pressure in his thigh. Denies drainage from the incision site. Denies fevers/chills presently or for the past 6 weeks. Denies recent infections  Patient still ambulating with walker. History of DVTs, on xarelto. Patient grew MSSA during last admission from OR cultures; patient was placed on ancef via PICC [id number]    PAST MEDICAL & SURGICAL HISTORY:  Diabetes Mellitus  diagnosed 10 years ago  doesn't do fingersticks      Hypertension, Essential      Hyperlipidemia      GERD (gastroesophageal reflux disease)      Depression      Obese      Bulging disc      Spinal stenosis      Spondylisthesis      Sleep apnea  had test > 10 years ago--supposed to wear device.  Lost 25 pounds but never retested      Anxiety      Insomnia      Varicosities      DVT (deep venous thrombosis)      R Knee Arthroplasty  Right 2003, Left 2011,      H/O laser iridotomy  left-2008      Reflux  Gastroscopy 2013      Deep vein thrombosis (DVT) of lower extremity      Allergies    No Known Allergies    Intolerances      Vital Signs Last 24 Hrs  T(C): 37.1 (25 May 2023 23:12), Max: 37.1 (25 May 2023 23:12)  T(F): 98.7 (25 May 2023 23:12), Max: 98.7 (25 May 2023 23:12)  HR: 84 (25 May 2023 23:12) (82 - 84)  BP: 158/79 (25 May 2023 23:12) (128/- - 159/92)  BP(mean): --  RR: 17 (25 May 2023 23:12) (17 - 18)  SpO2: 99% (25 May 2023 23:12) (97% - 99%)    Parameters below as of 25 May 2023 23:12  Patient On (Oxygen Delivery Method): room air        PE  Gen: NAD, resting comfortably  LLE:   Anterior knee Incision well healed, no erythema or drainage  TTP over anterior mid to distal thigh, increased swelling/no erythema  AROM 0-30 knee, PROM 0-45, no pain on micromotion; pain past that arc of motion  +TA/EHL/FHL/GS  SILT L2-S1  DP+  Compartments soft and compressible  Medial calf incision, well healed      ACC: [id number] EXAM:  CT LWR EXT IC LT   ORDERED BY: YEYO MCBRIDE     PROCEDURE DATE:  05/25/2023          INTERPRETATION:  CLINICAL INFORMATION: Left thigh pain being treated via   PICC line for infection. Wound check.    TECHNIQUE: CT of the leftlower extremity from the hip to the ankle was   performed in bone and soft tissue windows with coronal and sagittal   reformats after the uncomplicated administration of intravenous contrast.   90 cc of Omnipaque 350 was administered and 10 cc was discarded.    COMPARISON: No similar prior studies are available for comparison.    FINDINGS:    Bone: The patient is status post a total left knee arthroplasty with long   stem femoral and tibial components. No periprosthetic fracture or   dislocation is demonstrated. No periprosthetic lucency is seen to suggest   loosening. A partially visualized screw is noted in the left sacrum. An   osseous excrescence is seen emanating from the anterior mid femur. Mild   periosteal reaction is noted in the anterior and medial distal femur. An   osseous excrescence is seen emanating from the posterior proximal tibia.    Soft tissues: A multilobulated rim-enhancing fluid collection measuring   maximally 4.7 x 8.4 x 18.7 cm (AP x TRV x CC) is noted in the midto   distal vastus musculature compatible with an intramuscular abscess.   Heterotopic ossification is seen posterior to the proximal fibula. An   enlarged left iliac lymph node is noted measuring 2.3 x 2.3 cm. Enlarged   left inguinal lymph nodes are seen measuring up to 2.1 x 1.5 cm. Mild   subcutaneous inflammatory change is noted throughout the anterior left   thigh extending circumferentially in the distal thigh, knee and calf.    IMPRESSION:    Large intramuscular abscess in the mid to distal left vastus musculature.   Periosteal reaction in the anterior and medial distal left femur adjacent   to the intramuscular abscess which could be reactive in nature; however,   an underlying osteomyelitis cannot be excluded. If clinically warranted,   a follow-up MRI of the left femur could be performed for further   evaluation.    --- End of Report ---            ANNIE PHILIP MD; Attending Radiologist  This document has been electronically signed. May 25 2023 11:10PM        A/P 63yMale with L Thigh fluid collection concerning for abscess sp L TKA I&D with poly exchange 4/14/23    -Recent I&D with poly enchange, unclear if fluid collection extends to joint, but in close proximity concerning for further prosthetic infection  -ESR 77; FU CRP  -Patient not septic at this time  -Continue home medications  -Patient grew MSSA during last admission from OR cultures; patient was placed on ancef via PICC [id number]; patient still developed infection while on abx treatment  -Unclear plan at this time for further I&D vs revsion vs IR drainage with chronic abx suppression  -Plan for IR guided aspiration of abscess visualized on CT for further evaluation of fluid collection   - Plan for possible operative intervention during this hospitalization, but no plan for OR today 5/26  -Hold dvt ppx for possible OR/IR procedure  - D/w Dr Bob, will update plan accordingly

## 2023-05-26 NOTE — CONSULT NOTE ADULT - SUBJECTIVE AND OBJECTIVE BOX
HPI:  63yMale c/o L thigh pain/swelling. L Thigh fluid collection concerning for abscess sp L TKA I&D with poly exchange 4/14/23. L TKA done in 2011.  Patient sent in by ID office for concern of increased L thigh swelling and pain. Patient endorses that he had thigh pain which began about a week after the most recent poly exchange on 4/14 Endorses that while in rehab he noted that he was less able to flex his knee because of pressure in his thigh. Denies drainage from the incision site. Denies fevers/chills presently or for the past 6 weeks. Denies recent infections  Patient still ambulating with walker. History of DVTs, on xarelto. Patient grew MSSA during last admission from OR cultures; patient was placed on ancef via PICC [id number]    PAST MEDICAL & SURGICAL HISTORY:  Diabetes Mellitus  diagnosed 10 years ago  doesn't do fingersticks      Hypertension, Essential      Hyperlipidemia      GERD (gastroesophageal reflux disease)      Depression      Obese      Bulging disc      Spinal stenosis      Spondylisthesis      Sleep apnea  had test > 10 years ago--supposed to wear device.  Lost 25 pounds but never retested      Anxiety      Insomnia      Varicosities      DVT (deep venous thrombosis)      R Knee Arthroplasty  Right 2003, Left 2011,      H/O laser iridotomy  left-2008      Reflux  Gastroscopy 2013      Deep vein thrombosis (DVT) of lower extremity      Allergies    No Known Allergies    Intolerances           REVIEW OF SYSTEMS:    CONSTITUTIONAL: No fever, weight loss, or fatigue  EYES: No eye pain, visual disturbances, or discharge  ENMT:  No difficulty hearing, tinnitus, vertigo; No sinus or throat pain  NECK: No pain or stiffness  BREASTS: No pain, masses, or nipple discharge  RESPIRATORY: No cough, wheezing, chills or hemoptysis; No shortness of breath  CARDIOVASCULAR: No chest pain, palpitations, dizziness, or leg swelling  GASTROINTESTINAL: No abdominal or epigastric pain. No nausea, vomiting, or hematemesis; No diarrhea or constipation. No melena or hematochezia.  GENITOURINARY: No dysuria, frequency, hematuria, or incontinence  NEUROLOGICAL: No headaches, memory loss, loss of strength, numbness, or tremors  SKIN: No itching, burning, rashes, or lesions   LYMPH NODES: No enlarged glands  ENDOCRINE: No heat or cold intolerance; No hair loss  MUSCULOSKELETAL: No joint pain or swelling; No muscle, back, or extremity pain  PSYCHIATRIC: No depression, anxiety, mood swings, or difficulty sleeping  HEME/LYMPH: No easy bruising, or bleeding gums  ALLERGY AND IMMUNOLOGIC: No hives or eczema      MEDICATIONS  (STANDING):  acetaminophen     Tablet .. 650 milliGRAM(s) Oral every 6 hours  budesonide 160 MICROgram(s)/formoterol 4.5 MICROgram(s) Inhaler 2 Puff(s) Inhalation two times a day  buPROPion XL (24-Hour) 150 milliGRAM(s) Oral daily  DULoxetine 120 milliGRAM(s) Oral daily  losartan 100 milliGRAM(s) Oral daily  melatonin 3 milliGRAM(s) Oral at bedtime  multivitamin 1 Tablet(s) Oral daily  pantoprazole    Tablet 40 milliGRAM(s) Oral before breakfast  polyethylene glycol 3350 17 Gram(s) Oral at bedtime  senna 2 Tablet(s) Oral at bedtime  sertraline 100 milliGRAM(s) Oral two times a day  tiotropium 2.5 MICROgram(s) Inhaler 2 Puff(s) Inhalation daily    MEDICATIONS  (PRN):  albuterol    90 MICROgram(s) HFA Inhaler 2 Puff(s) Inhalation every 6 hours PRN Bronchospasm  ALPRAZolam 0.5 milliGRAM(s) Oral two times a day PRN anxiety/sleep  aluminum hydroxide/magnesium hydroxide/simethicone Suspension 30 milliLiter(s) Oral every 4 hours PRN Dyspepsia  magnesium hydroxide Suspension 30 milliLiter(s) Oral daily PRN Constipation  ondansetron Injectable 4 milliGRAM(s) IV Push every 6 hours PRN Nausea and/or Vomiting  oxyCODONE    IR 5 milliGRAM(s) Oral every 6 hours PRN Moderate Pain (4 - 6)  oxyCODONE    IR 10 milliGRAM(s) Oral every 6 hours PRN Severe Pain (7 - 10)      Allergies    No Known Allergies    Intolerances     SOCIAL HISTORY:    FAMILY HISTORY:  Family history of pancreatic cancer, rheumatoid arthritis, asthma & HTN       Vital Signs Last 24 Hrs  T(C): 36.8 (26 May 2023 18:15), Max: 37.2 (26 May 2023 07:28)  T(F): 98.3 (26 May 2023 18:15), Max: 98.9 (26 May 2023 07:28)  HR: 84 (26 May 2023 18:15) (78 - 88)  BP: 146/80 (26 May 2023 18:15) (123/72 - 159/92)  BP(mean): --  RR: 18 (26 May 2023 18:15) (17 - 19)  SpO2: 98% (26 May 2023 18:15) (97% - 99%)    Parameters below as of 26 May 2023 18:15  Patient On (Oxygen Delivery Method): room air        PHYSICAL EXAM:    GENERAL: NAD, well-groomed, well-developed  HEAD:  Atraumatic, Normocephalic  EYES: EOMI, PERRLA, conjunctiva and sclera clear  ENMT: No tonsillar erythema, exudates, or enlargement; Moist mucous membranes, Good dentition, No lesions  NECK: Supple, No JVD, Normal thyroid  NERVOUS SYSTEM:  Alert & Oriented X3, Good concentration; Motor Strength 5/5 B/L upper and lower extremities; DTRs 2+ intact and symmetric  CHEST/LUNG: Clear to percussion bilaterally; No rales, rhonchi, wheezing, or rubs  HEART: Regular rate and rhythm; No murmurs, rubs, or gallops  ABDOMEN: Soft, Nontender, Nondistended; Bowel sounds present  EXTREMITIES:  Left knee/ calf Incision well healed       LABS:                        8.9    7.04  )-----------( 369      ( 25 May 2023 20:20 )             28.2     05-25    142  |  109<H>  |  5<L>  ----------------------------<  69<L>  3.8   |  28  |  0.62    Ca    8.4<L>      25 May 2023 20:20    TPro  7.1  /  Alb  2.5<L>  /  TBili  0.3  /  DBili  x   /  AST  19  /  ALT  8<L>  /  AlkPhos  80  05-25        Vital Signs Last 24 Hrs  T(C): 37.1 (25 May 2023 23:12), Max: 37.1 (25 May 2023 23:12)  T(F): 98.7 (25 May 2023 23:12), Max: 98.7 (25 May 2023 23:12)  HR: 84 (25 May 2023 23:12) (82 - 84)  BP: 158/79 (25 May 2023 23:12) (128/- - 159/92)  BP(mean): --  RR: 17 (25 May 2023 23:12) (17 - 18)  SpO2: 99% (25 May 2023 23:12) (97% - 99%)    Parameters below as of 25 May 2023 23:12  Patient On (Oxygen Delivery Method): room air       PROCEDURE DATE:  05/25/2023       INTERPRETATION:  CLINICAL INFORMATION: Left thigh pain being treated via   PICC line for infection. Wound check.    TECHNIQUE: CT of the leftlower extremity from the hip to the ankle was   performed in bone and soft tissue windows with coronal and sagittal   reformats after the uncomplicated administration of intravenous contrast.   90 cc of Omnipaque 350 was administered and 10 cc was discarded.    COMPARISON: No similar prior studies are available for comparison.    FINDINGS:    Bone: The patient is status post a total left knee arthroplasty with long   stem femoral and tibial components. No periprosthetic fracture or   dislocation is demonstrated. No periprosthetic lucency is seen to suggest   loosening. A partially visualized screw is noted in the left sacrum. An   osseous excrescence is seen emanating from the anterior mid femur. Mild   periosteal reaction is noted in the anterior and medial distal femur. An   osseous excrescence is seen emanating from the posterior proximal tibia.    Soft tissues: A multilobulated rim-enhancing fluid collection measuring   maximally 4.7 x 8.4 x 18.7 cm (AP x TRV x CC) is noted in the midto   distal vastus musculature compatible with an intramuscular abscess.   Heterotopic ossification is seen posterior to the proximal fibula. An   enlarged left iliac lymph node is noted measuring 2.3 x 2.3 cm. Enlarged   left inguinal lymph nodes are seen measuring up to 2.1 x 1.5 cm. Mild   subcutaneous inflammatory change is noted throughout the anterior left   thigh extending circumferentially in the distal thigh, knee and calf.    IMPRESSION:    Large intramuscular abscess in the mid to distal left vastus musculature.   Periosteal reaction in the anterior and medial distal left femur adjacent   to the intramuscular abscess which could be reactive in nature; however,   an underlying osteomyelitis cannot be excluded. If clinically warranted,   a follow-up MRI of the left femur could be performed for further   evaluation.    --- End of Report ---       ANNIE PHILIP MD; Attending Radiologist  This document has been electronically signed. May 25 2023 11:10PM            63y Male with history of DM II, HTN, HLD L, DVTs on Xarelto, GERD, Depression, Anxiety, Insomnia, Spinal stenosis, Spondyloschises, R Knee Arthroplasty in 2003 and L Knee Arthroplasty in 2011 s/p L TKA I&D with poly exchange 4/14/23 w/ MSSA on OR cultures requiring Ancef via PICC [id number] p/w increased L thigh swelling and pain. Patient endorses that he's been having thigh pain for a few weeks since he came home from rehab and denied drainage from the incision site, fevers or chills.     PAST MEDICAL & SURGICAL HISTORY:  Diabetes Mellitus  diagnosed 10 years ago  doesn't do fingersticks      Hypertension, Essential      Hyperlipidemia      GERD (gastroesophageal reflux disease)      Depression      Obese      Bulging disc      Spinal stenosis      Spondylisthesis      Sleep apnea  had test > 10 years ago--supposed to wear device.  Lost 25 pounds but never retested      Anxiety      Insomnia      Varicosities      DVT (deep venous thrombosis)      R Knee Arthroplasty  Right 2003, Left 2011,      H/O laser iridotomy  left-2008      Reflux  Gastroscopy 2013      Deep vein thrombosis (DVT) of lower extremity      Allergies    No Known Allergies    Intolerances           REVIEW OF SYSTEMS:    CONSTITUTIONAL: No fever, weight loss, or fatigue  EYES: No eye pain, visual disturbances, or discharge  ENMT:  No difficulty hearing, tinnitus, vertigo; No sinus or throat pain  NECK: No pain or stiffness  BREASTS: No pain, masses, or nipple discharge  RESPIRATORY: No cough, wheezing, chills or hemoptysis; No shortness of breath  CARDIOVASCULAR: No chest pain, palpitations, dizziness, or leg swelling  GASTROINTESTINAL: No abdominal or epigastric pain. No nausea, vomiting, or hematemesis; No diarrhea or constipation. No melena or hematochezia.  GENITOURINARY: No dysuria, frequency, hematuria, or incontinence  NEUROLOGICAL: No headaches, memory loss, loss of strength, numbness, or tremors  SKIN: No itching, burning, rashes, or lesions   LYMPH NODES: No enlarged glands  ENDOCRINE: No heat or cold intolerance; No hair loss  MUSCULOSKELETAL: worsening left thigh swelling and pain  PSYCHIATRIC: No depression, anxiety, mood swings, or difficulty sleeping  HEME/LYMPH: No easy bruising, or bleeding gums  ALLERGY AND IMMUNOLOGIC: No hives or eczema      MEDICATIONS  (STANDING):  acetaminophen     Tablet .. 650 milliGRAM(s) Oral every 6 hours  budesonide 160 MICROgram(s)/formoterol 4.5 MICROgram(s) Inhaler 2 Puff(s) Inhalation two times a day  buPROPion XL (24-Hour) 150 milliGRAM(s) Oral daily  DULoxetine 120 milliGRAM(s) Oral daily  losartan 100 milliGRAM(s) Oral daily  melatonin 3 milliGRAM(s) Oral at bedtime  multivitamin 1 Tablet(s) Oral daily  pantoprazole    Tablet 40 milliGRAM(s) Oral before breakfast  polyethylene glycol 3350 17 Gram(s) Oral at bedtime  senna 2 Tablet(s) Oral at bedtime  sertraline 100 milliGRAM(s) Oral two times a day  tiotropium 2.5 MICROgram(s) Inhaler 2 Puff(s) Inhalation daily    MEDICATIONS  (PRN):  albuterol    90 MICROgram(s) HFA Inhaler 2 Puff(s) Inhalation every 6 hours PRN Bronchospasm  ALPRAZolam 0.5 milliGRAM(s) Oral two times a day PRN anxiety/sleep  aluminum hydroxide/magnesium hydroxide/simethicone Suspension 30 milliLiter(s) Oral every 4 hours PRN Dyspepsia  magnesium hydroxide Suspension 30 milliLiter(s) Oral daily PRN Constipation  ondansetron Injectable 4 milliGRAM(s) IV Push every 6 hours PRN Nausea and/or Vomiting  oxyCODONE    IR 5 milliGRAM(s) Oral every 6 hours PRN Moderate Pain (4 - 6)  oxyCODONE    IR 10 milliGRAM(s) Oral every 6 hours PRN Severe Pain (7 - 10)      Allergies    No Known Allergies    Intolerances     SOCIAL HISTORY:    FAMILY HISTORY:  Family history of pancreatic cancer, rheumatoid arthritis, asthma & HTN       Vital Signs Last 24 Hrs  T(C): 36.8 (26 May 2023 18:15), Max: 37.2 (26 May 2023 07:28)  T(F): 98.3 (26 May 2023 18:15), Max: 98.9 (26 May 2023 07:28)  HR: 84 (26 May 2023 18:15) (78 - 88)  BP: 146/80 (26 May 2023 18:15) (123/72 - 159/92)  BP(mean): --  RR: 18 (26 May 2023 18:15) (17 - 19)  SpO2: 98% (26 May 2023 18:15) (97% - 99%)    Parameters below as of 26 May 2023 18:15  Patient On (Oxygen Delivery Method): room air        PHYSICAL EXAM:    GENERAL: NAD, well-groomed, well-developed  HEAD:  Atraumatic, Normocephalic  EYES: EOMI, PERRLA, conjunctiva and sclera clear  ENMT: No tonsillar erythema, exudates, or enlargement; Moist mucous membranes, Good dentition, No lesions  NECK: Supple, No JVD, Normal thyroid  NERVOUS SYSTEM:  Alert & Oriented X3, Good concentration; Motor Strength 5/5 B/L upper and lower extremities; DTRs 2+ intact and symmetric  CHEST/LUNG: Clear to percussion bilaterally; No rales, rhonchi, wheezing, or rubs  HEART: Regular rate and rhythm; No murmurs, rubs, or gallops  ABDOMEN: Soft, Nontender, Nondistended; Bowel sounds present  EXTREMITIES:  Left knee/ calf Incision well healed       LABS:                        8.9    7.04  )-----------( 369      ( 25 May 2023 20:20 )             28.2     05-25    142  |  109<H>  |  5<L>  ----------------------------<  69<L>  3.8   |  28  |  0.62    Ca    8.4<L>      25 May 2023 20:20    TPro  7.1  /  Alb  2.5<L>  /  TBili  0.3  /  DBili  x   /  AST  19  /  ALT  8<L>  /  AlkPhos  80  05-25        Vital Signs Last 24 Hrs  T(C): 37.1 (25 May 2023 23:12), Max: 37.1 (25 May 2023 23:12)  T(F): 98.7 (25 May 2023 23:12), Max: 98.7 (25 May 2023 23:12)  HR: 84 (25 May 2023 23:12) (82 - 84)  BP: 158/79 (25 May 2023 23:12) (128/- - 159/92)  BP(mean): --  RR: 17 (25 May 2023 23:12) (17 - 18)  SpO2: 99% (25 May 2023 23:12) (97% - 99%)    Parameters below as of 25 May 2023 23:12  Patient On (Oxygen Delivery Method): room air       PROCEDURE DATE:  05/25/2023       INTERPRETATION:  CLINICAL INFORMATION: Left thigh pain being treated via   PICC line for infection. Wound check.    TECHNIQUE: CT of the leftlower extremity from the hip to the ankle was   performed in bone and soft tissue windows with coronal and sagittal   reformats after the uncomplicated administration of intravenous contrast.   90 cc of Omnipaque 350 was administered and 10 cc was discarded.    COMPARISON: No similar prior studies are available for comparison.    FINDINGS:    Bone: The patient is status post a total left knee arthroplasty with long   stem femoral and tibial components. No periprosthetic fracture or   dislocation is demonstrated. No periprosthetic lucency is seen to suggest   loosening. A partially visualized screw is noted in the left sacrum. An   osseous excrescence is seen emanating from the anterior mid femur. Mild   periosteal reaction is noted in the anterior and medial distal femur. An   osseous excrescence is seen emanating from the posterior proximal tibia.    Soft tissues: A multilobulated rim-enhancing fluid collection measuring   maximally 4.7 x 8.4 x 18.7 cm (AP x TRV x CC) is noted in the midto   distal vastus musculature compatible with an intramuscular abscess.   Heterotopic ossification is seen posterior to the proximal fibula. An   enlarged left iliac lymph node is noted measuring 2.3 x 2.3 cm. Enlarged   left inguinal lymph nodes are seen measuring up to 2.1 x 1.5 cm. Mild   subcutaneous inflammatory change is noted throughout the anterior left   thigh extending circumferentially in the distal thigh, knee and calf.    IMPRESSION:    Large intramuscular abscess in the mid to distal left vastus musculature.   Periosteal reaction in the anterior and medial distal left femur adjacent   to the intramuscular abscess which could be reactive in nature; however,   an underlying osteomyelitis cannot be excluded. If clinically warranted,   a follow-up MRI of the left femur could be performed for further   evaluation.    --- End of Report ---       ANNIE PHILIP MD; Attending Radiologist  This document has been electronically signed. May 25 2023 11:10PM

## 2023-05-26 NOTE — CONSULT NOTE ADULT - ASSESSMENT
Previously known to our group, on Rx for MSSA Septicemia/PJI  Office received a call that patient seemed very confused and had been falling.   Given that the patient is on anticoagulation and it was not clear what was going on, Dr. Bay had spoke with the patient's wife and advised him to be taken to the ER and have a workup including a CT head.  Presently he is much mor lucid, though still slightly confused, and confirms he has been falling ("mostly on my butt").   Patient also complained of swelling and pain in the L thigh, though is vague with respect to onset and other symptoms.   L thigh is red, warm and tender on exam.  IR drainage- old blood, cx pending  Knee aspirate- 29k WBC, PMN predominance  Will broaden antibiotics pending clinical course, though most likely still MSSA  Thank you for the courtesy of this referral.  I will be available via Teams for any issues that may arise over the weekend.    Lonnie Gonzalez MD  Attending Physician  [facility]  Division of Infectious Diseases  [phone]

## 2023-05-26 NOTE — PATIENT PROFILE ADULT - FUNCTIONAL ASSESSMENT - BASIC MOBILITY 6.
1-calculated by average/Not able to assess (calculate score using [facility] averaging method)

## 2023-05-26 NOTE — CONSULT NOTE ADULT - SUBJECTIVE AND OBJECTIVE BOX
Interventional Radiology    Evaluate for Procedure:     HPI: 63yMale c/o L thigh pain/swelling. L Thigh fluid collection concerning for abscess sp L TKA I&D with poly exchange 4/14/23. L TKA done in 2011.  Patient sent in by ID office for concern of increased L thigh swelling and pain. Patient endorses that he had thigh pain which began about a week after the most recent poly exchange on 4/14 Endorses that while in rehab he noted that he was less able to flex his knee because of pressure in his thigh. Denies drainage from the incision site. Denies fevers/chills presently or for the past 6 weeks. Denies recent infections  Patient still ambulating with walker. History of DVTs, on xarelto. Patient grew MSSA during last admission from OR cultures; patient was placed on ancef via PICC [id number]. IR consulted for left thigh abscess drain placement.     Allergies: No Known Allergies    Medications (Abx/Cardiac/Anticoagulation/Blood Products)    ceFAZolin   IVPB: 100 mL/Hr IV Intermittent (05-25 @ 23:06)    Data:  180.3  90.7  T(C): 37.1  HR: 88  BP: 147/80  RR: 17  SpO2: 98%    -WBC 7.04 / HgB 8.9 / Hct 28.2 / Plt 369  -Na 142 / Cl 109 / BUN 5 / Glucose 69  -K 3.8 / CO2 28 / Cr 0.62  -ALT 8 / Alk Phos 80 / T.Bili 0.3  -INR 1.17 / PTT 33.6    Radiology: Reviewed with Dr. Morel.     Assessment/Plan:   -63yMale c/o L thigh pain/swelling. L Thigh fluid collection concerning for abscess sp L TKA I&D with poly exchange 4/14/23. L TKA done in 2011. IR consulted for left thigh abscess drainage.       - case reviewed and approved for today 5/26/23  - please place IR procedure order under Dr. Morel  - d/w primary team

## 2023-05-26 NOTE — CONSULT NOTE ADULT - SUBJECTIVE AND OBJECTIVE BOX
63yMale c/o L thigh pain/swelling. L Thigh fluid collection concerning for abscess sp L TKA I&D with poly exchange 4/14/23. L TKA done in 2011.  Patient sent in by ID office for concern of increased L thigh swelling and pain.  Patient endorses that he had thigh pain which began about a week after the most recent poly exchange on 4/14  Endorses that while in rehab he noted that he was less able to flex his knee because of pressure in his thigh.  Denies drainage from the incision site.  Denies fevers/chills presently or for the past 6 weeks. Denies recent infections  Patient still ambulating with walker.  History of DVTs, on xarelto.  Patient grew MSSA during last admission from OR cultures; patient was placed on ancef via PICC [id number]    PAST MEDICAL & SURGICAL HISTORY:  Diabetes Mellitus  diagnosed 10 years ago  doesn't do fingersticks      Hypertension, Essential      Hyperlipidemia      GERD (gastroesophageal reflux disease)      Depression      Obese      Bulging disc      Spinal stenosis      Spondylisthesis      Sleep apnea  had test > 10 years ago--supposed to wear device.  Lost 25 pounds but never retested      Anxiety      Insomnia      Varicosities      DVT (deep venous thrombosis)      R Knee Arthroplasty  Right 2003, Left 2011,      H/O laser iridotomy  left-2008      Reflux  Gastroscopy 2013      Deep vein thrombosis (DVT) of lower extremity        Home Medications:  acetaminophen 325 mg oral tablet: 2 tab(s) orally every 6 hours, As needed, Mild Pain (1 - 3) (25 May 2023 16:30)  Advair Diskus 250 mcg-50 mcg inhalation powder: 1 puff(s) inhaled 2 times a day (25 May 2023 16:30)  albuterol 90 mcg/inh inhalation aerosol: 2 puff(s) inhaled every 6 hours (25 May 2023 16:30)  ALPRAZolam 0.5 mg oral tablet: 1 tab(s) orally 2 times a day (25 May 2023 16:30)  aluminum hydroxide-magnesium hydroxide 200 mg-200 mg/5 mL oral suspension: 30 milliliter(s) orally every 4 hours As needed Dyspepsia (25 May 2023 16:30)  budesonide-formoterol 160 mcg-4.5 mcg/inh inhalation aerosol: inhaled once a day (25 May 2023 16:30)  buPROPion 150 mg/24 hours (XL) oral tablet, extended release: 1 tab(s) orally once a day (25 May 2023 16:30)  Combivent Respimat 20 mcg-100 mcg/inh inhalation aerosol: 1 puff(s) inhaled every 6 hours (25 May 2023 16:30)  DULoxetine 60 mg oral delayed release capsule: 2 cap(s) orally once a day (25 May 2023 16:30)  fluticasone 50 mcg/inh nasal spray: 2 spray(s) in each nostril once a day (25 May 2023 16:30)  losartan 100 mg oral tablet: 1 tab(s) orally once a day (25 May 2023 16:30)  melatonin 3 mg oral tablet: 1 tab(s) orally once a day (at bedtime) (25 May 2023 16:30)  Multiple Vitamins oral tablet: 1 tab(s) orally once a day (25 May 2023 16:30)  Nasal Mist 0.05% nasal spray: 2 spray(s) nasal every 4 hours (25 May 2023 16:30)  omeprazole 40 mg oral delayed release capsule: 1 cap(s) orally once a day (25 May 2023 16:30)  oxyCODONE 10 mg oral tablet: 1 tab(s) orally every 4 hours As needed Severe Pain (7 - 10) (25 May 2023 16:30)  oxyCODONE 5 mg oral tablet: 1 tab(s) orally every 4 hours As needed Moderate Pain (4 - 6) (25 May 2023 16:30)  pantoprazole 40 mg oral delayed release tablet: 1 tab(s) orally once a day (before a meal) (25 May 2023 16:30)  sertraline 100 mg oral tablet: 1 tab(s) orally 2 times a day (25 May 2023 16:30)  tiotropium 2.5 mcg/inh inhalation aerosol: 2 puff(s) inhaled once a day (25 May 2023 16:30)  traMADol 50 mg oral tablet: 1 tab(s) orally 4 times a day (25 May 2023 16:30)  zolpidem 5 mg oral tablet: 1 tab(s) orally once a day (at bedtime) As needed Insomnia (25 May 2023 16:30)    Allergies    No Known Allergies    Intolerances      Vital Signs Last 24 Hrs  T(C): 37.1 (25 May 2023 23:12), Max: 37.1 (25 May 2023 23:12)  T(F): 98.7 (25 May 2023 23:12), Max: 98.7 (25 May 2023 23:12)  HR: 84 (25 May 2023 23:12) (82 - 84)  BP: 158/79 (25 May 2023 23:12) (128/- - 159/92)  BP(mean): --  RR: 17 (25 May 2023 23:12) (17 - 18)  SpO2: 99% (25 May 2023 23:12) (97% - 99%)    Parameters below as of 25 May 2023 23:12  Patient On (Oxygen Delivery Method): room air        PE  Gen: NAD, resting comfortably  LLE:   Anterior knee Incision well healed, no erythema or drainage  TTP over anterior mid to distal thigh, increased swelling/no erythema  AROM 0-30 knee, PROM 0-45, no pain on micromotion; pain past that arc of motion  +TA/EHL/FHL/GS  SILT L2-S1  DP+  Compartments soft and compressible  Medial calf incision, well healed      ACC: [id number] EXAM:  CT LWR EXT IC LT   ORDERED BY: YEYO MCBRIDE     PROCEDURE DATE:  05/25/2023          INTERPRETATION:  CLINICAL INFORMATION: Left thigh pain being treated via   PICC line for infection. Wound check.    TECHNIQUE: CT of the leftlower extremity from the hip to the ankle was   performed in bone and soft tissue windows with coronal and sagittal   reformats after the uncomplicated administration of intravenous contrast.   90 cc of Omnipaque 350 was administered and 10 cc was discarded.    COMPARISON: No similar prior studies are available for comparison.    FINDINGS:    Bone: The patient is status post a total left knee arthroplasty with long   stem femoral and tibial components. No periprosthetic fracture or   dislocation is demonstrated. No periprosthetic lucency is seen to suggest   loosening. A partially visualized screw is noted in the left sacrum. An   osseous excrescence is seen emanating from the anterior mid femur. Mild   periosteal reaction is noted in the anterior and medial distal femur. An   osseous excrescence is seen emanating from the posterior proximal tibia.    Soft tissues: A multilobulated rim-enhancing fluid collection measuring   maximally 4.7 x 8.4 x 18.7 cm (AP x TRV x CC) is noted in the midto   distal vastus musculature compatible with an intramuscular abscess.   Heterotopic ossification is seen posterior to the proximal fibula. An   enlarged left iliac lymph node is noted measuring 2.3 x 2.3 cm. Enlarged   left inguinal lymph nodes are seen measuring up to 2.1 x 1.5 cm. Mild   subcutaneous inflammatory change is noted throughout the anterior left   thigh extending circumferentially in the distal thigh, knee and calf.    IMPRESSION:    Large intramuscular abscess in the mid to distal left vastus musculature.   Periosteal reaction in the anterior and medial distal left femur adjacent   to the intramuscular abscess which could be reactive in nature; however,   an underlying osteomyelitis cannot be excluded. If clinically warranted,   a follow-up MRI of the left femur could be performed for further   evaluation.    --- End of Report ---            ANNIE PHILIP MD; Attending Radiologist  This document has been electronically signed. May 25 2023 11:10PM        A/P 63yMale with L Thigh fluid collection concerning for abscess sp L TKA I&D with poly exchange 4/14/23    -Recent I&D with poly enchange, unclear if fluid collection extends to joint, but in close proximity concerning for further prosthetic infection  -ESR 77; FU CRP  -Patient not septic at this time  -Continue home medications  -Patient grew MSSA during last admission from OR cultures; patient was placed on ancef via PICC [id number]; patient still developed infection while on abx treatment  -Unclear plan at this time for further I&D vs revsion vs IR drainage with chronic abx suppression  -If plan for operative intervention, plan to admit to orthopaedic service and convert consult to H&P  -Will discuss with attending and update plan accordingly  -Hold dvt ppx for possible OR/IR procedure 63yMale c/o L thigh pain/swelling. L Thigh fluid collection concerning for abscess sp L TKA I&D with poly exchange 4/14/23. L TKA done in 2011.  Patient sent in by ID office for concern of increased L thigh swelling and pain.  Patient endorses that he had thigh pain which began about a week after the most recent poly exchange on 4/14  Endorses that while in rehab he noted that he was less able to flex his knee because of pressure in his thigh.  Denies drainage from the incision site.  Denies fevers/chills presently or for the past 6 weeks. Denies recent infections  Patient still ambulating with walker.  History of DVTs, on xarelto.  Patient grew MSSA during last admission from OR cultures; patient was placed on ancef via PICC [id number]    PAST MEDICAL & SURGICAL HISTORY:  Diabetes Mellitus  diagnosed 10 years ago  doesn't do fingersticks      Hypertension, Essential      Hyperlipidemia      GERD (gastroesophageal reflux disease)      Depression      Obese      Bulging disc      Spinal stenosis      Spondylisthesis      Sleep apnea  had test > 10 years ago--supposed to wear device.  Lost 25 pounds but never retested      Anxiety      Insomnia      Varicosities      DVT (deep venous thrombosis)      R Knee Arthroplasty  Right 2003, Left 2011,      H/O laser iridotomy  left-2008      Reflux  Gastroscopy 2013      Deep vein thrombosis (DVT) of lower extremity        Home Medications:  acetaminophen 325 mg oral tablet: 2 tab(s) orally every 6 hours, As needed, Mild Pain (1 - 3) (25 May 2023 16:30)  Advair Diskus 250 mcg-50 mcg inhalation powder: 1 puff(s) inhaled 2 times a day (25 May 2023 16:30)  albuterol 90 mcg/inh inhalation aerosol: 2 puff(s) inhaled every 6 hours (25 May 2023 16:30)  ALPRAZolam 0.5 mg oral tablet: 1 tab(s) orally 2 times a day (25 May 2023 16:30)  aluminum hydroxide-magnesium hydroxide 200 mg-200 mg/5 mL oral suspension: 30 milliliter(s) orally every 4 hours As needed Dyspepsia (25 May 2023 16:30)  budesonide-formoterol 160 mcg-4.5 mcg/inh inhalation aerosol: inhaled once a day (25 May 2023 16:30)  buPROPion 150 mg/24 hours (XL) oral tablet, extended release: 1 tab(s) orally once a day (25 May 2023 16:30)  Combivent Respimat 20 mcg-100 mcg/inh inhalation aerosol: 1 puff(s) inhaled every 6 hours (25 May 2023 16:30)  DULoxetine 60 mg oral delayed release capsule: 2 cap(s) orally once a day (25 May 2023 16:30)  fluticasone 50 mcg/inh nasal spray: 2 spray(s) in each nostril once a day (25 May 2023 16:30)  losartan 100 mg oral tablet: 1 tab(s) orally once a day (25 May 2023 16:30)  melatonin 3 mg oral tablet: 1 tab(s) orally once a day (at bedtime) (25 May 2023 16:30)  Multiple Vitamins oral tablet: 1 tab(s) orally once a day (25 May 2023 16:30)  Nasal Mist 0.05% nasal spray: 2 spray(s) nasal every 4 hours (25 May 2023 16:30)  omeprazole 40 mg oral delayed release capsule: 1 cap(s) orally once a day (25 May 2023 16:30)  oxyCODONE 10 mg oral tablet: 1 tab(s) orally every 4 hours As needed Severe Pain (7 - 10) (25 May 2023 16:30)  oxyCODONE 5 mg oral tablet: 1 tab(s) orally every 4 hours As needed Moderate Pain (4 - 6) (25 May 2023 16:30)  pantoprazole 40 mg oral delayed release tablet: 1 tab(s) orally once a day (before a meal) (25 May 2023 16:30)  sertraline 100 mg oral tablet: 1 tab(s) orally 2 times a day (25 May 2023 16:30)  tiotropium 2.5 mcg/inh inhalation aerosol: 2 puff(s) inhaled once a day (25 May 2023 16:30)  traMADol 50 mg oral tablet: 1 tab(s) orally 4 times a day (25 May 2023 16:30)  zolpidem 5 mg oral tablet: 1 tab(s) orally once a day (at bedtime) As needed Insomnia (25 May 2023 16:30)    Allergies    No Known Allergies    Intolerances      Vital Signs Last 24 Hrs  T(C): 37.1 (25 May 2023 23:12), Max: 37.1 (25 May 2023 23:12)  T(F): 98.7 (25 May 2023 23:12), Max: 98.7 (25 May 2023 23:12)  HR: 84 (25 May 2023 23:12) (82 - 84)  BP: 158/79 (25 May 2023 23:12) (128/- - 159/92)  BP(mean): --  RR: 17 (25 May 2023 23:12) (17 - 18)  SpO2: 99% (25 May 2023 23:12) (97% - 99%)    Parameters below as of 25 May 2023 23:12  Patient On (Oxygen Delivery Method): room air        PE  Gen: NAD, resting comfortably  LLE:   Anterior knee Incision well healed, no erythema or drainage  TTP over anterior mid to distal thigh, increased swelling/no erythema  AROM 0-30 knee, PROM 0-45, no pain on micromotion; pain past that arc of motion  +TA/EHL/FHL/GS  SILT L2-S1  DP+  Compartments soft and compressible  Medial calf incision, well healed      ACC: [id number] EXAM:  CT LWR EXT IC LT   ORDERED BY: YEYO MCBRIDE     PROCEDURE DATE:  05/25/2023          INTERPRETATION:  CLINICAL INFORMATION: Left thigh pain being treated via   PICC line for infection. Wound check.    TECHNIQUE: CT of the leftlower extremity from the hip to the ankle was   performed in bone and soft tissue windows with coronal and sagittal   reformats after the uncomplicated administration of intravenous contrast.   90 cc of Omnipaque 350 was administered and 10 cc was discarded.    COMPARISON: No similar prior studies are available for comparison.    FINDINGS:    Bone: The patient is status post a total left knee arthroplasty with long   stem femoral and tibial components. No periprosthetic fracture or   dislocation is demonstrated. No periprosthetic lucency is seen to suggest   loosening. A partially visualized screw is noted in the left sacrum. An   osseous excrescence is seen emanating from the anterior mid femur. Mild   periosteal reaction is noted in the anterior and medial distal femur. An   osseous excrescence is seen emanating from the posterior proximal tibia.    Soft tissues: A multilobulated rim-enhancing fluid collection measuring   maximally 4.7 x 8.4 x 18.7 cm (AP x TRV x CC) is noted in the midto   distal vastus musculature compatible with an intramuscular abscess.   Heterotopic ossification is seen posterior to the proximal fibula. An   enlarged left iliac lymph node is noted measuring 2.3 x 2.3 cm. Enlarged   left inguinal lymph nodes are seen measuring up to 2.1 x 1.5 cm. Mild   subcutaneous inflammatory change is noted throughout the anterior left   thigh extending circumferentially in the distal thigh, knee and calf.    IMPRESSION:    Large intramuscular abscess in the mid to distal left vastus musculature.   Periosteal reaction in the anterior and medial distal left femur adjacent   to the intramuscular abscess which could be reactive in nature; however,   an underlying osteomyelitis cannot be excluded. If clinically warranted,   a follow-up MRI of the left femur could be performed for further   evaluation.    --- End of Report ---            ANNIE PHILIP MD; Attending Radiologist  This document has been electronically signed. May 25 2023 11:10PM        A/P 63yMale with L Thigh fluid collection concerning for abscess sp L TKA I&D with poly exchange 4/14/23    -Recent I&D with poly enchange, unclear if fluid collection extends to joint, but in close proximity concerning for further prosthetic infection  -ESR 77; FU CRP  -Patient not septic at this time  -Continue home medications  -Patient grew MSSA during last admission from OR cultures; patient was placed on ancef via PICC [id number]; patient still developed infection while on abx treatment  -Unclear plan at this time for further I&D vs revsion vs IR drainage with chronic abx suppression  -If plan for operative intervention, plan to admit to orthopaedic service and convert consult to H&P  -Will discuss with attending and update plan accordingly  -Hold dvt ppx/NPO for possible OR/IR procedure 63yMale c/o L thigh pain/swelling. L Thigh fluid collection concerning for abscess sp L TKA I&D with poly exchange 4/14/23. L TKA done in 2011.  Patient sent in by ID office for concern of increased L thigh swelling and pain.  Patient endorses that he had thigh pain which began about a week after the most recent poly exchange on 4/14  Endorses that while in rehab he noted that he was less able to flex his knee because of pressure in his thigh.  Denies drainage from the incision site.  Denies fevers/chills presently or for the past 6 weeks. Denies recent infections  Patient still ambulating with walker.  History of DVTs, on xarelto.  Patient grew MSSA during last admission from OR cultures; patient was placed on ancef via PICC [id number]    PAST MEDICAL & SURGICAL HISTORY:  Diabetes Mellitus  diagnosed 10 years ago  doesn't do fingersticks      Hypertension, Essential      Hyperlipidemia      GERD (gastroesophageal reflux disease)      Depression      Obese      Bulging disc      Spinal stenosis      Spondylisthesis      Sleep apnea  had test > 10 years ago--supposed to wear device.  Lost 25 pounds but never retested      Anxiety      Insomnia      Varicosities      DVT (deep venous thrombosis)      R Knee Arthroplasty  Right 2003, Left 2011,      H/O laser iridotomy  left-2008      Reflux  Gastroscopy 2013      Deep vein thrombosis (DVT) of lower extremity        Home Medications:  acetaminophen 325 mg oral tablet: 2 tab(s) orally every 6 hours, As needed, Mild Pain (1 - 3) (25 May 2023 16:30)  Advair Diskus 250 mcg-50 mcg inhalation powder: 1 puff(s) inhaled 2 times a day (25 May 2023 16:30)  albuterol 90 mcg/inh inhalation aerosol: 2 puff(s) inhaled every 6 hours (25 May 2023 16:30)  ALPRAZolam 0.5 mg oral tablet: 1 tab(s) orally 2 times a day (25 May 2023 16:30)  aluminum hydroxide-magnesium hydroxide 200 mg-200 mg/5 mL oral suspension: 30 milliliter(s) orally every 4 hours As needed Dyspepsia (25 May 2023 16:30)  budesonide-formoterol 160 mcg-4.5 mcg/inh inhalation aerosol: inhaled once a day (25 May 2023 16:30)  buPROPion 150 mg/24 hours (XL) oral tablet, extended release: 1 tab(s) orally once a day (25 May 2023 16:30)  Combivent Respimat 20 mcg-100 mcg/inh inhalation aerosol: 1 puff(s) inhaled every 6 hours (25 May 2023 16:30)  DULoxetine 60 mg oral delayed release capsule: 2 cap(s) orally once a day (25 May 2023 16:30)  fluticasone 50 mcg/inh nasal spray: 2 spray(s) in each nostril once a day (25 May 2023 16:30)  losartan 100 mg oral tablet: 1 tab(s) orally once a day (25 May 2023 16:30)  melatonin 3 mg oral tablet: 1 tab(s) orally once a day (at bedtime) (25 May 2023 16:30)  Multiple Vitamins oral tablet: 1 tab(s) orally once a day (25 May 2023 16:30)  Nasal Mist 0.05% nasal spray: 2 spray(s) nasal every 4 hours (25 May 2023 16:30)  omeprazole 40 mg oral delayed release capsule: 1 cap(s) orally once a day (25 May 2023 16:30)  oxyCODONE 10 mg oral tablet: 1 tab(s) orally every 4 hours As needed Severe Pain (7 - 10) (25 May 2023 16:30)  oxyCODONE 5 mg oral tablet: 1 tab(s) orally every 4 hours As needed Moderate Pain (4 - 6) (25 May 2023 16:30)  pantoprazole 40 mg oral delayed release tablet: 1 tab(s) orally once a day (before a meal) (25 May 2023 16:30)  sertraline 100 mg oral tablet: 1 tab(s) orally 2 times a day (25 May 2023 16:30)  tiotropium 2.5 mcg/inh inhalation aerosol: 2 puff(s) inhaled once a day (25 May 2023 16:30)  traMADol 50 mg oral tablet: 1 tab(s) orally 4 times a day (25 May 2023 16:30)  zolpidem 5 mg oral tablet: 1 tab(s) orally once a day (at bedtime) As needed Insomnia (25 May 2023 16:30)    Allergies    No Known Allergies    Intolerances      Vital Signs Last 24 Hrs  T(C): 37.1 (25 May 2023 23:12), Max: 37.1 (25 May 2023 23:12)  T(F): 98.7 (25 May 2023 23:12), Max: 98.7 (25 May 2023 23:12)  HR: 84 (25 May 2023 23:12) (82 - 84)  BP: 158/79 (25 May 2023 23:12) (128/- - 159/92)  BP(mean): --  RR: 17 (25 May 2023 23:12) (17 - 18)  SpO2: 99% (25 May 2023 23:12) (97% - 99%)    Parameters below as of 25 May 2023 23:12  Patient On (Oxygen Delivery Method): room air        PE  Gen: NAD, resting comfortably  LLE:   Anterior knee Incision well healed, no erythema or drainage  TTP over anterior mid to distal thigh, increased swelling/no erythema  AROM 0-30 knee, PROM 0-45, no pain on micromotion; pain past that arc of motion  +TA/EHL/FHL/GS  SILT L2-S1  DP+  Compartments soft and compressible  Medial calf incision, well healed      ACC: [id number] EXAM:  CT LWR EXT IC LT   ORDERED BY: YEYO MCBRIDE     PROCEDURE DATE:  05/25/2023          INTERPRETATION:  CLINICAL INFORMATION: Left thigh pain being treated via   PICC line for infection. Wound check.    TECHNIQUE: CT of the leftlower extremity from the hip to the ankle was   performed in bone and soft tissue windows with coronal and sagittal   reformats after the uncomplicated administration of intravenous contrast.   90 cc of Omnipaque 350 was administered and 10 cc was discarded.    COMPARISON: No similar prior studies are available for comparison.    FINDINGS:    Bone: The patient is status post a total left knee arthroplasty with long   stem femoral and tibial components. No periprosthetic fracture or   dislocation is demonstrated. No periprosthetic lucency is seen to suggest   loosening. A partially visualized screw is noted in the left sacrum. An   osseous excrescence is seen emanating from the anterior mid femur. Mild   periosteal reaction is noted in the anterior and medial distal femur. An   osseous excrescence is seen emanating from the posterior proximal tibia.    Soft tissues: A multilobulated rim-enhancing fluid collection measuring   maximally 4.7 x 8.4 x 18.7 cm (AP x TRV x CC) is noted in the midto   distal vastus musculature compatible with an intramuscular abscess.   Heterotopic ossification is seen posterior to the proximal fibula. An   enlarged left iliac lymph node is noted measuring 2.3 x 2.3 cm. Enlarged   left inguinal lymph nodes are seen measuring up to 2.1 x 1.5 cm. Mild   subcutaneous inflammatory change is noted throughout the anterior left   thigh extending circumferentially in the distal thigh, knee and calf.    IMPRESSION:    Large intramuscular abscess in the mid to distal left vastus musculature.   Periosteal reaction in the anterior and medial distal left femur adjacent   to the intramuscular abscess which could be reactive in nature; however,   an underlying osteomyelitis cannot be excluded. If clinically warranted,   a follow-up MRI of the left femur could be performed for further   evaluation.    --- End of Report ---            ANNIE PHILIP MD; Attending Radiologist  This document has been electronically signed. May 25 2023 11:10PM        A/P 63yMale with L Thigh fluid collection concerning for abscess sp L TKA I&D with poly exchange 4/14/23    -Recent I&D with poly enchange, unclear if fluid collection extends to joint, but in close proximity concerning for further prosthetic infection  -ESR 77; FU CRP  -Patient not septic at this time  -Continue home medications  -Patient grew MSSA during last admission from OR cultures; patient was placed on ancef via PICC [id number]; patient still developed infection while on abx treatment  -Unclear plan at this time for further I&D vs revsion vs IR drainage with chronic abx suppression  -Plan for IR guided aspiration of abscess visualized on CT for further evaluation  -Will discuss with attending and update plan accordingly  -Hold dvt ppx for possible OR/IR procedure  - D/w Dr Bob 63yMale c/o L thigh pain/swelling. L Thigh fluid collection concerning for abscess sp L TKA I&D with poly exchange 4/14/23. L TKA done in 2011.  Patient sent in by ID office for concern of increased L thigh swelling and pain.  Patient endorses that he had thigh pain which began about a week after the most recent poly exchange on 4/14  Endorses that while in rehab he noted that he was less able to flex his knee because of pressure in his thigh.  Denies drainage from the incision site.  Denies fevers/chills presently or for the past 6 weeks. Denies recent infections  Patient still ambulating with walker.  History of DVTs, on xarelto.  Patient grew MSSA during last admission from OR cultures; patient was placed on ancef via PICC [id number]    PAST MEDICAL & SURGICAL HISTORY:  Diabetes Mellitus  diagnosed 10 years ago  doesn't do fingersticks      Hypertension, Essential      Hyperlipidemia      GERD (gastroesophageal reflux disease)      Depression      Obese      Bulging disc      Spinal stenosis      Spondylisthesis      Sleep apnea  had test > 10 years ago--supposed to wear device.  Lost 25 pounds but never retested      Anxiety      Insomnia      Varicosities      DVT (deep venous thrombosis)      R Knee Arthroplasty  Right 2003, Left 2011,      H/O laser iridotomy  left-2008      Reflux  Gastroscopy 2013      Deep vein thrombosis (DVT) of lower extremity        Home Medications:  acetaminophen 325 mg oral tablet: 2 tab(s) orally every 6 hours, As needed, Mild Pain (1 - 3) (25 May 2023 16:30)  Advair Diskus 250 mcg-50 mcg inhalation powder: 1 puff(s) inhaled 2 times a day (25 May 2023 16:30)  albuterol 90 mcg/inh inhalation aerosol: 2 puff(s) inhaled every 6 hours (25 May 2023 16:30)  ALPRAZolam 0.5 mg oral tablet: 1 tab(s) orally 2 times a day (25 May 2023 16:30)  aluminum hydroxide-magnesium hydroxide 200 mg-200 mg/5 mL oral suspension: 30 milliliter(s) orally every 4 hours As needed Dyspepsia (25 May 2023 16:30)  budesonide-formoterol 160 mcg-4.5 mcg/inh inhalation aerosol: inhaled once a day (25 May 2023 16:30)  buPROPion 150 mg/24 hours (XL) oral tablet, extended release: 1 tab(s) orally once a day (25 May 2023 16:30)  Combivent Respimat 20 mcg-100 mcg/inh inhalation aerosol: 1 puff(s) inhaled every 6 hours (25 May 2023 16:30)  DULoxetine 60 mg oral delayed release capsule: 2 cap(s) orally once a day (25 May 2023 16:30)  fluticasone 50 mcg/inh nasal spray: 2 spray(s) in each nostril once a day (25 May 2023 16:30)  losartan 100 mg oral tablet: 1 tab(s) orally once a day (25 May 2023 16:30)  melatonin 3 mg oral tablet: 1 tab(s) orally once a day (at bedtime) (25 May 2023 16:30)  Multiple Vitamins oral tablet: 1 tab(s) orally once a day (25 May 2023 16:30)  Nasal Mist 0.05% nasal spray: 2 spray(s) nasal every 4 hours (25 May 2023 16:30)  omeprazole 40 mg oral delayed release capsule: 1 cap(s) orally once a day (25 May 2023 16:30)  oxyCODONE 10 mg oral tablet: 1 tab(s) orally every 4 hours As needed Severe Pain (7 - 10) (25 May 2023 16:30)  oxyCODONE 5 mg oral tablet: 1 tab(s) orally every 4 hours As needed Moderate Pain (4 - 6) (25 May 2023 16:30)  pantoprazole 40 mg oral delayed release tablet: 1 tab(s) orally once a day (before a meal) (25 May 2023 16:30)  sertraline 100 mg oral tablet: 1 tab(s) orally 2 times a day (25 May 2023 16:30)  tiotropium 2.5 mcg/inh inhalation aerosol: 2 puff(s) inhaled once a day (25 May 2023 16:30)  traMADol 50 mg oral tablet: 1 tab(s) orally 4 times a day (25 May 2023 16:30)  zolpidem 5 mg oral tablet: 1 tab(s) orally once a day (at bedtime) As needed Insomnia (25 May 2023 16:30)    Allergies    No Known Allergies    Intolerances      Vital Signs Last 24 Hrs  T(C): 37.1 (25 May 2023 23:12), Max: 37.1 (25 May 2023 23:12)  T(F): 98.7 (25 May 2023 23:12), Max: 98.7 (25 May 2023 23:12)  HR: 84 (25 May 2023 23:12) (82 - 84)  BP: 158/79 (25 May 2023 23:12) (128/- - 159/92)  BP(mean): --  RR: 17 (25 May 2023 23:12) (17 - 18)  SpO2: 99% (25 May 2023 23:12) (97% - 99%)    Parameters below as of 25 May 2023 23:12  Patient On (Oxygen Delivery Method): room air        PE  Gen: NAD, resting comfortably  LLE:   Anterior knee Incision well healed, no erythema or drainage  TTP over anterior mid to distal thigh, increased swelling/no erythema  AROM 0-30 knee, PROM 0-45, no pain on micromotion; pain past that arc of motion  +TA/EHL/FHL/GS  SILT L2-S1  DP+  Compartments soft and compressible  Medial calf incision, well healed      ACC: [id number] EXAM:  CT LWR EXT IC LT   ORDERED BY: YEYO MCBRIDE     PROCEDURE DATE:  05/25/2023          INTERPRETATION:  CLINICAL INFORMATION: Left thigh pain being treated via   PICC line for infection. Wound check.    TECHNIQUE: CT of the leftlower extremity from the hip to the ankle was   performed in bone and soft tissue windows with coronal and sagittal   reformats after the uncomplicated administration of intravenous contrast.   90 cc of Omnipaque 350 was administered and 10 cc was discarded.    COMPARISON: No similar prior studies are available for comparison.    FINDINGS:    Bone: The patient is status post a total left knee arthroplasty with long   stem femoral and tibial components. No periprosthetic fracture or   dislocation is demonstrated. No periprosthetic lucency is seen to suggest   loosening. A partially visualized screw is noted in the left sacrum. An   osseous excrescence is seen emanating from the anterior mid femur. Mild   periosteal reaction is noted in the anterior and medial distal femur. An   osseous excrescence is seen emanating from the posterior proximal tibia.    Soft tissues: A multilobulated rim-enhancing fluid collection measuring   maximally 4.7 x 8.4 x 18.7 cm (AP x TRV x CC) is noted in the midto   distal vastus musculature compatible with an intramuscular abscess.   Heterotopic ossification is seen posterior to the proximal fibula. An   enlarged left iliac lymph node is noted measuring 2.3 x 2.3 cm. Enlarged   left inguinal lymph nodes are seen measuring up to 2.1 x 1.5 cm. Mild   subcutaneous inflammatory change is noted throughout the anterior left   thigh extending circumferentially in the distal thigh, knee and calf.    IMPRESSION:    Large intramuscular abscess in the mid to distal left vastus musculature.   Periosteal reaction in the anterior and medial distal left femur adjacent   to the intramuscular abscess which could be reactive in nature; however,   an underlying osteomyelitis cannot be excluded. If clinically warranted,   a follow-up MRI of the left femur could be performed for further   evaluation.    --- End of Report ---            ANNIE PHILIP MD; Attending Radiologist  This document has been electronically signed. May 25 2023 11:10PM        A/P 63yMale with L Thigh fluid collection concerning for abscess sp L TKA I&D with poly exchange 4/14/23    -Recent I&D with poly enchange, unclear if fluid collection extends to joint, but in close proximity concerning for further prosthetic infection  -ESR 77; FU CRP  -Patient not septic at this time  -Continue home medications  -Patient grew MSSA during last admission from OR cultures; patient was placed on ancef via PICC [id number]; patient still developed infection while on abx treatment  -Unclear plan at this time for further I&D vs revsion vs IR drainage with chronic abx suppression  -Plan for IR guided aspiration of abscess visualized on CT for further evaluation  - Plan for possible operative intervention during this hospitalization, but no plan for OR today 5/26  -Hold dvt ppx for possible OR/IR procedure  - D/w Dr Bob, will update plan accordingly

## 2023-05-27 LAB
ANION GAP SERPL CALC-SCNC: 7 MMOL/L — SIGNIFICANT CHANGE UP (ref 5–17)
APTT BLD: 33.6 SEC — SIGNIFICANT CHANGE UP (ref 27.5–35.5)
BUN SERPL-MCNC: 4 MG/DL — LOW (ref 7–23)
CALCIUM SERPL-MCNC: 8.8 MG/DL — SIGNIFICANT CHANGE UP (ref 8.5–10.1)
CHLORIDE SERPL-SCNC: 104 MMOL/L — SIGNIFICANT CHANGE UP (ref 96–108)
CO2 SERPL-SCNC: 29 MMOL/L — SIGNIFICANT CHANGE UP (ref 22–31)
CREAT SERPL-MCNC: 0.66 MG/DL — SIGNIFICANT CHANGE UP (ref 0.5–1.3)
EGFR: 105 ML/MIN/1.73M2 — SIGNIFICANT CHANGE UP
FLUID INTAKE SUBSTANCE CLASS: SIGNIFICANT CHANGE UP
GLUCOSE BLDC GLUCOMTR-MCNC: 104 MG/DL — HIGH (ref 70–99)
GLUCOSE BLDC GLUCOMTR-MCNC: 109 MG/DL — HIGH (ref 70–99)
GLUCOSE BLDC GLUCOMTR-MCNC: 121 MG/DL — HIGH (ref 70–99)
GLUCOSE BLDC GLUCOMTR-MCNC: 196 MG/DL — HIGH (ref 70–99)
GLUCOSE SERPL-MCNC: 109 MG/DL — HIGH (ref 70–99)
GRAM STN FLD: SIGNIFICANT CHANGE UP
GRAM STN FLD: SIGNIFICANT CHANGE UP
HCT VFR BLD CALC: 31.4 % — LOW (ref 39–50)
HGB BLD-MCNC: 10.2 G/DL — LOW (ref 13–17)
INR BLD: 1.16 RATIO — SIGNIFICANT CHANGE UP (ref 0.88–1.16)
MAGNESIUM SERPL-MCNC: 1.9 MG/DL — SIGNIFICANT CHANGE UP (ref 1.6–2.6)
MCHC RBC-ENTMCNC: 30.3 PG — SIGNIFICANT CHANGE UP (ref 27–34)
MCHC RBC-ENTMCNC: 32.5 G/DL — SIGNIFICANT CHANGE UP (ref 32–36)
MCV RBC AUTO: 93.2 FL — SIGNIFICANT CHANGE UP (ref 80–100)
NRBC # BLD: 0 /100 WBCS — SIGNIFICANT CHANGE UP (ref 0–0)
PHOSPHATE SERPL-MCNC: 4 MG/DL — SIGNIFICANT CHANGE UP (ref 2.5–4.5)
PLATELET # BLD AUTO: 420 K/UL — HIGH (ref 150–400)
POTASSIUM SERPL-MCNC: 3.6 MMOL/L — SIGNIFICANT CHANGE UP (ref 3.5–5.3)
POTASSIUM SERPL-SCNC: 3.6 MMOL/L — SIGNIFICANT CHANGE UP (ref 3.5–5.3)
PROTHROM AB SERPL-ACNC: 13.9 SEC — HIGH (ref 10.5–13.4)
RBC # BLD: 3.37 M/UL — LOW (ref 4.2–5.8)
RBC # FLD: 15.4 % — HIGH (ref 10.3–14.5)
SODIUM SERPL-SCNC: 140 MMOL/L — SIGNIFICANT CHANGE UP (ref 135–145)
SPECIMEN SOURCE: SIGNIFICANT CHANGE UP
SPECIMEN SOURCE: SIGNIFICANT CHANGE UP
SYNOVIAL CRYSTALS CLARITY: ABNORMAL
SYNOVIAL CRYSTALS COLOR: ABNORMAL
SYNOVIAL CRYSTALS ID: ABNORMAL
SYNOVIAL CRYSTALS TUBE: SIGNIFICANT CHANGE UP
WBC # BLD: 6.9 K/UL — SIGNIFICANT CHANGE UP (ref 3.8–10.5)
WBC # FLD AUTO: 6.9 K/UL — SIGNIFICANT CHANGE UP (ref 3.8–10.5)

## 2023-05-27 PROCEDURE — 99232 SBSQ HOSP IP/OBS MODERATE 35: CPT

## 2023-05-27 PROCEDURE — 99223 1ST HOSP IP/OBS HIGH 75: CPT | Mod: 25

## 2023-05-27 RX ADMIN — BUDESONIDE AND FORMOTEROL FUMARATE DIHYDRATE 2 PUFF(S): 160; 4.5 AEROSOL RESPIRATORY (INHALATION) at 18:02

## 2023-05-27 RX ADMIN — Medication 650 MILLIGRAM(S): at 19:01

## 2023-05-27 RX ADMIN — BUDESONIDE AND FORMOTEROL FUMARATE DIHYDRATE 2 PUFF(S): 160; 4.5 AEROSOL RESPIRATORY (INHALATION) at 07:00

## 2023-05-27 RX ADMIN — CEFEPIME 100 MILLIGRAM(S): 1 INJECTION, POWDER, FOR SOLUTION INTRAMUSCULAR; INTRAVENOUS at 14:21

## 2023-05-27 RX ADMIN — Medication 1: at 12:04

## 2023-05-27 RX ADMIN — CEFEPIME 100 MILLIGRAM(S): 1 INJECTION, POWDER, FOR SOLUTION INTRAMUSCULAR; INTRAVENOUS at 21:36

## 2023-05-27 RX ADMIN — SERTRALINE 100 MILLIGRAM(S): 25 TABLET, FILM COATED ORAL at 05:59

## 2023-05-27 RX ADMIN — Medication 166.67 MILLIGRAM(S): at 08:43

## 2023-05-27 RX ADMIN — ENOXAPARIN SODIUM 40 MILLIGRAM(S): 100 INJECTION SUBCUTANEOUS at 05:57

## 2023-05-27 RX ADMIN — Medication 650 MILLIGRAM(S): at 23:10

## 2023-05-27 RX ADMIN — Medication 650 MILLIGRAM(S): at 12:39

## 2023-05-27 RX ADMIN — Medication 1 TABLET(S): at 12:39

## 2023-05-27 RX ADMIN — DULOXETINE HYDROCHLORIDE 120 MILLIGRAM(S): 30 CAPSULE, DELAYED RELEASE ORAL at 12:40

## 2023-05-27 RX ADMIN — Medication 650 MILLIGRAM(S): at 13:39

## 2023-05-27 RX ADMIN — Medication 650 MILLIGRAM(S): at 18:01

## 2023-05-27 RX ADMIN — CEFEPIME 100 MILLIGRAM(S): 1 INJECTION, POWDER, FOR SOLUTION INTRAMUSCULAR; INTRAVENOUS at 05:58

## 2023-05-27 RX ADMIN — PANTOPRAZOLE SODIUM 40 MILLIGRAM(S): 20 TABLET, DELAYED RELEASE ORAL at 08:28

## 2023-05-27 RX ADMIN — Medication 3 MILLIGRAM(S): at 21:36

## 2023-05-27 RX ADMIN — SERTRALINE 100 MILLIGRAM(S): 25 TABLET, FILM COATED ORAL at 18:01

## 2023-05-27 RX ADMIN — Medication 650 MILLIGRAM(S): at 05:57

## 2023-05-27 RX ADMIN — BUPROPION HYDROCHLORIDE 150 MILLIGRAM(S): 150 TABLET, EXTENDED RELEASE ORAL at 12:39

## 2023-05-27 RX ADMIN — Medication 650 MILLIGRAM(S): at 23:40

## 2023-05-27 RX ADMIN — LOSARTAN POTASSIUM 100 MILLIGRAM(S): 100 TABLET, FILM COATED ORAL at 05:58

## 2023-05-27 RX ADMIN — Medication 650 MILLIGRAM(S): at 06:58

## 2023-05-27 RX ADMIN — Medication 166.67 MILLIGRAM(S): at 21:37

## 2023-05-27 NOTE — PROGRESS NOTE ADULT - SUBJECTIVE AND OBJECTIVE BOX
63y Male with history of DM II, HTN, HLD L, DVTs on Xarelto, GERD, Depression, Anxiety, Insomnia, Spinal stenosis, Spondyloschises, R Knee Arthroplasty in 2003 and L Knee Arthroplasty in 2011 s/p L TKA I&D with poly exchange 4/14/23 w/ MSSA on OR cultures requiring Ancef via PICC [id number] p/w left thigh fluid collection concerning for PJI. He is lying in bed in NAD.     MEDICATIONS  (STANDING):  acetaminophen     Tablet .. 650 milliGRAM(s) Oral every 6 hours  budesonide 160 MICROgram(s)/formoterol 4.5 MICROgram(s) Inhaler 2 Puff(s) Inhalation two times a day  buPROPion XL (24-Hour) 150 milliGRAM(s) Oral daily  cefepime   IVPB 1000 milliGRAM(s) IV Intermittent every 8 hours  dextrose 5%. 1000 milliLiter(s) (100 mL/Hr) IV Continuous <Continuous>  dextrose 5%. 1000 milliLiter(s) (50 mL/Hr) IV Continuous <Continuous>  dextrose 50% Injectable 12.5 Gram(s) IV Push once  dextrose 50% Injectable 25 Gram(s) IV Push once  dextrose 50% Injectable 25 Gram(s) IV Push once  DULoxetine 120 milliGRAM(s) Oral daily  enoxaparin Injectable 40 milliGRAM(s) SubCutaneous every 24 hours  glucagon  Injectable 1 milliGRAM(s) IntraMuscular once  insulin lispro (ADMELOG) corrective regimen sliding scale   SubCutaneous three times a day before meals  losartan 100 milliGRAM(s) Oral daily  melatonin 3 milliGRAM(s) Oral at bedtime  multivitamin 1 Tablet(s) Oral daily  pantoprazole    Tablet 40 milliGRAM(s) Oral before breakfast  polyethylene glycol 3350 17 Gram(s) Oral at bedtime  senna 2 Tablet(s) Oral at bedtime  sertraline 100 milliGRAM(s) Oral two times a day  tiotropium 2.5 MICROgram(s) Inhaler 2 Puff(s) Inhalation daily  vancomycin  IVPB 1250 milliGRAM(s) IV Intermittent every 12 hours    MEDICATIONS  (PRN):  albuterol    90 MICROgram(s) HFA Inhaler 2 Puff(s) Inhalation every 6 hours PRN Bronchospasm  ALPRAZolam 0.5 milliGRAM(s) Oral two times a day PRN anxiety/sleep  aluminum hydroxide/magnesium hydroxide/simethicone Suspension 30 milliLiter(s) Oral every 4 hours PRN Dyspepsia  dextrose Oral Gel 15 Gram(s) Oral once PRN Blood Glucose LESS THAN 70 milliGRAM(s)/deciliter  magnesium hydroxide Suspension 30 milliLiter(s) Oral daily PRN Constipation  ondansetron Injectable 4 milliGRAM(s) IV Push every 6 hours PRN Nausea and/or Vomiting  oxyCODONE    IR 5 milliGRAM(s) Oral every 6 hours PRN Moderate Pain (4 - 6)  oxyCODONE    IR 10 milliGRAM(s) Oral every 6 hours PRN Severe Pain (7 - 10)      Allergies    No Known Allergies    Intolerances        Vital Signs Last 24 Hrs  T(C): 37 (27 May 2023 17:13), Max: 37 (27 May 2023 11:23)  T(F): 98.6 (27 May 2023 17:13), Max: 98.6 (27 May 2023 11:23)  HR: 91 (27 May 2023 17:13) (79 - 97)  BP: 144/88 (27 May 2023 17:13) (130/68 - 176/98)   RR: 19 (27 May 2023 17:13) (17 - 19)  SpO2: 95% (27 May 2023 17:13) (95% - 100%)    Parameters below as of 27 May 2023 11:23  Patient On (Oxygen Delivery Method): room air        PHYSICAL EXAM:  GENERAL: NAD   HEAD:  Atraumatic, Normocephalic  EYES: EOMI, PERRLA   NECK: Supple   NERVOUS SYSTEM:  Alert & Oriented X3, Good concentration   CHEST/LUNG: Clear to auscultation bilaterally; No rales, rhonchi, wheezing, or rubs  HEART: Regular rate and rhythm; No murmurs, rubs, or gallops  ABDOMEN: Soft, Nontender, Nondistended; Bowel sounds present  EXTREMITIES: left knee edematous w/ drain in place draining serosanguineous fluid       LABS:                        10.2   6.90  )-----------( 420      ( 27 May 2023 07:25 )             31.4     05-27    140  |  104  |  4<L>  ----------------------------<  109<H>  3.6   |  29  |  0.66    Ca    8.8      27 May 2023 07:25  Phos  4.0     05-27  Mg     1.9     05-27    TPro  7.1  /  Alb  2.5<L>  /  TBili  0.3  /  DBili  x   /  AST  19  /  ALT  8<L>  /  AlkPhos  80  05-25    PT/INR - ( 27 May 2023 07:25 )   PT: 13.9 sec;   INR: 1.16 ratio         PTT - ( 27 May 2023 07:25 )  PTT:33.6 sec    CAPILLARY BLOOD GLUCOSE      POCT Blood Glucose.: 109 mg/dL (27 May 2023 16:22)  POCT Blood Glucose.: 196 mg/dL (27 May 2023 11:35)  POCT Blood Glucose.: 104 mg/dL (27 May 2023 08:00)  POCT Blood Glucose.: 100 mg/dL (26 May 2023 21:45)      Culture - Body Fluid with Gram Stain (collected 26 May 2023 18:20)  Source: .Body Fluid Aqueous Fluid  Gram Stain (27 May 2023 04:06):    polymorphonuclear leukocytes seen    No organisms seen    by cytocentrifuge    Culture - Joint Fluid (collected 26 May 2023 14:20)  Source: Knee Synovial Fluid  Gram Stain (prelim) (27 May 2023 16:17):    Few polymorphonuclear leukocytes per low power field    No organisms seen per oil power field  Preliminary Report (27 May 2023 16:17):    No growth    Culture - Blood (collected 26 May 2023 10:30)  Source: .Blood Blood-Peripheral  Preliminary Report (27 May 2023 15:09):    No growth to date.    Culture - Blood (collected 26 May 2023 10:30)  Source: .Blood Blood-Peripheral  Preliminary Report (27 May 2023 15:09):    No growth to date.      RADIOLOGY & ADDITIONAL TESTS:    05-26-23 @ 07:01  -  05-27-23 @ 07:00  --------------------------------------------------------  IN:    IV PiggyBack: 100 mL    IV PiggyBack: 250 mL    Oral Fluid: 800 mL  Total IN: 1150 mL    OUT:    Drain (mL): 50 mL    Voided (mL): 2550 mL  Total OUT: 2600 mL    Total NET: -1450 mL      05-27-23 @ 07:01  -  05-27-23 @ 18:54  --------------------------------------------------------  IN:    Oral Fluid: 760 mL  Total IN: 760 mL    OUT:    Drain (mL): 30 mL  Total OUT: 30 mL    Total NET: 730 mL

## 2023-05-27 NOTE — PROGRESS NOTE ADULT - ASSESSMENT
63y Male with history of DM II, HTN, HLD L, DVTs on Xarelto, GERD, Depression, Anxiety, Insomnia, Spinal stenosis, Spondyloschises, R Knee Arthroplasty in 2003 and L Knee Arthroplasty in 2011 s/p L TKA I&D with poly exchange 4/14/23 w/ MSSA on OR cultures requiring Ancef via PICC [id number] p/w left thigh fluid collection concerning for PJI.     PJI w/ fluid collection concerning for abscess  - CT showed a large intramuscular abscess in the mid to distal left vastus musculature w/ a periosteal reaction in the anterior and medial distal left femur adjacent to the intramuscular abscess which could be reactive in nature; however, an underlying osteomyelitis cannot be excluded. There was also an enlarged left iliac lymph node and enlarged left inguinal lymph nodes   - ortho aspirated the joint  - follow up   - IR to aspirated 5 cc of old blood on 5/26 - specimen sent and an 8 [place] drain was left in place for further liquefaction/drainage  - Ortho planing on taking patient to OR for I&D of left knee Vs major revision surgery   - c/w antibiotics as per ortho   - recommend ID consult    - pain deferred to primary   - Pt reports that he's not very active because of his knee so cannot access with METs. He is not on insulin, has no history of CHF, CKD, CAD or cerebrovascular disease. Echo from April showed normal global left ventricular systolic function w/ no evidence of valve disease. EKG showed NSR @ 84 BPM and is unchanged from prior EKG from 12/2022. Patient can proceed to OR without further work up and is at moderate cardiac risk for a moderate risk surgery    Hx of DVT  - Xarelto on hold by ortho in anticipation of taking patient to OR    COPD  - patient reports it is secondary to exposure to dust at 9/11 site   - c/w Spiriva and Symbicort     DM II  - start ISS  - Hgb A1C in April was 7.4     HTN  - c/w losartan     Depression/ Anxiety/Insomnia  - c/w Zoloft, Cymbalta, Wellbutrin & Xanax     GERD  - c/w Protonix    Constipation  - c/w Senna & Miralax

## 2023-05-27 NOTE — PROGRESS NOTE ADULT - ASSESSMENT
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 Bob and will advise for any changes to the plan.

## 2023-05-27 NOTE — PROGRESS NOTE ADULT - ATTENDING COMMENTS
vitals/labs reviewed    Left knee IR drain with bloody drainage  Elevated cell count on aspiration, cultures pending    ROM 10-70  motor/sensory intact distally  palp DP  calf soft    continue antibiotics  f/u cultures  planning for surgery later this week

## 2023-05-27 NOTE — PROGRESS NOTE ADULT - SUBJECTIVE AND OBJECTIVE BOX
Patient seen and examined at bedside. Pain well controlled with medication. Patient denies any numbness, tingling, weakness, or any other orthopaedic complaint. S/p drain placement by IR    Exam:   T(C): 36.8 (23 @ 06:44), Max: 37.1 (23 @ 11:03)  T(F): 98.3 (23 @ 06:44), Max: 98.8 (23 @ 14:05)  HR: 79 (23 @ 06:44) (79 - 97)  BP: 130/68 (23 @ 06:44) (130/68 - 176/98)  RR: 18 (23 @ 06:44) (17 - 18)  SpO2: 97% (23 @ 06:44) (97% - 100%)    Gen: resting in bed, NAD  LLE:  Drain in place draining serosanguineous fluid  Nadya-incisional TTP; otherwise, NTTP throughout the rest of the extremity.  SILT L2-S1  GSC/TA/EHL/FHL intact  Extremity warm and well perfused  No calf tenderness bilaterally.  Compartments soft and compressible.     Laboratory Results:   CBC, 23 @ 07:25    WBC: 6.90  Hgb: 10.2  Hct: 31.4  Plt: 420      Chemistry, 23 @ 07:25    Na: 140     AST: --  K: 3.6       ALT: --  Cl: 104      TProt: --  CO2: 29     Alb: --  BUN: 4     TBili: --  Cr: 0.66      AP: --  Glu: 109       M.9  P: 4.0    eGFR: --  eGFR, AA: --

## 2023-05-28 LAB
ALBUMIN SERPL ELPH-MCNC: 2.8 G/DL — LOW (ref 3.3–5)
ALP SERPL-CCNC: 90 U/L — SIGNIFICANT CHANGE UP (ref 40–120)
ALT FLD-CCNC: 7 U/L — LOW (ref 12–78)
AMMONIA BLD-MCNC: 17 UMOL/L — SIGNIFICANT CHANGE UP (ref 11–32)
AMPHET UR-MCNC: NEGATIVE — SIGNIFICANT CHANGE UP
ANION GAP SERPL CALC-SCNC: 3 MMOL/L — LOW (ref 5–17)
ANION GAP SERPL CALC-SCNC: 8 MMOL/L — SIGNIFICANT CHANGE UP (ref 5–17)
APPEARANCE UR: CLEAR — SIGNIFICANT CHANGE UP
APTT BLD: 30.7 SEC — SIGNIFICANT CHANGE UP (ref 27.5–35.5)
AST SERPL-CCNC: 11 U/L — LOW (ref 15–37)
BACTERIA # UR AUTO: ABNORMAL
BARBITURATES UR SCN-MCNC: NEGATIVE — SIGNIFICANT CHANGE UP
BENZODIAZ UR-MCNC: NEGATIVE — SIGNIFICANT CHANGE UP
BILIRUB SERPL-MCNC: 0.6 MG/DL — SIGNIFICANT CHANGE UP (ref 0.2–1.2)
BILIRUB UR-MCNC: NEGATIVE — SIGNIFICANT CHANGE UP
BUN SERPL-MCNC: 6 MG/DL — LOW (ref 7–23)
BUN SERPL-MCNC: 6 MG/DL — LOW (ref 7–23)
CALCIUM SERPL-MCNC: 8.8 MG/DL — SIGNIFICANT CHANGE UP (ref 8.5–10.1)
CALCIUM SERPL-MCNC: 9.1 MG/DL — SIGNIFICANT CHANGE UP (ref 8.5–10.1)
CHLORIDE SERPL-SCNC: 107 MMOL/L — SIGNIFICANT CHANGE UP (ref 96–108)
CHLORIDE SERPL-SCNC: 108 MMOL/L — SIGNIFICANT CHANGE UP (ref 96–108)
CO2 SERPL-SCNC: 23 MMOL/L — SIGNIFICANT CHANGE UP (ref 22–31)
CO2 SERPL-SCNC: 30 MMOL/L — SIGNIFICANT CHANGE UP (ref 22–31)
COCAINE METAB.OTHER UR-MCNC: NEGATIVE — SIGNIFICANT CHANGE UP
COLOR SPEC: YELLOW — SIGNIFICANT CHANGE UP
CREAT SERPL-MCNC: 0.59 MG/DL — SIGNIFICANT CHANGE UP (ref 0.5–1.3)
CREAT SERPL-MCNC: 0.66 MG/DL — SIGNIFICANT CHANGE UP (ref 0.5–1.3)
DIFF PNL FLD: ABNORMAL
EGFR: 105 ML/MIN/1.73M2 — SIGNIFICANT CHANGE UP
EGFR: 108 ML/MIN/1.73M2 — SIGNIFICANT CHANGE UP
EPI CELLS # UR: SIGNIFICANT CHANGE UP
GLUCOSE BLDC GLUCOMTR-MCNC: 102 MG/DL — HIGH (ref 70–99)
GLUCOSE BLDC GLUCOMTR-MCNC: 122 MG/DL — HIGH (ref 70–99)
GLUCOSE BLDC GLUCOMTR-MCNC: 134 MG/DL — HIGH (ref 70–99)
GLUCOSE BLDC GLUCOMTR-MCNC: 138 MG/DL — HIGH (ref 70–99)
GLUCOSE SERPL-MCNC: 95 MG/DL — SIGNIFICANT CHANGE UP (ref 70–99)
GLUCOSE SERPL-MCNC: 98 MG/DL — SIGNIFICANT CHANGE UP (ref 70–99)
GLUCOSE UR QL: NEGATIVE MG/DL — SIGNIFICANT CHANGE UP
HCT VFR BLD CALC: 33 % — LOW (ref 39–50)
HCT VFR BLD CALC: 34.1 % — LOW (ref 39–50)
HGB BLD-MCNC: 10.8 G/DL — LOW (ref 13–17)
HGB BLD-MCNC: 10.9 G/DL — LOW (ref 13–17)
INR BLD: 1.14 RATIO — SIGNIFICANT CHANGE UP (ref 0.88–1.16)
KETONES UR-MCNC: ABNORMAL
LACTATE SERPL-SCNC: 1 MMOL/L — SIGNIFICANT CHANGE UP (ref 0.7–2)
LEUKOCYTE ESTERASE UR-ACNC: ABNORMAL
MAGNESIUM SERPL-MCNC: 1.8 MG/DL — SIGNIFICANT CHANGE UP (ref 1.6–2.6)
MCHC RBC-ENTMCNC: 29.9 PG — SIGNIFICANT CHANGE UP (ref 27–34)
MCHC RBC-ENTMCNC: 30 PG — SIGNIFICANT CHANGE UP (ref 27–34)
MCHC RBC-ENTMCNC: 32 G/DL — SIGNIFICANT CHANGE UP (ref 32–36)
MCHC RBC-ENTMCNC: 32.7 G/DL — SIGNIFICANT CHANGE UP (ref 32–36)
MCV RBC AUTO: 91.4 FL — SIGNIFICANT CHANGE UP (ref 80–100)
MCV RBC AUTO: 93.9 FL — SIGNIFICANT CHANGE UP (ref 80–100)
METHADONE UR-MCNC: NEGATIVE — SIGNIFICANT CHANGE UP
NITRITE UR-MCNC: NEGATIVE — SIGNIFICANT CHANGE UP
NRBC # BLD: 0 /100 WBCS — SIGNIFICANT CHANGE UP (ref 0–0)
NRBC # BLD: 0 /100 WBCS — SIGNIFICANT CHANGE UP (ref 0–0)
OPIATES UR-MCNC: NEGATIVE — SIGNIFICANT CHANGE UP
PCP SPEC-MCNC: SIGNIFICANT CHANGE UP
PCP UR-MCNC: NEGATIVE — SIGNIFICANT CHANGE UP
PH UR: 8 — SIGNIFICANT CHANGE UP (ref 5–8)
PHOSPHATE SERPL-MCNC: 2.5 MG/DL — SIGNIFICANT CHANGE UP (ref 2.5–4.5)
PLATELET # BLD AUTO: 458 K/UL — HIGH (ref 150–400)
PLATELET # BLD AUTO: 463 K/UL — HIGH (ref 150–400)
POTASSIUM SERPL-MCNC: 3.5 MMOL/L — SIGNIFICANT CHANGE UP (ref 3.5–5.3)
POTASSIUM SERPL-MCNC: 4.3 MMOL/L — SIGNIFICANT CHANGE UP (ref 3.5–5.3)
POTASSIUM SERPL-SCNC: 3.5 MMOL/L — SIGNIFICANT CHANGE UP (ref 3.5–5.3)
POTASSIUM SERPL-SCNC: 4.3 MMOL/L — SIGNIFICANT CHANGE UP (ref 3.5–5.3)
PROT SERPL-MCNC: 8.2 GM/DL — SIGNIFICANT CHANGE UP (ref 6–8.3)
PROT UR-MCNC: 15 MG/DL
PROTHROM AB SERPL-ACNC: 13.6 SEC — HIGH (ref 10.5–13.4)
RBC # BLD: 3.61 M/UL — LOW (ref 4.2–5.8)
RBC # BLD: 3.63 M/UL — LOW (ref 4.2–5.8)
RBC # FLD: 15.1 % — HIGH (ref 10.3–14.5)
RBC # FLD: 15.3 % — HIGH (ref 10.3–14.5)
RBC CASTS # UR COMP ASSIST: SIGNIFICANT CHANGE UP /HPF (ref 0–4)
SODIUM SERPL-SCNC: 138 MMOL/L — SIGNIFICANT CHANGE UP (ref 135–145)
SODIUM SERPL-SCNC: 141 MMOL/L — SIGNIFICANT CHANGE UP (ref 135–145)
SP GR SPEC: 1.01 — SIGNIFICANT CHANGE UP (ref 1.01–1.02)
THC UR QL: NEGATIVE — SIGNIFICANT CHANGE UP
TSH SERPL-MCNC: 2.05 UIU/ML — SIGNIFICANT CHANGE UP (ref 0.36–3.74)
UROBILINOGEN FLD QL: NEGATIVE MG/DL — SIGNIFICANT CHANGE UP
VANCOMYCIN TROUGH SERPL-MCNC: 11.6 UG/ML — SIGNIFICANT CHANGE UP (ref 10–20)
WBC # BLD: 8.11 K/UL — SIGNIFICANT CHANGE UP (ref 3.8–10.5)
WBC # BLD: 8.31 K/UL — SIGNIFICANT CHANGE UP (ref 3.8–10.5)
WBC # FLD AUTO: 8.11 K/UL — SIGNIFICANT CHANGE UP (ref 3.8–10.5)
WBC # FLD AUTO: 8.31 K/UL — SIGNIFICANT CHANGE UP (ref 3.8–10.5)
WBC UR QL: ABNORMAL

## 2023-05-28 PROCEDURE — 70450 CT HEAD/BRAIN W/O DYE: CPT | Mod: 26

## 2023-05-28 PROCEDURE — 99231 SBSQ HOSP IP/OBS SF/LOW 25: CPT | Mod: GC

## 2023-05-28 PROCEDURE — 99233 SBSQ HOSP IP/OBS HIGH 50: CPT

## 2023-05-28 PROCEDURE — 71045 X-RAY EXAM CHEST 1 VIEW: CPT | Mod: 26

## 2023-05-28 RX ORDER — OLANZAPINE 15 MG/1
5 TABLET, FILM COATED ORAL ONCE
Refills: 0 | Status: COMPLETED | OUTPATIENT
Start: 2023-05-28 | End: 2023-05-28

## 2023-05-28 RX ORDER — ERTAPENEM SODIUM 1 G/1
1000 INJECTION, POWDER, LYOPHILIZED, FOR SOLUTION INTRAMUSCULAR; INTRAVENOUS EVERY 24 HOURS
Refills: 0 | Status: DISCONTINUED | OUTPATIENT
Start: 2023-05-28 | End: 2023-06-02

## 2023-05-28 RX ADMIN — POLYETHYLENE GLYCOL 3350 17 GRAM(S): 17 POWDER, FOR SOLUTION ORAL at 21:38

## 2023-05-28 RX ADMIN — PANTOPRAZOLE SODIUM 40 MILLIGRAM(S): 20 TABLET, DELAYED RELEASE ORAL at 06:25

## 2023-05-28 RX ADMIN — Medication 650 MILLIGRAM(S): at 07:33

## 2023-05-28 RX ADMIN — BUDESONIDE AND FORMOTEROL FUMARATE DIHYDRATE 2 PUFF(S): 160; 4.5 AEROSOL RESPIRATORY (INHALATION) at 06:30

## 2023-05-28 RX ADMIN — Medication 3 MILLIGRAM(S): at 21:39

## 2023-05-28 RX ADMIN — Medication 166.67 MILLIGRAM(S): at 20:24

## 2023-05-28 RX ADMIN — Medication 650 MILLIGRAM(S): at 06:33

## 2023-05-28 RX ADMIN — Medication 1 TABLET(S): at 12:42

## 2023-05-28 RX ADMIN — ERTAPENEM SODIUM 120 MILLIGRAM(S): 1 INJECTION, POWDER, LYOPHILIZED, FOR SOLUTION INTRAMUSCULAR; INTRAVENOUS at 19:15

## 2023-05-28 RX ADMIN — Medication 166.67 MILLIGRAM(S): at 08:24

## 2023-05-28 RX ADMIN — Medication 0.5 MILLIGRAM(S): at 17:37

## 2023-05-28 RX ADMIN — BUDESONIDE AND FORMOTEROL FUMARATE DIHYDRATE 2 PUFF(S): 160; 4.5 AEROSOL RESPIRATORY (INHALATION) at 17:53

## 2023-05-28 RX ADMIN — DULOXETINE HYDROCHLORIDE 120 MILLIGRAM(S): 30 CAPSULE, DELAYED RELEASE ORAL at 12:43

## 2023-05-28 RX ADMIN — CEFEPIME 100 MILLIGRAM(S): 1 INJECTION, POWDER, FOR SOLUTION INTRAMUSCULAR; INTRAVENOUS at 06:24

## 2023-05-28 RX ADMIN — Medication 650 MILLIGRAM(S): at 12:41

## 2023-05-28 RX ADMIN — OLANZAPINE 5 MILLIGRAM(S): 15 TABLET, FILM COATED ORAL at 19:21

## 2023-05-28 RX ADMIN — BUPROPION HYDROCHLORIDE 150 MILLIGRAM(S): 150 TABLET, EXTENDED RELEASE ORAL at 12:42

## 2023-05-28 RX ADMIN — SERTRALINE 100 MILLIGRAM(S): 25 TABLET, FILM COATED ORAL at 06:37

## 2023-05-28 RX ADMIN — ENOXAPARIN SODIUM 40 MILLIGRAM(S): 100 INJECTION SUBCUTANEOUS at 06:24

## 2023-05-28 RX ADMIN — CEFEPIME 100 MILLIGRAM(S): 1 INJECTION, POWDER, FOR SOLUTION INTRAMUSCULAR; INTRAVENOUS at 14:10

## 2023-05-28 RX ADMIN — LOSARTAN POTASSIUM 100 MILLIGRAM(S): 100 TABLET, FILM COATED ORAL at 06:25

## 2023-05-28 RX ADMIN — Medication 650 MILLIGRAM(S): at 17:52

## 2023-05-28 RX ADMIN — TIOTROPIUM BROMIDE 2 PUFF(S): 18 CAPSULE ORAL; RESPIRATORY (INHALATION) at 12:42

## 2023-05-28 RX ADMIN — Medication 650 MILLIGRAM(S): at 13:41

## 2023-05-28 RX ADMIN — SERTRALINE 100 MILLIGRAM(S): 25 TABLET, FILM COATED ORAL at 17:53

## 2023-05-28 NOTE — PROGRESS NOTE ADULT - SUBJECTIVE AND OBJECTIVE BOX
63y Male with history of DM II, HTN, HLD L, DVTs on Xarelto, GERD, Depression, Anxiety, Insomnia, Spinal stenosis, Spondyloschises, R Knee Arthroplasty in 2003 and L Knee Arthroplasty in 2011 s/p L TKA I&D with poly exchange 4/14/23 w/ MSSA on OR cultures requiring Ancef via PICC [id number] p/w left thigh fluid collection concerning for PJI. He is lying in bed in NAD.       MEDICATIONS  (STANDING):  acetaminophen     Tablet .. 650 milliGRAM(s) Oral every 6 hours  budesonide 160 MICROgram(s)/formoterol 4.5 MICROgram(s) Inhaler 2 Puff(s) Inhalation two times a day  buPROPion XL (24-Hour) 150 milliGRAM(s) Oral daily  dextrose 5%. 1000 milliLiter(s) (100 mL/Hr) IV Continuous <Continuous>  dextrose 5%. 1000 milliLiter(s) (50 mL/Hr) IV Continuous <Continuous>  dextrose 50% Injectable 12.5 Gram(s) IV Push once  dextrose 50% Injectable 25 Gram(s) IV Push once  dextrose 50% Injectable 25 Gram(s) IV Push once  DULoxetine 120 milliGRAM(s) Oral daily  enoxaparin Injectable 40 milliGRAM(s) SubCutaneous every 24 hours  ertapenem  IVPB 1000 milliGRAM(s) IV Intermittent every 24 hours  glucagon  Injectable 1 milliGRAM(s) IntraMuscular once  insulin lispro (ADMELOG) corrective regimen sliding scale   SubCutaneous three times a day before meals  losartan 100 milliGRAM(s) Oral daily  melatonin 3 milliGRAM(s) Oral at bedtime  multivitamin 1 Tablet(s) Oral daily  pantoprazole    Tablet 40 milliGRAM(s) Oral before breakfast  polyethylene glycol 3350 17 Gram(s) Oral at bedtime  senna 2 Tablet(s) Oral at bedtime  sertraline 100 milliGRAM(s) Oral two times a day  tiotropium 2.5 MICROgram(s) Inhaler 2 Puff(s) Inhalation daily  vancomycin  IVPB 1250 milliGRAM(s) IV Intermittent every 12 hours    MEDICATIONS  (PRN):  albuterol    90 MICROgram(s) HFA Inhaler 2 Puff(s) Inhalation every 6 hours PRN Bronchospasm  ALPRAZolam 0.5 milliGRAM(s) Oral two times a day PRN anxiety/sleep  aluminum hydroxide/magnesium hydroxide/simethicone Suspension 30 milliLiter(s) Oral every 4 hours PRN Dyspepsia  dextrose Oral Gel 15 Gram(s) Oral once PRN Blood Glucose LESS THAN 70 milliGRAM(s)/deciliter  magnesium hydroxide Suspension 30 milliLiter(s) Oral daily PRN Constipation  ondansetron Injectable 4 milliGRAM(s) IV Push every 6 hours PRN Nausea and/or Vomiting      Allergies    No Known Allergies    Intolerances        Vital Signs Last 24 Hrs  T(C): 37.8 (28 May 2023 18:00), Max: 37.8 (28 May 2023 18:00)  T(F): 100.1 (28 May 2023 18:00), Max: 100.1 (28 May 2023 18:00)  HR: 108 (28 May 2023 18:00) (74 - 108)  BP: 163/95 (28 May 2023 18:00) (129/80 - 167/87)   RR: 19 (28 May 2023 18:00) (18 - 19)  SpO2: 100% (28 May 2023 18:00) (97% - 100%)    Parameters below as of 28 May 2023 18:00  Patient On (Oxygen Delivery Method): room air        PHYSICAL EXAM:  GENERAL: NAD   HEAD:  Atraumatic, Normocephalic  EYES: EOMI, PERRLA   NECK: Supple   NERVOUS SYSTEM:  Alert & confused  CHEST/LUNG: Clear to auscultation bilaterally; No rales, rhonchi, wheezing, or rubs  HEART: Regular rate and rhythm; No murmurs, rubs, or gallops  ABDOMEN: Soft, Nontender, Nondistended; Bowel sounds present  EXTREMITIES: left knee edematous w/ drain in place draining serosanguineous fluid    LABS:                        10.9   8.11  )-----------( 463      ( 28 May 2023 06:37 )             34.1     05-28    141  |  108  |  6<L>  ----------------------------<  95  4.3   |  30  |  0.66    Ca    9.1      28 May 2023 06:37  Phos  4.0     05-27  Mg     1.9     05-27      PT/INR - ( 28 May 2023 06:37 )   PT: 13.6 sec;   INR: 1.14 ratio         PTT - ( 28 May 2023 06:37 )  PTT:30.7 sec    CAPILLARY BLOOD GLUCOSE      POCT Blood Glucose.: 138 mg/dL (28 May 2023 16:24)  POCT Blood Glucose.: 134 mg/dL (28 May 2023 11:45)  POCT Blood Glucose.: 102 mg/dL (28 May 2023 07:54)  POCT Blood Glucose.: 121 mg/dL (27 May 2023 21:16)      Culture - Body Fluid with Gram Stain (collected 26 May 2023 18:20)  Source: .Body Fluid Aqueous Fluid  Gram Stain (prelim) (28 May 2023 09:32):    polymorphonuclear leukocytes seen    No organisms seen    by cytocentrifuge  Preliminary Report (28 May 2023 09:32):    No growth    Culture - Joint Fluid (collected 26 May 2023 14:20)  Source: Knee Synovial Fluid  Gram Stain (prelim) (27 May 2023 16:17):    Few polymorphonuclear leukocytes per low power field    No organisms seen per oil power field  Preliminary Report (27 May 2023 16:17):    No growth    Culture - Blood (collected 26 May 2023 10:30)  Source: .Blood Blood-Peripheral  Preliminary Report (27 May 2023 15:09):    No growth to date.    Culture - Blood (collected 26 May 2023 10:30)  Source: .Blood Blood-Peripheral  Preliminary Report (27 May 2023 15:09):    No growth to date.      RADIOLOGY & ADDITIONAL TESTS:    05-27-23 @ 07:01  -  05-28-23 @ 07:00  --------------------------------------------------------  IN:    Oral Fluid: 760 mL  Total IN: 760 mL    OUT:    Drain (mL): 50 mL    Voided (mL): 400 mL  Total OUT: 450 mL    Total NET: 310 mL

## 2023-05-28 NOTE — PROGRESS NOTE ADULT - ASSESSMENT
63y Male with history of DM II, HTN, HLD L, DVTs on Xarelto, GERD, Depression, Anxiety, Insomnia, Spinal stenosis, Spondyloschises, R Knee Arthroplasty in 2003 and L Knee Arthroplasty in 2011 s/p L TKA I&D with poly exchange 4/14/23 w/ MSSA on OR cultures requiring Ancef via PICC [id number] p/w left thigh fluid collection concerning for PJI.     PJI w/ fluid collection concerning for abscess  - CT showed a large intramuscular abscess in the mid to distal left vastus musculature w/ a periosteal reaction in the anterior and medial distal left femur adjacent to the intramuscular abscess which could be reactive in nature; however, an underlying osteomyelitis cannot be excluded. There was also an enlarged left iliac lymph node and enlarged left inguinal lymph nodes   - ortho aspirated the joint  - follow up   - IR to aspirated 5 cc of old blood on 5/26 - specimen sent and an 8 [place] drain was left in place for further liquefaction/drainage  - Ortho planing on taking patient to OR for I&D of left knee Vs major revision surgery   - c/w antibiotics as per ortho   - recommend ID consult    - pain deferred to primary   - Pt reports that he's not very active because of his knee so cannot access with METs. He is not on insulin, has no history of CHF, CKD, CAD or cerebrovascular disease. Echo from April showed normal global left ventricular systolic function w/ no evidence of valve disease. EKG showed NSR @ 84 BPM and is unchanged from prior EKG from 12/2022. Patient can proceed to OR without further work up and is at moderate cardiac risk for a moderate risk surgery    Confusion  - suspect delirium/ metabolic encephalopathy due to meds vs infection  - as per ID, there is an unlikely possibility of cefepime neurotoxicity and patient was switching to ertapenem  - CT head showed no acute intracranial abnormalities and mild small vessel and atrophic changes  - may need psych consult    Hx of DVT  - Xarelto on hold by ortho in anticipation of taking patient to OR    COPD  - patient reports it is secondary to exposure to dust at 9/11 site   - c/w Spiriva and Symbicort     DM II  - start ISS  - Hgb A1C in April was 7.4     HTN  - c/w losartan     Depression/ Anxiety/Insomnia  - c/w Zoloft, Cymbalta, Wellbutrin & Xanax     GERD  - c/w Protonix    Constipation  - c/w Senna & Miralax

## 2023-05-28 NOTE — PROGRESS NOTE ADULT - ATTENDING COMMENTS
knee aspiration cultures no growth  cell count elevated at 29,000, now 6 weeks s/p left knee I&D and poly exchange    Continue antibiotics  monitor drain output

## 2023-05-28 NOTE — PROGRESS NOTE ADULT - SUBJECTIVE AND OBJECTIVE BOX
Patient seen and examined at bedside. Pain well controlled with medication. Patient denies any numbness, tingling, weakness, or any other orthopaedic complaint. S/p drain placement by IR    Exam:   Vital Signs Last 24 Hrs  T(C): 36.8 (28 May 2023 05:09), Max: 37 (27 May 2023 11:23)  T(F): 98.3 (28 May 2023 05:09), Max: 98.6 (27 May 2023 11:23)  HR: 74 (28 May 2023 05:09) (74 - 91)  BP: 129/80 (28 May 2023 05:09) (129/80 - 154/81)  BP(mean): --  RR: 18 (28 May 2023 05:09) (18 - 19)  SpO2: 97% (28 May 2023 05:09) (95% - 100%)    Parameters below as of 28 May 2023 05:09  Patient On (Oxygen Delivery Method): room air       10.9   8.11  )-----------( 463      ( 28 May 2023 06:37 )             34.1       05-28    141  |  108  |  6<L>  ----------------------------<  95  4.3   |  30  |  0.66    Ca    9.1      28 May 2023 06:37  Phos  4.0     05-27  Mg     1.9     05-27         PT/INR - ( 28 May 2023 06:37 )   PT: 13.6 sec;   INR: 1.14 ratio         PTT - ( 28 May 2023 06:37 )  PTT:30.7 sec      Gen: resting in bed, NAD  LLE:  Drain in place draining serosanguineous fluid  Nadya-incisional TTP; otherwise, NTTP throughout the rest of the extremity.  SILT L2-S1  GSC/TA/EHL/FHL intact  Extremity warm and well perfused  No calf tenderness bilaterally.  Compartments soft and compressible.

## 2023-05-28 NOTE — PROGRESS NOTE ADULT - ASSESSMENT
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 Bob and will advise for any changes to the plan.

## 2023-05-29 DIAGNOSIS — A41.01 SEPSIS DUE TO METHICILLIN SUSCEPTIBLE STAPHYLOCOCCUS AUREUS: ICD-10-CM

## 2023-05-29 DIAGNOSIS — T84.54XA INFECTION AND INFLAMMATORY REACTION DUE TO INTERNAL LEFT KNEE PROSTHESIS, INITIAL ENCOUNTER: ICD-10-CM

## 2023-05-29 DIAGNOSIS — M96.840 POSTPROCEDURAL HEMATOMA OF A MUSCULOSKELETAL STRUCTURE FOLLOWING A MUSCULOSKELETAL SYSTEM PROCEDURE: ICD-10-CM

## 2023-05-29 LAB
ALBUMIN SERPL ELPH-MCNC: 2.7 G/DL — LOW (ref 3.3–5)
ALP SERPL-CCNC: 78 U/L — SIGNIFICANT CHANGE UP (ref 40–120)
ALT FLD-CCNC: 10 U/L — LOW (ref 12–78)
ANION GAP SERPL CALC-SCNC: 5 MMOL/L — SIGNIFICANT CHANGE UP (ref 5–17)
APTT BLD: 33.2 SEC — SIGNIFICANT CHANGE UP (ref 27.5–35.5)
AST SERPL-CCNC: 13 U/L — LOW (ref 15–37)
BILIRUB SERPL-MCNC: 0.4 MG/DL — SIGNIFICANT CHANGE UP (ref 0.2–1.2)
BUN SERPL-MCNC: 6 MG/DL — LOW (ref 7–23)
CALCIUM SERPL-MCNC: 8.6 MG/DL — SIGNIFICANT CHANGE UP (ref 8.5–10.1)
CHLORIDE SERPL-SCNC: 112 MMOL/L — HIGH (ref 96–108)
CO2 SERPL-SCNC: 25 MMOL/L — SIGNIFICANT CHANGE UP (ref 22–31)
CREAT SERPL-MCNC: 0.6 MG/DL — SIGNIFICANT CHANGE UP (ref 0.5–1.3)
EGFR: 108 ML/MIN/1.73M2 — SIGNIFICANT CHANGE UP
GLUCOSE BLDC GLUCOMTR-MCNC: 120 MG/DL — HIGH (ref 70–99)
GLUCOSE BLDC GLUCOMTR-MCNC: 121 MG/DL — HIGH (ref 70–99)
GLUCOSE BLDC GLUCOMTR-MCNC: 76 MG/DL — SIGNIFICANT CHANGE UP (ref 70–99)
GLUCOSE BLDC GLUCOMTR-MCNC: 83 MG/DL — SIGNIFICANT CHANGE UP (ref 70–99)
GLUCOSE SERPL-MCNC: 83 MG/DL — SIGNIFICANT CHANGE UP (ref 70–99)
HCT VFR BLD CALC: 32.8 % — LOW (ref 39–50)
HGB BLD-MCNC: 10.3 G/DL — LOW (ref 13–17)
INR BLD: 1.15 RATIO — SIGNIFICANT CHANGE UP (ref 0.88–1.16)
MAGNESIUM SERPL-MCNC: 1.9 MG/DL — SIGNIFICANT CHANGE UP (ref 1.6–2.6)
MCHC RBC-ENTMCNC: 29.1 PG — SIGNIFICANT CHANGE UP (ref 27–34)
MCHC RBC-ENTMCNC: 31.4 G/DL — LOW (ref 32–36)
MCV RBC AUTO: 92.7 FL — SIGNIFICANT CHANGE UP (ref 80–100)
NRBC # BLD: 0 /100 WBCS — SIGNIFICANT CHANGE UP (ref 0–0)
PHOSPHATE SERPL-MCNC: 4.4 MG/DL — SIGNIFICANT CHANGE UP (ref 2.5–4.5)
PLATELET # BLD AUTO: 436 K/UL — HIGH (ref 150–400)
POTASSIUM SERPL-MCNC: 3.5 MMOL/L — SIGNIFICANT CHANGE UP (ref 3.5–5.3)
POTASSIUM SERPL-SCNC: 3.5 MMOL/L — SIGNIFICANT CHANGE UP (ref 3.5–5.3)
PROT SERPL-MCNC: 7.3 GM/DL — SIGNIFICANT CHANGE UP (ref 6–8.3)
PROTHROM AB SERPL-ACNC: 13.8 SEC — HIGH (ref 10.5–13.4)
RBC # BLD: 3.54 M/UL — LOW (ref 4.2–5.8)
RBC # FLD: 15.3 % — HIGH (ref 10.3–14.5)
SODIUM SERPL-SCNC: 142 MMOL/L — SIGNIFICANT CHANGE UP (ref 135–145)
TROPONIN I, HIGH SENSITIVITY RESULT: 10.3 NG/L — SIGNIFICANT CHANGE UP
VANCOMYCIN TROUGH SERPL-MCNC: 11.5 UG/ML — SIGNIFICANT CHANGE UP (ref 10–20)
WBC # BLD: 6.34 K/UL — SIGNIFICANT CHANGE UP (ref 3.8–10.5)
WBC # FLD AUTO: 6.34 K/UL — SIGNIFICANT CHANGE UP (ref 3.8–10.5)

## 2023-05-29 PROCEDURE — 99232 SBSQ HOSP IP/OBS MODERATE 35: CPT

## 2023-05-29 PROCEDURE — 99231 SBSQ HOSP IP/OBS SF/LOW 25: CPT | Mod: GC

## 2023-05-29 PROCEDURE — 93010 ELECTROCARDIOGRAM REPORT: CPT

## 2023-05-29 RX ORDER — SODIUM CHLORIDE 9 MG/ML
1000 INJECTION INTRAMUSCULAR; INTRAVENOUS; SUBCUTANEOUS ONCE
Refills: 0 | Status: COMPLETED | OUTPATIENT
Start: 2023-05-29 | End: 2023-05-29

## 2023-05-29 RX ADMIN — SODIUM CHLORIDE 1000 MILLILITER(S): 9 INJECTION INTRAMUSCULAR; INTRAVENOUS; SUBCUTANEOUS at 00:32

## 2023-05-29 RX ADMIN — LOSARTAN POTASSIUM 100 MILLIGRAM(S): 100 TABLET, FILM COATED ORAL at 05:45

## 2023-05-29 RX ADMIN — SERTRALINE 100 MILLIGRAM(S): 25 TABLET, FILM COATED ORAL at 17:54

## 2023-05-29 RX ADMIN — TIOTROPIUM BROMIDE 2 PUFF(S): 18 CAPSULE ORAL; RESPIRATORY (INHALATION) at 11:57

## 2023-05-29 RX ADMIN — PANTOPRAZOLE SODIUM 40 MILLIGRAM(S): 20 TABLET, DELAYED RELEASE ORAL at 08:17

## 2023-05-29 RX ADMIN — Medication 650 MILLIGRAM(S): at 17:54

## 2023-05-29 RX ADMIN — Medication 650 MILLIGRAM(S): at 06:46

## 2023-05-29 RX ADMIN — BUDESONIDE AND FORMOTEROL FUMARATE DIHYDRATE 2 PUFF(S): 160; 4.5 AEROSOL RESPIRATORY (INHALATION) at 17:54

## 2023-05-29 RX ADMIN — Medication 166.67 MILLIGRAM(S): at 08:19

## 2023-05-29 RX ADMIN — Medication 1 TABLET(S): at 11:55

## 2023-05-29 RX ADMIN — SERTRALINE 100 MILLIGRAM(S): 25 TABLET, FILM COATED ORAL at 05:45

## 2023-05-29 RX ADMIN — DULOXETINE HYDROCHLORIDE 120 MILLIGRAM(S): 30 CAPSULE, DELAYED RELEASE ORAL at 11:56

## 2023-05-29 RX ADMIN — POLYETHYLENE GLYCOL 3350 17 GRAM(S): 17 POWDER, FOR SOLUTION ORAL at 22:12

## 2023-05-29 RX ADMIN — Medication 650 MILLIGRAM(S): at 17:56

## 2023-05-29 RX ADMIN — Medication 3 MILLIGRAM(S): at 23:07

## 2023-05-29 RX ADMIN — ENOXAPARIN SODIUM 40 MILLIGRAM(S): 100 INJECTION SUBCUTANEOUS at 05:46

## 2023-05-29 RX ADMIN — Medication 650 MILLIGRAM(S): at 11:58

## 2023-05-29 RX ADMIN — ERTAPENEM SODIUM 120 MILLIGRAM(S): 1 INJECTION, POWDER, LYOPHILIZED, FOR SOLUTION INTRAMUSCULAR; INTRAVENOUS at 18:40

## 2023-05-29 RX ADMIN — BUPROPION HYDROCHLORIDE 150 MILLIGRAM(S): 150 TABLET, EXTENDED RELEASE ORAL at 11:55

## 2023-05-29 RX ADMIN — BUDESONIDE AND FORMOTEROL FUMARATE DIHYDRATE 2 PUFF(S): 160; 4.5 AEROSOL RESPIRATORY (INHALATION) at 05:45

## 2023-05-29 RX ADMIN — Medication 650 MILLIGRAM(S): at 05:45

## 2023-05-29 RX ADMIN — Medication 166.67 MILLIGRAM(S): at 22:08

## 2023-05-29 RX ADMIN — SENNA PLUS 2 TABLET(S): 8.6 TABLET ORAL at 22:11

## 2023-05-29 RX ADMIN — Medication 650 MILLIGRAM(S): at 17:44

## 2023-05-29 NOTE — PROGRESS NOTE ADULT - SUBJECTIVE AND OBJECTIVE BOX
63y Male with history of DM II, HTN, HLD L, DVTs on Xarelto, GERD, Depression, Anxiety, Insomnia, Spinal stenosis, Spondyloschises, R Knee Arthroplasty in  and L Knee Arthroplasty in  s/p L TKA I&D with poly exchange 23 w/ MSSA on OR cultures requiring Ancef via PICC [id number] p/w left thigh fluid collection concerning for PJI. He is lying in bed in NAD.       MEDICATIONS  (STANDING):  acetaminophen     Tablet .. 650 milliGRAM(s) Oral every 6 hours  budesonide 160 MICROgram(s)/formoterol 4.5 MICROgram(s) Inhaler 2 Puff(s) Inhalation two times a day  buPROPion XL (24-Hour) 150 milliGRAM(s) Oral daily  dextrose 5%. 1000 milliLiter(s) (100 mL/Hr) IV Continuous <Continuous>  dextrose 5%. 1000 milliLiter(s) (50 mL/Hr) IV Continuous <Continuous>  dextrose 50% Injectable 12.5 Gram(s) IV Push once  dextrose 50% Injectable 25 Gram(s) IV Push once  dextrose 50% Injectable 25 Gram(s) IV Push once  DULoxetine 120 milliGRAM(s) Oral daily  enoxaparin Injectable 40 milliGRAM(s) SubCutaneous every 24 hours  ertapenem  IVPB 1000 milliGRAM(s) IV Intermittent every 24 hours  glucagon  Injectable 1 milliGRAM(s) IntraMuscular once  insulin lispro (ADMELOG) corrective regimen sliding scale   SubCutaneous three times a day before meals  losartan 100 milliGRAM(s) Oral daily  melatonin 3 milliGRAM(s) Oral at bedtime  multivitamin 1 Tablet(s) Oral daily  pantoprazole    Tablet 40 milliGRAM(s) Oral before breakfast  polyethylene glycol 3350 17 Gram(s) Oral at bedtime  senna 2 Tablet(s) Oral at bedtime  sertraline 100 milliGRAM(s) Oral two times a day  tiotropium 2.5 MICROgram(s) Inhaler 2 Puff(s) Inhalation daily  vancomycin  IVPB 1250 milliGRAM(s) IV Intermittent every 12 hours    MEDICATIONS  (PRN):  albuterol    90 MICROgram(s) HFA Inhaler 2 Puff(s) Inhalation every 6 hours PRN Bronchospasm  ALPRAZolam 0.5 milliGRAM(s) Oral two times a day PRN anxiety/sleep  aluminum hydroxide/magnesium hydroxide/simethicone Suspension 30 milliLiter(s) Oral every 4 hours PRN Dyspepsia  dextrose Oral Gel 15 Gram(s) Oral once PRN Blood Glucose LESS THAN 70 milliGRAM(s)/deciliter  magnesium hydroxide Suspension 30 milliLiter(s) Oral daily PRN Constipation  ondansetron Injectable 4 milliGRAM(s) IV Push every 6 hours PRN Nausea and/or Vomiting      Allergies    No Known Allergies    Intolerances        Vital Signs Last 24 Hrs  T(C): 36.7 (29 May 2023 16:17), Max: 37.2 (29 May 2023 10:58)  T(F): 98.1 (29 May 2023 16:17), Max: 99 (29 May 2023 10:58)  HR: 87 (29 May 2023 16:17) (73 - 99)  BP: 137/79 (29 May 2023 16:17) (108/61 - 137/79)  BP(mean): --  RR: 18 (29 May 2023 16:17) (18 - 19)  SpO2: 98% (29 May 2023 16:17) (96% - 98%)    Parameters below as of 29 May 2023 10:58  Patient On (Oxygen Delivery Method): room air        PHYSICAL EXAM:  GENERAL: NAD   HEAD:  Atraumatic, Normocephalic  EYES: EOMI, PERRLA   NECK: Supple   NERVOUS SYSTEM:  Alert & more oriented today  CHEST/LUNG: Clear to auscultation bilaterally; No rales, rhonchi, wheezing, or rubs  HEART: Regular rate and rhythm; No murmurs, rubs, or gallops  ABDOMEN: Soft, Nontender, Nondistended; Bowel sounds present  EXTREMITIES: left knee edematous w/ drain in place draining serosanguineous fluid      LABS:                        10.3   6.34  )-----------( 436      ( 29 May 2023 06:49 )             32.8         142  |  112<H>  |  6<L>  ----------------------------<  83  3.5   |  25  |  0.60    Ca    8.6      29 May 2023 06:49  Phos  4.4       Mg     1.9         TPro  7.3  /  Alb  2.7<L>  /  TBili  0.4  /  DBili  x   /  AST  13<L>  /  ALT  10<L>  /  AlkPhos  78  05-29    PT/INR - ( 29 May 2023 06:49 )   PT: 13.8 sec;   INR: 1.15 ratio         PTT - ( 29 May 2023 06:49 )  PTT:33.2 sec  Urinalysis Basic - ( 28 May 2023 21:30 )    Color: Yellow / Appearance: Clear / S.010 / pH: x  Gluc: x / Ketone: Trace  / Bili: Negative / Urobili: Negative mg/dL   Blood: x / Protein: 15 mg/dL / Nitrite: Negative   Leuk Esterase: Trace / RBC: 0-2 /HPF / WBC 6-10   Sq Epi: x / Non Sq Epi: x / Bacteria: Few      CAPILLARY BLOOD GLUCOSE      POCT Blood Glucose.: 121 mg/dL (29 May 2023 16:38)  POCT Blood Glucose.: 120 mg/dL (29 May 2023 11:01)  POCT Blood Glucose.: 76 mg/dL (29 May 2023 07:49)  POCT Blood Glucose.: 122 mg/dL (28 May 2023 21:17)      Culture - Body Fluid with Gram Stain (collected 26 May 2023 18:20)  Source: .Body Fluid Aqueous Fluid  Gram Stain (prelim) (28 May 2023 09:32):    polymorphonuclear leukocytes seen    No organisms seen    by cytocentrifuge  Preliminary Report (28 May 2023 09:32):    No growth    Culture - Joint Fluid (collected 26 May 2023 14:20)  Source: Knee Synovial Fluid  Gram Stain (prelim) (27 May 2023 16:17):    Few polymorphonuclear leukocytes per low power field    No organisms seen per oil power field  Preliminary Report (27 May 2023 16:17):    No growth    Culture - Blood (collected 26 May 2023 10:30)  Source: .Blood Blood-Peripheral  Preliminary Report (27 May 2023 15:09):    No growth to date.    Culture - Blood (collected 26 May 2023 10:30)  Source: .Blood Blood-Peripheral  Preliminary Report (27 May 2023 15:09):    No growth to date.      RADIOLOGY & ADDITIONAL TESTS:    23 @ 07:01  -  23 @ 07:00  --------------------------------------------------------  IN:    IV PiggyBack: 250 mL    Oral Fluid: 200 mL    Sodium Chloride 0.9% Bolus: 1000 mL  Total IN: 1450 mL    OUT:    Drain (mL): 35 mL  Total OUT: 35 mL    Total NET: 1415 mL

## 2023-05-29 NOTE — PROGRESS NOTE ADULT - ATTENDING COMMENTS
Episode of confusion yesterday with hallucinations, and fell while getting out of bed.   CT head negative for changes.  He reports left sided chest pain with deep inspiration.   Now on constant observation.    vitals/labs reviewed    Gen: alert, pleasant, oriented with partial confusion  LLE: drain with sanguinous output  ROM 5-70  stable varus/valgus  motor/sensory intact distally, palp DP  calf soft    Chronic left knee prosthetic joint infection s/p I&D and poly exchange 6 weeks ago.  Continue antibiotics per ID with changes regarding mental status  constant observation for now until mental status clears  psych consult  Recommend EKG and troponin due to episode of chest pain, if negative then consider CT chest if pain persists  Surgery planning thursday vs Friday for explant of hardware and placement of antibiotic spacer

## 2023-05-29 NOTE — PROGRESS NOTE ADULT - ASSESSMENT
63y Male with history of DM II, HTN, HLD L, DVTs on Xarelto, GERD, Depression, Anxiety, Insomnia, Spinal stenosis, Spondyloschises, R Knee Arthroplasty in 2003 and L Knee Arthroplasty in 2011 s/p L TKA I&D with poly exchange 4/14/23 w/ MSSA on OR cultures requiring Ancef via PICC [id number] p/w left thigh fluid collection concerning for PJI.     PJI w/ fluid collection concerning for abscess  - CT showed a large intramuscular abscess in the mid to distal left vastus musculature w/ a periosteal reaction in the anterior and medial distal left femur adjacent to the intramuscular abscess which could be reactive in nature; however, an underlying osteomyelitis cannot be excluded. There was also an enlarged left iliac lymph node and enlarged left inguinal lymph nodes   - ortho aspirated the joint  - follow up   - IR to aspirated 5 cc of old blood on 5/26 - specimen sent and an 8 Citizen of [place] drain was left in place for further liquefaction/drainage  - Ortho planing on taking patient to OR for I&D of left knee Vs major revision surgery   - c/w antibiotics as per ortho   - recommend ID consult    - pain deferred to primary      Confusion on 5/28  - suspect delirium/ metabolic encephalopathy due to meds - improved on 5/29 after antibiotics changed and pain meds were stopped  - as per ID, there is an unlikely possibility of cefepime neurotoxicity and patient was switching to ertapenem  - CT head showed no acute intracranial abnormalities and mild small vessel and atrophic changes  - psych consult note read and appreciated     Hx of DVT  - Xarelto on hold by ortho in anticipation of taking patient to OR    COPD  - patient reports it is secondary to exposure to dust at 9/11 site   - c/w Spiriva and Symbicort     DM II  - start ISS  - Hgb A1C in April was 7.4     HTN  - c/w losartan     Depression/ Anxiety/Insomnia  - c/w Zoloft, Cymbalta, Wellbutrin & Xanax     GERD  - c/w Protonix    Constipation  - c/w Senna & Miralax

## 2023-05-29 NOTE — PROGRESS NOTE ADULT - SUBJECTIVE AND OBJECTIVE BOX
Patient seen and examined at bedside. Had episode of visual and auditory hallucinations yesterday, now resolved. Patient denies any numbness, tingling, weakness, or any other orthopaedic complaint. Appreciate psych, medicine recs.     Exam:   Vital Signs Last 24 Hrs  T(C): 36.9 (29 May 2023 05:50), Max: 37.8 (28 May 2023 18:00)  T(F): 98.4 (29 May 2023 05:50), Max: 100.1 (28 May 2023 18:00)  HR: 77 (29 May 2023 05:50) (73 - 108)  BP: 125/76 (29 May 2023 05:50) (108/61 - 167/87)  BP(mean): --  RR: 18 (29 May 2023 05:50) (18 - 19)  SpO2: 96% (29 May 2023 05:50) (96% - 100%)    Parameters below as of 29 May 2023 05:50  Patient On (Oxygen Delivery Method): room air      Gen: resting in bed, NAD  LLE:  Drain in place draining serosanguineous fluid  Nadya-incisional TTP; otherwise, NTTP throughout the rest of the extremity.  SILT L2-S1  GSC/TA/EHL/FHL intact  Extremity warm and well perfused  No calf tenderness bilaterally.  Compartments soft and compressible.     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 Bob and will advise for any changes to the plan.

## 2023-05-30 LAB
ALBUMIN SERPL ELPH-MCNC: 2.8 G/DL — LOW (ref 3.3–5)
ALP SERPL-CCNC: 89 U/L — SIGNIFICANT CHANGE UP (ref 40–120)
ALT FLD-CCNC: 10 U/L — LOW (ref 12–78)
ANION GAP SERPL CALC-SCNC: 6 MMOL/L — SIGNIFICANT CHANGE UP (ref 5–17)
APTT BLD: 31 SEC — SIGNIFICANT CHANGE UP (ref 27.5–35.5)
AST SERPL-CCNC: 11 U/L — LOW (ref 15–37)
BILIRUB SERPL-MCNC: 0.4 MG/DL — SIGNIFICANT CHANGE UP (ref 0.2–1.2)
BUN SERPL-MCNC: 6 MG/DL — LOW (ref 7–23)
CALCIUM SERPL-MCNC: 9.1 MG/DL — SIGNIFICANT CHANGE UP (ref 8.5–10.1)
CHLORIDE SERPL-SCNC: 108 MMOL/L — SIGNIFICANT CHANGE UP (ref 96–108)
CO2 SERPL-SCNC: 26 MMOL/L — SIGNIFICANT CHANGE UP (ref 22–31)
CREAT SERPL-MCNC: 0.7 MG/DL — SIGNIFICANT CHANGE UP (ref 0.5–1.3)
EGFR: 103 ML/MIN/1.73M2 — SIGNIFICANT CHANGE UP
GLUCOSE BLDC GLUCOMTR-MCNC: 131 MG/DL — HIGH (ref 70–99)
GLUCOSE BLDC GLUCOMTR-MCNC: 84 MG/DL — SIGNIFICANT CHANGE UP (ref 70–99)
GLUCOSE BLDC GLUCOMTR-MCNC: 95 MG/DL — SIGNIFICANT CHANGE UP (ref 70–99)
GLUCOSE SERPL-MCNC: 77 MG/DL — SIGNIFICANT CHANGE UP (ref 70–99)
HCT VFR BLD CALC: 35.2 % — LOW (ref 39–50)
HGB BLD-MCNC: 11.3 G/DL — LOW (ref 13–17)
INR BLD: 1.15 RATIO — SIGNIFICANT CHANGE UP (ref 0.88–1.16)
MAGNESIUM SERPL-MCNC: 1.9 MG/DL — SIGNIFICANT CHANGE UP (ref 1.6–2.6)
MCHC RBC-ENTMCNC: 29.5 PG — SIGNIFICANT CHANGE UP (ref 27–34)
MCHC RBC-ENTMCNC: 32.1 G/DL — SIGNIFICANT CHANGE UP (ref 32–36)
MCV RBC AUTO: 91.9 FL — SIGNIFICANT CHANGE UP (ref 80–100)
NRBC # BLD: 0 /100 WBCS — SIGNIFICANT CHANGE UP (ref 0–0)
PLATELET # BLD AUTO: 489 K/UL — HIGH (ref 150–400)
POTASSIUM SERPL-MCNC: 3.5 MMOL/L — SIGNIFICANT CHANGE UP (ref 3.5–5.3)
POTASSIUM SERPL-SCNC: 3.5 MMOL/L — SIGNIFICANT CHANGE UP (ref 3.5–5.3)
PROT SERPL-MCNC: 8.1 GM/DL — SIGNIFICANT CHANGE UP (ref 6–8.3)
PROTHROM AB SERPL-ACNC: 13.7 SEC — HIGH (ref 10.5–13.4)
RBC # BLD: 3.83 M/UL — LOW (ref 4.2–5.8)
RBC # FLD: 15.3 % — HIGH (ref 10.3–14.5)
SODIUM SERPL-SCNC: 140 MMOL/L — SIGNIFICANT CHANGE UP (ref 135–145)
WBC # BLD: 6.98 K/UL — SIGNIFICANT CHANGE UP (ref 3.8–10.5)
WBC # FLD AUTO: 6.98 K/UL — SIGNIFICANT CHANGE UP (ref 3.8–10.5)

## 2023-05-30 PROCEDURE — 99232 SBSQ HOSP IP/OBS MODERATE 35: CPT | Mod: FS

## 2023-05-30 PROCEDURE — 99232 SBSQ HOSP IP/OBS MODERATE 35: CPT

## 2023-05-30 PROCEDURE — 99231 SBSQ HOSP IP/OBS SF/LOW 25: CPT | Mod: GC

## 2023-05-30 RX ORDER — CHLORHEXIDINE GLUCONATE 213 G/1000ML
1 SOLUTION TOPICAL DAILY
Refills: 0 | Status: DISCONTINUED | OUTPATIENT
Start: 2023-05-30 | End: 2023-06-02

## 2023-05-30 RX ADMIN — CHLORHEXIDINE GLUCONATE 1 APPLICATION(S): 213 SOLUTION TOPICAL at 15:17

## 2023-05-30 RX ADMIN — POLYETHYLENE GLYCOL 3350 17 GRAM(S): 17 POWDER, FOR SOLUTION ORAL at 21:13

## 2023-05-30 RX ADMIN — SENNA PLUS 2 TABLET(S): 8.6 TABLET ORAL at 21:13

## 2023-05-30 RX ADMIN — BUPROPION HYDROCHLORIDE 150 MILLIGRAM(S): 150 TABLET, EXTENDED RELEASE ORAL at 12:50

## 2023-05-30 RX ADMIN — Medication 650 MILLIGRAM(S): at 07:23

## 2023-05-30 RX ADMIN — Medication 650 MILLIGRAM(S): at 06:23

## 2023-05-30 RX ADMIN — Medication 650 MILLIGRAM(S): at 17:59

## 2023-05-30 RX ADMIN — LOSARTAN POTASSIUM 100 MILLIGRAM(S): 100 TABLET, FILM COATED ORAL at 06:23

## 2023-05-30 RX ADMIN — SERTRALINE 100 MILLIGRAM(S): 25 TABLET, FILM COATED ORAL at 06:23

## 2023-05-30 RX ADMIN — DULOXETINE HYDROCHLORIDE 120 MILLIGRAM(S): 30 CAPSULE, DELAYED RELEASE ORAL at 12:50

## 2023-05-30 RX ADMIN — PANTOPRAZOLE SODIUM 40 MILLIGRAM(S): 20 TABLET, DELAYED RELEASE ORAL at 06:23

## 2023-05-30 RX ADMIN — Medication 650 MILLIGRAM(S): at 13:50

## 2023-05-30 RX ADMIN — BUDESONIDE AND FORMOTEROL FUMARATE DIHYDRATE 2 PUFF(S): 160; 4.5 AEROSOL RESPIRATORY (INHALATION) at 17:58

## 2023-05-30 RX ADMIN — ENOXAPARIN SODIUM 40 MILLIGRAM(S): 100 INJECTION SUBCUTANEOUS at 06:23

## 2023-05-30 RX ADMIN — Medication 3 MILLIGRAM(S): at 21:13

## 2023-05-30 RX ADMIN — Medication 166.67 MILLIGRAM(S): at 08:31

## 2023-05-30 RX ADMIN — Medication 166.67 MILLIGRAM(S): at 21:14

## 2023-05-30 RX ADMIN — ERTAPENEM SODIUM 120 MILLIGRAM(S): 1 INJECTION, POWDER, LYOPHILIZED, FOR SOLUTION INTRAMUSCULAR; INTRAVENOUS at 18:31

## 2023-05-30 RX ADMIN — SERTRALINE 100 MILLIGRAM(S): 25 TABLET, FILM COATED ORAL at 17:59

## 2023-05-30 RX ADMIN — Medication 1 TABLET(S): at 12:50

## 2023-05-30 RX ADMIN — Medication 650 MILLIGRAM(S): at 12:50

## 2023-05-30 NOTE — PROGRESS NOTE ADULT - ASSESSMENT
5/26:  Previously known to our group, on Rx for MSSA Septicemia/PJI  Office received a call that patient seemed very confused and had been falling.   Given that the patient is on anticoagulation and it was not clear what was going on, Dr. Bay had spoke with the patient's wife and advised him to be taken to the ER and have a workup including a CT head.  Presently he is much mor lucid, though still slightly confused, and confirms he has been falling ("mostly on my butt").   Patient also complained of swelling and pain in the L thigh, though is vague with respect to onset and other symptoms.   L thigh is red, warm and tender on exam.  IR drainage- old blood, cx pending  Knee aspirate- 29k WBC, PMN predominance  Will broaden antibiotics pending clinical course, though most likely still MSSA    5/30: improving, no fevers, RA, no wbc, Cr and LFTs ok, IR drainage fluid cultures with no growth thus far, BCs with no growth to date on this admission, Vanco trough 11.5 yesterday, will repeat, Vancomycin IV and ertapenem IV continue, possibly to OR by the end of this week for I&D revision procedure.     Suggestions:  - continue IV vancomycin   - continue IV ertapenem  - repeat Vancomycin level tomorrow  - monitoring vancomycin levels is required  - follow culture data   - pain control  - drain per IR  - possibly back to OR by the end of this week for I&D revision procedure.

## 2023-05-30 NOTE — PROGRESS NOTE ADULT - SUBJECTIVE AND OBJECTIVE BOX
63y Male with history of DM II, HTN, HLD L, DVTs on Xarelto, GERD, Depression, Anxiety, Insomnia, Spinal stenosis, Spondyloschises, R Knee Arthroplasty in  and L Knee Arthroplasty in  s/p L TKA I&D with poly exchange 23 w/ MSSA on OR cultures requiring Ancef via PICC [id number] p/w left thigh fluid collection concerning for PJI. He is lying in bed in NAD.       MEDICATIONS  (STANDING):  acetaminophen     Tablet .. 650 milliGRAM(s) Oral every 6 hours  budesonide 160 MICROgram(s)/formoterol 4.5 MICROgram(s) Inhaler 2 Puff(s) Inhalation two times a day  buPROPion XL (24-Hour) 150 milliGRAM(s) Oral daily  chlorhexidine 2% Cloths 1 Application(s) Topical daily  dextrose 5%. 1000 milliLiter(s) (100 mL/Hr) IV Continuous <Continuous>  dextrose 5%. 1000 milliLiter(s) (50 mL/Hr) IV Continuous <Continuous>  dextrose 50% Injectable 12.5 Gram(s) IV Push once  dextrose 50% Injectable 25 Gram(s) IV Push once  dextrose 50% Injectable 25 Gram(s) IV Push once  DULoxetine 120 milliGRAM(s) Oral daily  enoxaparin Injectable 40 milliGRAM(s) SubCutaneous every 24 hours  ertapenem  IVPB 1000 milliGRAM(s) IV Intermittent every 24 hours  glucagon  Injectable 1 milliGRAM(s) IntraMuscular once  insulin lispro (ADMELOG) corrective regimen sliding scale   SubCutaneous three times a day before meals  losartan 100 milliGRAM(s) Oral daily  melatonin 3 milliGRAM(s) Oral at bedtime  multivitamin 1 Tablet(s) Oral daily  pantoprazole    Tablet 40 milliGRAM(s) Oral before breakfast  polyethylene glycol 3350 17 Gram(s) Oral at bedtime  senna 2 Tablet(s) Oral at bedtime  sertraline 100 milliGRAM(s) Oral two times a day  tiotropium 2.5 MICROgram(s) Inhaler 2 Puff(s) Inhalation daily  vancomycin  IVPB 1250 milliGRAM(s) IV Intermittent every 12 hours    MEDICATIONS  (PRN):  albuterol    90 MICROgram(s) HFA Inhaler 2 Puff(s) Inhalation every 6 hours PRN Bronchospasm  ALPRAZolam 0.5 milliGRAM(s) Oral two times a day PRN anxiety/sleep  aluminum hydroxide/magnesium hydroxide/simethicone Suspension 30 milliLiter(s) Oral every 4 hours PRN Dyspepsia  dextrose Oral Gel 15 Gram(s) Oral once PRN Blood Glucose LESS THAN 70 milliGRAM(s)/deciliter  magnesium hydroxide Suspension 30 milliLiter(s) Oral daily PRN Constipation  ondansetron Injectable 4 milliGRAM(s) IV Push every 6 hours PRN Nausea and/or Vomiting      Allergies    No Known Allergies    Intolerances        Vital Signs Last 24 Hrs  T(C): 36.8 (30 May 2023 14:54), Max: 36.9 (29 May 2023 23:31)  T(F): 98.3 (30 May 2023 14:54), Max: 98.4 (29 May 2023 23:31)  HR: 88 (30 May 2023 14:54) (79 - 88)  BP: 143/78 (30 May 2023 14:54) (143/78 - 152/78)  BP(mean): --  RR: 19 (30 May 2023 14:54) (17 - 19)  SpO2: 97% (30 May 2023 14:54) (97% - 99%)    Parameters below as of 30 May 2023 14:54  Patient On (Oxygen Delivery Method): room air        PHYSICAL EXAM:  GENERAL: NAD   HEAD:  Atraumatic, Normocephalic  EYES: EOMI, PERRLA   NECK: Supple   NERVOUS SYSTEM:  Alert & more oriented today  CHEST/LUNG: Clear to auscultation bilaterally; No rales, rhonchi, wheezing, or rubs  HEART: Regular rate and rhythm; No murmurs, rubs, or gallops  ABDOMEN: Soft, Nontender, Nondistended; Bowel sounds present  EXTREMITIES: left knee edematous w/ drain in place draining serosanguineous fluid      LABS:                        11.3   6.98  )-----------( 489      ( 30 May 2023 06:16 )             35.2     05-30    140  |  108  |  6<L>  ----------------------------<  77  3.5   |  26  |  0.70    Ca    9.1      30 May 2023 06:16  Phos  4.4     05-29  Mg     1.9     05-30    TPro  8.1  /  Alb  2.8<L>  /  TBili  0.4  /  DBili  x   /  AST  11<L>  /  ALT  10<L>  /  AlkPhos  89      PT/INR - ( 30 May 2023 06:16 )   PT: 13.7 sec;   INR: 1.15 ratio         PTT - ( 30 May 2023 06:16 )  PTT:31.0 sec  Urinalysis Basic - ( 28 May 2023 21:30 )    Color: Yellow / Appearance: Clear / S.010 / pH: x  Gluc: x / Ketone: Trace  / Bili: Negative / Urobili: Negative mg/dL   Blood: x / Protein: 15 mg/dL / Nitrite: Negative   Leuk Esterase: Trace / RBC: 0-2 /HPF / WBC 6-10   Sq Epi: x / Non Sq Epi: x / Bacteria: Few      CAPILLARY BLOOD GLUCOSE      POCT Blood Glucose.: 95 mg/dL (30 May 2023 16:31)  POCT Blood Glucose.: 131 mg/dL (30 May 2023 11:50)  POCT Blood Glucose.: 84 mg/dL (30 May 2023 08:08)  POCT Blood Glucose.: 83 mg/dL (29 May 2023 21:10)      Culture - Blood (collected 28 May 2023 19:30)  Source: .Blood Blood  Preliminary Report (30 May 2023 01:02):    No growth to date.    Culture - Blood (collected 28 May 2023 19:15)  Source: .Blood Blood  Preliminary Report (30 May 2023 01:02):    No growth to date.    Culture - Body Fluid with Gram Stain (collected 26 May 2023 18:20)  Source: .Body Fluid Aqueous Fluid  Gram Stain (prelim) (28 May 2023 09:32):    polymorphonuclear leukocytes seen    No organisms seen    by cytocentrifuge  Preliminary Report (28 May 2023 09:32):    No growth    Culture - Joint Fluid (collected 26 May 2023 14:20)  Source: Knee Synovial Fluid  Gram Stain (prelim) (27 May 2023 16:17):    Few polymorphonuclear leukocytes per low power field    No organisms seen per oil power field  Preliminary Report (27 May 2023 16:17):    No growth    Culture - Blood (collected 26 May 2023 10:30)  Source: .Blood Blood-Peripheral  Preliminary Report (27 May 2023 15:09):    No growth to date.    Culture - Blood (collected 26 May 2023 10:30)  Source: .Blood Blood-Peripheral  Preliminary Report (27 May 2023 15:09):    No growth to date.      RADIOLOGY & ADDITIONAL TESTS:    23 @ 07:01  -  23 @ 07:00  --------------------------------------------------------  IN:    IV PiggyBack: 250 mL  Total IN: 250 mL    OUT:    Drain (mL): 50 mL  Total OUT: 50 mL    Total NET: 200 mL       63y Male with history of DM II, HTN, HLD L, DVTs on Xarelto, GERD, Depression, Anxiety, Insomnia, Spinal stenosis, Spondyloschises, R Knee Arthroplasty in  and L Knee Arthroplasty in  s/p L TKA I&D with poly exchange 23 w/ MSSA on OR cultures requiring Ancef via PICC [id number] p/w left thigh fluid collection concerning for PJI. He is lying in bed in NAD.       MEDICATIONS  (STANDING):  acetaminophen     Tablet .. 650 milliGRAM(s) Oral every 6 hours  budesonide 160 MICROgram(s)/formoterol 4.5 MICROgram(s) Inhaler 2 Puff(s) Inhalation two times a day  buPROPion XL (24-Hour) 150 milliGRAM(s) Oral daily  chlorhexidine 2% Cloths 1 Application(s) Topical daily  dextrose 5%. 1000 milliLiter(s) (100 mL/Hr) IV Continuous <Continuous>  dextrose 5%. 1000 milliLiter(s) (50 mL/Hr) IV Continuous <Continuous>  dextrose 50% Injectable 12.5 Gram(s) IV Push once  dextrose 50% Injectable 25 Gram(s) IV Push once  dextrose 50% Injectable 25 Gram(s) IV Push once  DULoxetine 120 milliGRAM(s) Oral daily  enoxaparin Injectable 40 milliGRAM(s) SubCutaneous every 24 hours  ertapenem  IVPB 1000 milliGRAM(s) IV Intermittent every 24 hours  glucagon  Injectable 1 milliGRAM(s) IntraMuscular once  insulin lispro (ADMELOG) corrective regimen sliding scale   SubCutaneous three times a day before meals  losartan 100 milliGRAM(s) Oral daily  melatonin 3 milliGRAM(s) Oral at bedtime  multivitamin 1 Tablet(s) Oral daily  pantoprazole    Tablet 40 milliGRAM(s) Oral before breakfast  polyethylene glycol 3350 17 Gram(s) Oral at bedtime  senna 2 Tablet(s) Oral at bedtime  sertraline 100 milliGRAM(s) Oral two times a day  tiotropium 2.5 MICROgram(s) Inhaler 2 Puff(s) Inhalation daily  vancomycin  IVPB 1250 milliGRAM(s) IV Intermittent every 12 hours    MEDICATIONS  (PRN):  albuterol    90 MICROgram(s) HFA Inhaler 2 Puff(s) Inhalation every 6 hours PRN Bronchospasm  ALPRAZolam 0.5 milliGRAM(s) Oral two times a day PRN anxiety/sleep  aluminum hydroxide/magnesium hydroxide/simethicone Suspension 30 milliLiter(s) Oral every 4 hours PRN Dyspepsia  dextrose Oral Gel 15 Gram(s) Oral once PRN Blood Glucose LESS THAN 70 milliGRAM(s)/deciliter  magnesium hydroxide Suspension 30 milliLiter(s) Oral daily PRN Constipation  ondansetron Injectable 4 milliGRAM(s) IV Push every 6 hours PRN Nausea and/or Vomiting      Allergies    No Known Allergies    Intolerances        Vital Signs Last 24 Hrs  T(C): 36.8 (30 May 2023 14:54), Max: 36.9 (29 May 2023 23:31)  T(F): 98.3 (30 May 2023 14:54), Max: 98.4 (29 May 2023 23:31)  HR: 88 (30 May 2023 14:54) (79 - 88)  BP: 143/78 (30 May 2023 14:54) (143/78 - 152/78)  BP(mean): --  RR: 19 (30 May 2023 14:54) (17 - 19)  SpO2: 97% (30 May 2023 14:54) (97% - 99%)    Parameters below as of 30 May 2023 14:54  Patient On (Oxygen Delivery Method): room air        PHYSICAL EXAM:  GENERAL: NAD   HEAD:  Atraumatic, Normocephalic  EYES: EOMI, PERRLA   NECK: Supple   NERVOUS SYSTEM:  Alert & more oriented today but not at baseline  CHEST/LUNG: Clear to auscultation bilaterally; No rales, rhonchi, wheezing, or rubs  HEART: Regular rate and rhythm; No murmurs, rubs, or gallops  ABDOMEN: Soft, Nontender, Nondistended; Bowel sounds present  EXTREMITIES: left knee edematous w/ drain in place draining serosanguineous fluid      LABS:                        11.3   6.98  )-----------( 489      ( 30 May 2023 06:16 )             35.2     05-30    140  |  108  |  6<L>  ----------------------------<  77  3.5   |  26  |  0.70    Ca    9.1      30 May 2023 06:16  Phos  4.4     05-29  Mg     1.9     05-30    TPro  8.1  /  Alb  2.8<L>  /  TBili  0.4  /  DBili  x   /  AST  11<L>  /  ALT  10<L>  /  AlkPhos  89  05-30    PT/INR - ( 30 May 2023 06:16 )   PT: 13.7 sec;   INR: 1.15 ratio         PTT - ( 30 May 2023 06:16 )  PTT:31.0 sec  Urinalysis Basic - ( 28 May 2023 21:30 )    Color: Yellow / Appearance: Clear / S.010 / pH: x  Gluc: x / Ketone: Trace  / Bili: Negative / Urobili: Negative mg/dL   Blood: x / Protein: 15 mg/dL / Nitrite: Negative   Leuk Esterase: Trace / RBC: 0-2 /HPF / WBC 6-10   Sq Epi: x / Non Sq Epi: x / Bacteria: Few      CAPILLARY BLOOD GLUCOSE      POCT Blood Glucose.: 95 mg/dL (30 May 2023 16:31)  POCT Blood Glucose.: 131 mg/dL (30 May 2023 11:50)  POCT Blood Glucose.: 84 mg/dL (30 May 2023 08:08)  POCT Blood Glucose.: 83 mg/dL (29 May 2023 21:10)      Culture - Blood (collected 28 May 2023 19:30)  Source: .Blood Blood  Preliminary Report (30 May 2023 01:02):    No growth to date.    Culture - Blood (collected 28 May 2023 19:15)  Source: .Blood Blood  Preliminary Report (30 May 2023 01:02):    No growth to date.    Culture - Body Fluid with Gram Stain (collected 26 May 2023 18:20)  Source: .Body Fluid Aqueous Fluid  Gram Stain (prelim) (28 May 2023 09:32):    polymorphonuclear leukocytes seen    No organisms seen    by cytocentrifuge  Preliminary Report (28 May 2023 09:32):    No growth    Culture - Joint Fluid (collected 26 May 2023 14:20)  Source: Knee Synovial Fluid  Gram Stain (prelim) (27 May 2023 16:17):    Few polymorphonuclear leukocytes per low power field    No organisms seen per oil power field  Preliminary Report (27 May 2023 16:17):    No growth    Culture - Blood (collected 26 May 2023 10:30)  Source: .Blood Blood-Peripheral  Preliminary Report (27 May 2023 15:09):    No growth to date.    Culture - Blood (collected 26 May 2023 10:30)  Source: .Blood Blood-Peripheral  Preliminary Report (27 May 2023 15:09):    No growth to date.      RADIOLOGY & ADDITIONAL TESTS:    23 @ 07:01  -  23 @ 07:00  --------------------------------------------------------  IN:    IV PiggyBack: 250 mL  Total IN: 250 mL    OUT:    Drain (mL): 50 mL  Total OUT: 50 mL    Total NET: 200 mL

## 2023-05-30 NOTE — PROGRESS NOTE ADULT - SUBJECTIVE AND OBJECTIVE BOX
SKYLAR MCCULLOUGH  MRN-[id number]    Follow Up:  knee infection     Interval History: the pt was seen and examined earlier, no acute distress, awake and alert, mildly confused, aid 1:1 present. Pt states that his left knee feels much better, less pain and swelling. Pt is afebrile, RA, no WBC.     PAST MEDICAL & SURGICAL HISTORY:  Diabetes Mellitus  diagnosed 10 years ago  doesn't do fingersticks      Hypertension, Essential      Hyperlipidemia      GERD (gastroesophageal reflux disease)      Depression      Obese      Bulging disc      Spinal stenosis      Spondylisthesis      Sleep apnea  had test > 10 years ago--supposed to wear device.  Lost 25 pounds but never retested      Anxiety      Insomnia      Varicosities      DVT (deep venous thrombosis)      R Knee Arthroplasty  Right , Left ,      H/O laser iridotomy  left-      Reflux  Gastroscopy       Deep vein thrombosis (DVT) of lower extremity          ROS:    [ ] Unobtainable because:  [x ] All other systems negative    Constitutional: no fever, no chills  Head: no trauma  Eyes: no vision changes, no eye pain  ENT:  no sore throat, no rhinorrhea  Cardiovascular:  no chest pain, no palpitation  Respiratory:  no SOB, no cough  GI:  no abd pain, no vomiting, no diarrhea  urinary: no dysuria, no hematuria, no flank pain  musculoskeletal:  left knee pain and swelling are improving   skin:  no rash  neurology:  no headache, no seizure, no change in mental status  psych: no anxiety, no depression         Allergies  No Known Allergies        ANTIMICROBIALS:  ertapenem  IVPB 1000 every 24 hours  vancomycin  IVPB 1250 every 12 hours      OTHER MEDS:  acetaminophen     Tablet .. 650 milliGRAM(s) Oral every 6 hours  albuterol    90 MICROgram(s) HFA Inhaler 2 Puff(s) Inhalation every 6 hours PRN  ALPRAZolam 0.5 milliGRAM(s) Oral two times a day PRN  aluminum hydroxide/magnesium hydroxide/simethicone Suspension 30 milliLiter(s) Oral every 4 hours PRN  budesonide 160 MICROgram(s)/formoterol 4.5 MICROgram(s) Inhaler 2 Puff(s) Inhalation two times a day  buPROPion XL (24-Hour) 150 milliGRAM(s) Oral daily  chlorhexidine 2% Cloths 1 Application(s) Topical daily  dextrose 5%. 1000 milliLiter(s) IV Continuous <Continuous>  dextrose 5%. 1000 milliLiter(s) IV Continuous <Continuous>  dextrose 50% Injectable 12.5 Gram(s) IV Push once  dextrose 50% Injectable 25 Gram(s) IV Push once  dextrose 50% Injectable 25 Gram(s) IV Push once  dextrose Oral Gel 15 Gram(s) Oral once PRN  DULoxetine 120 milliGRAM(s) Oral daily  enoxaparin Injectable 40 milliGRAM(s) SubCutaneous every 24 hours  glucagon  Injectable 1 milliGRAM(s) IntraMuscular once  insulin lispro (ADMELOG) corrective regimen sliding scale   SubCutaneous three times a day before meals  losartan 100 milliGRAM(s) Oral daily  magnesium hydroxide Suspension 30 milliLiter(s) Oral daily PRN  melatonin 3 milliGRAM(s) Oral at bedtime  multivitamin 1 Tablet(s) Oral daily  ondansetron Injectable 4 milliGRAM(s) IV Push every 6 hours PRN  pantoprazole    Tablet 40 milliGRAM(s) Oral before breakfast  polyethylene glycol 3350 17 Gram(s) Oral at bedtime  senna 2 Tablet(s) Oral at bedtime  sertraline 100 milliGRAM(s) Oral two times a day  tiotropium 2.5 MICROgram(s) Inhaler 2 Puff(s) Inhalation daily      Vital Signs Last 24 Hrs  T(C): 36.9 (30 May 2023 11:41), Max: 36.9 (29 May 2023 23:31)  T(F): 98.4 (30 May 2023 11:41), Max: 98.4 (29 May 2023 23:31)  HR: 79 (30 May 2023 11:41) (79 - 83)  BP: 145/79 (30 May 2023 11:41) (145/71 - 152/78)  BP(mean): --  RR: 17 (30 May 2023 11:41) (17 - 19)  SpO2: 97% (30 May 2023 11:41) (97% - 99%)    Parameters below as of 30 May 2023 05:10  Patient On (Oxygen Delivery Method): room air        Physical Exam:  General: Nontoxic-appearing Male in no acute distress.  HEENT: AT/NC.  Anicteric. Conjunctiva pink and moist. Oropharynx clear.   Neck: Not rigid. No sense of mass.  Nodes: None palpable.  Lungs: Clear bilaterally without rales, wheezing or rhonchi  Heart: Regular rate and rhythm. No Murmur. No rub. No gallop. No palpable thrill.  Abdomen: Bowel sounds present and normoactive. Soft. Nondistended. Nontender. No sense of mass. No organomegaly.  Back: No spinal tenderness. No costovertebral angle tenderness.   Extremities: No cyanosis or clubbing. Left knee with less swelling, warmth to touch is present, not tender, no noted erythema, Drain in place.   Skin: Warm. Dry. Good turgor. No rash. No vasculitic stigmata.  Psychiatric: Appropriate affect and mood for situation.     WBC Count: 6.98 K/uL ( @ 06:16)  WBC Count: 6.34 K/uL ( @ 06:49)  WBC Count: 8.31 K/uL ( @ 19:30)  WBC Count: 8.11 K/uL ( @ 06:37)  WBC Count: 6.90 K/uL ( @ 07:25)  WBC Count: 7.04 K/uL ( @ 20:20)                            11.3   6.98  )-----------( 489      ( 30 May 2023 06:16 )             35.2       30    140  |  108  |  6<L>  ----------------------------<  77  3.5   |  26  |  0.70    Ca    9.1      30 May 2023 06:16  Phos  4.4       Mg     1.9         TPro  8.1  /  Alb  2.8<L>  /  TBili  0.4  /  DBili  x   /  AST  11<L>  /  ALT  10<L>  /  AlkPhos  89        Urinalysis Basic - ( 28 May 2023 21:30 )    Color: Yellow / Appearance: Clear / S.010 / pH: x  Gluc: x / Ketone: Trace  / Bili: Negative / Urobili: Negative mg/dL   Blood: x / Protein: 15 mg/dL / Nitrite: Negative   Leuk Esterase: Trace / RBC: 0-2 /HPF / WBC 6-10   Sq Epi: x / Non Sq Epi: x / Bacteria: Few        Creatinine Trend: 0.70<--, 0.60<--, 0.59<--, 0.66<--, 0.66<--, 0.62<--  Lactate, Blood: 1.0 mmol/L (23 @ 19:30)      MICROBIOLOGY:  Vancomycin Level, Trough: 11.5 ug/mL (23 @ 21:15)    v  .Blood Blood  23   No growth to date.  --  --      .Blood Blood  23   No growth to date.  --  --      .Body Fluid Aqueous Fluid  23   No growth  --    polymorphonuclear leukocytes seen  No organisms seen  by cytocentrifuge      Knee Synovial Fluid  23   No growth  --    Few polymorphonuclear leukocytes per low power field  No organisms seen per oil power field      .Blood Blood-Peripheral  23   No growth to date.  --  --      C-Reactive Protein, Serum: 38 ()        RADIOLOGY:

## 2023-05-30 NOTE — PROGRESS NOTE ADULT - ASSESSMENT
63y Male with history of DM II, HTN, HLD L, DVTs on Xarelto, GERD, Depression, Anxiety, Insomnia, Spinal stenosis, Spondyloschises, R Knee Arthroplasty in 2003 and L Knee Arthroplasty in 2011 s/p L TKA I&D with poly exchange 4/14/23 w/ MSSA on OR cultures requiring Ancef via PICC [id number] p/w left thigh fluid collection concerning for PJI.     PJI w/ fluid collection concerning for abscess  - CT showed a large intramuscular abscess in the mid to distal left vastus musculature w/ a periosteal reaction in the anterior and medial distal left femur adjacent to the intramuscular abscess which could be reactive in nature; however, an underlying osteomyelitis cannot be excluded. There was also an enlarged left iliac lymph node and enlarged left inguinal lymph nodes   - ortho aspirated the joint  - follow up   - IR to aspirated 5 cc of old blood on 5/26 - specimen sent and an 8 Kisw[place]ili drain was left in place for further liquefaction/drainage  - Ortho planing on taking patient to OR for I&D of left knee Vs major revision surgery   - c/w antibiotics as per ortho   - recommend ID consult    - pain deferred to primary   - Pt reports that he's not very active because of his knee so cannot access with METs. He is not on insulin, has no history of CHF, CKD, CAD or cerebrovascular disease. Echo from April showed normal global left ventricular systolic function w/ no evidence of valve disease. EKG showed NSR @ 84 BPM and is unchanged from prior EKG from 12/2022. Patient can proceed to OR without further work up and is at moderate cardiac risk for a moderate risk surg - will need to reassess if patient has chest pain (he denied chest pain at this time when I spoke w/ him today)     Confusion on 5/28  - suspect delirium/ metabolic encephalopathy due to meds - improved on 5/29 after antibiotics changed and pain meds were stopped but patient is still not at baseline  - as per ID, there is an unlikely possibility of cefepime neurotoxicity and patient was switching to ertapenem  - CT head showed no acute intracranial abnormalities and mild small vessel and atrophic changes  - psych consult note read and appreciated     Hx of DVT  - Xarelto on hold by ortho in anticipation of taking patient to OR    COPD  - patient reports it is secondary to exposure to dust at 9/11 site   - c/w Spiriva and Symbicort     DM II  - start ISS  - Hgb A1C in April was 7.4     HTN  - c/w losartan     Depression/ Anxiety/Insomnia  - c/w Zoloft, Cymbalta, Wellbutrin & Xanax     GERD  - c/w Protonix    Constipation  - c/w Senna & Miralax    63y Male with history of DM II, HTN, HLD L, DVTs on Xarelto, GERD, Depression, Anxiety, Insomnia, Spinal stenosis, Spondyloschises, R Knee Arthroplasty in 2003 and L Knee Arthroplasty in 2011 s/p L TKA I&D with poly exchange 4/14/23 w/ MSSA on OR cultures requiring Ancef via PICC [id number] p/w left thigh fluid collection concerning for PJI.     PJI w/ fluid collection concerning for abscess  - CT showed a large intramuscular abscess in the mid to distal left vastus musculature w/ a periosteal reaction in the anterior and medial distal left femur adjacent to the intramuscular abscess which could be reactive in nature; however, an underlying osteomyelitis cannot be excluded. There was also an enlarged left iliac lymph node and enlarged left inguinal lymph nodes   - ortho aspirated the joint  - follow up   - IR to aspirated 5 cc of old blood on 5/26 - specimen sent and an 8 English drain was left in place for further liquefaction/drainage  - Ortho planing on taking patient to OR for I&D of left knee Vs major revision surgery   - c/w antibiotics as per ortho   -  ID consult following    - pain deferred to primary   - Pt reports that he's not very active because of his knee so cannot access with METs. He is not on insulin, has no history of CHF, CKD, CAD or cerebrovascular disease. Echo from April showed normal global left ventricular systolic function w/ no evidence of valve disease. EKG showed NSR @ 84 BPM and is unchanged from prior EKG from 12/2022. Patient can proceed to OR without further work up and is at moderate cardiac risk for a moderate risk surg - will need to reassess if patient has chest pain (he denied chest pain at this time when I spoke w/ him today)     Confusion on 5/28  - suspect delirium/ metabolic encephalopathy due to meds - improved on 5/29 after antibiotics changed and pain meds were stopped but patient is still not at baseline  - as per ID, there is an unlikely possibility of cefepime neurotoxicity and patient was switching to ertapenem  - CT head showed no acute intracranial abnormalities and mild small vessel and atrophic changes  - psych consult note read and appreciated     Hx of DVT  - Xarelto on hold by ortho in anticipation of taking patient to OR    COPD  - patient reports it is secondary to exposure to dust at 9/11 site   - c/w Spiriva and Symbicort     DM II  - start ISS  - Hgb A1C in April was 7.4     HTN  - c/w losartan     Depression/ Anxiety/Insomnia  - c/w Zoloft, Cymbalta, Wellbutrin & Xanax     GERD  - c/w Protonix    Constipation  - c/w Senna & Miralax

## 2023-05-30 NOTE — PROGRESS NOTE ADULT - ATTENDING COMMENTS
Left knee persistent PJI s/p I&D 6 weeks ago.    Mental status improving.  awake and alert    OR planning for Friday

## 2023-05-30 NOTE — PROGRESS NOTE ADULT - SUBJECTIVE AND OBJECTIVE BOX
Patient seen and examined at bedside. Pt still on 1:1 observation. Mentation improving. Patient denies any numbness, tingling, weakness, or any other orthopaedic complaint. Appreciate psych, medicine recs.     Vital Signs Last 24 Hrs  T(C): 36.8 (30 May 2023 05:10), Max: 37.2 (29 May 2023 10:58)  T(F): 98.3 (30 May 2023 05:10), Max: 99 (29 May 2023 10:58)  HR: 81 (30 May 2023 05:10) (81 - 99)  BP: 145/71 (30 May 2023 05:10) (116/69 - 152/78)  BP(mean): --  RR: 18 (30 May 2023 05:10) (18 - 19)  SpO2: 99% (30 May 2023 05:10) (97% - 99%)    Parameters below as of 30 May 2023 05:10  Patient On (Oxygen Delivery Method): room air                          11.3   6.98  )-----------( 489      ( 30 May 2023 06:16 )             35.2       05-29    142  |  112<H>  |  6<L>  ----------------------------<  83  3.5   |  25  |  0.60    Ca    8.6      29 May 2023 06:49  Phos  4.4     05-29  Mg     1.9     05-29    TPro  7.3  /  Alb  2.7<L>  /  TBili  0.4  /  DBili  x   /  AST  13<L>  /  ALT  10<L>  /  AlkPhos  78  05-29              PT/INR - ( 30 May 2023 06:16 )   PT: 13.7 sec;   INR: 1.15 ratio         PTT - ( 30 May 2023 06:16 )  PTT:31.0 sec        Gen: resting in bed, NAD  LLE:  Drain in place draining serosanguineous fluid  minimal TTP throughout the rest of the extremity.  SILT L2-S1  GSC/TA/EHL/FHL intact  Extremity warm and well perfused  No calf tenderness bilaterally.  Compartments soft and compressible.     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 Bob and will advise for any changes to the plan.

## 2023-05-31 LAB
ALBUMIN SERPL ELPH-MCNC: 3 G/DL — LOW (ref 3.3–5)
ALP SERPL-CCNC: 85 U/L — SIGNIFICANT CHANGE UP (ref 40–120)
ALT FLD-CCNC: 10 U/L — LOW (ref 12–78)
ANION GAP SERPL CALC-SCNC: 6 MMOL/L — SIGNIFICANT CHANGE UP (ref 5–17)
APTT BLD: 34.6 SEC — SIGNIFICANT CHANGE UP (ref 27.5–35.5)
AST SERPL-CCNC: 15 U/L — SIGNIFICANT CHANGE UP (ref 15–37)
BILIRUB SERPL-MCNC: 0.5 MG/DL — SIGNIFICANT CHANGE UP (ref 0.2–1.2)
BUN SERPL-MCNC: 6 MG/DL — LOW (ref 7–23)
CALCIUM SERPL-MCNC: 9.1 MG/DL — SIGNIFICANT CHANGE UP (ref 8.5–10.1)
CHLORIDE SERPL-SCNC: 107 MMOL/L — SIGNIFICANT CHANGE UP (ref 96–108)
CO2 SERPL-SCNC: 28 MMOL/L — SIGNIFICANT CHANGE UP (ref 22–31)
CREAT SERPL-MCNC: 0.76 MG/DL — SIGNIFICANT CHANGE UP (ref 0.5–1.3)
CULTURE RESULTS: SIGNIFICANT CHANGE UP
CULTURE RESULTS: SIGNIFICANT CHANGE UP
EGFR: 100 ML/MIN/1.73M2 — SIGNIFICANT CHANGE UP
GLUCOSE BLDC GLUCOMTR-MCNC: 69 MG/DL — LOW (ref 70–99)
GLUCOSE BLDC GLUCOMTR-MCNC: 80 MG/DL — SIGNIFICANT CHANGE UP (ref 70–99)
GLUCOSE BLDC GLUCOMTR-MCNC: 86 MG/DL — SIGNIFICANT CHANGE UP (ref 70–99)
GLUCOSE BLDC GLUCOMTR-MCNC: 98 MG/DL — SIGNIFICANT CHANGE UP (ref 70–99)
GLUCOSE SERPL-MCNC: 90 MG/DL — SIGNIFICANT CHANGE UP (ref 70–99)
HCT VFR BLD CALC: 35.7 % — LOW (ref 39–50)
HGB BLD-MCNC: 11.1 G/DL — LOW (ref 13–17)
INR BLD: 1.17 RATIO — HIGH (ref 0.88–1.16)
MAGNESIUM SERPL-MCNC: 2 MG/DL — SIGNIFICANT CHANGE UP (ref 1.6–2.6)
MCHC RBC-ENTMCNC: 29.3 PG — SIGNIFICANT CHANGE UP (ref 27–34)
MCHC RBC-ENTMCNC: 31.1 G/DL — LOW (ref 32–36)
MCV RBC AUTO: 94.2 FL — SIGNIFICANT CHANGE UP (ref 80–100)
NRBC # BLD: 0 /100 WBCS — SIGNIFICANT CHANGE UP (ref 0–0)
PHOSPHATE SERPL-MCNC: 3.4 MG/DL — SIGNIFICANT CHANGE UP (ref 2.5–4.5)
PLATELET # BLD AUTO: 469 K/UL — HIGH (ref 150–400)
POTASSIUM SERPL-MCNC: 3.7 MMOL/L — SIGNIFICANT CHANGE UP (ref 3.5–5.3)
POTASSIUM SERPL-SCNC: 3.7 MMOL/L — SIGNIFICANT CHANGE UP (ref 3.5–5.3)
PROT SERPL-MCNC: 8.2 GM/DL — SIGNIFICANT CHANGE UP (ref 6–8.3)
PROTHROM AB SERPL-ACNC: 14.1 SEC — HIGH (ref 10.5–13.4)
RBC # BLD: 3.79 M/UL — LOW (ref 4.2–5.8)
RBC # FLD: 15.2 % — HIGH (ref 10.3–14.5)
SODIUM SERPL-SCNC: 141 MMOL/L — SIGNIFICANT CHANGE UP (ref 135–145)
SPECIMEN SOURCE: SIGNIFICANT CHANGE UP
SPECIMEN SOURCE: SIGNIFICANT CHANGE UP
VANCOMYCIN TROUGH SERPL-MCNC: 17.6 UG/ML — SIGNIFICANT CHANGE UP (ref 10–20)
WBC # BLD: 6.57 K/UL — SIGNIFICANT CHANGE UP (ref 3.8–10.5)
WBC # FLD AUTO: 6.57 K/UL — SIGNIFICANT CHANGE UP (ref 3.8–10.5)

## 2023-05-31 PROCEDURE — 99232 SBSQ HOSP IP/OBS MODERATE 35: CPT | Mod: FS

## 2023-05-31 PROCEDURE — 99232 SBSQ HOSP IP/OBS MODERATE 35: CPT

## 2023-05-31 PROCEDURE — 99231 SBSQ HOSP IP/OBS SF/LOW 25: CPT | Mod: GC

## 2023-05-31 RX ADMIN — Medication 650 MILLIGRAM(S): at 18:11

## 2023-05-31 RX ADMIN — Medication 166.67 MILLIGRAM(S): at 08:34

## 2023-05-31 RX ADMIN — PANTOPRAZOLE SODIUM 40 MILLIGRAM(S): 20 TABLET, DELAYED RELEASE ORAL at 06:13

## 2023-05-31 RX ADMIN — ERTAPENEM SODIUM 120 MILLIGRAM(S): 1 INJECTION, POWDER, LYOPHILIZED, FOR SOLUTION INTRAMUSCULAR; INTRAVENOUS at 18:34

## 2023-05-31 RX ADMIN — BUPROPION HYDROCHLORIDE 150 MILLIGRAM(S): 150 TABLET, EXTENDED RELEASE ORAL at 12:09

## 2023-05-31 RX ADMIN — Medication 650 MILLIGRAM(S): at 02:05

## 2023-05-31 RX ADMIN — TIOTROPIUM BROMIDE 2 PUFF(S): 18 CAPSULE ORAL; RESPIRATORY (INHALATION) at 12:09

## 2023-05-31 RX ADMIN — SERTRALINE 100 MILLIGRAM(S): 25 TABLET, FILM COATED ORAL at 06:05

## 2023-05-31 RX ADMIN — Medication 650 MILLIGRAM(S): at 12:08

## 2023-05-31 RX ADMIN — DULOXETINE HYDROCHLORIDE 120 MILLIGRAM(S): 30 CAPSULE, DELAYED RELEASE ORAL at 12:09

## 2023-05-31 RX ADMIN — Medication 650 MILLIGRAM(S): at 01:09

## 2023-05-31 RX ADMIN — CHLORHEXIDINE GLUCONATE 1 APPLICATION(S): 213 SOLUTION TOPICAL at 12:09

## 2023-05-31 RX ADMIN — Medication 166.67 MILLIGRAM(S): at 21:36

## 2023-05-31 RX ADMIN — Medication 3 MILLIGRAM(S): at 21:36

## 2023-05-31 RX ADMIN — Medication 1 TABLET(S): at 12:09

## 2023-05-31 RX ADMIN — Medication 650 MILLIGRAM(S): at 06:05

## 2023-05-31 RX ADMIN — ENOXAPARIN SODIUM 40 MILLIGRAM(S): 100 INJECTION SUBCUTANEOUS at 06:05

## 2023-05-31 RX ADMIN — BUDESONIDE AND FORMOTEROL FUMARATE DIHYDRATE 2 PUFF(S): 160; 4.5 AEROSOL RESPIRATORY (INHALATION) at 06:05

## 2023-05-31 RX ADMIN — LOSARTAN POTASSIUM 100 MILLIGRAM(S): 100 TABLET, FILM COATED ORAL at 06:06

## 2023-05-31 RX ADMIN — BUDESONIDE AND FORMOTEROL FUMARATE DIHYDRATE 2 PUFF(S): 160; 4.5 AEROSOL RESPIRATORY (INHALATION) at 18:11

## 2023-05-31 RX ADMIN — SERTRALINE 100 MILLIGRAM(S): 25 TABLET, FILM COATED ORAL at 18:11

## 2023-05-31 RX ADMIN — Medication 650 MILLIGRAM(S): at 07:05

## 2023-05-31 NOTE — PROGRESS NOTE ADULT - SUBJECTIVE AND OBJECTIVE BOX
Patient is a 63y old  Male who presents with a chief complaint of L thigh abscess V PJI (31 May 2023 06:46)    INTERVAL HPI/OVERNIGHT EVENTS:    MEDICATIONS  (STANDING):  acetaminophen     Tablet .. 650 milliGRAM(s) Oral every 6 hours  budesonide 160 MICROgram(s)/formoterol 4.5 MICROgram(s) Inhaler 2 Puff(s) Inhalation two times a day  buPROPion XL (24-Hour) 150 milliGRAM(s) Oral daily  chlorhexidine 2% Cloths 1 Application(s) Topical daily  dextrose 5%. 1000 milliLiter(s) (100 mL/Hr) IV Continuous <Continuous>  dextrose 5%. 1000 milliLiter(s) (50 mL/Hr) IV Continuous <Continuous>  dextrose 50% Injectable 12.5 Gram(s) IV Push once  dextrose 50% Injectable 25 Gram(s) IV Push once  dextrose 50% Injectable 25 Gram(s) IV Push once  DULoxetine 120 milliGRAM(s) Oral daily  enoxaparin Injectable 40 milliGRAM(s) SubCutaneous every 24 hours  ertapenem  IVPB 1000 milliGRAM(s) IV Intermittent every 24 hours  glucagon  Injectable 1 milliGRAM(s) IntraMuscular once  insulin lispro (ADMELOG) corrective regimen sliding scale   SubCutaneous three times a day before meals  losartan 100 milliGRAM(s) Oral daily  melatonin 3 milliGRAM(s) Oral at bedtime  multivitamin 1 Tablet(s) Oral daily  pantoprazole    Tablet 40 milliGRAM(s) Oral before breakfast  polyethylene glycol 3350 17 Gram(s) Oral at bedtime  senna 2 Tablet(s) Oral at bedtime  sertraline 100 milliGRAM(s) Oral two times a day  tiotropium 2.5 MICROgram(s) Inhaler 2 Puff(s) Inhalation daily  vancomycin  IVPB 1250 milliGRAM(s) IV Intermittent every 12 hours    MEDICATIONS  (PRN):  albuterol    90 MICROgram(s) HFA Inhaler 2 Puff(s) Inhalation every 6 hours PRN Bronchospasm  ALPRAZolam 0.5 milliGRAM(s) Oral two times a day PRN anxiety/sleep  aluminum hydroxide/magnesium hydroxide/simethicone Suspension 30 milliLiter(s) Oral every 4 hours PRN Dyspepsia  dextrose Oral Gel 15 Gram(s) Oral once PRN Blood Glucose LESS THAN 70 milliGRAM(s)/deciliter  magnesium hydroxide Suspension 30 milliLiter(s) Oral daily PRN Constipation  ondansetron Injectable 4 milliGRAM(s) IV Push every 6 hours PRN Nausea and/or Vomiting    Allergies    No Known Allergies    Intolerances      REVIEW OF SYSTEMS:  All other systems reviewed and are negative    Vital Signs Last 24 Hrs  T(C): 37.3 (31 May 2023 11:26), Max: 37.3 (31 May 2023 11:26)  T(F): 99.1 (31 May 2023 11:26), Max: 99.1 (31 May 2023 11:26)  HR: 86 (31 May 2023 11:26) (78 - 88)  BP: 135/80 (31 May 2023 11:26) (118/69 - 143/78)  BP(mean): --  RR: 17 (31 May 2023 11:26) (17 - 19)  SpO2: 100% (31 May 2023 05:54) (96% - 100%)    Parameters below as of 31 May 2023 11:26  Patient On (Oxygen Delivery Method): room air      Daily     Daily   I&O's Summary    30 May 2023 07:01  -  31 May 2023 07:00  --------------------------------------------------------  IN: 1075 mL / OUT: 1530 mL / NET: -455 mL      CAPILLARY BLOOD GLUCOSE      POCT Blood Glucose.: 80 mg/dL (31 May 2023 11:19)  POCT Blood Glucose.: 86 mg/dL (31 May 2023 07:58)  POCT Blood Glucose.: 95 mg/dL (30 May 2023 16:31)    PHYSICAL EXAM  GENERAL: NAD, alert oriented    HEAD:  Atraumatic, Normocephalic  EYES: EOMI, PERRLA, conjunctiva and sclera clear  ENMT: No tonsillar erythema, exudates, or enlargement; Moist mucous membranes, Good dentition, No lesions  NECK: Supple, No JVD, Normal thyroid  CHEST/LUNG: Clear to percussion bilaterally; No rales, rhonchi, wheezing, or rubs  HEART: Regular rate and rhythm; No murmurs, rubs, or gallops  ABDOMEN: Soft, Nontender, Nondistended; Bowel sounds present  EXTREMITIES:  2+ Peripheral Pulses, No clubbing, cyanosis, or edema  LYMPH: No lymphadenopathy noted  SKIN: No rashes or lesions    Labs                          11.1   6.57  )-----------( 469      ( 31 May 2023 07:45 )             35.7     05-31    141  |  107  |  6<L>  ----------------------------<  90  3.7   |  28  |  0.76    Ca    9.1      31 May 2023 07:45  Phos  3.4     05-31  Mg     2.0     05-31    TPro  8.2  /  Alb  3.0<L>  /  TBili  0.5  /  DBili  x   /  AST  15  /  ALT  10<L>  /  AlkPhos  85  05-31    PT/INR - ( 31 May 2023 07:45 )   PT: 14.1 sec;   INR: 1.17 ratio         PTT - ( 31 May 2023 07:45 )  PTT:34.6 sec          Culture - Blood (collected 28 May 2023 19:30)  Source: .Blood Blood  Preliminary Report (30 May 2023 01:02):    No growth to date.    Culture - Blood (collected 28 May 2023 19:15)  Source: .Blood Blood  Preliminary Report (30 May 2023 01:02):    No growth to date.                DVT prophylaxis: > Lovenox 40mg SQ daily  > Heparin   > SCD's

## 2023-05-31 NOTE — PROGRESS NOTE ADULT - ASSESSMENT
5/26:  Previously known to our group, on Rx for MSSA Septicemia/PJI  Office received a call that patient seemed very confused and had been falling.   Given that the patient is on anticoagulation and it was not clear what was going on, Dr. Bay had spoke with the patient's wife and advised him to be taken to the ER and have a workup including a CT head.  Presently he is much mor lucid, though still slightly confused, and confirms he has been falling ("mostly on my butt").   Patient also complained of swelling and pain in the L thigh, though is vague with respect to onset and other symptoms.   L thigh is red, warm and tender on exam.  IR drainage- old blood, cx pending  Knee aspirate- 29k WBC, PMN predominance  Will broaden antibiotics pending clinical course, though most likely still MSSA    5/30: improving, no fevers, RA, no wbc, Cr and LFTs ok, IR drainage fluid cultures with no growth thus far, BCs with no growth to date on this admission, Vanco trough 11.5 yesterday, will repeat, Vancomycin IV and ertapenem IV continue, possibly to OR by the end of this week for I&D revision procedure.   Attending addendum:  agree with above  continue antibiotics   trend bloody output from drain   pain control   send vancomycin trough with target AUC/LIEN 400-600   (therapeutic drug monitoring required with IV vancomycin)     5/31: afebrile, RA, no WBC, Cr ok, LFTs ok, BCs and synovial fluid cultures with no growth to date, drain in place with bloody drainage (small amount), ertapenem IV and Vancomycin IV continued, will  obtain vanco level today prior evening dose. Possibly to OR by the end of this week.     Suggestions:  - continue IV vancomycin   - continue IV ertapenem  - obtain Vancomycin level today prior evening dose - order is in   - monitoring vancomycin levels is required  - follow culture data   - pain control  - drain per IR  - possibly back to OR by the end of this week for I&D revision procedure.     Discussed with Dr. Bay

## 2023-05-31 NOTE — PROGRESS NOTE ADULT - ASSESSMENT
63y Male with history of DM II, HTN, HLD L, DVTs on Xarelto, GERD, Depression, Anxiety, Insomnia, Spinal stenosis, Spondyloschises, R Knee Arthroplasty in 2003 and L Knee Arthroplasty in 2011 s/p L TKA I&D with poly exchange 4/14/23 w/ MSSA on OR cultures requiring Ancef via PICC [id number] p/w left thigh fluid collection concerning for PJI.     PJI w/ fluid collection concerning for abscess  - CT showed a large intramuscular abscess in the mid to distal left vastus musculature w/ a periosteal reaction in the anterior and medial distal left femur adjacent to the intramuscular abscess which could be reactive in nature; however, an underlying osteomyelitis cannot be excluded. There was also an enlarged left iliac lymph node and enlarged left inguinal lymph nodes   - ortho aspirated the joint  - follow up   - IR to aspirated 5 cc of old blood on 5/26 - specimen sent and an 8 Taj[place] drain was left in place for further liquefaction/drainage  - Ortho planing on taking patient to OR for I&D of left knee Vs major revision surgery   - c/w antibiotics as per ortho   -  ID consult following    - pain deferred to primary   - Pt reports that he's not very active because of his knee so cannot access with METs. He is not on insulin, has no history of CHF, CKD, CAD or cerebrovascular disease. Echo from April showed normal global left ventricular systolic function w/ no evidence of valve disease. EKG showed NSR @ 84 BPM and is unchanged from prior EKG from 12/2022. Patient can proceed to OR without further work up and is at moderate cardiac risk for a moderate risk surg - will need to reassess if patient has chest pain (he denied chest pain at this time when I spoke w/ him today)     Confusion on 5/28  - suspect delirium/ metabolic encephalopathy due to meds - improving with periods of waxing and waning   - as per ID, there is an unlikely possibility of cefepime neurotoxicity and patient was switching to ertapenem  - CT head showed no acute intracranial abnormalities and mild small vessel and atrophic changes  - psych consult note read and appreciated     Hx of DVT  - Xarelto on hold by ortho in anticipation of taking patient to OR    COPD  - patient reports it is secondary to exposure to dust at 9/11 site   - c/w Spiriva and Symbicort     DM II  - start ISS  - Hgb A1C in April was 7.4     HTN  - c/w losartan     Depression/ Anxiety/Insomnia  - c/w Zoloft, Cymbalta, Wellbutrin & Xanax     GERD  - c/w Protonix    Constipation  - c/w Senna & Miralax

## 2023-05-31 NOTE — PROGRESS NOTE ADULT - SUBJECTIVE AND OBJECTIVE BOX
SKYLAR MCCULLOUGH  MRN-[id number]    Follow Up:  PJI    Interval History: the pt was seen and examined earlier, no acute distress, states that he is feeling much better, denies pain in his left knee. Pt is afebrile, RA, no WBC.     PAST MEDICAL & SURGICAL HISTORY:  Diabetes Mellitus  diagnosed 10 years ago  doesn't do fingersticks      Hypertension, Essential      Hyperlipidemia      GERD (gastroesophageal reflux disease)      Depression      Obese      Bulging disc      Spinal stenosis      Spondylisthesis      Sleep apnea  had test > 10 years ago--supposed to wear device.  Lost 25 pounds but never retested      Anxiety      Insomnia      Varicosities      DVT (deep venous thrombosis)      R Knee Arthroplasty  Right 2003, Left 2011,      H/O laser iridotomy  left-2008      Reflux  Gastroscopy 2013      Deep vein thrombosis (DVT) of lower extremity          ROS:    [ ] Unobtainable because:  [x ] All other systems negative    Constitutional: no fever, no chills  Head: no trauma  Eyes: no vision changes, no eye pain  ENT:  no sore throat, no rhinorrhea  Cardiovascular:  no chest pain, no palpitation  Respiratory:  no SOB, no cough  GI:  no abd pain, no vomiting, no diarrhea  urinary: no dysuria, no hematuria, no flank pain  musculoskeletal:  left knee is better   skin:  no rash  neurology:  no headache, no seizure, no change in mental status  psych: no anxiety, no depression         Allergies  No Known Allergies        ANTIMICROBIALS:  ertapenem  IVPB 1000 every 24 hours  vancomycin  IVPB 1250 every 12 hours      OTHER MEDS:  acetaminophen     Tablet .. 650 milliGRAM(s) Oral every 6 hours  albuterol    90 MICROgram(s) HFA Inhaler 2 Puff(s) Inhalation every 6 hours PRN  ALPRAZolam 0.5 milliGRAM(s) Oral two times a day PRN  aluminum hydroxide/magnesium hydroxide/simethicone Suspension 30 milliLiter(s) Oral every 4 hours PRN  budesonide 160 MICROgram(s)/formoterol 4.5 MICROgram(s) Inhaler 2 Puff(s) Inhalation two times a day  buPROPion XL (24-Hour) 150 milliGRAM(s) Oral daily  chlorhexidine 2% Cloths 1 Application(s) Topical daily  dextrose 5%. 1000 milliLiter(s) IV Continuous <Continuous>  dextrose 5%. 1000 milliLiter(s) IV Continuous <Continuous>  dextrose 50% Injectable 12.5 Gram(s) IV Push once  dextrose 50% Injectable 25 Gram(s) IV Push once  dextrose 50% Injectable 25 Gram(s) IV Push once  dextrose Oral Gel 15 Gram(s) Oral once PRN  DULoxetine 120 milliGRAM(s) Oral daily  enoxaparin Injectable 40 milliGRAM(s) SubCutaneous every 24 hours  glucagon  Injectable 1 milliGRAM(s) IntraMuscular once  insulin lispro (ADMELOG) corrective regimen sliding scale   SubCutaneous three times a day before meals  losartan 100 milliGRAM(s) Oral daily  magnesium hydroxide Suspension 30 milliLiter(s) Oral daily PRN  melatonin 3 milliGRAM(s) Oral at bedtime  multivitamin 1 Tablet(s) Oral daily  ondansetron Injectable 4 milliGRAM(s) IV Push every 6 hours PRN  pantoprazole    Tablet 40 milliGRAM(s) Oral before breakfast  polyethylene glycol 3350 17 Gram(s) Oral at bedtime  senna 2 Tablet(s) Oral at bedtime  sertraline 100 milliGRAM(s) Oral two times a day  tiotropium 2.5 MICROgram(s) Inhaler 2 Puff(s) Inhalation daily      Vital Signs Last 24 Hrs  T(C): 37.3 (31 May 2023 11:26), Max: 37.3 (31 May 2023 11:26)  T(F): 99.1 (31 May 2023 11:26), Max: 99.1 (31 May 2023 11:26)  HR: 86 (31 May 2023 11:26) (78 - 86)  BP: 135/80 (31 May 2023 11:26) (118/69 - 135/80)  BP(mean): --  RR: 17 (31 May 2023 11:26) (17 - 18)  SpO2: 100% (31 May 2023 05:54) (96% - 100%)    Parameters below as of 31 May 2023 11:26  Patient On (Oxygen Delivery Method): room air        Physical Exam:  General: Nontoxic-appearing Male in no acute distress.  HEENT: AT/NC.  Anicteric. Conjunctiva pink and moist. Oropharynx clear.   Neck: Not rigid. No sense of mass.  Nodes: None palpable.  Lungs: Clear bilaterally without rales, wheezing or rhonchi  Heart: Regular rate and rhythm. No Murmur. No rub. No gallop. No palpable thrill.  Abdomen: Bowel sounds present and normoactive. Soft. Nondistended. Nontender. No sense of mass. No organomegaly.  Back: No spinal tenderness. No costovertebral angle tenderness.   Extremities: No cyanosis or clubbing. Left knee with less swelling, mild warmth to touch is present, not tender, no noted erythema, Drain in place.   Skin: Warm. Dry. Good turgor. No rash. No vasculitic stigmata.  Psychiatric: Appropriate affect and mood for situation.     WBC Count: 6.57 K/uL (05-31 @ 07:45)  WBC Count: 6.98 K/uL (05-30 @ 06:16)  WBC Count: 6.34 K/uL (05-29 @ 06:49)  WBC Count: 8.31 K/uL (05-28 @ 19:30)  WBC Count: 8.11 K/uL (05-28 @ 06:37)  WBC Count: 6.90 K/uL (05-27 @ 07:25)  WBC Count: 7.04 K/uL (05-25 @ 20:20)                            11.1   6.57  )-----------( 469      ( 31 May 2023 07:45 )             35.7       05-31    141  |  107  |  6<L>  ----------------------------<  90  3.7   |  28  |  0.76    Ca    9.1      31 May 2023 07:45  Phos  3.4     05-31  Mg     2.0     05-31    TPro  8.2  /  Alb  3.0<L>  /  TBili  0.5  /  DBili  x   /  AST  15  /  ALT  10<L>  /  AlkPhos  85  05-31          Creatinine Trend: 0.76<--, 0.70<--, 0.60<--, 0.59<--, 0.66<--, 0.66<--      MICROBIOLOGY:  Vancomycin Level, Trough: 17.6 ug/mL (05-31-23 @ 15:11)  v  .Blood Blood  05-28-23   No growth to date.  --  --      .Blood Blood  05-28-23   No growth to date.  --  --      .Body Fluid Aqueous Fluid  05-26-23   No growth  --    polymorphonuclear leukocytes seen  No organisms seen  by cytocentrifuge      Knee Synovial Fluid  05-26-23   No growth at 5 days  --    Few polymorphonuclear leukocytes per low power field  No organisms seen per oil power field      .Blood Blood-Peripheral  05-26-23   No Growth Final  --  --      C-Reactive Protein, Serum: 38 (05-25)    RADIOLOGY:

## 2023-05-31 NOTE — PROGRESS NOTE ADULT - SUBJECTIVE AND OBJECTIVE BOX
Patient seen and examined at bedside. Pt still on 1:1 observation. Mentation improving. Patient denies any numbness, tingling, weakness, or any other orthopaedic complaint. Appreciate psych, medicine recs. Pt denies fever/chills. Pt endorsing walking well with PT.     Vital Signs Last 24 Hrs  T(C): 36.8 (31 May 2023 05:54), Max: 36.9 (30 May 2023 11:41)  T(F): 98.3 (31 May 2023 05:54), Max: 98.4 (30 May 2023 11:41)  HR: 78 (31 May 2023 05:54) (78 - 88)  BP: 118/69 (31 May 2023 05:54) (118/69 - 145/79)  BP(mean): --  RR: 18 (31 May 2023 05:54) (17 - 19)  SpO2: 100% (31 May 2023 05:54) (96% - 100%)    Parameters below as of 31 May 2023 05:54  Patient On (Oxygen Delivery Method): room air                          11.3   6.98  )-----------( 489      ( 30 May 2023 06:16 )             35.2       05-30    140  |  108  |  6<L>  ----------------------------<  77  3.5   |  26  |  0.70    Ca    9.1      30 May 2023 06:16  Phos  4.4     05-29  Mg     1.9     05-30    TPro  8.1  /  Alb  2.8<L>  /  TBili  0.4  /  DBili  x   /  AST  11<L>  /  ALT  10<L>  /  AlkPhos  89  05-30              PT/INR - ( 30 May 2023 06:16 )   PT: 13.7 sec;   INR: 1.15 ratio         PTT - ( 30 May 2023 06:16 )  PTT:31.0 sec            Gen: resting in bed, NAD  LLE:  Drain in place draining serosanguineous fluid  minimal TTP throughout the rest of the extremity.  SILT L2-S1  GSC/TA/EHL/FHL intact  Extremity warm and well perfused  No calf tenderness bilaterally.  Compartments soft and compressible.     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 Bob and will advise for any changes to the plan.

## 2023-05-31 NOTE — PROGRESS NOTE ADULT - ATTENDING COMMENTS
Feeling okay, denies side effects of medications.  Left knee pain improving.    Mental status improving    Left knee exam stable    Discussed continued plans for surgery

## 2023-06-01 ENCOUNTER — TRANSCRIPTION ENCOUNTER (OUTPATIENT)
Age: 63
End: 2023-06-01

## 2023-06-01 LAB
ALBUMIN SERPL ELPH-MCNC: 3.2 G/DL — LOW (ref 3.3–5)
ALP SERPL-CCNC: 87 U/L — SIGNIFICANT CHANGE UP (ref 40–120)
ALT FLD-CCNC: 14 U/L — SIGNIFICANT CHANGE UP (ref 12–78)
ANION GAP SERPL CALC-SCNC: 10 MMOL/L — SIGNIFICANT CHANGE UP (ref 5–17)
APTT BLD: 26.9 SEC — LOW (ref 27.5–35.5)
AST SERPL-CCNC: 16 U/L — SIGNIFICANT CHANGE UP (ref 15–37)
BILIRUB SERPL-MCNC: 0.5 MG/DL — SIGNIFICANT CHANGE UP (ref 0.2–1.2)
BUN SERPL-MCNC: 7 MG/DL — SIGNIFICANT CHANGE UP (ref 7–23)
CALCIUM SERPL-MCNC: 9.6 MG/DL — SIGNIFICANT CHANGE UP (ref 8.5–10.1)
CHLORIDE SERPL-SCNC: 106 MMOL/L — SIGNIFICANT CHANGE UP (ref 96–108)
CO2 SERPL-SCNC: 25 MMOL/L — SIGNIFICANT CHANGE UP (ref 22–31)
CREAT SERPL-MCNC: 0.88 MG/DL — SIGNIFICANT CHANGE UP (ref 0.5–1.3)
CULTURE RESULTS: SIGNIFICANT CHANGE UP
EGFR: 96 ML/MIN/1.73M2 — SIGNIFICANT CHANGE UP
FLUAV AG NPH QL: SIGNIFICANT CHANGE UP
FLUBV AG NPH QL: SIGNIFICANT CHANGE UP
GLUCOSE BLDC GLUCOMTR-MCNC: 110 MG/DL — HIGH (ref 70–99)
GLUCOSE BLDC GLUCOMTR-MCNC: 81 MG/DL — SIGNIFICANT CHANGE UP (ref 70–99)
GLUCOSE BLDC GLUCOMTR-MCNC: 82 MG/DL — SIGNIFICANT CHANGE UP (ref 70–99)
GLUCOSE BLDC GLUCOMTR-MCNC: 84 MG/DL — SIGNIFICANT CHANGE UP (ref 70–99)
GLUCOSE BLDC GLUCOMTR-MCNC: 93 MG/DL — SIGNIFICANT CHANGE UP (ref 70–99)
GLUCOSE SERPL-MCNC: 81 MG/DL — SIGNIFICANT CHANGE UP (ref 70–99)
GRAM STN FLD: SIGNIFICANT CHANGE UP
HCT VFR BLD CALC: 36.7 % — LOW (ref 39–50)
HGB BLD-MCNC: 11.7 G/DL — LOW (ref 13–17)
INR BLD: 1.12 RATIO — SIGNIFICANT CHANGE UP (ref 0.88–1.16)
MCHC RBC-ENTMCNC: 29.8 PG — SIGNIFICANT CHANGE UP (ref 27–34)
MCHC RBC-ENTMCNC: 31.9 G/DL — LOW (ref 32–36)
MCV RBC AUTO: 93.6 FL — SIGNIFICANT CHANGE UP (ref 80–100)
NRBC # BLD: 0 /100 WBCS — SIGNIFICANT CHANGE UP (ref 0–0)
PLATELET # BLD AUTO: 377 K/UL — SIGNIFICANT CHANGE UP (ref 150–400)
POTASSIUM SERPL-MCNC: 4.5 MMOL/L — SIGNIFICANT CHANGE UP (ref 3.5–5.3)
POTASSIUM SERPL-SCNC: 4.5 MMOL/L — SIGNIFICANT CHANGE UP (ref 3.5–5.3)
PROT SERPL-MCNC: 8.5 GM/DL — HIGH (ref 6–8.3)
PROTHROM AB SERPL-ACNC: 13.4 SEC — SIGNIFICANT CHANGE UP (ref 10.5–13.4)
RBC # BLD: 3.92 M/UL — LOW (ref 4.2–5.8)
RBC # FLD: 15.4 % — HIGH (ref 10.3–14.5)
SARS-COV-2 RNA SPEC QL NAA+PROBE: SIGNIFICANT CHANGE UP
SODIUM SERPL-SCNC: 141 MMOL/L — SIGNIFICANT CHANGE UP (ref 135–145)
SPECIMEN SOURCE: SIGNIFICANT CHANGE UP
WBC # BLD: 7.09 K/UL — SIGNIFICANT CHANGE UP (ref 3.8–10.5)
WBC # FLD AUTO: 7.09 K/UL — SIGNIFICANT CHANGE UP (ref 3.8–10.5)

## 2023-06-01 PROCEDURE — 99222 1ST HOSP IP/OBS MODERATE 55: CPT

## 2023-06-01 PROCEDURE — 99232 SBSQ HOSP IP/OBS MODERATE 35: CPT

## 2023-06-01 RX ORDER — SODIUM CHLORIDE 9 MG/ML
1000 INJECTION, SOLUTION INTRAVENOUS
Refills: 0 | Status: DISCONTINUED | OUTPATIENT
Start: 2023-06-01 | End: 2023-06-02

## 2023-06-01 RX ADMIN — Medication 166.67 MILLIGRAM(S): at 09:00

## 2023-06-01 RX ADMIN — TIOTROPIUM BROMIDE 2 PUFF(S): 18 CAPSULE ORAL; RESPIRATORY (INHALATION) at 12:59

## 2023-06-01 RX ADMIN — BUPROPION HYDROCHLORIDE 150 MILLIGRAM(S): 150 TABLET, EXTENDED RELEASE ORAL at 12:59

## 2023-06-01 RX ADMIN — Medication 166.67 MILLIGRAM(S): at 22:50

## 2023-06-01 RX ADMIN — LOSARTAN POTASSIUM 100 MILLIGRAM(S): 100 TABLET, FILM COATED ORAL at 06:54

## 2023-06-01 RX ADMIN — DULOXETINE HYDROCHLORIDE 120 MILLIGRAM(S): 30 CAPSULE, DELAYED RELEASE ORAL at 12:58

## 2023-06-01 RX ADMIN — SERTRALINE 100 MILLIGRAM(S): 25 TABLET, FILM COATED ORAL at 17:38

## 2023-06-01 RX ADMIN — ERTAPENEM SODIUM 120 MILLIGRAM(S): 1 INJECTION, POWDER, LYOPHILIZED, FOR SOLUTION INTRAMUSCULAR; INTRAVENOUS at 22:35

## 2023-06-01 RX ADMIN — PANTOPRAZOLE SODIUM 40 MILLIGRAM(S): 20 TABLET, DELAYED RELEASE ORAL at 06:53

## 2023-06-01 RX ADMIN — Medication 3 MILLIGRAM(S): at 22:29

## 2023-06-01 RX ADMIN — Medication 650 MILLIGRAM(S): at 07:00

## 2023-06-01 RX ADMIN — BUDESONIDE AND FORMOTEROL FUMARATE DIHYDRATE 2 PUFF(S): 160; 4.5 AEROSOL RESPIRATORY (INHALATION) at 06:59

## 2023-06-01 RX ADMIN — Medication 650 MILLIGRAM(S): at 18:35

## 2023-06-01 RX ADMIN — POLYETHYLENE GLYCOL 3350 17 GRAM(S): 17 POWDER, FOR SOLUTION ORAL at 22:28

## 2023-06-01 RX ADMIN — Medication 650 MILLIGRAM(S): at 06:53

## 2023-06-01 RX ADMIN — SENNA PLUS 2 TABLET(S): 8.6 TABLET ORAL at 22:29

## 2023-06-01 RX ADMIN — ENOXAPARIN SODIUM 40 MILLIGRAM(S): 100 INJECTION SUBCUTANEOUS at 06:53

## 2023-06-01 RX ADMIN — CHLORHEXIDINE GLUCONATE 1 APPLICATION(S): 213 SOLUTION TOPICAL at 13:01

## 2023-06-01 RX ADMIN — Medication 650 MILLIGRAM(S): at 13:57

## 2023-06-01 RX ADMIN — BUDESONIDE AND FORMOTEROL FUMARATE DIHYDRATE 2 PUFF(S): 160; 4.5 AEROSOL RESPIRATORY (INHALATION) at 17:37

## 2023-06-01 RX ADMIN — Medication 650 MILLIGRAM(S): at 00:32

## 2023-06-01 RX ADMIN — SERTRALINE 100 MILLIGRAM(S): 25 TABLET, FILM COATED ORAL at 06:54

## 2023-06-01 RX ADMIN — Medication 1 TABLET(S): at 12:57

## 2023-06-01 RX ADMIN — Medication 650 MILLIGRAM(S): at 01:32

## 2023-06-01 RX ADMIN — Medication 650 MILLIGRAM(S): at 17:37

## 2023-06-01 RX ADMIN — Medication 650 MILLIGRAM(S): at 12:57

## 2023-06-01 NOTE — PROGRESS NOTE ADULT - ATTENDING COMMENTS
Pain improved in knee.  Drain removed this morning.  Initially refusing surgery and then changed decision in afternoon.  Has capacity to make medical decisions.    Discussed surgery with patient, he understands risks/benefits/alternatives. He understands surgery will be full removal of total knee implant, I&D, and placement of antibiotic spacer.

## 2023-06-01 NOTE — BH CONSULTATION LIAISON ASSESSMENT NOTE - SUMMARY
Had a bout of delirium which has now nearly fully resolved, residual minor symptoms noted which did do impact capacity at this time. Patient meets allelements of capacity at this time hence can refuse surgery. 
64 y/o male with history of depression, anxiety, in treatment, currently admitted for planned OR revision of previous surgery, most likely on Thursday/Friday this week. While patient receiving RX for sepsis, patient had heavy medication regimen and had one episode of sudden onset AVH and emotional lability which has now resolved and patient has returned to usual state of health.    Patient is not a danger to himself or others, currently oriented, good historian and has good comprehension of history of present illness and treatment plan going forward. Patient denies any other acute psychiatric s/sx on review of systems, and denies any concern over events last night.

## 2023-06-01 NOTE — PROGRESS NOTE ADULT - SUBJECTIVE AND OBJECTIVE BOX
Patient seen and examined at bedside. Mentation improving. No longer on 1:1. Patient denies any numbness, tingling, weakness, or any other orthopaedic complaint. Appreciate psych, medicine recs. Pt denies fever/chills. Pt endorsing walking well with PT. Pt now refusing surgery, endorsing pain and ROM greatly improved s/p aspiration and percutaneous drain placement.     Vital Signs Last 24 Hrs  T(C): 36.7 (01 Jun 2023 05:19), Max: 37.3 (31 May 2023 11:26)  T(F): 98.1 (01 Jun 2023 05:19), Max: 99.1 (31 May 2023 11:26)  HR: 72 (01 Jun 2023 05:19) (72 - 86)  BP: 129/72 (01 Jun 2023 05:19) (129/72 - 136/78)  BP(mean): --  RR: 20 (01 Jun 2023 05:19) (17 - 20)  SpO2: 99% (01 Jun 2023 05:19) (96% - 99%)    Parameters below as of 01 Jun 2023 05:19  Patient On (Oxygen Delivery Method): room air                          11.1   6.57  )-----------( 469      ( 31 May 2023 07:45 )             35.7       05-31    141  |  107  |  6<L>  ----------------------------<  90  3.7   |  28  |  0.76    Ca    9.1      31 May 2023 07:45  Phos  3.4     05-31  Mg     2.0     05-31    TPro  8.2  /  Alb  3.0<L>  /  TBili  0.5  /  DBili  x   /  AST  15  /  ALT  10<L>  /  AlkPhos  85  05-31          PT/INR - ( 31 May 2023 07:45 )   PT: 14.1 sec;   INR: 1.17 ratio         PTT - ( 31 May 2023 07:45 )  PTT:34.6 sec      LLE:  Gen: resting in bed, NAD  Drain in place draining serosanguineous fluid  minimal TTP throughout the rest of the extremity.  SILT L2-S1  GSC/TA/EHL/FHL intact  Extremity warm and well perfused  No calf tenderness bilaterally.  Compartments soft and compressible.     63M with L knee PJI  Plan:   Appreciate psych, medicine recs  WBAT/PT/OT  Pain management PRN  DVT prophylaxis: lovenox  Follow cultures, NGTD at this time  Follow AM labs   Incentive spirometry  Plan for OR friday 6/2 this week for TKA explantation with placement of antibiotic spacer  NPO after midnight   Hold DVT ppx after midnight   FU preop labs  Will discuss with Dr. Bob and will advise for any changes to the plan.

## 2023-06-01 NOTE — BH CONSULTATION LIAISON ASSESSMENT NOTE - VIOLENCE PROTECTIVE FACTORS:
Residential stability/Relationship stability/Sobriety/Engagement in treatment/Affective Stability/Insight into violence risk and need for management/treatment/Good treatment response/compliance
Residential stability/Relationship stability/Engagement in treatment

## 2023-06-01 NOTE — PROGRESS NOTE ADULT - ASSESSMENT
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     PJI w/ fluid collection concerning for abscess  - CT showed a large intramuscular abscess in the mid to distal left vastus musculature w/ a periosteal reaction in the anterior and medial distal left femur adjacent to the intramuscular abscess which could be reactive in nature; however, an underlying osteomyelitis cannot be excluded. There was also an enlarged left iliac lymph node and enlarged left inguinal lymph nodes   - ortho aspirated the joint  - follow up   - IR to aspirated 5 cc of old blood on 5/26 - specimen sent and an 8 Welsh drain was left in place for further liquefaction/drainage  - Ortho planing on taking patient to OR for I&D of left knee Vs major revision surgery   - c/w antibiotics as per ortho   -  ID consult following    - pain deferred to primary   - Pt reports that he's not very active because of his knee so cannot access with METs. He is not on insulin, has no history of CHF, CKD, CAD or cerebrovascular disease. Echo from April showed normal global left ventricular systolic function w/ no evidence of valve disease. EKG showed NSR @ 84 BPM and is unchanged from prior EKG from 12/2022. Patient can proceed to OR without further work up and is at moderate cardiac risk for a moderate risk surg - will need to reassess if patient has chest pain (he denied chest pain at this time when I spoke w/ him today)     Confusion on 5/28  - suspect delirium/ metabolic encephalopathy due to meds - improving with periods of waxing and waning   - as per ID, there is an unlikely possibility of cefepime neurotoxicity and patient was switching to ertapenem  - CT head showed no acute intracranial abnormalities and mild small vessel and atrophic changes  - psych consult note read and appreciated     Patient now declining surgery, wants to postpone, seen by psych to determine capacity     Hx of DVT  - Xarelto on hold by ortho in anticipation of taking patient to OR    COPD  - patient reports it is secondary to exposure to dust at 9/11 site   - c/w Spiriva and Symbicort     DM II  - start ISS  - Hgb A1C in April was 7.4     HTN  - c/w losartan     Depression/ Anxiety/Insomnia  - c/w Zoloft, Cymbalta, Wellbutrin & Xanax     GERD  - c/w Protonix    Constipation  - c/w Senna & Miralax    63y Male with history of DM II, HTN, HLD L, DVTs on Xarelto, GERD, Depression, Anxiety, Insomnia, Spinal stenosis, Spondyloschises, R Knee Arthroplasty in 2003 and L Knee Arthroplasty in 2011 s/p L TKA I&D with poly exchange 4/14/23 w/ MSSA on OR cultures requiring Ancef via PICC [id number] p/w left thigh fluid collection concerning for PJI.     PJI w/ fluid collection concerning for abscess  - CT showed a large intramuscular abscess in the mid to distal left vastus musculature w/ a periosteal reaction in the anterior and medial distal left femur adjacent to the intramuscular abscess which could be reactive in nature; however, an underlying osteomyelitis cannot be excluded. There was also an enlarged left iliac lymph node and enlarged left inguinal lymph nodes   - ortho aspirated the joint  - follow up   - IR to aspirated 5 cc of old blood on 5/26 - specimen sent and an 8 Polish drain was left in place for further liquefaction/drainage  - Ortho planing on taking patient to OR for I&D of left knee Vs major revision surgery   - c/w antibiotics as per ortho   -  ID consult following    - pain deferred to primary   - Pt reports that he's not very active because of his knee so cannot access with METs. He is not on insulin, has no history of CHF, CKD, CAD or cerebrovascular disease. Echo from April showed normal global left ventricular systolic function w/ no evidence of valve disease. EKG showed NSR @ 84 BPM and is unchanged from prior EKG from 12/2022. Patient can proceed to OR without further work up and is at moderate cardiac risk for a moderate risk surg  pt w no chest pain      Confusion on 5/28  - suspect delirium/ metabolic encephalopathy due to meds - improving with periods of waxing and waning   - as per ID, there is an unlikely possibility of cefepime neurotoxicity and patient was switching to ertapenem  - CT head showed no acute intracranial abnormalities and mild small vessel and atrophic changes  - psych consult note read and appreciated     Patient now declining surgery, wants to postpone, seen by psych to determine capacity     Hx of DVT  - Xarelto on hold by ortho in anticipation of taking patient to OR    COPD  - patient reports it is secondary to exposure to dust at 9/11 site   - c/w Spiriva and Symbicort     DM II  - start ISS  - Hgb A1C in April was 7.4     HTN  - c/w losartan     Depression/ Anxiety/Insomnia  - c/w Zoloft, Cymbalta, Wellbutrin & Xanax     GERD  - c/w Protonix    Constipation  - c/w Senna & Miralax

## 2023-06-01 NOTE — PROGRESS NOTE ADULT - SUBJECTIVE AND OBJECTIVE BOX
SKYLAR MCCULLOUGH  MRN-[id number]    Follow Up:  PJI    Interval History: the pt was seen and examined earlier, no acute distress, states that he is feeling much better, initially didnt want to stay for surgery but now change his mind. Planned for OR tomorrow     PAST MEDICAL & SURGICAL HISTORY:  Diabetes Mellitus  diagnosed 10 years ago  doesn't do fingersticks      Hypertension, Essential      Hyperlipidemia      GERD (gastroesophageal reflux disease)      Depression      Obese      Bulging disc      Spinal stenosis      Spondylisthesis      Sleep apnea  had test > 10 years ago--supposed to wear device.  Lost 25 pounds but never retested      Anxiety      Insomnia      Varicosities      DVT (deep venous thrombosis)      R Knee Arthroplasty  Right 2003, Left 2011,      H/O laser iridotomy  left-2008      Reflux  Gastroscopy 2013      Deep vein thrombosis (DVT) of lower extremity          ROS:    [ ] Unobtainable because:  [x ] All other systems negative    Constitutional: no fever, no chills  Head: no trauma  Eyes: no vision changes, no eye pain  ENT:  no sore throat, no rhinorrhea  Cardiovascular:  no chest pain, no palpitation  Respiratory:  no SOB, no cough  GI:  no abd pain, no vomiting, no diarrhea  urinary: no dysuria, no hematuria, no flank pain  musculoskeletal:  left knee is better   skin:  no rash  neurology:  no headache, no seizure, no change in mental status  psych: no anxiety, no depression         Allergies  ertapenem  IVPB 1000 every 24 hours  vancomycin  IVPB 1250 every 12 hours      MEDICATIONS  (STANDING):  acetaminophen     Tablet .. 650 every 6 hours  budesonide 160 MICROgram(s)/formoterol 4.5 MICROgram(s) Inhaler 2 two times a day  buPROPion XL (24-Hour) 150 daily  dextrose 50% Injectable 12.5 once  dextrose 50% Injectable 25 once  dextrose 50% Injectable 25 once  DULoxetine 120 daily  glucagon  Injectable 1 once  insulin lispro (ADMELOG) corrective regimen sliding scale  three times a day before meals  losartan 100 daily  melatonin 3 at bedtime  pantoprazole    Tablet 40 before breakfast  polyethylene glycol 3350 17 at bedtime  senna 2 at bedtime  sertraline 100 two times a day  tiotropium 2.5 MICROgram(s) Inhaler 2 daily      PRN  albuterol    90 MICROgram(s) HFA Inhaler 2 Puff(s) Inhalation every 6 hours PRN  ALPRAZolam 0.5 milliGRAM(s) Oral two times a day PRN  aluminum hydroxide/magnesium hydroxide/simethicone Suspension 30 milliLiter(s) Oral every 4 hours PRN  dextrose Oral Gel 15 Gram(s) Oral once PRN  magnesium hydroxide Suspension 30 milliLiter(s) Oral daily PRN  ondansetron Injectable 4 milliGRAM(s) IV Push every 6 hours PRN      Physical Exam:  Vital Signs Last 24 Hrs  T(F): 98.4 (06-01-23 @ 17:13), Max: 99.4 (06-01-23 @ 10:52)  HR: 93 (06-01-23 @ 17:13)  BP: 136/90 (06-01-23 @ 17:13)  RR: 18 (06-01-23 @ 17:13)  SpO2: 98% (06-01-23 @ 17:13) (96% - 99%)  Wt(kg): --No Known Allergies      Physical Exam:  General: Nontoxic-appearing Male in no acute distress.  HEENT: AT/NC.  Anicteric. Conjunctiva pink and moist. Oropharynx clear.   Neck: Not rigid. No sense of mass.  Nodes: None palpable.  Lungs: Clear bilaterally without rales, wheezing or rhonchi  Heart: Regular rate and rhythm. No Murmur. No rub. No gallop. No palpable thrill.  Abdomen: Bowel sounds present and normoactive. Soft. Nondistended. Nontender.  Back: No spinal tenderness. No costovertebral angle tenderness.   Extremities: No cyanosis or clubbing. Left knee with less swelling, mild warmth to touch is present, not tender, no noted erythema, Drain in place.   Skin: Warm. Dry. Good turgor. No rash. No vasculitic stigmata.  Psychiatric: Appropriate affect and mood for situation.     WBC Count: 6.57 K/uL (05-31 @ 07:45)  WBC Count: 6.98 K/uL (05-30 @ 06:16)  WBC Count: 6.34 K/uL (05-29 @ 06:49)  WBC Count: 8.31 K/uL (05-28 @ 19:30)  WBC Count: 8.11 K/uL (05-28 @ 06:37)  WBC Count: 6.90 K/uL (05-27 @ 07:25)  WBC Count: 7.04 K/uL (05-25 @ 20:20)                            11.1   6.57  )-----------( 469      ( 31 May 2023 07:45 )             35.7       05-31    141  |  107  |  6<L>  ----------------------------<  90  3.7   |  28  |  0.76    Ca    9.1      31 May 2023 07:45  Phos  3.4     05-31  Mg     2.0     05-31    TPro  8.2  /  Alb  3.0<L>  /  TBili  0.5  /  DBili  x   /  AST  15  /  ALT  10<L>  /  AlkPhos  85  05-31          Creatinine Trend: 0.76<--, 0.70<--, 0.60<--, 0.59<--, 0.66<--, 0.66<--      MICROBIOLOGY:  Vancomycin Level, Trough: 17.6 ug/mL (05-31-23 @ 15:11)  v  .Blood Blood  05-28-23   No growth to date.  --  --      .Blood Blood  05-28-23   No growth to date.  --  --      .Body Fluid Aqueous Fluid  05-26-23   No growth  --    polymorphonuclear leukocytes seen  No organisms seen  by cytocentrifuge      Knee Synovial Fluid  05-26-23   No growth at 5 days  --    Few polymorphonuclear leukocytes per low power field  No organisms seen per oil power field      .Blood Blood-Peripheral  05-26-23   No Growth Final  --  --      C-Reactive Protein, Serum: 38 (05-25)    RADIOLOGY:

## 2023-06-01 NOTE — BH CONSULTATION LIAISON ASSESSMENT NOTE - RISK ASSESSMENT
Chronic risk factors: male gender, medical issues. Protective factors: ag e < 65 yrs, medication and treatment compliant; no known history of psychiatric hospitalizations, denies suicide attempts; no self-injurious behavior; no hx of aggression/violence; no substance use, no legal issues; motivated for help; articulate; strong family support; stable domicile, stable finances; access to health services. No acute risk factors identified  
low risk for self-harm and agitation. Patient is here for surgery, no known history of behavioral disturbance or violence, or suicide ideation.

## 2023-06-01 NOTE — BH CONSULTATION LIAISON ASSESSMENT NOTE - NSICDXPASTMEDICALHX_GEN_ALL_CORE_FT
PAST MEDICAL HISTORY:  Anxiety     Bulging disc     Depression     Diabetes Mellitus diagnosed 10 years ago  doesn't do fingersticks    DVT (deep venous thrombosis)     GERD (gastroesophageal reflux disease)     Hyperlipidemia     Hypertension, Essential     Insomnia     Obese     Sleep apnea had test > 10 years ago--supposed to wear device.  Lost 25 pounds but never retested    Spinal stenosis     Spondylisthesis     Varicosities     
PAST MEDICAL HISTORY:  Anxiety     Bulging disc     Depression     Diabetes Mellitus diagnosed 10 years ago  doesn't do fingersticks    DVT (deep venous thrombosis)     GERD (gastroesophageal reflux disease)     Hyperlipidemia     Hypertension, Essential     Insomnia     Obese     Sleep apnea had test > 10 years ago--supposed to wear device.  Lost 25 pounds but never retested    Spinal stenosis     Spondylisthesis     Varicosities

## 2023-06-01 NOTE — BH CONSULTATION LIAISON ASSESSMENT NOTE - NSBHCHARTREVIEWLAB_PSY_A_CORE FT
06-01    141  |  106  |  7   ----------------------------<  81  4.5   |  25  |  0.88    Ca    9.6      01 Jun 2023 06:20  Phos  3.4     05-31  Mg     2.0     05-31    TPro  8.5<H>  /  Alb  3.2<L>  /  TBili  0.5  /  DBili  x   /  AST  16  /  ALT  14  /  AlkPhos  87  06-01
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
## 2023-06-01 NOTE — PROGRESS NOTE ADULT - ASSESSMENT
5/26:  Previously known to our group, on Rx for MSSA Septicemia/PJI  Office received a call that patient seemed very confused and had been falling.   Given that the patient is on anticoagulation and it was not clear what was going on, Dr. Bay had spoke with the patient's wife and advised him to be taken to the ER and have a workup including a CT head.  Presently he is much mor lucid, though still slightly confused, and confirms he has been falling ("mostly on my butt").   Patient also complained of swelling and pain in the L thigh, though is vague with respect to onset and other symptoms.   L thigh is red, warm and tender on exam.  IR drainage- old blood, cx pending  Knee aspirate- 29k WBC, PMN predominance  Will broaden antibiotics pending clinical course, though most likely still MSSA    5/30: improving, no fevers, RA, no wbc, Cr and LFTs ok, IR drainage fluid cultures with no growth thus far, BCs with no growth to date on this admission, Vanco trough 11.5 yesterday, will repeat, Vancomycin IV and ertapenem IV continue, possibly to OR by the end of this week for I&D revision procedure.   Attending addendum:  agree with above  continue antibiotics   trend bloody output from drain   pain control   send vancomycin trough with target AUC/LIEN 400-600   (therapeutic drug monitoring required with IV vancomycin)     5/31: afebrile, RA, no WBC, Cr ok, LFTs ok, BCs and synovial fluid cultures with no growth to date, drain in place with bloody drainage (small amount), ertapenem IV and Vancomycin IV continued, will  obtain vanco level today prior evening dose. Possibly to OR by the end of this week.   6/1:continue his antibiotics, vancomycin level 17 last night, continue current dose, scheduled for surgery tomorrow    Suggestions:  - continue IV vancomycin   - continue IV ertapenem  - monitoring vancomycin levels is required  - follow culture data   - pain control  - drain per IR  - OR tomorrow for I&D revision procedure.     Discussed with ORtho team    Glenn Bay, DO  Infectious Disease Attending  Reachable via Microsoft Teams or ID office: [phone]  After 5pm/weekends please call [phone] for all inquiries and new consults

## 2023-06-01 NOTE — BH CONSULTATION LIAISON ASSESSMENT NOTE - NSSUICPROTFACT_PSY_ALL_CORE
Responsibility to children, family, or others/Identifies reasons for living/Supportive social network of family or friends/Positive therapeutic relationships
Responsibility to children, family, or others/Identifies reasons for living/Supportive social network of family or friends/Fear of death or the actual act of killing self/Cultural, spiritual and/or moral attitudes against suicide/Positive therapeutic relationships/Ability to cope with stress/Frustration tolerance/Yarsani beliefs

## 2023-06-01 NOTE — BH CONSULTATION LIAISON ASSESSMENT NOTE - NSICDXPASTSURGICALHX_GEN_ALL_CORE_FT
PAST SURGICAL HISTORY:  Deep vein thrombosis (DVT) of lower extremity     H/O laser iridotomy left-2008    R Knee Arthroplasty Right 2003, Left 2011,    Reflux Gastroscopy 2013    
PAST SURGICAL HISTORY:  Deep vein thrombosis (DVT) of lower extremity     H/O laser iridotomy left-2008    R Knee Arthroplasty Right 2003, Left 2011,    Reflux Gastroscopy 2013

## 2023-06-01 NOTE — BH CONSULTATION LIAISON ASSESSMENT NOTE - NSBHCHARTREVIEWVS_PSY_A_CORE FT
Vital Signs Last 24 Hrs  T(C): 37.4 (01 Jun 2023 10:52), Max: 37.4 (01 Jun 2023 10:52)  T(F): 99.4 (01 Jun 2023 10:52), Max: 99.4 (01 Jun 2023 10:52)  HR: 88 (01 Jun 2023 10:52) (72 - 88)  BP: 168/91 (01 Jun 2023 10:52) (129/72 - 168/91)  BP(mean): --  RR: 18 (01 Jun 2023 10:52) (18 - 20)  SpO2: 97% (01 Jun 2023 10:52) (96% - 99%)    Parameters below as of 01 Jun 2023 05:19  Patient On (Oxygen Delivery Method): room air    
Vital Signs Last 24 Hrs  T(C): 36.9 (29 May 2023 05:50), Max: 37.8 (28 May 2023 18:00)  T(F): 98.4 (29 May 2023 05:50), Max: 100.1 (28 May 2023 18:00)  HR: 77 (29 May 2023 05:50) (73 - 108)  BP: 125/76 (29 May 2023 05:50) (108/61 - 167/87)  BP(mean): --  RR: 18 (29 May 2023 05:50) (18 - 19)  SpO2: 96% (29 May 2023 05:50) (96% - 100%)    Parameters below as of 29 May 2023 05:50  Patient On (Oxygen Delivery Method): room air

## 2023-06-01 NOTE — BH CONSULTATION LIAISON ASSESSMENT NOTE - CURRENT MEDICATION
MEDICATIONS  (STANDING):  acetaminophen     Tablet .. 650 milliGRAM(s) Oral every 6 hours  budesonide 160 MICROgram(s)/formoterol 4.5 MICROgram(s) Inhaler 2 Puff(s) Inhalation two times a day  buPROPion XL (24-Hour) 150 milliGRAM(s) Oral daily  chlorhexidine 2% Cloths 1 Application(s) Topical daily  dextrose 5%. 1000 milliLiter(s) (100 mL/Hr) IV Continuous <Continuous>  dextrose 5%. 1000 milliLiter(s) (50 mL/Hr) IV Continuous <Continuous>  dextrose 50% Injectable 25 Gram(s) IV Push once  dextrose 50% Injectable 25 Gram(s) IV Push once  dextrose 50% Injectable 12.5 Gram(s) IV Push once  DULoxetine 120 milliGRAM(s) Oral daily  enoxaparin Injectable 40 milliGRAM(s) SubCutaneous every 24 hours  ertapenem  IVPB 1000 milliGRAM(s) IV Intermittent every 24 hours  glucagon  Injectable 1 milliGRAM(s) IntraMuscular once  insulin lispro (ADMELOG) corrective regimen sliding scale   SubCutaneous three times a day before meals  losartan 100 milliGRAM(s) Oral daily  melatonin 3 milliGRAM(s) Oral at bedtime  multivitamin 1 Tablet(s) Oral daily  pantoprazole    Tablet 40 milliGRAM(s) Oral before breakfast  polyethylene glycol 3350 17 Gram(s) Oral at bedtime  senna 2 Tablet(s) Oral at bedtime  sertraline 100 milliGRAM(s) Oral two times a day  tiotropium 2.5 MICROgram(s) Inhaler 2 Puff(s) Inhalation daily  vancomycin  IVPB 1250 milliGRAM(s) IV Intermittent every 12 hours    MEDICATIONS  (PRN):  albuterol    90 MICROgram(s) HFA Inhaler 2 Puff(s) Inhalation every 6 hours PRN Bronchospasm  ALPRAZolam 0.5 milliGRAM(s) Oral two times a day PRN anxiety/sleep  aluminum hydroxide/magnesium hydroxide/simethicone Suspension 30 milliLiter(s) Oral every 4 hours PRN Dyspepsia  dextrose Oral Gel 15 Gram(s) Oral once PRN Blood Glucose LESS THAN 70 milliGRAM(s)/deciliter  magnesium hydroxide Suspension 30 milliLiter(s) Oral daily PRN Constipation  ondansetron Injectable 4 milliGRAM(s) IV Push every 6 hours PRN Nausea and/or Vomiting  
MEDICATIONS  (STANDING):  acetaminophen     Tablet .. 650 milliGRAM(s) Oral every 6 hours  budesonide 160 MICROgram(s)/formoterol 4.5 MICROgram(s) Inhaler 2 Puff(s) Inhalation two times a day  buPROPion XL (24-Hour) 150 milliGRAM(s) Oral daily  dextrose 5%. 1000 milliLiter(s) (100 mL/Hr) IV Continuous <Continuous>  dextrose 5%. 1000 milliLiter(s) (50 mL/Hr) IV Continuous <Continuous>  dextrose 50% Injectable 12.5 Gram(s) IV Push once  dextrose 50% Injectable 25 Gram(s) IV Push once  dextrose 50% Injectable 25 Gram(s) IV Push once  DULoxetine 120 milliGRAM(s) Oral daily  enoxaparin Injectable 40 milliGRAM(s) SubCutaneous every 24 hours  ertapenem  IVPB 1000 milliGRAM(s) IV Intermittent every 24 hours  glucagon  Injectable 1 milliGRAM(s) IntraMuscular once  insulin lispro (ADMELOG) corrective regimen sliding scale   SubCutaneous three times a day before meals  losartan 100 milliGRAM(s) Oral daily  melatonin 3 milliGRAM(s) Oral at bedtime  multivitamin 1 Tablet(s) Oral daily  pantoprazole    Tablet 40 milliGRAM(s) Oral before breakfast  polyethylene glycol 3350 17 Gram(s) Oral at bedtime  senna 2 Tablet(s) Oral at bedtime  sertraline 100 milliGRAM(s) Oral two times a day  tiotropium 2.5 MICROgram(s) Inhaler 2 Puff(s) Inhalation daily  vancomycin  IVPB 1250 milliGRAM(s) IV Intermittent every 12 hours    MEDICATIONS  (PRN):  albuterol    90 MICROgram(s) HFA Inhaler 2 Puff(s) Inhalation every 6 hours PRN Bronchospasm  ALPRAZolam 0.5 milliGRAM(s) Oral two times a day PRN anxiety/sleep  aluminum hydroxide/magnesium hydroxide/simethicone Suspension 30 milliLiter(s) Oral every 4 hours PRN Dyspepsia  dextrose Oral Gel 15 Gram(s) Oral once PRN Blood Glucose LESS THAN 70 milliGRAM(s)/deciliter  magnesium hydroxide Suspension 30 milliLiter(s) Oral daily PRN Constipation  ondansetron Injectable 4 milliGRAM(s) IV Push every 6 hours PRN Nausea and/or Vomiting

## 2023-06-01 NOTE — BH CONSULTATION LIAISON ASSESSMENT NOTE - NSBHCONSULTRECOMMENDOTHER_PSY_A_CORE FT
- patient's presentation of sudden onset VH most likely related to delirium, potential medication withdrawal (previously on xanax and ambien), opioid related (associated with hypnagogic and hypnopompic hallucinations), and/or combination of multiple etiologies which is most likely, but LESS likely primary psychotic illness given lack of history and evaluation.  - Patient's medication psychotropic medication regimen concerning (is on 3 different anti-depressants including Zoloft, Wellbutrin, and Cymbalta) - increasing risk of bleeding, risk of serotonin syndrome , also no indication in psychiatry for this at all, but given patient has returned to usual state of health, will not change at this time. Will defer to primary provider given patient's stability.  - continue with home meds as prescribed  - no need for 1:1 for psychiatric reasons such as suicide or agitation, but will defer to primary team regarding AMS given patient's recent fall.  - no safety concerns  - psychiatry to sign off, please call with any questions while patient admitted.
Per se, Patient is not refusing to have surgery - he is declining to have the procedure done at this time and is opting to have to pushed back at least 2 weeks as he has outstanding obligations he needs to take care of at this time and they cannot be taken care of in a post-op state of convalescence. Patient wants to go home, take care of the issues and then once done, have the surgery done.

## 2023-06-01 NOTE — BH CONSULTATION LIAISON ASSESSMENT NOTE - NSBHATTESTBILLING_PSY_A_CORE
07367-Iubbhyvcsbc diagnostic evaluation with medical services
99222-Initial OBS or IP - moderate complexity OR 55-74 mins

## 2023-06-01 NOTE — BH CONSULTATION LIAISON ASSESSMENT NOTE - NSSUICRSKFACTOR_PSY_ALL_CORE
Spine appears normal, range of motion is not limited, no muscle or joint tenderness
Current and Past Psychiatric Diagnoses/Activating Events/Stressors
Current and Past Psychiatric Diagnoses/Activating Events/Stressors

## 2023-06-01 NOTE — PROGRESS NOTE ADULT - SUBJECTIVE AND OBJECTIVE BOX
Patient is a 63y old  Male who presents with a chief complaint of L thigh abscess V PJI (01 Jun 2023 06:34)    INTERVAL HPI/OVERNIGHT EVENTS:    MEDICATIONS  (STANDING):  acetaminophen     Tablet .. 650 milliGRAM(s) Oral every 6 hours  budesonide 160 MICROgram(s)/formoterol 4.5 MICROgram(s) Inhaler 2 Puff(s) Inhalation two times a day  buPROPion XL (24-Hour) 150 milliGRAM(s) Oral daily  chlorhexidine 2% Cloths 1 Application(s) Topical daily  dextrose 5%. 1000 milliLiter(s) (100 mL/Hr) IV Continuous <Continuous>  dextrose 5%. 1000 milliLiter(s) (50 mL/Hr) IV Continuous <Continuous>  dextrose 50% Injectable 12.5 Gram(s) IV Push once  dextrose 50% Injectable 25 Gram(s) IV Push once  dextrose 50% Injectable 25 Gram(s) IV Push once  DULoxetine 120 milliGRAM(s) Oral daily  enoxaparin Injectable 40 milliGRAM(s) SubCutaneous every 24 hours  ertapenem  IVPB 1000 milliGRAM(s) IV Intermittent every 24 hours  glucagon  Injectable 1 milliGRAM(s) IntraMuscular once  insulin lispro (ADMELOG) corrective regimen sliding scale   SubCutaneous three times a day before meals  losartan 100 milliGRAM(s) Oral daily  melatonin 3 milliGRAM(s) Oral at bedtime  multivitamin 1 Tablet(s) Oral daily  pantoprazole    Tablet 40 milliGRAM(s) Oral before breakfast  polyethylene glycol 3350 17 Gram(s) Oral at bedtime  senna 2 Tablet(s) Oral at bedtime  sertraline 100 milliGRAM(s) Oral two times a day  tiotropium 2.5 MICROgram(s) Inhaler 2 Puff(s) Inhalation daily  vancomycin  IVPB 1250 milliGRAM(s) IV Intermittent every 12 hours    MEDICATIONS  (PRN):  albuterol    90 MICROgram(s) HFA Inhaler 2 Puff(s) Inhalation every 6 hours PRN Bronchospasm  ALPRAZolam 0.5 milliGRAM(s) Oral two times a day PRN anxiety/sleep  aluminum hydroxide/magnesium hydroxide/simethicone Suspension 30 milliLiter(s) Oral every 4 hours PRN Dyspepsia  dextrose Oral Gel 15 Gram(s) Oral once PRN Blood Glucose LESS THAN 70 milliGRAM(s)/deciliter  magnesium hydroxide Suspension 30 milliLiter(s) Oral daily PRN Constipation  ondansetron Injectable 4 milliGRAM(s) IV Push every 6 hours PRN Nausea and/or Vomiting    Allergies    No Known Allergies    Intolerances      REVIEW OF SYSTEMS:  All other systems reviewed and are negative    Vital Signs Last 24 Hrs  T(C): 37.4 (01 Jun 2023 10:52), Max: 37.4 (01 Jun 2023 10:52)  T(F): 99.4 (01 Jun 2023 10:52), Max: 99.4 (01 Jun 2023 10:52)  HR: 88 (01 Jun 2023 10:52) (72 - 88)  BP: 168/91 (01 Jun 2023 10:52) (129/72 - 168/91)  BP(mean): --  RR: 18 (01 Jun 2023 10:52) (18 - 20)  SpO2: 97% (01 Jun 2023 10:52) (96% - 99%)    Parameters below as of 01 Jun 2023 05:19  Patient On (Oxygen Delivery Method): room air      Daily     Daily   I&O's Summary    31 May 2023 07:01  -  01 Jun 2023 07:00  --------------------------------------------------------  IN: 0 mL / OUT: 620 mL / NET: -620 mL      CAPILLARY BLOOD GLUCOSE      POCT Blood Glucose.: 93 mg/dL (01 Jun 2023 11:37)  POCT Blood Glucose.: 82 mg/dL (01 Jun 2023 08:08)  POCT Blood Glucose.: 69 mg/dL (31 May 2023 21:12)  POCT Blood Glucose.: 98 mg/dL (31 May 2023 17:26)  POCT Blood Glucose.: 84 mg/dL (31 May 2023 16:33)    PHYSICAL EXAM:  GENERAL: NAD, alert oriented    HEAD:  Atraumatic, Normocephalic  EYES: EOMI, PERRLA, conjunctiva and sclera clear  ENMT: No tonsillar erythema, exudates, or enlargement; Moist mucous membranes, Good dentition, No lesions  NECK: Supple, No JVD, Normal thyroid  CHEST/LUNG: Clear to percussion bilaterally; No rales, rhonchi, wheezing, or rubs  HEART: Regular rate and rhythm; No murmurs, rubs, or gallops  ABDOMEN: Soft, Nontender, Nondistended; Bowel sounds present  EXTREMITIES:  2+ Peripheral Pulses, No clubbing, cyanosis, or edema  LYMPH: No lymphadenopathy noted  SKIN: No rashes or lesions    Labs                          11.7   7.09  )-----------( 377      ( 01 Jun 2023 06:20 )             36.7     06-01    141  |  106  |  7   ----------------------------<  81  4.5   |  25  |  0.88    Ca    9.6      01 Jun 2023 06:20  Phos  3.4     05-31  Mg     2.0     05-31    TPro  8.5<H>  /  Alb  3.2<L>  /  TBili  0.5  /  DBili  x   /  AST  16  /  ALT  14  /  AlkPhos  87  06-01    PT/INR - ( 01 Jun 2023 06:20 )   PT: 13.4 sec;   INR: 1.12 ratio         PTT - ( 01 Jun 2023 06:20 )  PTT:26.9 sec                    DVT prophylaxis: > Lovenox 40mg SQ daily  > Heparin   > SCD's

## 2023-06-01 NOTE — BH CONSULTATION LIAISON ASSESSMENT NOTE - OTHER
deferred  appropriate  slight minor episodes of recall /delay - self corrects higher end of fair  Qbrexza Pregnancy And Lactation Text: There is no available data on Qbrexza use in pregnant women.  There is no available data on Qbrexza use in lactation.

## 2023-06-02 ENCOUNTER — TRANSCRIPTION ENCOUNTER (OUTPATIENT)
Age: 63
End: 2023-06-02

## 2023-06-02 ENCOUNTER — RESULT REVIEW (OUTPATIENT)
Age: 63
End: 2023-06-02

## 2023-06-02 ENCOUNTER — APPOINTMENT (OUTPATIENT)
Dept: ORTHOPEDIC SURGERY | Facility: HOSPITAL | Age: 63
End: 2023-06-02

## 2023-06-02 LAB
ALBUMIN SERPL ELPH-MCNC: 3.2 G/DL — LOW (ref 3.3–5)
ALP SERPL-CCNC: 84 U/L — SIGNIFICANT CHANGE UP (ref 40–120)
ALT FLD-CCNC: 16 U/L — SIGNIFICANT CHANGE UP (ref 12–78)
ANION GAP SERPL CALC-SCNC: 5 MMOL/L — SIGNIFICANT CHANGE UP (ref 5–17)
ANION GAP SERPL CALC-SCNC: 8 MMOL/L — SIGNIFICANT CHANGE UP (ref 5–17)
APTT BLD: 33 SEC — SIGNIFICANT CHANGE UP (ref 27.5–35.5)
AST SERPL-CCNC: 15 U/L — SIGNIFICANT CHANGE UP (ref 15–37)
BILIRUB SERPL-MCNC: 0.4 MG/DL — SIGNIFICANT CHANGE UP (ref 0.2–1.2)
BLD GP AB SCN SERPL QL: SIGNIFICANT CHANGE UP
BLD GP AB SCN SERPL QL: SIGNIFICANT CHANGE UP
BUN SERPL-MCNC: 10 MG/DL — SIGNIFICANT CHANGE UP (ref 7–23)
BUN SERPL-MCNC: 8 MG/DL — SIGNIFICANT CHANGE UP (ref 7–23)
CALCIUM SERPL-MCNC: 8.1 MG/DL — LOW (ref 8.5–10.1)
CALCIUM SERPL-MCNC: 9.2 MG/DL — SIGNIFICANT CHANGE UP (ref 8.5–10.1)
CHLORIDE SERPL-SCNC: 107 MMOL/L — SIGNIFICANT CHANGE UP (ref 96–108)
CHLORIDE SERPL-SCNC: 110 MMOL/L — HIGH (ref 96–108)
CO2 SERPL-SCNC: 21 MMOL/L — LOW (ref 22–31)
CO2 SERPL-SCNC: 28 MMOL/L — SIGNIFICANT CHANGE UP (ref 22–31)
CREAT SERPL-MCNC: 0.72 MG/DL — SIGNIFICANT CHANGE UP (ref 0.5–1.3)
CREAT SERPL-MCNC: 1.21 MG/DL — SIGNIFICANT CHANGE UP (ref 0.5–1.3)
EGFR: 102 ML/MIN/1.73M2 — SIGNIFICANT CHANGE UP
EGFR: 67 ML/MIN/1.73M2 — SIGNIFICANT CHANGE UP
GLUCOSE BLDC GLUCOMTR-MCNC: 191 MG/DL — HIGH (ref 70–99)
GLUCOSE BLDC GLUCOMTR-MCNC: 209 MG/DL — HIGH (ref 70–99)
GLUCOSE BLDC GLUCOMTR-MCNC: 223 MG/DL — HIGH (ref 70–99)
GLUCOSE BLDC GLUCOMTR-MCNC: 80 MG/DL — SIGNIFICANT CHANGE UP (ref 70–99)
GLUCOSE BLDC GLUCOMTR-MCNC: 97 MG/DL — SIGNIFICANT CHANGE UP (ref 70–99)
GLUCOSE SERPL-MCNC: 101 MG/DL — HIGH (ref 70–99)
GLUCOSE SERPL-MCNC: 231 MG/DL — HIGH (ref 70–99)
GLUCOSE SERPL-MCNC: 328 MG/DL — HIGH (ref 70–99)
GRAM STN FLD: SIGNIFICANT CHANGE UP
HCT VFR BLD CALC: 29.3 % — LOW (ref 39–50)
HCT VFR BLD CALC: 32.3 % — LOW (ref 39–50)
HCT VFR BLD CALC: 35.5 % — LOW (ref 39–50)
HGB BLD-MCNC: 10.3 G/DL — LOW (ref 13–17)
HGB BLD-MCNC: 11.3 G/DL — LOW (ref 13–17)
HGB BLD-MCNC: 9.1 G/DL — LOW (ref 13–17)
INR BLD: 1.09 RATIO — SIGNIFICANT CHANGE UP (ref 0.88–1.16)
MCHC RBC-ENTMCNC: 29.3 PG — SIGNIFICANT CHANGE UP (ref 27–34)
MCHC RBC-ENTMCNC: 29.5 PG — SIGNIFICANT CHANGE UP (ref 27–34)
MCHC RBC-ENTMCNC: 30.1 PG — SIGNIFICANT CHANGE UP (ref 27–34)
MCHC RBC-ENTMCNC: 31.1 G/DL — LOW (ref 32–36)
MCHC RBC-ENTMCNC: 31.8 G/DL — LOW (ref 32–36)
MCHC RBC-ENTMCNC: 31.9 G/DL — LOW (ref 32–36)
MCV RBC AUTO: 92 FL — SIGNIFICANT CHANGE UP (ref 80–100)
MCV RBC AUTO: 94.7 FL — SIGNIFICANT CHANGE UP (ref 80–100)
MCV RBC AUTO: 95.1 FL — SIGNIFICANT CHANGE UP (ref 80–100)
NRBC # BLD: 0 /100 WBCS — SIGNIFICANT CHANGE UP (ref 0–0)
PLATELET # BLD AUTO: 353 K/UL — SIGNIFICANT CHANGE UP (ref 150–400)
PLATELET # BLD AUTO: 400 K/UL — SIGNIFICANT CHANGE UP (ref 150–400)
PLATELET # BLD AUTO: 434 K/UL — HIGH (ref 150–400)
POTASSIUM SERPL-MCNC: 4.2 MMOL/L — SIGNIFICANT CHANGE UP (ref 3.5–5.3)
POTASSIUM SERPL-MCNC: 5.7 MMOL/L — HIGH (ref 3.5–5.3)
POTASSIUM SERPL-SCNC: 4.2 MMOL/L — SIGNIFICANT CHANGE UP (ref 3.5–5.3)
POTASSIUM SERPL-SCNC: 5.7 MMOL/L — HIGH (ref 3.5–5.3)
PROT SERPL-MCNC: 8.3 GM/DL — SIGNIFICANT CHANGE UP (ref 6–8.3)
PROTHROM AB SERPL-ACNC: 13 SEC — SIGNIFICANT CHANGE UP (ref 10.5–13.4)
RBC # BLD: 3.08 M/UL — LOW (ref 4.2–5.8)
RBC # BLD: 3.51 M/UL — LOW (ref 4.2–5.8)
RBC # BLD: 3.75 M/UL — LOW (ref 4.2–5.8)
RBC # FLD: 15.1 % — HIGH (ref 10.3–14.5)
RBC # FLD: 15.3 % — HIGH (ref 10.3–14.5)
RBC # FLD: 16 % — HIGH (ref 10.3–14.5)
SODIUM SERPL-SCNC: 136 MMOL/L — SIGNIFICANT CHANGE UP (ref 135–145)
SODIUM SERPL-SCNC: 143 MMOL/L — SIGNIFICANT CHANGE UP (ref 135–145)
SPECIMEN SOURCE: SIGNIFICANT CHANGE UP
WBC # BLD: 10.96 K/UL — HIGH (ref 3.8–10.5)
WBC # BLD: 14.27 K/UL — HIGH (ref 3.8–10.5)
WBC # BLD: 7.32 K/UL — SIGNIFICANT CHANGE UP (ref 3.8–10.5)
WBC # FLD AUTO: 10.96 K/UL — HIGH (ref 3.8–10.5)
WBC # FLD AUTO: 14.27 K/UL — HIGH (ref 3.8–10.5)
WBC # FLD AUTO: 7.32 K/UL — SIGNIFICANT CHANGE UP (ref 3.8–10.5)

## 2023-06-02 PROCEDURE — 27488 REMOVAL OF KNEE PROSTHESIS: CPT | Mod: 78,LT

## 2023-06-02 PROCEDURE — 88300 SURGICAL PATH GROSS: CPT | Mod: 26

## 2023-06-02 PROCEDURE — 73560 X-RAY EXAM OF KNEE 1 OR 2: CPT | Mod: 26,LT

## 2023-06-02 PROCEDURE — 99232 SBSQ HOSP IP/OBS MODERATE 35: CPT

## 2023-06-02 PROCEDURE — 11981 INSERTION DRUG DLVR IMPLANT: CPT

## 2023-06-02 DEVICE — GUIDEWIRE BALL TIP 3MM X 1000MM FOR T2 R1.5 FEMORAL NAILING SYSTEM
Type: IMPLANTABLE DEVICE | Site: LEFT | Status: NON-FUNCTIONAL
Removed: 2023-06-02

## 2023-06-02 DEVICE — CEMENT PALACOS R
Type: IMPLANTABLE DEVICE | Site: LEFT | Status: NON-FUNCTIONAL
Removed: 2023-06-02

## 2023-06-02 DEVICE — IMPLANTABLE DEVICE
Type: IMPLANTABLE DEVICE | Site: LEFT | Status: NON-FUNCTIONAL
Removed: 2023-06-02

## 2023-06-02 DEVICE — KIT PREP BONE BIO-PREP
Type: IMPLANTABLE DEVICE | Site: LEFT | Status: NON-FUNCTIONAL
Removed: 2023-06-02

## 2023-06-02 RX ORDER — OXYCODONE HYDROCHLORIDE 5 MG/1
10 TABLET ORAL
Refills: 0 | Status: DISCONTINUED | OUTPATIENT
Start: 2023-06-02 | End: 2023-06-06

## 2023-06-02 RX ORDER — DEXTROSE 50 % IN WATER 50 %
25 SYRINGE (ML) INTRAVENOUS ONCE
Refills: 0 | Status: DISCONTINUED | OUTPATIENT
Start: 2023-06-02 | End: 2023-06-06

## 2023-06-02 RX ORDER — FOLIC ACID 0.8 MG
1 TABLET ORAL DAILY
Refills: 0 | Status: DISCONTINUED | OUTPATIENT
Start: 2023-06-02 | End: 2023-06-06

## 2023-06-02 RX ORDER — GLUCAGON INJECTION, SOLUTION 0.5 MG/.1ML
1 INJECTION, SOLUTION SUBCUTANEOUS ONCE
Refills: 0 | Status: DISCONTINUED | OUTPATIENT
Start: 2023-06-02 | End: 2023-06-06

## 2023-06-02 RX ORDER — RIVAROXABAN 15 MG-20MG
10 KIT ORAL DAILY
Refills: 0 | Status: DISCONTINUED | OUTPATIENT
Start: 2023-06-03 | End: 2023-06-06

## 2023-06-02 RX ORDER — SODIUM CHLORIDE 9 MG/ML
1000 INJECTION, SOLUTION INTRAVENOUS
Refills: 0 | Status: DISCONTINUED | OUTPATIENT
Start: 2023-06-02 | End: 2023-06-02

## 2023-06-02 RX ORDER — POLYETHYLENE GLYCOL 3350 17 G/17G
17 POWDER, FOR SOLUTION ORAL AT BEDTIME
Refills: 0 | Status: DISCONTINUED | OUTPATIENT
Start: 2023-06-02 | End: 2023-06-06

## 2023-06-02 RX ORDER — CEFAZOLIN SODIUM 1 G
2000 VIAL (EA) INJECTION EVERY 8 HOURS
Refills: 0 | Status: COMPLETED | OUTPATIENT
Start: 2023-06-02 | End: 2023-06-03

## 2023-06-02 RX ORDER — OXYCODONE HYDROCHLORIDE 5 MG/1
5 TABLET ORAL
Refills: 0 | Status: DISCONTINUED | OUTPATIENT
Start: 2023-06-02 | End: 2023-06-06

## 2023-06-02 RX ORDER — DEXTROSE 50 % IN WATER 50 %
15 SYRINGE (ML) INTRAVENOUS ONCE
Refills: 0 | Status: DISCONTINUED | OUTPATIENT
Start: 2023-06-02 | End: 2023-06-06

## 2023-06-02 RX ORDER — ACETAMINOPHEN 500 MG
650 TABLET ORAL EVERY 6 HOURS
Refills: 0 | Status: COMPLETED | OUTPATIENT
Start: 2023-06-02 | End: 2023-06-05

## 2023-06-02 RX ORDER — PANTOPRAZOLE SODIUM 20 MG/1
40 TABLET, DELAYED RELEASE ORAL
Refills: 0 | Status: DISCONTINUED | OUTPATIENT
Start: 2023-06-02 | End: 2023-06-06

## 2023-06-02 RX ORDER — HYDROMORPHONE HYDROCHLORIDE 2 MG/ML
0.5 INJECTION INTRAMUSCULAR; INTRAVENOUS; SUBCUTANEOUS
Refills: 0 | Status: DISCONTINUED | OUTPATIENT
Start: 2023-06-02 | End: 2023-06-02

## 2023-06-02 RX ORDER — INSULIN LISPRO 100/ML
VIAL (ML) SUBCUTANEOUS
Refills: 0 | Status: DISCONTINUED | OUTPATIENT
Start: 2023-06-02 | End: 2023-06-06

## 2023-06-02 RX ORDER — SENNA PLUS 8.6 MG/1
2 TABLET ORAL AT BEDTIME
Refills: 0 | Status: DISCONTINUED | OUTPATIENT
Start: 2023-06-02 | End: 2023-06-06

## 2023-06-02 RX ORDER — HYDROMORPHONE HYDROCHLORIDE 2 MG/ML
1 INJECTION INTRAMUSCULAR; INTRAVENOUS; SUBCUTANEOUS
Refills: 0 | Status: DISCONTINUED | OUTPATIENT
Start: 2023-06-02 | End: 2023-06-02

## 2023-06-02 RX ORDER — ASCORBIC ACID 60 MG
500 TABLET,CHEWABLE ORAL
Refills: 0 | Status: DISCONTINUED | OUTPATIENT
Start: 2023-06-02 | End: 2023-06-06

## 2023-06-02 RX ORDER — DEXTROSE 50 % IN WATER 50 %
12.5 SYRINGE (ML) INTRAVENOUS ONCE
Refills: 0 | Status: DISCONTINUED | OUTPATIENT
Start: 2023-06-02 | End: 2023-06-06

## 2023-06-02 RX ORDER — SODIUM CHLORIDE 9 MG/ML
1000 INJECTION, SOLUTION INTRAVENOUS
Refills: 0 | Status: DISCONTINUED | OUTPATIENT
Start: 2023-06-02 | End: 2023-06-06

## 2023-06-02 RX ORDER — ONDANSETRON 8 MG/1
4 TABLET, FILM COATED ORAL EVERY 6 HOURS
Refills: 0 | Status: DISCONTINUED | OUTPATIENT
Start: 2023-06-02 | End: 2023-06-06

## 2023-06-02 RX ORDER — ALPRAZOLAM 0.25 MG
0.5 TABLET ORAL ONCE
Refills: 0 | Status: DISCONTINUED | OUTPATIENT
Start: 2023-06-02 | End: 2023-06-02

## 2023-06-02 RX ORDER — ONDANSETRON 8 MG/1
4 TABLET, FILM COATED ORAL ONCE
Refills: 0 | Status: DISCONTINUED | OUTPATIENT
Start: 2023-06-02 | End: 2023-06-02

## 2023-06-02 RX ADMIN — SODIUM CHLORIDE 125 MILLILITER(S): 9 INJECTION, SOLUTION INTRAVENOUS at 05:28

## 2023-06-02 RX ADMIN — Medication 100 MILLIGRAM(S): at 21:28

## 2023-06-02 RX ADMIN — SENNA PLUS 2 TABLET(S): 8.6 TABLET ORAL at 21:27

## 2023-06-02 RX ADMIN — Medication 0.5 MILLIGRAM(S): at 21:27

## 2023-06-02 RX ADMIN — LOSARTAN POTASSIUM 100 MILLIGRAM(S): 100 TABLET, FILM COATED ORAL at 05:29

## 2023-06-02 RX ADMIN — Medication 650 MILLIGRAM(S): at 05:36

## 2023-06-02 RX ADMIN — HYDROMORPHONE HYDROCHLORIDE 0.5 MILLIGRAM(S): 2 INJECTION INTRAMUSCULAR; INTRAVENOUS; SUBCUTANEOUS at 15:15

## 2023-06-02 RX ADMIN — OXYCODONE HYDROCHLORIDE 5 MILLIGRAM(S): 5 TABLET ORAL at 20:04

## 2023-06-02 RX ADMIN — Medication 1: at 17:18

## 2023-06-02 RX ADMIN — Medication 650 MILLIGRAM(S): at 23:24

## 2023-06-02 RX ADMIN — Medication 1 TABLET(S): at 17:20

## 2023-06-02 RX ADMIN — Medication 0.5 MILLIGRAM(S): at 00:46

## 2023-06-02 RX ADMIN — HYDROMORPHONE HYDROCHLORIDE 0.5 MILLIGRAM(S): 2 INJECTION INTRAMUSCULAR; INTRAVENOUS; SUBCUTANEOUS at 14:53

## 2023-06-02 RX ADMIN — SERTRALINE 100 MILLIGRAM(S): 25 TABLET, FILM COATED ORAL at 05:33

## 2023-06-02 RX ADMIN — POLYETHYLENE GLYCOL 3350 17 GRAM(S): 17 POWDER, FOR SOLUTION ORAL at 21:28

## 2023-06-02 RX ADMIN — Medication 500 MILLIGRAM(S): at 17:19

## 2023-06-02 RX ADMIN — Medication 1 MILLIGRAM(S): at 17:20

## 2023-06-02 RX ADMIN — OXYCODONE HYDROCHLORIDE 5 MILLIGRAM(S): 5 TABLET ORAL at 21:04

## 2023-06-02 RX ADMIN — Medication 650 MILLIGRAM(S): at 17:27

## 2023-06-02 RX ADMIN — Medication 650 MILLIGRAM(S): at 18:27

## 2023-06-02 RX ADMIN — Medication 650 MILLIGRAM(S): at 06:36

## 2023-06-02 RX ADMIN — BUDESONIDE AND FORMOTEROL FUMARATE DIHYDRATE 2 PUFF(S): 160; 4.5 AEROSOL RESPIRATORY (INHALATION) at 05:29

## 2023-06-02 RX ADMIN — PANTOPRAZOLE SODIUM 40 MILLIGRAM(S): 20 TABLET, DELAYED RELEASE ORAL at 05:28

## 2023-06-02 NOTE — OCCUPATIONAL THERAPY INITIAL EVALUATION ADULT - BALANCE TRAINING, PT EVAL
Patient will increase static/dynamic balance to good in order to perform toilet transfer independently using the least restrictive device within 4 weeks

## 2023-06-02 NOTE — PHYSICAL THERAPY INITIAL EVALUATION ADULT - LIVES WITH, PROFILE
wife and children in private home with 4 steps to enter with handrail/alone wife and children in private home with 4 steps to enter with handrail

## 2023-06-02 NOTE — PROGRESS NOTE ADULT - SUBJECTIVE AND OBJECTIVE BOX
Patient seen and examined at bedside. Pain is controlled. Patient denies any numbness, tingling, weakness, or any other orthopaedic complaint.    Exam:  T(C): 36.6 (06-02-23 @ 07:22), Max: 36.9 (06-01-23 @ 17:13)  T(F): 97.9 (06-02-23 @ 07:22), Max: 98.4 (06-01-23 @ 17:13)  HR: 85 (06-02-23 @ 07:22) (75 - 93)  BP: 129/76 (06-02-23 @ 07:22) (129/76 - 139/80)  RR: 18 (06-02-23 @ 07:22) (18 - 20)  SpO2: 98% (06-02-23 @ 07:22) (97% - 98%)    Gen: Resting in bed, no acute distress  LLE  Prevena dressing with appropriate negative pressure, c/d/i. Knee immobilizer in place.   Nadya-incisional tenderness to palpation; otherwise, NTTP throughout the rest of the extremity.   SILT L2-S1.  GSC/TA/EHL/FHL intact. Q/H unable to assess 2' pain.   DP pulse palpable.   No calf tenderness bilaterally.   Compartments soft and compressible.       Assessment and Plan:  63y Male POD0 L TKA explant and placement of antibiotic spacer    Follow up postoperative labs  TTWB/PT/OT  Pain management PRN  Continue prophylactic antibiotics  DVT PPx: hold until POD1, then ok for home xarelto  Incentive spirometry  Dispo: pending recommendations per PT  Will discuss with Dr. Bob and advise of any changes to the plan.

## 2023-06-02 NOTE — PROGRESS NOTE ADULT - SUBJECTIVE AND OBJECTIVE BOX
Patient is a 63y old  Male who presents with a chief complaint of L thigh abscess V PJI (02 Jun 2023 06:12)    INTERVAL HPI/OVERNIGHT EVENTS:    MEDICATIONS  (STANDING):    MEDICATIONS  (PRN):    Allergies    No Known Allergies    Intolerances      REVIEW OF SYSTEMS:  All other systems reviewed and are negative    Vital Signs Last 24 Hrs  T(C): 36.6 (02 Jun 2023 07:22), Max: 36.9 (01 Jun 2023 17:13)  T(F): 97.9 (02 Jun 2023 07:22), Max: 98.4 (01 Jun 2023 17:13)  HR: 85 (02 Jun 2023 07:22) (75 - 93)  BP: 129/76 (02 Jun 2023 07:22) (129/76 - 139/80)  BP(mean): --  RR: 18 (02 Jun 2023 07:22) (18 - 20)  SpO2: 98% (02 Jun 2023 07:22) (97% - 98%)      Daily     Daily   I&O's Summary    01 Jun 2023 07:01  -  02 Jun 2023 07:00  --------------------------------------------------------  IN: 1175 mL / OUT: 1200 mL / NET: -25 mL      CAPILLARY BLOOD GLUCOSE      POCT Blood Glucose.: 97 mg/dL (02 Jun 2023 07:25)  POCT Blood Glucose.: 80 mg/dL (02 Jun 2023 05:24)  POCT Blood Glucose.: 110 mg/dL (01 Jun 2023 21:35)  POCT Blood Glucose.: 81 mg/dL (01 Jun 2023 16:23)    PHYSICAL EXAM:  GENERAL: NAD,    HEAD:  Atraumatic, Normocephalic  EYES: EOMI, PERRLA, conjunctiva and sclera clear  ENMT: No tonsillar erythema, exudates, or enlargement; Moist mucous membranes, Good dentition, No lesions  NECK: Supple, No JVD, Normal thyroid  NERVOUS SYSTEM:  Alert & Oriented X3, Good concentration; Motor Strength 5/5 B/L upper and lower extremities; DTRs 2+ intact and symmetric  CHEST/LUNG: Clear to percussion bilaterally; No rales, rhonchi, wheezing, or rubs  HEART: Regular rate and rhythm; No murmurs, rubs, or gallops  ABDOMEN: Soft, Nontender, Nondistended; Bowel sounds present  EXTREMITIES:  2+ Peripheral Pulses, No clubbing, cyanosis, or edema  LYMPH: No lymphadenopathy noted  SKIN: No rashes or lesions    Labs                          11.3   7.32  )-----------( 434      ( 02 Jun 2023 06:33 )             35.5     06-02    143  |  110<H>  |  8   ----------------------------<  101<H>  4.2   |  28  |  0.72    Ca    9.2      02 Jun 2023 06:33    TPro  8.3  /  Alb  3.2<L>  /  TBili  0.4  /  DBili  x   /  AST  15  /  ALT  16  /  AlkPhos  84  06-02    PT/INR - ( 02 Jun 2023 06:33 )   PT: 13.0 sec;   INR: 1.09 ratio         PTT - ( 02 Jun 2023 06:33 )  PTT:33.0 sec                    DVT prophylaxis: > Lovenox 40mg SQ daily  > Heparin   > SCD's

## 2023-06-02 NOTE — PROGRESS NOTE ADULT - ATTENDING COMMENTS
Doing well postop s/p left knee removal of Total knee implant and placement of static antibiotic spacer    vitals/labs reviewed    LLE:  vac  intact TA/EHL/FHL/EHL  SILT sp/dp/t  palp DP    TDWB LLE  Xarelto for dvt ppx  antibiotics with ID consult

## 2023-06-02 NOTE — PHYSICAL THERAPY INITIAL EVALUATION ADULT - ACTIVE RANGE OF MOTION EXAMINATION, REHAB EVAL
except L knee unable to assess secondary to knee immobilizer/bilateral upper extremity Active ROM was WFL (within functional limits)/bilateral  lower extremity Active ROM was WFL (within functional limits)

## 2023-06-02 NOTE — OCCUPATIONAL THERAPY INITIAL EVALUATION ADULT - ADDITIONAL COMMENTS
Copied from Previous eval; Patient lives with wife and children in private home with 4 steps to enter with handrail B HR's to enter the house, inside has 12 stairs with L HR to access bedroom and bathroom. pt was independent, pt owns a cane, rolling walkers.

## 2023-06-02 NOTE — PHYSICAL THERAPY INITIAL EVALUATION ADULT - GENERAL OBSERVATIONS, REHAB EVAL
Pt found semi supine in bed in NAD, +hep lock, L LE knee immobilizer, +SCDs, agreeable to PT Tha and BARTOLOME Zuñiga.

## 2023-06-02 NOTE — PHYSICAL THERAPY INITIAL EVALUATION ADULT - IMPAIRMENTS FOUND, PT EVAL
aerobic capacity/endurance/cranial and peripheral nerve integrity/decreased midline orientation/gait, locomotion, and balance/joint integrity and mobility/muscle strength/poor safety awareness/posture/ROM

## 2023-06-02 NOTE — PROGRESS NOTE ADULT - ASSESSMENT
63y Male with history of DM II, HTN, HLD L, DVTs on Xarelto, GERD, Depression, Anxiety, Insomnia, Spinal stenosis, Spondyloschises, R Knee Arthroplasty in 2003 and L Knee Arthroplasty in 2011 s/p L TKA I&D with poly exchange 4/14/23 w/ MSSA on OR cultures requiring Ancef via PICC [id number] p/w left thigh fluid collection concerning for PJI.     PJI w/ fluid collection concerning for abscess  - CT showed a large intramuscular abscess in the mid to distal left vastus musculature w/ a periosteal reaction in the anterior and medial distal left femur adjacent to the intramuscular abscess which could be reactive in nature; however, an underlying osteomyelitis cannot be excluded. There was also an enlarged left iliac lymph node and enlarged left inguinal lymph nodes   - ortho aspirated the joint  - follow up   - IR to aspirated 5 cc of old blood on 5/26 - specimen sent and an 8 Greek drain was left in place for further liquefaction/drainage  - Ortho planing on taking patient to OR for I&D of left knee Vs major revision surgery   - c/w antibiotics as per ortho   -  ID consult following    - pain deferred to primary   - Pt reports that he's not very active because of his knee so cannot access with METs. He is not on insulin, has no history of CHF, CKD, CAD or cerebrovascular disease. Echo from April showed normal global left ventricular systolic function w/ no evidence of valve disease. EKG showed NSR @ 84 BPM and is unchanged from prior EKG from 12/2022. Patient can proceed to OR without further work up and is at moderate cardiac risk for a moderate risk surg  pt w no chest pain      Confusion on 5/28  - suspect delirium/ metabolic encephalopathy due to meds - improving with periods of waxing and waning   - as per ID, there is an unlikely possibility of cefepime neurotoxicity and patient was switching to ertapenem  - CT head showed no acute intracranial abnormalities and mild small vessel and atrophic changes  - psych consult note read and appreciated     Patient now declining surgery, wants to postpone, seen by psych to determine capacity     Hx of DVT  - Xarelto on hold by ortho in anticipation of taking patient to OR    COPD  - patient reports it is secondary to exposure to dust at 9/11 site   - c/w Spiriva and Symbicort     DM II  - start ISS  - Hgb A1C in April was 7.4     HTN  - c/w losartan     Depression/ Anxiety/Insomnia  - c/w Zoloft, Cymbalta, Wellbutrin & Xanax     GERD  - c/w Protonix    Constipation  - c/w Senna & Miralax

## 2023-06-02 NOTE — PHYSICAL THERAPY INITIAL EVALUATION ADULT - ADDITIONAL COMMENTS
Pt uncooperative during exam to gather PLOF and social hx Per previous PT Eval: wife and children in private home with 4 steps to enter with handrail B HR's to enter the house, inside has 12 stairs with L HR to access bedroom and bathroom. pt was independent, pt owns a cane, rolling walkers.

## 2023-06-02 NOTE — OCCUPATIONAL THERAPY INITIAL EVALUATION ADULT - GENERAL OBSERVATIONS, REHAB EVAL
Chart reviewed and event noted to date. Patient encountered supine in bed, NAD, TTWB LLE s/p L TKA explant and placement of antibiotic spacer, +prevena, +L knee immobilizer, +benton cath, +ace wrap to L knee C/D/I. PT Roshen bedside.

## 2023-06-02 NOTE — PROGRESS NOTE ADULT - SUBJECTIVE AND OBJECTIVE BOX
Patient seen and examined at bedside. Mentation improving. No longer on 1:1. Patient denies any numbness, tingling, weakness, or any other orthopaedic complaint. Psych came to evaluate patient yesterday to assess capacity. Pt yesterday morning decided he did not want surgery and wanted to sign out AMA, by the afternoon pt changed his mind and decided it would be best to stay for surgery. Pt denies fever/chills. Pt endorsing walking well with PT.     Vital Signs Last 24 Hrs  T(C): 36.5 (02 Jun 2023 04:35), Max: 37.4 (01 Jun 2023 10:52)  T(F): 97.7 (02 Jun 2023 04:35), Max: 99.4 (01 Jun 2023 10:52)  HR: 75 (02 Jun 2023 04:35) (75 - 93)  BP: 134/75 (02 Jun 2023 04:35) (134/75 - 168/91)  BP(mean): --  RR: 20 (02 Jun 2023 04:35) (18 - 20)  SpO2: 98% (02 Jun 2023 04:35) (97% - 98%)                              11.7   7.09  )-----------( 377      ( 01 Jun 2023 06:20 )             36.7       06-01    141  |  106  |  7   ----------------------------<  81  4.5   |  25  |  0.88    Ca    9.6      01 Jun 2023 06:20  Phos  3.4     05-31  Mg     2.0     05-31    TPro  8.5<H>  /  Alb  3.2<L>  /  TBili  0.5  /  DBili  x   /  AST  16  /  ALT  14  /  AlkPhos  87  06-01              PT/INR - ( 01 Jun 2023 06:20 )   PT: 13.4 sec;   INR: 1.12 ratio         PTT - ( 01 Jun 2023 06:20 )  PTT:26.9 sec    LLE  Gen: resting in bed, NAD  minimal TTP throughout the rest of the extremity.  SILT L2-S1  GSC/TA/EHL/FHL intact  Good ROM L knee   Extremity warm and well perfused  +DP  No calf tenderness bilaterally.  Compartments soft and compressible.     63M with L knee PJI  Plan:   Plan for OR today   NPO  Hold chemical DVT ppx, SCD aki   Appreciate psych, medicine recs  WBAT/PT/OT  Pain management PRN  Follow cultures, NGTD at this time  Percutaneous drain DC'd tip intact 6/1  Incentive spirometry  FU preop labs  Will discuss with Dr. Bob and will advise for any changes to the plan.

## 2023-06-02 NOTE — BRIEF OPERATIVE NOTE - NSICDXBRIEFPROCEDURE_GEN_ALL_CORE_FT
PROCEDURES:  Removal of total knee prosthesis 02-Jun-2023 13:46:17  Taj Cancino  Insertion, antibiotic spacer 02-Jun-2023 13:46:31  Taj Cancino

## 2023-06-02 NOTE — PROGRESS NOTE ADULT - ATTENDING COMMENTS
Reports worsening left knee pain today and increased swelling after drain was removed. He would like to proceed with surgery.    vitals/labs reviewed    Gen: awake, alert, oriented  LLE:  healed midline incision  moderate effusion  ROM 10-90  intact HS/Q/GS/TA/EHL  SILT sp/dp/t/sural/saph  palp DP    Discussed surgical plan including explant of TKA and placement of antibiotic spacer. He will need at least 6 weeks of IV antibiotics. We will plan for 2nd stage revision TKA when infection is cleared. Risks/benefits/alternatives discussed.  He is medically cleared. Informed consent signed.

## 2023-06-03 LAB
ALBUMIN SERPL ELPH-MCNC: 2.6 G/DL — LOW (ref 3.3–5)
ALP SERPL-CCNC: 63 U/L — SIGNIFICANT CHANGE UP (ref 40–120)
ALT FLD-CCNC: 11 U/L — LOW (ref 12–78)
ANION GAP SERPL CALC-SCNC: 7 MMOL/L — SIGNIFICANT CHANGE UP (ref 5–17)
AST SERPL-CCNC: 14 U/L — LOW (ref 15–37)
BILIRUB SERPL-MCNC: 0.4 MG/DL — SIGNIFICANT CHANGE UP (ref 0.2–1.2)
BUN SERPL-MCNC: 12 MG/DL — SIGNIFICANT CHANGE UP (ref 7–23)
CALCIUM SERPL-MCNC: 8.2 MG/DL — LOW (ref 8.5–10.1)
CHLORIDE SERPL-SCNC: 104 MMOL/L — SIGNIFICANT CHANGE UP (ref 96–108)
CO2 SERPL-SCNC: 24 MMOL/L — SIGNIFICANT CHANGE UP (ref 22–31)
CREAT SERPL-MCNC: 1.09 MG/DL — SIGNIFICANT CHANGE UP (ref 0.5–1.3)
CULTURE RESULTS: SIGNIFICANT CHANGE UP
CULTURE RESULTS: SIGNIFICANT CHANGE UP
EGFR: 76 ML/MIN/1.73M2 — SIGNIFICANT CHANGE UP
GLUCOSE BLDC GLUCOMTR-MCNC: 117 MG/DL — HIGH (ref 70–99)
GLUCOSE BLDC GLUCOMTR-MCNC: 137 MG/DL — HIGH (ref 70–99)
GLUCOSE BLDC GLUCOMTR-MCNC: 197 MG/DL — HIGH (ref 70–99)
GLUCOSE BLDC GLUCOMTR-MCNC: 207 MG/DL — HIGH (ref 70–99)
GLUCOSE SERPL-MCNC: 124 MG/DL — HIGH (ref 70–99)
GRAM STN FLD: SIGNIFICANT CHANGE UP
HCT VFR BLD CALC: 26.5 % — LOW (ref 39–50)
HGB BLD-MCNC: 8.7 G/DL — LOW (ref 13–17)
MCHC RBC-ENTMCNC: 29.7 PG — SIGNIFICANT CHANGE UP (ref 27–34)
MCHC RBC-ENTMCNC: 32.8 G/DL — SIGNIFICANT CHANGE UP (ref 32–36)
MCV RBC AUTO: 90.4 FL — SIGNIFICANT CHANGE UP (ref 80–100)
NRBC # BLD: 0 /100 WBCS — SIGNIFICANT CHANGE UP (ref 0–0)
PLATELET # BLD AUTO: 297 K/UL — SIGNIFICANT CHANGE UP (ref 150–400)
POTASSIUM SERPL-MCNC: 3.7 MMOL/L — SIGNIFICANT CHANGE UP (ref 3.5–5.3)
POTASSIUM SERPL-SCNC: 3.7 MMOL/L — SIGNIFICANT CHANGE UP (ref 3.5–5.3)
PROT SERPL-MCNC: 6.6 GM/DL — SIGNIFICANT CHANGE UP (ref 6–8.3)
RBC # BLD: 2.93 M/UL — LOW (ref 4.2–5.8)
RBC # FLD: 16.3 % — HIGH (ref 10.3–14.5)
SODIUM SERPL-SCNC: 135 MMOL/L — SIGNIFICANT CHANGE UP (ref 135–145)
SPECIMEN SOURCE: SIGNIFICANT CHANGE UP
VANCOMYCIN TROUGH SERPL-MCNC: 14.4 UG/ML — SIGNIFICANT CHANGE UP (ref 10–20)
WBC # BLD: 10.9 K/UL — HIGH (ref 3.8–10.5)
WBC # FLD AUTO: 10.9 K/UL — HIGH (ref 3.8–10.5)

## 2023-06-03 PROCEDURE — 99232 SBSQ HOSP IP/OBS MODERATE 35: CPT

## 2023-06-03 RX ORDER — SODIUM CHLORIDE 9 MG/ML
1000 INJECTION INTRAMUSCULAR; INTRAVENOUS; SUBCUTANEOUS
Refills: 0 | Status: DISCONTINUED | OUTPATIENT
Start: 2023-06-03 | End: 2023-06-06

## 2023-06-03 RX ORDER — ERTAPENEM SODIUM 1 G/1
1000 INJECTION, POWDER, LYOPHILIZED, FOR SOLUTION INTRAMUSCULAR; INTRAVENOUS EVERY 24 HOURS
Refills: 0 | Status: DISCONTINUED | OUTPATIENT
Start: 2023-06-03 | End: 2023-06-05

## 2023-06-03 RX ORDER — VANCOMYCIN HCL 1 G
1000 VIAL (EA) INTRAVENOUS EVERY 12 HOURS
Refills: 0 | Status: DISCONTINUED | OUTPATIENT
Start: 2023-06-03 | End: 2023-06-05

## 2023-06-03 RX ADMIN — OXYCODONE HYDROCHLORIDE 10 MILLIGRAM(S): 5 TABLET ORAL at 17:15

## 2023-06-03 RX ADMIN — ERTAPENEM SODIUM 120 MILLIGRAM(S): 1 INJECTION, POWDER, LYOPHILIZED, FOR SOLUTION INTRAMUSCULAR; INTRAVENOUS at 05:24

## 2023-06-03 RX ADMIN — Medication 500 MILLIGRAM(S): at 05:24

## 2023-06-03 RX ADMIN — Medication 650 MILLIGRAM(S): at 05:24

## 2023-06-03 RX ADMIN — OXYCODONE HYDROCHLORIDE 5 MILLIGRAM(S): 5 TABLET ORAL at 08:59

## 2023-06-03 RX ADMIN — SENNA PLUS 2 TABLET(S): 8.6 TABLET ORAL at 21:25

## 2023-06-03 RX ADMIN — Medication 166.67 MILLIGRAM(S): at 08:56

## 2023-06-03 RX ADMIN — Medication 650 MILLIGRAM(S): at 11:14

## 2023-06-03 RX ADMIN — OXYCODONE HYDROCHLORIDE 10 MILLIGRAM(S): 5 TABLET ORAL at 21:26

## 2023-06-03 RX ADMIN — RIVAROXABAN 10 MILLIGRAM(S): KIT at 12:55

## 2023-06-03 RX ADMIN — OXYCODONE HYDROCHLORIDE 5 MILLIGRAM(S): 5 TABLET ORAL at 02:44

## 2023-06-03 RX ADMIN — OXYCODONE HYDROCHLORIDE 10 MILLIGRAM(S): 5 TABLET ORAL at 20:26

## 2023-06-03 RX ADMIN — OXYCODONE HYDROCHLORIDE 5 MILLIGRAM(S): 5 TABLET ORAL at 03:44

## 2023-06-03 RX ADMIN — Medication 650 MILLIGRAM(S): at 00:24

## 2023-06-03 RX ADMIN — POLYETHYLENE GLYCOL 3350 17 GRAM(S): 17 POWDER, FOR SOLUTION ORAL at 21:25

## 2023-06-03 RX ADMIN — SODIUM CHLORIDE 75 MILLILITER(S): 9 INJECTION INTRAMUSCULAR; INTRAVENOUS; SUBCUTANEOUS at 20:25

## 2023-06-03 RX ADMIN — OXYCODONE HYDROCHLORIDE 10 MILLIGRAM(S): 5 TABLET ORAL at 12:05

## 2023-06-03 RX ADMIN — Medication 166.67 MILLIGRAM(S): at 17:16

## 2023-06-03 RX ADMIN — OXYCODONE HYDROCHLORIDE 10 MILLIGRAM(S): 5 TABLET ORAL at 18:15

## 2023-06-03 RX ADMIN — Medication 1 TABLET(S): at 11:14

## 2023-06-03 RX ADMIN — OXYCODONE HYDROCHLORIDE 5 MILLIGRAM(S): 5 TABLET ORAL at 07:59

## 2023-06-03 RX ADMIN — Medication 500 MILLIGRAM(S): at 17:16

## 2023-06-03 RX ADMIN — Medication 1 MILLIGRAM(S): at 11:14

## 2023-06-03 RX ADMIN — Medication 650 MILLIGRAM(S): at 06:24

## 2023-06-03 RX ADMIN — OXYCODONE HYDROCHLORIDE 10 MILLIGRAM(S): 5 TABLET ORAL at 01:05

## 2023-06-03 RX ADMIN — Medication 650 MILLIGRAM(S): at 12:14

## 2023-06-03 RX ADMIN — Medication 2: at 12:55

## 2023-06-03 RX ADMIN — Medication 100 MILLIGRAM(S): at 05:40

## 2023-06-03 RX ADMIN — PANTOPRAZOLE SODIUM 40 MILLIGRAM(S): 20 TABLET, DELAYED RELEASE ORAL at 07:58

## 2023-06-03 RX ADMIN — SODIUM CHLORIDE 75 MILLILITER(S): 9 INJECTION INTRAMUSCULAR; INTRAVENOUS; SUBCUTANEOUS at 05:40

## 2023-06-03 NOTE — DISCHARGE NOTE PROVIDER - CARE PROVIDER_API CALL
Williams Bobiot  Orthopaedic Surgery  [address] [place] [address], [address]  [place] [address]  Phone: [phone]  Fax: [phone]  Follow Up Time:

## 2023-06-03 NOTE — DISCHARGE NOTE PROVIDER - NSDCMRMEDTOKEN_GEN_ALL_CORE_FT
acetaminophen 325 mg oral tablet: 2 tab(s) orally every 6 hours, As needed, Mild Pain (1 - 3)  Advair Diskus 250 mcg-50 mcg inhalation powder: 1 puff(s) inhaled 2 times a day  albuterol 90 mcg/inh inhalation aerosol: 2 puff(s) inhaled every 6 hours  ALPRAZolam 0.5 mg oral tablet: 1 tab(s) orally 2 times a day  aluminum hydroxide-magnesium hydroxide 200 mg-200 mg/5 mL oral suspension: 30 milliliter(s) orally every 4 hours As needed Dyspepsia  budesonide-formoterol 160 mcg-4.5 mcg/inh inhalation aerosol: inhaled once a day  buPROPion 150 mg/24 hours (XL) oral tablet, extended release: 1 tab(s) orally once a day  ceFAZolin 2 g injection: 2 gram(s) intravenously every 8 hours Last dose on 5/26/2023  weekly lab work: cbc with diff, cmp, esr, crp - fax to Dr. Bay at [phone]  follow up with ID in the office  remove picc line upon completion of the course of abx  flushes  Combivent Respimat 20 mcg-100 mcg/inh inhalation aerosol: 1 puff(s) inhaled every 6 hours  DULoxetine 60 mg oral delayed release capsule: 2 cap(s) orally once a day  fluticasone 50 mcg/inh nasal spray: 2 spray(s) in each nostril once a day  losartan 100 mg oral tablet: 1 tab(s) orally once a day  melatonin 3 mg oral tablet: 1 tab(s) orally once a day (at bedtime)  metFORMIN 500 mg oral tablet: 1 orally 2 times a day  Multiple Vitamins oral tablet: 1 tab(s) orally once a day  Nasal Mist 0.05% nasal spray: 2 spray(s) nasal every 4 hours  omeprazole 40 mg oral delayed release capsule: 1 cap(s) orally once a day  oxyCODONE 10 mg oral tablet: 1 tab(s) orally every 4 hours As needed Severe Pain (7 - 10)  oxyCODONE 5 mg oral tablet: 1 tab(s) orally every 4 hours As needed Moderate Pain (4 - 6)  pantoprazole 40 mg oral delayed release tablet: 1 tab(s) orally once a day (before a meal)  sertraline 100 mg oral tablet: 1 tab(s) orally 2 times a day  tiotropium 2.5 mcg/inh inhalation aerosol: 2 puff(s) inhaled once a day  traMADol 50 mg oral tablet: 1 tab(s) orally 4 times a day  Xarelto 15 mg oral tablet: 1 tab(s) orally 2 times a day   zolpidem 5 mg oral tablet: 1 tab(s) orally once a day (at bedtime) As needed Insomnia   acetaminophen 325 mg oral tablet: 2 tab(s) orally every 6 hours, As needed, Mild Pain (1 - 3)  Advair Diskus 250 mcg-50 mcg inhalation powder: 1 puff(s) inhaled 2 times a day  albuterol 90 mcg/inh inhalation aerosol: 2 puff(s) inhaled every 6 hours  ALPRAZolam 0.5 mg oral tablet: 1 tab(s) orally 2 times a day  aluminum hydroxide-magnesium hydroxide 200 mg-200 mg/5 mL oral suspension: 30 milliliter(s) orally every 4 hours As needed Dyspepsia  budesonide-formoterol 160 mcg-4.5 mcg/inh inhalation aerosol: inhaled once a day  buPROPion 150 mg/24 hours (XL) oral tablet, extended release: 1 tab(s) orally once a day  ceFAZolin 2 g intravenous injection: 2 gram(s) intravenous every 8 hours  Combivent Respimat 20 mcg-100 mcg/inh inhalation aerosol: 1 puff(s) inhaled every 6 hours  DULoxetine 60 mg oral delayed release capsule: 2 cap(s) orally once a day  fluticasone 50 mcg/inh nasal spray: 2 spray(s) in each nostril once a day  folic acid 1 mg oral tablet: 1 tab(s) orally once a day  losartan 100 mg oral tablet: 1 tab(s) orally once a day  melatonin 3 mg oral tablet: 1 tab(s) orally once a day (at bedtime)  metFORMIN 500 mg oral tablet: 1 orally 2 times a day  Multiple Vitamins oral tablet: 1 tab(s) orally once a day  Nasal Mist 0.05% nasal spray: 2 spray(s) nasal every 4 hours  omeprazole 40 mg oral delayed release capsule: 1 cap(s) orally once a day  oxyCODONE 10 mg oral tablet: 1 tab(s) orally every 4 hours As needed Severe Pain (7 - 10)  oxyCODONE 5 mg oral tablet: 1 tab(s) orally every 4 hours As needed Moderate Pain (4 - 6)  polyethylene glycol 3350 oral powder for reconstitution: 17 gram(s) orally once a day (at bedtime)  senna leaf extract oral tablet: 2 tab(s) orally once a day (at bedtime)  sertraline 100 mg oral tablet: 1 tab(s) orally 2 times a day  tiotropium 2.5 mcg/inh inhalation aerosol: 2 puff(s) inhaled once a day  traMADol 50 mg oral tablet: 1 tab(s) orally 4 times a day  Xarelto 15 mg oral tablet: 1 tab(s) orally 2 times a day   zolpidem 5 mg oral tablet: 1 tab(s) orally once a day (at bedtime) As needed Insomnia   acetaminophen 325 mg oral tablet: 2 tab(s) orally every 6 hours, As needed, Mild Pain (1 - 3)  Advair Diskus 250 mcg-50 mcg inhalation powder: 1 puff(s) inhaled 2 times a day  albuterol 90 mcg/inh inhalation aerosol: 2 puff(s) inhaled every 6 hours  ALPRAZolam 0.5 mg oral tablet: 1 tab(s) orally 2 times a day  aluminum hydroxide-magnesium hydroxide 200 mg-200 mg/5 mL oral suspension: 30 milliliter(s) orally every 4 hours As needed Dyspepsia  budesonide-formoterol 160 mcg-4.5 mcg/inh inhalation aerosol: inhaled once a day  buPROPion 150 mg/24 hours (XL) oral tablet, extended release: 1 tab(s) orally once a day  ceFAZolin 2 g intravenous injection: 2 gram(s) intravenous every 8 hours  Combivent Respimat 20 mcg-100 mcg/inh inhalation aerosol: 1 puff(s) inhaled every 6 hours  DULoxetine 60 mg oral delayed release capsule: 2 cap(s) orally once a day  fluticasone 50 mcg/inh nasal spray: 2 spray(s) in each nostril once a day  folic acid 1 mg oral tablet: 1 tab(s) orally once a day  losartan 100 mg oral tablet: 1 tab(s) orally once a day  melatonin 3 mg oral tablet: 1 tab(s) orally once a day (at bedtime)  metFORMIN 500 mg oral tablet: 1 orally 2 times a day  Multiple Vitamins oral tablet: 1 tab(s) orally once a day  Nasal Mist 0.05% nasal spray: 2 spray(s) nasal every 4 hours  omeprazole 40 mg oral delayed release capsule: 1 cap(s) orally once a day  oxyCODONE 10 mg oral tablet: 1 tab(s) orally every 4 hours As needed Severe Pain (7 - 10)  oxyCODONE 5 mg oral tablet: 1 tab(s) orally every 4 hours As needed Moderate Pain (4 - 6)  polyethylene glycol 3350 oral powder for reconstitution: 17 gram(s) orally once a day (at bedtime)  senna leaf extract oral tablet: 2 tab(s) orally once a day (at bedtime)  sertraline 100 mg oral tablet: 1 tab(s) orally 2 times a day  tiotropium 2.5 mcg/inh inhalation aerosol: 2 puff(s) inhaled once a day  traMADol 50 mg oral tablet: 1 tab(s) orally 4 times a day  warfarin 5 mg oral tablet: 1 tab(s) orally once  Xarelto 15 mg oral tablet: 1 tab(s) orally 2 times a day   zolpidem 5 mg oral tablet: 1 tab(s) orally once a day (at bedtime) As needed Insomnia

## 2023-06-03 NOTE — DISCHARGE NOTE PROVIDER - NSDCCPCAREPLAN_GEN_ALL_CORE_FT
PRINCIPAL DISCHARGE DIAGNOSIS  Diagnosis: Abscess of left leg  Assessment and Plan of Treatment:       SECONDARY DISCHARGE DIAGNOSES  Diagnosis: Infection of prosthetic left knee joint  Assessment and Plan of Treatment:

## 2023-06-03 NOTE — DISCHARGE NOTE PROVIDER - NSDCCPTREATMENT_GEN_ALL_CORE_FT
PRINCIPAL PROCEDURE  Procedure: Removal of total knee prosthesis  Findings and Treatment: Explantation of long stem [employer] total knee prosthesis. Tissue cultures x5 sent, swabs x2 sent. 1 unit of PRBC transfused intraoperatively      SECONDARY PROCEDURE  Procedure: Insertion, antibiotic spacer  Findings and Treatment: Antibiotic cement coated [place] Gamma nail introduced, spaced with antibiotic cement

## 2023-06-03 NOTE — PROGRESS NOTE ADULT - ATTENDING COMMENTS
Pain controlled, doing well. No other complaints    vitals/labs reviewed    LLE:  vac dressing c/d/i  knee immobilizer  intact EHL/FHL/TA/GS  SILT sp/dp/t  palp DP    OR cultures pending    POD1 s/p left TKA explant and insertion of static antibiotic spacer    TDWB LLE  Continue IV Abx per ID consult  Xarelto for dvt ppx  PT/OT

## 2023-06-03 NOTE — DISCHARGE NOTE PROVIDER - NSDCFUADDINST_GEN_ALL_CORE_FT
1. Pain medication has been sent to your pharmacy - pick it up on the way home and use as needed.   2. Toe touch weight bearing on the left lower extremity with assistive devices (walker/cane) as needed  3. DVT Prophylaxis for 30 days  4. Physical therapy as indicated.   5. Follow up with your orthopaedic surgeon as outpatient in 10-14 days after discharge from the hospital or rehab. Call office for appointment.  6. Staples/sutures to be removed postoperative day 14, and repeat x-rays as needed.  7. Ice/Elevate affected area as needed.  8. Keep dressing/splint/cast clean and dry. Maintain the knee in a straight position.  1. Pain medication has been sent to your pharmacy - pick it up on the way home and use as needed.   2. Toe touch weight bearing on the left lower extremity with assistive devices (walker/cane) as needed  3. DVT Prophylaxis: Begin Coumadin 5mg QD. Keep current Xarelto dose to bridge Coumadin until INR is therapeutic at 2-3. Once INR is therapeutic, please discontinue xarelto  Once INR is 2-3 and pt has been successfully bridged to coumadin, begin Rifampin 600mg QD along with daily INR to ensure there is no interference with therapeutic range.  4. Physical therapy as indicated.   5. Follow up with your orthopaedic surgeon as outpatient in 10-14 days after discharge from the hospital or rehab. Call office for appointment.  6. Staples/sutures to be removed postoperative day 14, and repeat x-rays as needed.  7. Ice/Elevate affected area as needed.  8. Keep dressing/splint/cast clean and dry. Maintain the knee in a straight position.

## 2023-06-03 NOTE — PROGRESS NOTE ADULT - ASSESSMENT
63y Male with history of DM II, HTN, HLD L, DVTs on Xarelto, GERD, Depression, Anxiety, Insomnia, Spinal stenosis, Spondyloschises, R Knee Arthroplasty in 2003 and L Knee Arthroplasty in 2011 s/p L TKA I&D with poly exchange 4/14/23 w/ MSSA on OR cultures requiring Ancef via PICC [id number] p/w left thigh fluid collection concerning for PJI.     PJI w/ fluid collection concerning for abscess  - CT showed a large intramuscular abscess in the mid to distal left vastus musculature w/ a periosteal reaction in the anterior and medial distal left femur adjacent to the intramuscular abscess which could be reactive in nature; however, an underlying osteomyelitis cannot be excluded. There was also an enlarged left iliac lymph node and enlarged left inguinal lymph nodes   - ortho aspirated the joint  - follow up   - IR to aspirated 5 cc of old blood on 5/26 - specimen sent and an 8 [place] drain was left in place for further liquefaction/drainage  - Ortho planing on taking patient to OR for I&D of left knee Vs major revision surgery   - c/w antibiotics as per ortho   -  ID consult following     POD1 L TKA explant and placement of antibiotic spacer     Confusion on 5/28  - suspect delirium/ metabolic encephalopathy due to meds - improving with periods of waxing and waning   - as per ID, there is an unlikely possibility of cefepime neurotoxicity and patient was switching to ertapenem  - CT head showed no acute intracranial abnormalities and mild small vessel and atrophic changes  - psych consult note read and appreciated     Patient now declining surgery, wants to postpone, seen by psych to determine capacity     Hx of DVT  - Xarelto on hold by ortho in anticipation of taking patient to OR    COPD  - patient reports it is secondary to exposure to dust at 9/11 site   - c/w Spiriva and Symbicort     DM II  - start ISS  - Hgb A1C in April was 7.4     HTN  - c/w losartan     Depression/ Anxiety/Insomnia  - c/w Zoloft, Cymbalta, Wellbutrin & Xanax     GERD  - c/w Protonix    Constipation  - c/w Senna & Miralax

## 2023-06-03 NOTE — PROGRESS NOTE ADULT - SUBJECTIVE AND OBJECTIVE BOX
Patient is a 63y old  Male who presents with a chief complaint of L thigh abscess V PJI (03 Jun 2023 10:29)    INTERVAL HPI/OVERNIGHT EVENTS:    MEDICATIONS  (STANDING):  acetaminophen     Tablet .. 650 milliGRAM(s) Oral every 6 hours  ascorbic acid 500 milliGRAM(s) Oral two times a day  dextrose 5%. 1000 milliLiter(s) (50 mL/Hr) IV Continuous <Continuous>  dextrose 5%. 1000 milliLiter(s) (100 mL/Hr) IV Continuous <Continuous>  dextrose 50% Injectable 25 Gram(s) IV Push once  dextrose 50% Injectable 12.5 Gram(s) IV Push once  dextrose 50% Injectable 25 Gram(s) IV Push once  ertapenem  IVPB 1000 milliGRAM(s) IV Intermittent every 24 hours  folic acid 1 milliGRAM(s) Oral daily  glucagon  Injectable 1 milliGRAM(s) IntraMuscular once  insulin lispro (ADMELOG) corrective regimen sliding scale   SubCutaneous three times a day before meals  multivitamin 1 Tablet(s) Oral daily  pantoprazole    Tablet 40 milliGRAM(s) Oral before breakfast  polyethylene glycol 3350 17 Gram(s) Oral at bedtime  rivaroxaban 10 milliGRAM(s) Oral daily  senna 2 Tablet(s) Oral at bedtime  sodium chloride 0.9%. 1000 milliLiter(s) (75 mL/Hr) IV Continuous <Continuous>  vancomycin  IVPB 1000 milliGRAM(s) IV Intermittent every 12 hours    MEDICATIONS  (PRN):  dextrose Oral Gel 15 Gram(s) Oral once PRN Blood Glucose LESS THAN 70 milliGRAM(s)/deciliter  ondansetron Injectable 4 milliGRAM(s) IV Push every 6 hours PRN Nausea and/or Vomiting  oxyCODONE    IR 5 milliGRAM(s) Oral every 3 hours PRN Moderate Pain (4 - 6)  oxyCODONE    IR 10 milliGRAM(s) Oral every 3 hours PRN Severe Pain (7 - 10)    Allergies    No Known Allergies    Intolerances      REVIEW OF SYSTEMS:  All other systems reviewed and are negative    Vital Signs Last 24 Hrs  T(C): 37 (03 Jun 2023 10:50), Max: 37.1 (02 Jun 2023 16:49)  T(F): 98.6 (03 Jun 2023 10:50), Max: 98.7 (02 Jun 2023 16:49)  HR: 93 (03 Jun 2023 10:50) (87 - 100)  BP: 108/68 (03 Jun 2023 10:50) (89/62 - 132/67)  BP(mean): 70 (02 Jun 2023 15:08) (70 - 70)  RR: 19 (03 Jun 2023 10:50) (13 - 20)  SpO2: 96% (03 Jun 2023 10:50) (95% - 99%)    Parameters below as of 03 Jun 2023 10:50  Patient On (Oxygen Delivery Method): room air      Daily     Daily   I&O's Summary    02 Jun 2023 07:01  -  03 Jun 2023 07:00  --------------------------------------------------------  IN: 420 mL / OUT: 2550 mL / NET: -2130 mL    03 Jun 2023 07:01  -  03 Jun 2023 11:05  --------------------------------------------------------  IN: 0 mL / OUT: 900 mL / NET: -900 mL      CAPILLARY BLOOD GLUCOSE      POCT Blood Glucose.: 117 mg/dL (03 Jun 2023 08:53)  POCT Blood Glucose.: 223 mg/dL (02 Jun 2023 20:59)  POCT Blood Glucose.: 191 mg/dL (02 Jun 2023 16:22)  POCT Blood Glucose.: 209 mg/dL (02 Jun 2023 15:25)    PHYSICAL EXAM:  GENERAL: NAD,    HEAD:  Atraumatic, Normocephalic  EYES: EOMI, PERRLA, conjunctiva and sclera clear  ENMT: No tonsillar erythema, exudates, or enlargement; Moist mucous membranes, Good dentition, No lesions  NECK: Supple, No JVD, Normal thyroid  NERVOUS SYSTEM:  Alert & Oriented X3, Good concentration; Motor Strength 5/5 B/L upper and lower extremities; DTRs 2+ intact and symmetric  CHEST/LUNG: Clear to percussion bilaterally; No rales, rhonchi, wheezing, or rubs  HEART: Regular rate and rhythm; No murmurs, rubs, or gallops  ABDOMEN: Soft, Nontender, Nondistended; Bowel sounds present  EXTREMITIES:  2+ Peripheral Pulses, No clubbing, cyanosis, or edema  LYMPH: No lymphadenopathy noted  SKIN: No rashes or lesions    Labs                          8.7    10.90 )-----------( 297      ( 03 Jun 2023 07:15 )             26.5     06-03    135  |  104  |  12  ----------------------------<  124<H>  3.7   |  24  |  1.09    Ca    8.2<L>      03 Jun 2023 07:15    TPro  6.6  /  Alb  2.6<L>  /  TBili  0.4  /  DBili  x   /  AST  14<L>  /  ALT  11<L>  /  AlkPhos  63  06-03    PT/INR - ( 02 Jun 2023 06:33 )   PT: 13.0 sec;   INR: 1.09 ratio         PTT - ( 02 Jun 2023 06:33 )  PTT:33.0 sec          Culture - Tissue with Gram Stain (collected 02 Jun 2023 11:01)  Source: .Tissue left knee tibial medllary canal #2 c/s  Gram Stain (02 Jun 2023 23:35):    No polymorphonuclear cells seen per low power field    No organisms seen per oil power field    Culture - Joint Fluid (collected 02 Jun 2023 11:01)  Source: Joint Fl left knee tibial medllary canal #1 c/s  Gram Stain (02 Jun 2023 23:17):    No polymorphonuclear cells seen per low power field    No organisms seen    Culture - Tissue with Gram Stain (collected 02 Jun 2023 11:00)  Source: .Tissue left knee femur medullary canal #2  Gram Stain (02 Jun 2023 23:35):    No polymorphonuclear cells seen per low power field    No organisms seen per oil power field    Culture - Joint Fluid (collected 02 Jun 2023 11:00)  Source: Joint Fl left knee femur medullary canal #1c/s  Gram Stain (03 Jun 2023 01:17):    Rare polymorphonuclear leukocytes seen per low power field    No organisms seen per oil power field    Culture - Tissue with Gram Stain (collected 02 Jun 2023 08:49)  Source: .Tissue left knee synovium #3 c/s  Gram Stain (02 Jun 2023 23:36):    No polymorphonuclear cells seen per low power field    No organisms seen per oil power field    Culture - Tissue with Gram Stain (collected 02 Jun 2023 08:48)  Source: .Tissue left knee synovium #2 c/s  Gram Stain (02 Jun 2023 23:35):    No polymorphonuclear cells seen per low power field    No organisms seen per oil power field    Culture - Tissue with Gram Stain (collected 02 Jun 2023 08:47)  Source: .Tissue left knee synovium #1 c/s  Gram Stain (02 Jun 2023 23:36):    No polymorphonuclear leukocytes per low power field    No organisms seen per oil power field    Culture - Joint Fluid (collected 02 Jun 2023 08:46)  Source: Knee left knee synovium c/s  Gram Stain (03 Jun 2023 00:57):    Rare polymorphonuclear leukocytes per low power field    No organisms seen    Culture - Joint Fluid (collected 02 Jun 2023 08:45)  Source: Knee left knee synovial fluid c/s  Gram Stain (03 Jun 2023 01:17):    Rare polymorphonuclear leukocytes seen per low power field    No organisms seen per oil power field                DVT prophylaxis: > Lovenox 40mg SQ daily  > Heparin   > SCD's

## 2023-06-03 NOTE — PROGRESS NOTE ADULT - SUBJECTIVE AND OBJECTIVE BOX
Patient seen and examined at bedside. Pain is controlled. Patient denies any numbness, tingling, weakness, or any other orthopaedic complaint.    Exam:  Vital Signs Last 24 Hrs  T(C): 36.9 (03 Jun 2023 06:51), Max: 37.1 (02 Jun 2023 16:49)  T(F): 98.5 (03 Jun 2023 06:51), Max: 98.7 (02 Jun 2023 16:49)  HR: 87 (03 Jun 2023 06:51) (87 - 100)  BP: 106/61 (03 Jun 2023 06:51) (89/62 - 132/67)  BP(mean): 70 (02 Jun 2023 15:08) (70 - 70)  RR: 19 (03 Jun 2023 06:51) (13 - 20)  SpO2: 97% (03 Jun 2023 06:51) (95% - 99%)    Parameters below as of 02 Jun 2023 19:49  Patient On (Oxygen Delivery Method): room air      Gen: Resting in bed, no acute distress  LLE  Prevena dressing with appropriate negative pressure, c/d/i. Knee immobilizer in place.   Nadya-incisional tenderness to palpation; otherwise, NTTP throughout the rest of the extremity.   SILT L2-S1.  GSC/TA/EHL/FHL intact. Q/H unable to assess 2' pain.   DP pulse palpable.   No calf tenderness bilaterally.   Compartments soft and compressible.       Assessment and Plan:  63y Male POD1 L TKA explant and placement of antibiotic spacer    Follow up postoperative labs  TTWB/PT/OT  Pain management PRN  Continue antibiotics per ID recs, f/u cultures  DVT PPx: ok for home xarelto  Incentive spirometry  Dispo: DYLAN per PT  Will discuss with Dr. Bob and advise of any changes to the plan.

## 2023-06-03 NOTE — DISCHARGE NOTE PROVIDER - NSDCFUSCHEDAPPT_GEN_ALL_CORE_FT
Noé Tamayo  [facility]  ORTHOSURG [address]  Scheduled Appointment: 07/05/2023

## 2023-06-03 NOTE — DISCHARGE NOTE PROVIDER - HOSPITAL COURSE
Hospital Course:  The patient is a 63y year old Male status post L knee explantation of hardware, placement of antibiotic spacer. Patient presented to [facility] Emergency Department on the date above for evaluation and management. In the ED, the patient was found to have an abscess communicating with left knee prosthesis. This abscess was aspirated by interventional radiology. Infectious disease was consulted for assistance in management. Antibiotics were started per ID recommendations. Psychiatry consult was also called for assistance in managing patient's medications. A medical evaluation was performed that included laboratory work and imaging; the patient was subsequently deemed medically optimized for surgery. The patient was taken to the operating room on date mentioned above. Prophylactic antibiotics were started before the procedure and continued in addition to ID recommended course. There were no complications during the procedure and patient tolerated the procedure well. Received 1 unit of PRBC intraoperatively. The patient was transferred to the recovery room in stable condition and subsequently to the surgical floor. The patient was placed on appropriate medication/therapy for DVT prophylaxis. All appropriate home medications were continued. The patient received physical therapy daily and laboratory studies were followed. Dressings were kept clean, dry, and intact; and were changed as necessary. The rest of the hospital stay was unremarkable.

## 2023-06-04 LAB
ALBUMIN SERPL ELPH-MCNC: 2.4 G/DL — LOW (ref 3.3–5)
ALP SERPL-CCNC: 68 U/L — SIGNIFICANT CHANGE UP (ref 40–120)
ALT FLD-CCNC: 12 U/L — SIGNIFICANT CHANGE UP (ref 12–78)
ANION GAP SERPL CALC-SCNC: 6 MMOL/L — SIGNIFICANT CHANGE UP (ref 5–17)
AST SERPL-CCNC: 12 U/L — LOW (ref 15–37)
BILIRUB SERPL-MCNC: 0.5 MG/DL — SIGNIFICANT CHANGE UP (ref 0.2–1.2)
BUN SERPL-MCNC: 8 MG/DL — SIGNIFICANT CHANGE UP (ref 7–23)
CALCIUM SERPL-MCNC: 8.4 MG/DL — LOW (ref 8.5–10.1)
CHLORIDE SERPL-SCNC: 105 MMOL/L — SIGNIFICANT CHANGE UP (ref 96–108)
CO2 SERPL-SCNC: 27 MMOL/L — SIGNIFICANT CHANGE UP (ref 22–31)
CREAT SERPL-MCNC: 0.82 MG/DL — SIGNIFICANT CHANGE UP (ref 0.5–1.3)
EGFR: 98 ML/MIN/1.73M2 — SIGNIFICANT CHANGE UP
GLUCOSE BLDC GLUCOMTR-MCNC: 128 MG/DL — HIGH (ref 70–99)
GLUCOSE BLDC GLUCOMTR-MCNC: 148 MG/DL — HIGH (ref 70–99)
GLUCOSE BLDC GLUCOMTR-MCNC: 177 MG/DL — HIGH (ref 70–99)
GLUCOSE BLDC GLUCOMTR-MCNC: 227 MG/DL — HIGH (ref 70–99)
GLUCOSE SERPL-MCNC: 143 MG/DL — HIGH (ref 70–99)
HCT VFR BLD CALC: 24.7 % — LOW (ref 39–50)
HGB BLD-MCNC: 8.2 G/DL — LOW (ref 13–17)
MCHC RBC-ENTMCNC: 29.9 PG — SIGNIFICANT CHANGE UP (ref 27–34)
MCHC RBC-ENTMCNC: 33.2 G/DL — SIGNIFICANT CHANGE UP (ref 32–36)
MCV RBC AUTO: 90.1 FL — SIGNIFICANT CHANGE UP (ref 80–100)
NRBC # BLD: 0 /100 WBCS — SIGNIFICANT CHANGE UP (ref 0–0)
PLATELET # BLD AUTO: 285 K/UL — SIGNIFICANT CHANGE UP (ref 150–400)
POTASSIUM SERPL-MCNC: 3.6 MMOL/L — SIGNIFICANT CHANGE UP (ref 3.5–5.3)
POTASSIUM SERPL-SCNC: 3.6 MMOL/L — SIGNIFICANT CHANGE UP (ref 3.5–5.3)
PROT SERPL-MCNC: 6.5 GM/DL — SIGNIFICANT CHANGE UP (ref 6–8.3)
RBC # BLD: 2.74 M/UL — LOW (ref 4.2–5.8)
RBC # FLD: 15.9 % — HIGH (ref 10.3–14.5)
SODIUM SERPL-SCNC: 138 MMOL/L — SIGNIFICANT CHANGE UP (ref 135–145)
VANCOMYCIN TROUGH SERPL-MCNC: 11.7 UG/ML — SIGNIFICANT CHANGE UP (ref 10–20)
WBC # BLD: 8.99 K/UL — SIGNIFICANT CHANGE UP (ref 3.8–10.5)
WBC # FLD AUTO: 8.99 K/UL — SIGNIFICANT CHANGE UP (ref 3.8–10.5)

## 2023-06-04 PROCEDURE — 99232 SBSQ HOSP IP/OBS MODERATE 35: CPT

## 2023-06-04 RX ORDER — ALPRAZOLAM 0.25 MG
0.5 TABLET ORAL
Refills: 0 | Status: DISCONTINUED | OUTPATIENT
Start: 2023-06-04 | End: 2023-06-06

## 2023-06-04 RX ADMIN — Medication 650 MILLIGRAM(S): at 18:58

## 2023-06-04 RX ADMIN — Medication 1 MILLIGRAM(S): at 11:16

## 2023-06-04 RX ADMIN — Medication 166.67 MILLIGRAM(S): at 18:54

## 2023-06-04 RX ADMIN — OXYCODONE HYDROCHLORIDE 5 MILLIGRAM(S): 5 TABLET ORAL at 22:23

## 2023-06-04 RX ADMIN — SODIUM CHLORIDE 75 MILLILITER(S): 9 INJECTION INTRAMUSCULAR; INTRAVENOUS; SUBCUTANEOUS at 11:16

## 2023-06-04 RX ADMIN — ERTAPENEM SODIUM 120 MILLIGRAM(S): 1 INJECTION, POWDER, LYOPHILIZED, FOR SOLUTION INTRAMUSCULAR; INTRAVENOUS at 05:01

## 2023-06-04 RX ADMIN — Medication 1 TABLET(S): at 11:16

## 2023-06-04 RX ADMIN — PANTOPRAZOLE SODIUM 40 MILLIGRAM(S): 20 TABLET, DELAYED RELEASE ORAL at 08:19

## 2023-06-04 RX ADMIN — Medication 650 MILLIGRAM(S): at 12:16

## 2023-06-04 RX ADMIN — OXYCODONE HYDROCHLORIDE 10 MILLIGRAM(S): 5 TABLET ORAL at 00:57

## 2023-06-04 RX ADMIN — OXYCODONE HYDROCHLORIDE 10 MILLIGRAM(S): 5 TABLET ORAL at 01:57

## 2023-06-04 RX ADMIN — Medication 650 MILLIGRAM(S): at 06:01

## 2023-06-04 RX ADMIN — RIVAROXABAN 10 MILLIGRAM(S): KIT at 11:16

## 2023-06-04 RX ADMIN — POLYETHYLENE GLYCOL 3350 17 GRAM(S): 17 POWDER, FOR SOLUTION ORAL at 21:17

## 2023-06-04 RX ADMIN — Medication 0.5 MILLIGRAM(S): at 11:16

## 2023-06-04 RX ADMIN — SENNA PLUS 2 TABLET(S): 8.6 TABLET ORAL at 21:17

## 2023-06-04 RX ADMIN — Medication 650 MILLIGRAM(S): at 11:16

## 2023-06-04 RX ADMIN — Medication 500 MILLIGRAM(S): at 18:00

## 2023-06-04 RX ADMIN — OXYCODONE HYDROCHLORIDE 10 MILLIGRAM(S): 5 TABLET ORAL at 18:56

## 2023-06-04 RX ADMIN — OXYCODONE HYDROCHLORIDE 10 MILLIGRAM(S): 5 TABLET ORAL at 19:56

## 2023-06-04 RX ADMIN — Medication 166.67 MILLIGRAM(S): at 05:01

## 2023-06-04 RX ADMIN — Medication 500 MILLIGRAM(S): at 05:01

## 2023-06-04 RX ADMIN — Medication 650 MILLIGRAM(S): at 18:00

## 2023-06-04 RX ADMIN — OXYCODONE HYDROCHLORIDE 5 MILLIGRAM(S): 5 TABLET ORAL at 21:23

## 2023-06-04 RX ADMIN — Medication 650 MILLIGRAM(S): at 05:01

## 2023-06-04 NOTE — PROGRESS NOTE ADULT - ASSESSMENT
63y Male with history of DM II, HTN, HLD L, DVTs on Xarelto, GERD, Depression, Anxiety, Insomnia, Spinal stenosis, Spondyloschises, R Knee Arthroplasty in 2003 and L Knee Arthroplasty in 2011 s/p L TKA I&D with poly exchange 4/14/23 w/ MSSA on OR cultures requiring Ancef via PICC [id number] p/w left thigh fluid collection concerning for PJI.     PJI w/ fluid collection concerning for abscess  - CT showed a large intramuscular abscess in the mid to distal left vastus musculature w/ a periosteal reaction in the anterior and medial distal left femur adjacent to the intramuscular abscess which could be reactive in nature; however, an underlying osteomyelitis cannot be excluded. There was also an enlarged left iliac lymph node and enlarged left inguinal lymph nodes   - ortho aspirated the joint  - follow up   - IR to aspirated 5 cc of old blood on 5/26 - specimen sent and an 8 [place] drain was left in place for further liquefaction/drainage  - Ortho planing on taking patient to OR for I&D of left knee Vs major revision surgery   - c/w antibiotics as per ortho   -  ID consult following     POD2L TKA explant and placement of antibiotic spacer     Confusion on 5/28  - suspect delirium/ metabolic encephalopathy due to meds - improving with periods of waxing and waning   - as per ID, there is an unlikely possibility of cefepime neurotoxicity and patient was switching to ertapenem  - CT head showed no acute intracranial abnormalities and mild small vessel and atrophic changes  - psych consult note read and appreciated     Patient now declining surgery, wants to postpone, seen by psych to determine capacity     Hx of DVT  - Xarelto on hold by ortho in anticipation of taking patient to OR    COPD  - patient reports it is secondary to exposure to dust at 9/11 site   - c/w Spiriva and Symbicort     DM II  - start ISS  - Hgb A1C in April was 7.4     HTN  - c/w losartan     Depression/ Anxiety/Insomnia  - c/w Zoloft, Cymbalta, Wellbutrin & Xanax     GERD  - c/w Protonix    Constipation  - c/w Senna & Miralax

## 2023-06-04 NOTE — PROGRESS NOTE ADULT - SUBJECTIVE AND OBJECTIVE BOX
Patient seen and examined at bedside. Pain is controlled. Patient denies any numbness, tingling, weakness, or any other orthopaedic complaint.    Exam:    Vital Signs Last 24 Hrs  T(C): 37.6 (04 Jun 2023 04:57), Max: 37.6 (03 Jun 2023 23:37)  T(F): 99.7 (04 Jun 2023 04:57), Max: 99.7 (03 Jun 2023 23:37)  HR: 94 (04 Jun 2023 04:57) (93 - 104)  BP: 132/75 (04 Jun 2023 04:57) (108/68 - 152/75)  BP(mean): --  RR: 17 (04 Jun 2023 04:57) (17 - 19)  SpO2: 96% (04 Jun 2023 04:57) (93% - 96%)    Parameters below as of 04 Jun 2023 04:57  Patient On (Oxygen Delivery Method): room air                            8.2    8.99  )-----------( 285      ( 04 Jun 2023 06:50 )             24.7       06-04    138  |  105  |  8   ----------------------------<  143<H>  3.6   |  27  |  0.82    Ca    8.4<L>      04 Jun 2023 06:50    TPro  6.5  /  Alb  2.4<L>  /  TBili  0.5  /  DBili  x   /  AST  12<L>  /  ALT  12  /  AlkPhos  68  06-04              Gen: Resting in bed, no acute distress  LLE  Prevena dressing with appropriate negative pressure, c/d/i. Knee immobilizer in place.   Nadya-incisional tenderness to palpation; otherwise, NTTP throughout the rest of the extremity.   SILT L2-S1.  GSC/TA/EHL/FHL intact. Q/H unable to assess 2' pain.   DP pulse palpable.   No calf tenderness bilaterally.   Compartments soft and compressible.       Assessment and Plan:  63y Male POD2 L TKA explant and placement of antibiotic spacer    Follow up postoperative labs  TTWB/PT/OT  Pain management PRN  Continue antibiotics per ID recs, f/u cultures  DVT PPx: ok for home xarelto  Incentive spirometry  Dispo: DYLAN per PT  Will discuss with Dr. Bob and advise of any changes to the plan.

## 2023-06-04 NOTE — PROGRESS NOTE ADULT - SUBJECTIVE AND OBJECTIVE BOX
Patient is a 63y old  Male who presents with a chief complaint of L thigh abscess V PJI (04 Jun 2023 10:10)    INTERVAL HPI/OVERNIGHT EVENTS: no events     MEDICATIONS  (STANDING):  acetaminophen     Tablet .. 650 milliGRAM(s) Oral every 6 hours  ascorbic acid 500 milliGRAM(s) Oral two times a day  dextrose 5%. 1000 milliLiter(s) (50 mL/Hr) IV Continuous <Continuous>  dextrose 5%. 1000 milliLiter(s) (100 mL/Hr) IV Continuous <Continuous>  dextrose 50% Injectable 25 Gram(s) IV Push once  dextrose 50% Injectable 25 Gram(s) IV Push once  dextrose 50% Injectable 12.5 Gram(s) IV Push once  ertapenem  IVPB 1000 milliGRAM(s) IV Intermittent every 24 hours  folic acid 1 milliGRAM(s) Oral daily  glucagon  Injectable 1 milliGRAM(s) IntraMuscular once  insulin lispro (ADMELOG) corrective regimen sliding scale   SubCutaneous three times a day before meals  multivitamin 1 Tablet(s) Oral daily  pantoprazole    Tablet 40 milliGRAM(s) Oral before breakfast  polyethylene glycol 3350 17 Gram(s) Oral at bedtime  rivaroxaban 10 milliGRAM(s) Oral daily  senna 2 Tablet(s) Oral at bedtime  sodium chloride 0.9%. 1000 milliLiter(s) (75 mL/Hr) IV Continuous <Continuous>  vancomycin  IVPB 1000 milliGRAM(s) IV Intermittent every 12 hours    MEDICATIONS  (PRN):  ALPRAZolam 0.5 milliGRAM(s) Oral two times a day PRN anxiety  dextrose Oral Gel 15 Gram(s) Oral once PRN Blood Glucose LESS THAN 70 milliGRAM(s)/deciliter  ondansetron Injectable 4 milliGRAM(s) IV Push every 6 hours PRN Nausea and/or Vomiting  oxyCODONE    IR 10 milliGRAM(s) Oral every 3 hours PRN Severe Pain (7 - 10)  oxyCODONE    IR 5 milliGRAM(s) Oral every 3 hours PRN Moderate Pain (4 - 6)    Allergies    No Known Allergies    Intolerances      REVIEW OF SYSTEMS:  All other systems reviewed and are negative    Vital Signs Last 24 Hrs  T(C): 37.6 (04 Jun 2023 04:57), Max: 37.6 (03 Jun 2023 23:37)  T(F): 99.7 (04 Jun 2023 04:57), Max: 99.7 (03 Jun 2023 23:37)  HR: 94 (04 Jun 2023 04:57) (93 - 104)  BP: 132/75 (04 Jun 2023 04:57) (109/- - 152/75)  BP(mean): --  RR: 17 (04 Jun 2023 04:57) (17 - 19)  SpO2: 96% (04 Jun 2023 04:57) (93% - 96%)    Parameters below as of 04 Jun 2023 04:57  Patient On (Oxygen Delivery Method): room air      Daily     Daily   I&O's Summary    03 Jun 2023 07:01  -  04 Jun 2023 07:00  --------------------------------------------------------  IN: 2300 mL / OUT: 2640 mL / NET: -340 mL      CAPILLARY BLOOD GLUCOSE      POCT Blood Glucose.: 148 mg/dL (04 Jun 2023 08:01)  POCT Blood Glucose.: 197 mg/dL (03 Jun 2023 21:34)  POCT Blood Glucose.: 137 mg/dL (03 Jun 2023 17:10)  POCT Blood Glucose.: 207 mg/dL (03 Jun 2023 12:03)    PHYSICAL EXAM:  GENERAL: NAD,    HEAD:  Atraumatic, Normocephalic  EYES: EOMI, PERRLA, conjunctiva and sclera clear  ENMT: No tonsillar erythema, exudates, or enlargement; Moist mucous membranes, Good dentition, No lesions  NECK: Supple, No JVD, Normal thyroid  NERVOUS SYSTEM:  Alert & Oriented X3, Good concentration; Motor Strength 5/5 B/L upper and lower extremities; DTRs 2+ intact and symmetric  CHEST/LUNG: Clear to percussion bilaterally; No rales, rhonchi, wheezing, or rubs  HEART: Regular rate and rhythm; No murmurs, rubs, or gallops  ABDOMEN: Soft, Nontender, Nondistended; Bowel sounds present  EXTREMITIES:  2+ Peripheral Pulses, No clubbing, cyanosis, or edema  LYMPH: No lymphadenopathy noted  SKIN: No rashes or lesions    Labs                          8.2    8.99  )-----------( 285      ( 04 Jun 2023 06:50 )             24.7     06-04    138  |  105  |  8   ----------------------------<  143<H>  3.6   |  27  |  0.82    Ca    8.4<L>      04 Jun 2023 06:50    TPro  6.5  /  Alb  2.4<L>  /  TBili  0.5  /  DBili  x   /  AST  12<L>  /  ALT  12  /  AlkPhos  68  06-04              Culture - Tissue with Gram Stain (collected 02 Jun 2023 11:01)  Source: .Tissue left knee tibial medllary canal #2 c/s  Gram Stain (prelim) (03 Jun 2023 15:02):    No polymorphonuclear cells seen per low power field    No organisms seen per oil power field  Preliminary Report (03 Jun 2023 15:02):    No growth to date.    Culture - Joint Fluid (collected 02 Jun 2023 11:01)  Source: Joint Fl left knee tibial medllary canal #1 c/s  Gram Stain (prelim) (03 Jun 2023 16:57):    No polymorphonuclear cells seen per low power field    No organisms seen  Preliminary Report (03 Jun 2023 16:57):    No growth    Culture - Tissue with Gram Stain (collected 02 Jun 2023 11:00)  Source: .Tissue left knee femur medullary canal #2  Gram Stain (prelim) (03 Jun 2023 15:00):    No polymorphonuclear cells seen per low power field    No organisms seen per oil power field  Preliminary Report (03 Jun 2023 15:00):    No growth to date.    Culture - Joint Fluid (collected 02 Jun 2023 11:00)  Source: Joint Fl left knee femur medullary canal #1c/s  Gram Stain (prelim) (03 Jun 2023 16:58):    Rare polymorphonuclear leukocytes seen per low power field    No organisms seen per oil power field  Preliminary Report (03 Jun 2023 16:58):    No growth    Culture - Tissue with Gram Stain (collected 02 Jun 2023 08:49)  Source: .Tissue left knee synovium #3 c/s  Gram Stain (prelim) (03 Jun 2023 15:05):    No polymorphonuclear cells seen per low power field    No organisms seen per oil power field  Preliminary Report (03 Jun 2023 15:05):    No growth to date.    Culture - Tissue with Gram Stain (collected 02 Jun 2023 08:48)  Source: .Tissue left knee synovium #2 c/s  Gram Stain (prelim) (03 Jun 2023 15:09):    No polymorphonuclear cells seen per low power field    No organisms seen per oil power field  Preliminary Report (03 Jun 2023 15:09):    No growth to date.    Culture - Tissue with Gram Stain (collected 02 Jun 2023 08:47)  Source: .Tissue left knee synovium #1 c/s  Gram Stain (prelim) (03 Jun 2023 15:15):    No polymorphonuclear leukocytes per low power field    No organisms seen per oil power field  Preliminary Report (03 Jun 2023 15:15):    No growth to date.    Culture - Joint Fluid (collected 02 Jun 2023 08:46)  Source: Knee left knee synovium c/s  Gram Stain (prelim) (03 Jun 2023 13:15):    Rare polymorphonuclear leukocytes per low power field    No organisms seen  Preliminary Report (03 Jun 2023 13:15):    No growth    Culture - Joint Fluid (collected 02 Jun 2023 08:45)  Source: Knee left knee synovial fluid c/s  Gram Stain (prelim) (03 Jun 2023 13:18):    Rare polymorphonuclear leukocytes seen per low power field    No organisms seen per oil power field  Preliminary Report (03 Jun 2023 13:18):    No growth                DVT prophylaxis: > Lovenox 40mg SQ daily  > Heparin   > SCD's

## 2023-06-04 NOTE — PROGRESS NOTE ADULT - ATTENDING COMMENTS
Pain controlled, doing well. No other complaints    vitals/labs reviewed    LLE:  vac dressing c/d/i  knee immobilizer  intact EHL/FHL/TA/GS  SILT sp/dp/t  palp DP    OR cultures pending    POD1 s/p left TKA explant and insertion of static antibiotic spacer    TDWB LLE  Continue IV Abx per ID consult  Xarelto for dvt ppx  PT/OT Doing well, pain controlled with medication. Worked with PT, compliant with precautions.  Nursing reported hallucinations at times and random yelling.    vitals/labs reviewed    LLE exam stable  intact motor/sensory, vac intact    TDWB LLE s/p left TKA explant and antibiotic spacer  Will restart home xanax and reconsult psych if any changes in mental status  Xarelto  Abx for PJI, ID consult

## 2023-06-05 LAB
ANION GAP SERPL CALC-SCNC: 5 MMOL/L — SIGNIFICANT CHANGE UP (ref 5–17)
BUN SERPL-MCNC: 8 MG/DL — SIGNIFICANT CHANGE UP (ref 7–23)
CALCIUM SERPL-MCNC: 8.4 MG/DL — LOW (ref 8.5–10.1)
CHLORIDE SERPL-SCNC: 103 MMOL/L — SIGNIFICANT CHANGE UP (ref 96–108)
CO2 SERPL-SCNC: 28 MMOL/L — SIGNIFICANT CHANGE UP (ref 22–31)
CREAT SERPL-MCNC: 0.89 MG/DL — SIGNIFICANT CHANGE UP (ref 0.5–1.3)
EGFR: 96 ML/MIN/1.73M2 — SIGNIFICANT CHANGE UP
GLUCOSE BLDC GLUCOMTR-MCNC: 108 MG/DL — HIGH (ref 70–99)
GLUCOSE BLDC GLUCOMTR-MCNC: 121 MG/DL — HIGH (ref 70–99)
GLUCOSE BLDC GLUCOMTR-MCNC: 148 MG/DL — HIGH (ref 70–99)
GLUCOSE BLDC GLUCOMTR-MCNC: 189 MG/DL — HIGH (ref 70–99)
GLUCOSE SERPL-MCNC: 231 MG/DL — HIGH (ref 70–99)
HCT VFR BLD CALC: 25.6 % — LOW (ref 39–50)
HGB BLD-MCNC: 8 G/DL — LOW (ref 13–17)
MCHC RBC-ENTMCNC: 29.5 PG — SIGNIFICANT CHANGE UP (ref 27–34)
MCHC RBC-ENTMCNC: 31.3 G/DL — LOW (ref 32–36)
MCV RBC AUTO: 94.5 FL — SIGNIFICANT CHANGE UP (ref 80–100)
NRBC # BLD: 0 /100 WBCS — SIGNIFICANT CHANGE UP (ref 0–0)
PLATELET # BLD AUTO: 342 K/UL — SIGNIFICANT CHANGE UP (ref 150–400)
POTASSIUM SERPL-MCNC: 3.4 MMOL/L — LOW (ref 3.5–5.3)
POTASSIUM SERPL-SCNC: 3.4 MMOL/L — LOW (ref 3.5–5.3)
RBC # BLD: 2.71 M/UL — LOW (ref 4.2–5.8)
RBC # FLD: 15.6 % — HIGH (ref 10.3–14.5)
SODIUM SERPL-SCNC: 136 MMOL/L — SIGNIFICANT CHANGE UP (ref 135–145)
WBC # BLD: 9.45 K/UL — SIGNIFICANT CHANGE UP (ref 3.8–10.5)
WBC # FLD AUTO: 9.45 K/UL — SIGNIFICANT CHANGE UP (ref 3.8–10.5)

## 2023-06-05 PROCEDURE — 99232 SBSQ HOSP IP/OBS MODERATE 35: CPT

## 2023-06-05 PROCEDURE — 99232 SBSQ HOSP IP/OBS MODERATE 35: CPT | Mod: FS

## 2023-06-05 RX ORDER — CEPHALEXIN 500 MG
500 CAPSULE ORAL THREE TIMES A DAY
Refills: 0 | Status: DISCONTINUED | OUTPATIENT
Start: 2023-06-05 | End: 2023-06-06

## 2023-06-05 RX ORDER — POTASSIUM CHLORIDE 20 MEQ
40 PACKET (EA) ORAL THREE TIMES A DAY
Refills: 0 | Status: COMPLETED | OUTPATIENT
Start: 2023-06-05 | End: 2023-06-06

## 2023-06-05 RX ADMIN — Medication 500 MILLIGRAM(S): at 05:22

## 2023-06-05 RX ADMIN — Medication 40 MILLIEQUIVALENT(S): at 13:55

## 2023-06-05 RX ADMIN — Medication 40 MILLIEQUIVALENT(S): at 22:39

## 2023-06-05 RX ADMIN — SODIUM CHLORIDE 75 MILLILITER(S): 9 INJECTION INTRAMUSCULAR; INTRAVENOUS; SUBCUTANEOUS at 05:21

## 2023-06-05 RX ADMIN — Medication 166.67 MILLIGRAM(S): at 05:23

## 2023-06-05 RX ADMIN — Medication 500 MILLIGRAM(S): at 17:38

## 2023-06-05 RX ADMIN — OXYCODONE HYDROCHLORIDE 5 MILLIGRAM(S): 5 TABLET ORAL at 18:41

## 2023-06-05 RX ADMIN — Medication 1: at 08:35

## 2023-06-05 RX ADMIN — Medication 1 TABLET(S): at 12:09

## 2023-06-05 RX ADMIN — Medication 650 MILLIGRAM(S): at 12:09

## 2023-06-05 RX ADMIN — OXYCODONE HYDROCHLORIDE 5 MILLIGRAM(S): 5 TABLET ORAL at 06:24

## 2023-06-05 RX ADMIN — OXYCODONE HYDROCHLORIDE 5 MILLIGRAM(S): 5 TABLET ORAL at 05:24

## 2023-06-05 RX ADMIN — RIVAROXABAN 10 MILLIGRAM(S): KIT at 12:09

## 2023-06-05 RX ADMIN — ERTAPENEM SODIUM 120 MILLIGRAM(S): 1 INJECTION, POWDER, LYOPHILIZED, FOR SOLUTION INTRAMUSCULAR; INTRAVENOUS at 05:23

## 2023-06-05 RX ADMIN — Medication 650 MILLIGRAM(S): at 06:24

## 2023-06-05 RX ADMIN — Medication 1 MILLIGRAM(S): at 12:09

## 2023-06-05 RX ADMIN — Medication 650 MILLIGRAM(S): at 13:09

## 2023-06-05 RX ADMIN — Medication 500 MILLIGRAM(S): at 22:39

## 2023-06-05 RX ADMIN — SENNA PLUS 2 TABLET(S): 8.6 TABLET ORAL at 22:39

## 2023-06-05 RX ADMIN — Medication 650 MILLIGRAM(S): at 07:24

## 2023-06-05 RX ADMIN — PANTOPRAZOLE SODIUM 40 MILLIGRAM(S): 20 TABLET, DELAYED RELEASE ORAL at 08:35

## 2023-06-05 RX ADMIN — OXYCODONE HYDROCHLORIDE 5 MILLIGRAM(S): 5 TABLET ORAL at 17:41

## 2023-06-05 RX ADMIN — SODIUM CHLORIDE 75 MILLILITER(S): 9 INJECTION INTRAMUSCULAR; INTRAVENOUS; SUBCUTANEOUS at 23:38

## 2023-06-05 NOTE — PROGRESS NOTE ADULT - ATTENDING COMMENTS
vitals/labs reviewed    Discussed with ID.  Will need continues IV antibiotics x 6weeks via picc line.  Will resume ancef.  Transition to coumadin and start Rifampin.

## 2023-06-05 NOTE — CHART NOTE - NSCHARTNOTESELECT_GEN_ALL_CORE
Event Note
Event Note
Nutrition Services
Event Note
Nutrition Services
Orthopaedic Surgery/Event Note
PCXR prelim

## 2023-06-05 NOTE — PROGRESS NOTE ADULT - ASSESSMENT
5/26:  Previously known to our group, on Rx for MSSA Septicemia/PJI  Office received a call that patient seemed very confused and had been falling.   Given that the patient is on anticoagulation and it was not clear what was going on, Dr. Bay had spoke with the patient's wife and advised him to be taken to the ER and have a workup including a CT head.  Presently he is much mor lucid, though still slightly confused, and confirms he has been falling ("mostly on my butt").   Patient also complained of swelling and pain in the L thigh, though is vague with respect to onset and other symptoms.   L thigh is red, warm and tender on exam.  IR drainage- old blood, cx pending  Knee aspirate- 29k WBC, PMN predominance  Will broaden antibiotics pending clinical course, though most likely still MSSA    5/30: improving, no fevers, RA, no wbc, Cr and LFTs ok, IR drainage fluid cultures with no growth thus far, BCs with no growth to date on this admission, Vanco trough 11.5 yesterday, will repeat, Vancomycin IV and ertapenem IV continue, possibly to OR by the end of this week for I&D revision procedure.   Attending addendum:  agree with above  continue antibiotics   trend bloody output from drain   pain control   send vancomycin trough with target AUC/LIEN 400-600   (therapeutic drug monitoring required with IV vancomycin)     5/31: afebrile, RA, no WBC, Cr ok, LFTs ok, BCs and synovial fluid cultures with no growth to date, drain in place with bloody drainage (small amount), ertapenem IV and Vancomycin IV continued, will  obtain vanco level today prior evening dose. Possibly to OR by the end of this week.   6/1:continue his antibiotics, vancomycin level 17 last night, continue current dose, scheduled for surgery tomorrow  6/5: s/p removal of total knee prosthesis, insertion of abx spacer performed on 6/2, pt is doing well, no fevers, RA, no WBC, Cr ok, all surgical cultures with no growth to date, will stop IV abx will advise to stat po Keflex 500 mg three times a day until pt's next surgery, knee prosthesis re-implantation.     Suggestions:  - stop IV vancomycin   - stop IV ertapenem  - start po Keflex 500 mg q 8 hrs - continue until pt's f/up knee surgery   - follow all culture data   - pain control      Discussed with the pt  Discussed with Dr. Bay  5/26:  Previously known to our group, on Rx for MSSA Septicemia/PJI  Office received a call that patient seemed very confused and had been falling.   Given that the patient is on anticoagulation and it was not clear what was going on, Dr. Bay had spoke with the patient's wife and advised him to be taken to the ER and have a workup including a CT head.  Presently he is much mor lucid, though still slightly confused, and confirms he has been falling ("mostly on my butt").   Patient also complained of swelling and pain in the L thigh, though is vague with respect to onset and other symptoms.   L thigh is red, warm and tender on exam.  IR drainage- old blood, cx pending  Knee aspirate- 29k WBC, PMN predominance  Will broaden antibiotics pending clinical course, though most likely still MSSA    5/30: improving, no fevers, RA, no wbc, Cr and LFTs ok, IR drainage fluid cultures with no growth thus far, BCs with no growth to date on this admission, Vanco trough 11.5 yesterday, will repeat, Vancomycin IV and ertapenem IV continue, possibly to OR by the end of this week for I&D revision procedure.   Attending addendum:  agree with above  continue antibiotics   trend bloody output from drain   pain control   send vancomycin trough with target AUC/LIEN 400-600   (therapeutic drug monitoring required with IV vancomycin)     5/31: afebrile, RA, no WBC, Cr ok, LFTs ok, BCs and synovial fluid cultures with no growth to date, drain in place with bloody drainage (small amount), ertapenem IV and Vancomycin IV continued, will  obtain vanco level today prior evening dose. Possibly to OR by the end of this week.   6/1:continue his antibiotics, vancomycin level 17 last night, continue current dose, scheduled for surgery tomorrow  6/5: s/p removal of total knee prosthesis, insertion of abx spacer performed on 6/2, pt is doing well, no fevers, RA, no WBC, Cr ok, all surgical cultures with no growth to date, will stop IV abx will advise to stat po Keflex 500 mg three times a day until pt's next surgery, knee prosthesis re-implantation.   Pt's picc line seems dislodged, should be removed.     Suggestions:  - stop IV vancomycin   - stop IV ertapenem  - start po Keflex 500 mg q 8 hrs - continue until pt's f/up knee surgery   - follow all culture data   - pain control  - remove picc line       Discussed with the pt  Discussed with Dr. Bay   Discussed with RN

## 2023-06-05 NOTE — PROGRESS NOTE ADULT - SUBJECTIVE AND OBJECTIVE BOX
SKYLAR MCCULLOUGH  MRN-[id number]    Follow Up:  knee infection     Interval History: The pt was seen and examined earlier, no acute distress, no new complaints, states that he is feeling great. Pt is afebrile, RA, no WBC.     PAST MEDICAL & SURGICAL HISTORY:  Diabetes Mellitus  diagnosed 10 years ago  doesn't do fingersticks      Hypertension, Essential      Hyperlipidemia      GERD (gastroesophageal reflux disease)      Depression      Obese      Bulging disc      Spinal stenosis      Spondylisthesis      Sleep apnea  had test > 10 years ago--supposed to wear device.  Lost 25 pounds but never retested      Anxiety      Insomnia      Varicosities      DVT (deep venous thrombosis)      R Knee Arthroplasty  Right 2003, Left 2011,      H/O laser iridotomy  left-2008      Reflux  Gastroscopy 2013      Deep vein thrombosis (DVT) of lower extremity          ROS:    [ ] Unobtainable because:  [x ] All other systems negative    Constitutional: no fever, no chills  Head: no trauma  Eyes: no vision changes, no eye pain  ENT:  no sore throat, no rhinorrhea  Cardiovascular:  no chest pain, no palpitation  Respiratory:  no SOB, no cough  GI:  no abd pain, no vomiting, no diarrhea  urinary: no dysuria, no hematuria, no flank pain  musculoskeletal:  no joint pain, no joint swelling  skin:  no rash  neurology:  no headache, no seizure, no change in mental status  psych: no anxiety, no depression         Allergies  No Known Allergies        ANTIMICROBIALS:  ertapenem  IVPB 1000 every 24 hours  vancomycin  IVPB 1000 every 12 hours      OTHER MEDS:  ALPRAZolam 0.5 milliGRAM(s) Oral two times a day PRN  ascorbic acid 500 milliGRAM(s) Oral two times a day  dextrose 5%. 1000 milliLiter(s) IV Continuous <Continuous>  dextrose 5%. 1000 milliLiter(s) IV Continuous <Continuous>  dextrose 50% Injectable 25 Gram(s) IV Push once  dextrose 50% Injectable 25 Gram(s) IV Push once  dextrose 50% Injectable 12.5 Gram(s) IV Push once  dextrose Oral Gel 15 Gram(s) Oral once PRN  folic acid 1 milliGRAM(s) Oral daily  glucagon  Injectable 1 milliGRAM(s) IntraMuscular once  insulin lispro (ADMELOG) corrective regimen sliding scale   SubCutaneous three times a day before meals  multivitamin 1 Tablet(s) Oral daily  ondansetron Injectable 4 milliGRAM(s) IV Push every 6 hours PRN  oxyCODONE    IR 10 milliGRAM(s) Oral every 3 hours PRN  oxyCODONE    IR 5 milliGRAM(s) Oral every 3 hours PRN  pantoprazole    Tablet 40 milliGRAM(s) Oral before breakfast  polyethylene glycol 3350 17 Gram(s) Oral at bedtime  potassium chloride   Powder 40 milliEquivalent(s) Oral three times a day  rivaroxaban 10 milliGRAM(s) Oral daily  senna 2 Tablet(s) Oral at bedtime  sodium chloride 0.9%. 1000 milliLiter(s) IV Continuous <Continuous>      Vital Signs Last 24 Hrs  T(C): 36.7 (05 Jun 2023 16:47), Max: 37.1 (05 Jun 2023 06:10)  T(F): 98 (05 Jun 2023 16:47), Max: 98.8 (05 Jun 2023 06:10)  HR: 81 (05 Jun 2023 16:47) (81 - 110)  BP: 152/79 (05 Jun 2023 16:47) (123/76 - 152/79)  BP(mean): --  RR: 18 (05 Jun 2023 16:47) (18 - 18)  SpO2: 98% (05 Jun 2023 16:47) (95% - 99%)    Parameters below as of 05 Jun 2023 16:47  Patient On (Oxygen Delivery Method): room air        Physical Exam:  General: Nontoxic-appearing Male in no acute distress.  HEENT: AT/NC.  Anicteric. Conjunctiva pink and moist. Oropharynx clear.   Neck: Not rigid. No sense of mass.  Nodes: None palpable.  Lungs: Clear bilaterally without rales, wheezing or rhonchi  Heart: Regular rate and rhythm. No Murmur. No rub. No gallop. No palpable thrill.  Abdomen: Bowel sounds present and normoactive. Soft. Nondistended. Nontender.  Back: No spinal tenderness. No costovertebral angle tenderness.   Extremities: No cyanosis or clubbing. Left knee heavily dressed  Skin: Warm. Dry. Good turgor. No rash. No vasculitic stigmata.  Psychiatric: Appropriate affect and mood for situation.     WBC Count: 9.45 K/uL (06-05 @ 06:56)  WBC Count: 8.99 K/uL (06-04 @ 06:50)  WBC Count: 10.90 K/uL (06-03 @ 07:15)  WBC Count: 14.27 K/uL (06-02 @ 14:43)  WBC Count: 10.96 K/uL (06-02 @ 12:40)  WBC Count: 7.32 K/uL (06-02 @ 06:33)  WBC Count: 7.09 K/uL (06-01 @ 06:20)                            8.0    9.45  )-----------( 342      ( 05 Jun 2023 06:56 )             25.6       06-05    136  |  103  |  8   ----------------------------<  231<H>  3.4<L>   |  28  |  0.89    Ca    8.4<L>      05 Jun 2023 06:56    TPro  6.5  /  Alb  2.4<L>  /  TBili  0.5  /  DBili  x   /  AST  12<L>  /  ALT  12  /  AlkPhos  68  06-04          Creatinine Trend: 0.89<--, 0.82<--, 1.09<--, 1.21<--, 0.72<--, 0.88<--      MICROBIOLOGY:  Vancomycin Level, Trough: 11.7 ug/mL (06-04-23 @ 18:25)  v  Joint Fl left knee tibial medllary canal #1 c/s  06-02-23   No growth  --    No polymorphonuclear cells seen per low power field  No organisms seen      Joint Fl left knee femur medullary canal #1c/s  06-02-23   No growth  --    Rare polymorphonuclear leukocytes seen per low power field  No organisms seen per oil power field      .Tissue left knee synovium #3 c/s  06-02-23   No growth to date.  --    No polymorphonuclear cells seen per low power field  No organisms seen per oil power field      .Tissue left knee synovium #2 c/s  06-02-23   No growth to date.  --    No polymorphonuclear cells seen per low power field  No organisms seen per oil power field      .Tissue left knee synovium #1 c/s  06-02-23   No growth to date.  --    No polymorphonuclear leukocytes per low power field  No organisms seen per oil power field      Knee left knee synovium c/s  06-02-23   No growth  --    Rare polymorphonuclear leukocytes per low power field  No organisms seen      Knee left knee synovial fluid c/s  06-02-23   No growth  --    Rare polymorphonuclear leukocytes seen per low power field  No organisms seen per oil power field      .Blood Blood  05-28-23   No Growth Final  --  --      .Blood Blood  05-28-23   No Growth Final  --  --      .Body Fluid Aqueous Fluid  05-26-23   No growth at 5 days  --    polymorphonuclear leukocytes seen  No organisms seen  by cytocentrifuge      Knee Synovial Fluid  05-26-23   No Growth at 10 Days  --    Few polymorphonuclear leukocytes per low power field  No organisms seen per oil power field      .Blood Blood-Peripheral  05-26-23   No Growth Final  --  --    C-Reactive Protein, Serum: 38 (05-25)    SARS-CoV-2 Result: NotDete (06-01-23 @ 17:45)      RADIOLOGY:

## 2023-06-05 NOTE — CHART NOTE - NSCHARTNOTEFT_GEN_A_CORE
Asked by day team to follow up PCXR r/o fx s/p witnessed fall oob, landing on chest.  Pt without c/o sob, desat or tachypnea at this time.    d/w [facility] radiologist, lungs without ptx or e/o other injury, ribs appear w/o fx.  difficult to r/o sternal fx on this view.    If pt remains asx, no additional imaging required.  If pt were to develop symtoms concerning for injury, rec obtain CT Chest without contrast for best view
Discussion with patient at bedside with Dr. Bob. Pt now adamantly refusing surgery. Risks of refusing surgery discussed with patient who demonstrates understanding. Orthopedics department unsure if patient has capacity to refuse surgery. Countless attempts to contact patient's wife/HCP unsuccessful. Psych consult placed to determine if pt has capacity to refuse indicated surgery or not. Psych recs appreciated.   D/w Dr. Bob
Orthopaedic team paged regarding patient about 16:58 for altered mental status per RN team. At that time, nursing staff noticed acute change in mental status over the last few hours, noting that the patient was seeing chameleons and lizards on the wall, and thought that the television was speaking to him. At that time, medicine team was contacted for recommendations and chart was reviewed for possible causes of change in mental status. At 17:21, orthopaedic team paged again by RN team noting that he was found down on the floor after a suspected fall. Patient seen and examined at bedside. A/O x1, slightly tremulous, responding to external stimuli. Briefly and partially redirectable. Secondary exam negative for any point tenderness or pain with ROM.     At this time, ordered CT head without contrast; constant observation; blood cultures x2; holding all opioid pain medications. Ordered EKG. Case discussed with medicine PA, appreciate recommendations. Case discussed with Dr. Gonzalez of ID, who noted an unlikely possibility of cefepime neurotoxicity and switching to ertapenem. Will continue to monitor.
Patient seen for follow-up. Patient has been screened for and presents negative for:    -Pressure ulcers stage 2 or greater  -Enteral or Parenteral Nutrition  -BMI <18  -BsktsvwyoxH7F >8  -Chewing/Swallowing Difficulty  -Decreased appetite  -Unintentional Weight Loss  -Inadequate diet (i.e. NPO/Clear Liquids)    Pt was admitted for orthopedic issue.  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 RD remains available.
63y Male with history of DM II, HTN, HLD L, DVTs on Xarelto, GERD, Depression, Anxiety, Insomnia, Spinal stenosis, Spondyloschises, R Knee Arthroplasty in 2003 and L Knee Arthroplasty in 2011 s/p L TKA I&D with poly exchange 4/14/23 w/ MSSA on OR cultures requiring Ancef via PICC [id number], admitted with L thigh abscess s/p IR drainage on 5/26. Notified by RN regarding fall, as patient was witnessed falling from bed, seen landing on his chest but supported. Pt denies any LOC, dyspnea, but endorses mild diffuse tenderness upon palpation of l side of ribcage. During exam, patient with acute visual and auditory hallucinations, claiming to see and hear his daughter talking to him, having unprompted emotional/crying outbursts, and is A&Ox1. Upon chart review, pt on Bupropion, Xanax, Cymbalta, Zoloft, which have been continued during this admission.     ICU Vital Signs Last 24 Hrs  T(C): 37.2 (28 May 2023 17:09), Max: 37.2 (28 May 2023 17:09)  T(F): 99 (28 May 2023 17:09), Max: 99 (28 May 2023 17:09)  HR: 87 (28 May 2023 11:26) (74 - 90)  BP: 167/87 (28 May 2023 17:09) (129/80 - 167/87)  RR: 19 (28 May 2023 17:09) (18 - 19)  SpO2: 99% (28 May 2023 17:09) (97% - 99%)    Physical Exam  General: lethargic, confused, with A/V hallucinations   Head: Atraumatic, normocephalic   Eyes: PERRL, conjunctiva clear, scerla non-icteric   ENMT: Moist mucous membranes, nares patent  Chest/Lungs: CTAB, non labored breathing, good air entry, No respiratory distress, No wheeze/Rales/Rhonchi. TTP upon L chest wall, no paradoxical rib motion, no ecchymosis   Heart: RRR, No murmur   Abd: Soft, nontender nondistended, BS present. No rebound tenderness or guarding  Extremities: Normal pulses,  No edema, normal capillary refill. L Lower extremity drain in place and secure.   Skin: warm, dry, appropriate for ethnicity. Nailbeds pink with no cyanosis or clubbing. area of dry blood on right hand near previous IV site  Neuro:A&O x 1, poor concentration but able to follow commands, no focal neuro deficits                           10.9   8.11  )-----------( 463      ( 28 May 2023 06:37 )             34.1     05-28    141  |  108  |  6<L>  ----------------------------<  95  4.3   |  30  |  0.66    Ca    9.1      28 May 2023 06:37  Phos  4.0     05-27  Mg     1.9     05-27    Plan: pt seen and evaluated at bedside with Orthopedic team   S/p fall, no apparent evidence of trauma/fx, will get CXR to ensure no rib fx  CTH stat for acute delirium/AMS/hallucinations, unclear etiology. Potentially multifactorial pain/infection vs acute psychiatric dz.   Unsure if pt withdrawing from other psychiatric medication not on MAR, if he remains delirious will benefit from psych consult in AM.   Zyprexa 5mg stat  Pt placed on 1 to 1   VBG w/ lytes, repeat Bx, lactate, TSH,                 Plan:
Discussion with patient and Dr. Bob. When presented with AMA forms for patient to leave against medical advice, pt changed his mind and is deciding to stay for the planned procedure tomorrow. Pt with good understanding. Pt willing to have the planned surgery tomorrow. Patient was deemed to have capacity by Dr. Barksdale. Pt was consented at bedside for procedure.     FU preop labs   NPO after midnight   Hold chemical DVT ppx, SCD okay   D/w Dr. Bob
Pt seen for RN consult for MST score 2 or > due pt being unsure of recent loss of without trying.  Pt inappropriate in response to questions asked, asking why he is seeing the other pt's TV program while he is in the hospital, c/o hearing things.  RN made aware of pt's statements.  Pt stated that he does not know what he is doing here and wants to get out of here.  Pt was unable to provide diet history due to current mental status.  Pt adm c dx of abscess of left leg, plan for possible OR later this week for I&D V revision surgery noted.  PMH include anxiety, depression, DM(was on metformin as per outpatient medication review), HTN.  Ht: 5'11" Wt. 05/25, 90.7 kg/199 LBS. BMI=27.9  Diet, Regular (05-26-23 @ 11:08)  Fluctuation in oral intake from 51-75-> % noted, pt declined offer for addition of nutritional supplements at present.  05-28 Na141 mmol/L Glu 95 mg/dL K+ 4.3 mmol/L Cr  0.66 mg/dL BUN 6 mg/dL<L> 05-27 Phos 4.0 mg/dL 05-25 Alb 2.5 g/dL<L>05-25 ALT 8 U/L<L> AST 19 U/L Alkaline Phosphatase 80 U/L  04/23, A1C=7.4% <H>  Recommend change diet to consistent carbohydrate c evening snack, low sodium.  RD to remain available for nutrition intervention as indicated.  Recommend consider Behavioral rahul consultation.

## 2023-06-05 NOTE — PROGRESS NOTE ADULT - SUBJECTIVE AND OBJECTIVE BOX
Patient is a 63y old  Male who presents with a chief complaint of L thigh abscess V PJI (05 Jun 2023 06:45)    INTERVAL HPI/OVERNIGHT EVENTS: no events     MEDICATIONS  (STANDING):  ascorbic acid 500 milliGRAM(s) Oral two times a day  dextrose 5%. 1000 milliLiter(s) (50 mL/Hr) IV Continuous <Continuous>  dextrose 5%. 1000 milliLiter(s) (100 mL/Hr) IV Continuous <Continuous>  dextrose 50% Injectable 25 Gram(s) IV Push once  dextrose 50% Injectable 25 Gram(s) IV Push once  dextrose 50% Injectable 12.5 Gram(s) IV Push once  ertapenem  IVPB 1000 milliGRAM(s) IV Intermittent every 24 hours  folic acid 1 milliGRAM(s) Oral daily  glucagon  Injectable 1 milliGRAM(s) IntraMuscular once  insulin lispro (ADMELOG) corrective regimen sliding scale   SubCutaneous three times a day before meals  multivitamin 1 Tablet(s) Oral daily  pantoprazole    Tablet 40 milliGRAM(s) Oral before breakfast  polyethylene glycol 3350 17 Gram(s) Oral at bedtime  potassium chloride   Powder 40 milliEquivalent(s) Oral three times a day  rivaroxaban 10 milliGRAM(s) Oral daily  senna 2 Tablet(s) Oral at bedtime  sodium chloride 0.9%. 1000 milliLiter(s) (75 mL/Hr) IV Continuous <Continuous>  vancomycin  IVPB 1000 milliGRAM(s) IV Intermittent every 12 hours    MEDICATIONS  (PRN):  ALPRAZolam 0.5 milliGRAM(s) Oral two times a day PRN anxiety  dextrose Oral Gel 15 Gram(s) Oral once PRN Blood Glucose LESS THAN 70 milliGRAM(s)/deciliter  ondansetron Injectable 4 milliGRAM(s) IV Push every 6 hours PRN Nausea and/or Vomiting  oxyCODONE    IR 10 milliGRAM(s) Oral every 3 hours PRN Severe Pain (7 - 10)  oxyCODONE    IR 5 milliGRAM(s) Oral every 3 hours PRN Moderate Pain (4 - 6)    Allergies    No Known Allergies    Intolerances      REVIEW OF SYSTEMS:  All other systems reviewed and are negative    Vital Signs Last 24 Hrs  T(C): 36.9 (05 Jun 2023 12:33), Max: 37.1 (05 Jun 2023 06:10)  T(F): 98.5 (05 Jun 2023 12:33), Max: 98.8 (05 Jun 2023 06:10)  HR: 95 (05 Jun 2023 12:33) (85 - 110)  BP: 123/79 (05 Jun 2023 12:33) (123/76 - 156/81)  BP(mean): --  RR: 18 (05 Jun 2023 12:33) (18 - 18)  SpO2: 99% (05 Jun 2023 12:33) (94% - 99%)    Parameters below as of 05 Jun 2023 12:33  Patient On (Oxygen Delivery Method): room air      Daily     Daily   I&O's Summary    04 Jun 2023 07:01  -  05 Jun 2023 07:00  --------------------------------------------------------  IN: 1750 mL / OUT: 2600 mL / NET: -850 mL      CAPILLARY BLOOD GLUCOSE      POCT Blood Glucose.: 108 mg/dL (05 Jun 2023 11:27)  POCT Blood Glucose.: 189 mg/dL (05 Jun 2023 08:05)  POCT Blood Glucose.: 227 mg/dL (04 Jun 2023 21:41)  POCT Blood Glucose.: 128 mg/dL (04 Jun 2023 16:29)    PHYSICAL EXAM:  GENERAL: NAD,    HEAD:  Atraumatic, Normocephalic  EYES: EOMI, PERRLA, conjunctiva and sclera clear  ENMT: No tonsillar erythema, exudates, or enlargement; Moist mucous membranes, Good dentition, No lesions  NECK: Supple, No JVD, Normal thyroid  NERVOUS SYSTEM:  Alert & Oriented X3, Good concentration; Motor Strength 5/5 B/L upper and lower extremities; DTRs 2+ intact and symmetric  CHEST/LUNG: Clear to percussion bilaterally; No rales, rhonchi, wheezing, or rubs  HEART: Regular rate and rhythm; No murmurs, rubs, or gallops  ABDOMEN: Soft, Nontender, Nondistended; Bowel sounds present  EXTREMITIES:  2+ Peripheral Pulses, No clubbing, cyanosis, or edema  LYMPH: No lymphadenopathy noted  SKIN: No rashes or lesions    Labs                          8.0    9.45  )-----------( 342      ( 05 Jun 2023 06:56 )             25.6     06-05    136  |  103  |  8   ----------------------------<  231<H>  3.4<L>   |  28  |  0.89    Ca    8.4<L>      05 Jun 2023 06:56    TPro  6.5  /  Alb  2.4<L>  /  TBili  0.5  /  DBili  x   /  AST  12<L>  /  ALT  12  /  AlkPhos  68  06-04                        DVT prophylaxis: > Lovenox 40mg SQ daily  > Heparin   > SCD's

## 2023-06-05 NOTE — PROGRESS NOTE ADULT - SUBJECTIVE AND OBJECTIVE BOX
Patient seen and examined at bedside. Patient reports pain well controlled on medications. No acute events overnight. Pt denies fevers, chills, new onset numbness, weakness or tingling in the extremities.    T(C): 37.1 (06-05-23 @ 06:10), Max: 37.1 (06-05-23 @ 06:10)  T(F): 98.8 (06-05-23 @ 06:10), Max: 98.8 (06-05-23 @ 06:10)  HR: 93 (06-05-23 @ 06:10) (74 - 98)  BP: 148/79 (06-05-23 @ 06:10) (148/78 - 156/81)  RR: 18 (06-05-23 @ 06:10) (17 - 18)  SpO2: 95% (06-05-23 @ 06:10) (94% - 99%)    Gen: Resting in bed, no acute distress  LLE  Prevena dressing with appropriate negative pressure, c/d/i. Knee immobilizer in place.   Nadya-incisional tenderness to palpation; otherwise, NTTP throughout the rest of the extremity.   SILT L2-S1.  GSC/TA/EHL/FHL intact. Q/H unable to assess 2' pain.   DP pulse palpable.   No calf tenderness bilaterally.   Compartments soft and compressible.       Assessment and Plan:  63y Male POD3 L TKA explant and placement of antibiotic spacer    Follow up postoperative labs  TTWB/PT/OT  Pain management PRN  Continue antibiotics per ID recs, f/u cultures  DVT PPx: ok for home xarelto  Incentive spirometry  Dispo: [facility]YLAN per PT  Will discuss with Dr. Bob and advise of any changes to the plan.

## 2023-06-05 NOTE — PROGRESS NOTE ADULT - NS ATTEND AMEND GEN_ALL_CORE FT
I have reviewed all pertinent clinical information, including history, physical exam, plan and the NP's note and agree.   continue vancomycin and Ertapenem 1000mg every 24hrs   send vancomycin trough with target AUC/LIEN 400-600   (therapeutic drug monitoring required with IV vancomycin)   please send cultures from OR     Glenn Bay,   Infectious Disease Attending  Reachable via Microsoft Teams or ID office: [phone]  After 5pm/weekends please call [phone] for all inquiries and new consults
agree with above  continue antibiotics   trend bloody output from drain   pain control   send vancomycin trough with target AUC/LIEN 400-600   (therapeutic drug monitoring required with IV vancomycin)     Glenn Bay, DO  Infectious Disease Attending  Reachable via [employer] Teams or ID office: [phone]  After 5pm/weekends please call [phone] for all inquiries and new consults
I have reviewed all pertinent clinical information, including history, physical exam, plan and the NP's note and agree.   please remove picc line  can change to PO keflex 500mg 3 times a day   duration of antibiotics will be extended until re-implantation     Glenn Bay, DO  Infectious Disease Attending  Reachable via [employer] Teams or ID office: [phone]  After 5pm/weekends please call [phone] for all inquiries and new consults

## 2023-06-05 NOTE — PROGRESS NOTE ADULT - ASSESSMENT
63y Male with history of DM II, HTN, HLD L, DVTs on Xarelto, GERD, Depression, Anxiety, Insomnia, Spinal stenosis, Spondyloschises, R Knee Arthroplasty in 2003 and L Knee Arthroplasty in 2011 s/p L TKA I&D with poly exchange 4/14/23 w/ MSSA on OR cultures requiring Ancef via PICC [id number] p/w left thigh fluid collection concerning for PJI.     PJI w/ fluid collection concerning for abscess  - CT showed a large intramuscular abscess in the mid to distal left vastus musculature w/ a periosteal reaction in the anterior and medial distal left femur adjacent to the intramuscular abscess which could be reactive in nature; however, an underlying osteomyelitis cannot be excluded. There was also an enlarged left iliac lymph node and enlarged left inguinal lymph nodes   - ortho aspirated the joint  - follow up   - IR to aspirated 5 cc of old blood on 5/26 - specimen sent and an 8 [place] drain was left in place for further liquefaction/drainage  - Ortho planing on taking patient to OR for I&D of left knee Vs major revision surgery   - c/w antibiotics as per ortho   -  ID consult following     POD2L TKA explant and placement of antibiotic spacer     Confusion on 5/28  - suspect delirium/ metabolic encephalopathy due to meds - improving with periods of waxing and waning   - as per ID, there is an unlikely possibility of cefepime neurotoxicity and patient was switching to ertapenem  - CT head showed no acute intracranial abnormalities and mild small vessel and atrophic changes  - psych consult note read and appreciated     Patient now declining surgery, wants to postpone, seen by psych to determine capacity     Hx of DVT  - Xarelto on hold by ortho in anticipation of taking patient to OR    COPD  - patient reports it is secondary to exposure to dust at 9/11 site   - c/w Spiriva and Symbicort     DM II  - start ISS  - Hgb A1C in April was 7.4     HTN  - c/w losartan     Depression/ Anxiety/Insomnia  - c/w Zoloft, Cymbalta, Wellbutrin & Xanax     GERD  - c/w Protonix    Constipation  - c/w Senna & Miralax

## 2023-06-06 ENCOUNTER — TRANSCRIPTION ENCOUNTER (OUTPATIENT)
Age: 63
End: 2023-06-06

## 2023-06-06 VITALS
OXYGEN SATURATION: 100 % | DIASTOLIC BLOOD PRESSURE: 81 MMHG | HEART RATE: 99 BPM | SYSTOLIC BLOOD PRESSURE: 146 MMHG | RESPIRATION RATE: 18 BRPM | TEMPERATURE: 100 F

## 2023-06-06 LAB
FLUAV AG NPH QL: SIGNIFICANT CHANGE UP
FLUBV AG NPH QL: SIGNIFICANT CHANGE UP
GLUCOSE BLDC GLUCOMTR-MCNC: 127 MG/DL — HIGH (ref 70–99)
GLUCOSE BLDC GLUCOMTR-MCNC: 143 MG/DL — HIGH (ref 70–99)
GLUCOSE BLDC GLUCOMTR-MCNC: 266 MG/DL — HIGH (ref 70–99)
INR BLD: 1.61 RATIO — HIGH (ref 0.88–1.16)
PROTHROM AB SERPL-ACNC: 19.3 SEC — HIGH (ref 10.5–13.4)
SARS-COV-2 RNA SPEC QL NAA+PROBE: SIGNIFICANT CHANGE UP
SURGICAL PATHOLOGY STUDY: SIGNIFICANT CHANGE UP
VANCOMYCIN TROUGH SERPL-MCNC: 8 UG/ML — LOW (ref 10–20)

## 2023-06-06 PROCEDURE — 71045 X-RAY EXAM CHEST 1 VIEW: CPT | Mod: 26

## 2023-06-06 PROCEDURE — 99232 SBSQ HOSP IP/OBS MODERATE 35: CPT

## 2023-06-06 RX ORDER — WARFARIN SODIUM 2.5 MG/1
5 TABLET ORAL ONCE
Refills: 0 | Status: DISCONTINUED | OUTPATIENT
Start: 2023-06-06 | End: 2023-06-06

## 2023-06-06 RX ORDER — FOLIC ACID 0.8 MG
1 TABLET ORAL
Qty: 0 | Refills: 0 | DISCHARGE
Start: 2023-06-06

## 2023-06-06 RX ORDER — WARFARIN SODIUM 2.5 MG/1
1 TABLET ORAL
Qty: 0 | Refills: 0 | DISCHARGE
Start: 2023-06-06

## 2023-06-06 RX ORDER — SENNA PLUS 8.6 MG/1
2 TABLET ORAL
Qty: 0 | Refills: 0 | DISCHARGE
Start: 2023-06-06

## 2023-06-06 RX ORDER — POLYETHYLENE GLYCOL 3350 17 G/17G
17 POWDER, FOR SOLUTION ORAL
Qty: 0 | Refills: 0 | DISCHARGE
Start: 2023-06-06

## 2023-06-06 RX ORDER — CEFAZOLIN SODIUM 1 G
2000 VIAL (EA) INJECTION EVERY 8 HOURS
Refills: 0 | Status: DISCONTINUED | OUTPATIENT
Start: 2023-06-06 | End: 2023-06-06

## 2023-06-06 RX ORDER — VANCOMYCIN HCL 1 G
VIAL (EA) INTRAVENOUS
Refills: 0 | Status: DISCONTINUED | OUTPATIENT
Start: 2023-06-06 | End: 2023-06-06

## 2023-06-06 RX ORDER — CEFAZOLIN SODIUM 1 G
2 VIAL (EA) INJECTION
Qty: 0 | Refills: 0 | DISCHARGE
Start: 2023-06-06

## 2023-06-06 RX ADMIN — Medication 40 MILLIEQUIVALENT(S): at 05:28

## 2023-06-06 RX ADMIN — Medication 1 TABLET(S): at 11:40

## 2023-06-06 RX ADMIN — Medication 500 MILLIGRAM(S): at 05:29

## 2023-06-06 RX ADMIN — Medication 500 MILLIGRAM(S): at 17:14

## 2023-06-06 RX ADMIN — Medication 1 MILLIGRAM(S): at 11:41

## 2023-06-06 RX ADMIN — Medication 100 MILLIGRAM(S): at 14:47

## 2023-06-06 RX ADMIN — Medication 3: at 11:41

## 2023-06-06 RX ADMIN — SODIUM CHLORIDE 75 MILLILITER(S): 9 INJECTION INTRAMUSCULAR; INTRAVENOUS; SUBCUTANEOUS at 14:47

## 2023-06-06 RX ADMIN — PANTOPRAZOLE SODIUM 40 MILLIGRAM(S): 20 TABLET, DELAYED RELEASE ORAL at 08:36

## 2023-06-06 RX ADMIN — RIVAROXABAN 10 MILLIGRAM(S): KIT at 11:40

## 2023-06-06 RX ADMIN — Medication 0.5 MILLIGRAM(S): at 17:14

## 2023-06-06 NOTE — DISCHARGE NOTE NURSING/CASE MANAGEMENT/SOCIAL WORK - PATIENT PORTAL LINK FT
You can access the FollowMyHealth Patient Portal offered by [facility] by registering at the following website: [email or web address]. By joining [facility]’s FollowMyHealth portal, you will also be able to view your health information using other applications (apps) compatible with our system.

## 2023-06-06 NOTE — PROGRESS NOTE ADULT - SUBJECTIVE AND OBJECTIVE BOX
Patient seen and examined at bedside. Patient reports pain well controlled on medications. No acute events overnight. Pt denies fevers, chills, new onset numbness, weakness or tingling in the extremities.    LABS:                        8.0    9.45  )-----------( 342      ( 05 Jun 2023 06:56 )             25.6     06-05    136  |  103  |  8   ----------------------------<  231<H>  3.4<L>   |  28  |  0.89    Ca    8.4<L>      05 Jun 2023 06:56            VITAL SIGNS:  T(C): 37.7 (06-06-23 @ 05:00), Max: 37.7 (06-06-23 @ 05:00)  HR: 82 (06-06-23 @ 05:00) (81 - 110)  BP: 133/77 (06-06-23 @ 05:00) (123/76 - 152/79)  RR: 18 (06-06-23 @ 05:00) (18 - 18)  SpO2: 97% (06-06-23 @ 05:00) (96% - 99%)      Gen: Resting in bed, no acute distress  LLE  Prevena dressing with appropriate negative pressure, c/d/i. Knee immobilizer in place.   Nadya-incisional tenderness to palpation; otherwise, NTTP throughout the rest of the extremity.   SILT L2-S1.  GSC/TA/EHL/FHL intact. HF intact  DP pulse palpable.   No calf tenderness bilaterally.   Compartments soft and compressible.       Assessment and Plan:  63y Male POD4 L TKA explant and placement of antibiotic spacer    Follow up postoperative labs  TTWB/PT/OT  Pain management PRN  Continue antibiotics per ID recs, f/u cultures; would consider 6 weeks of IV abx  DVT PPx: ok for home xarelto  Incentive spirometry  Dispo: [facility]YLAN per PT  Will discuss with Dr. Bob and advise of any changes to the plan.

## 2023-06-06 NOTE — PROGRESS NOTE ADULT - SUBJECTIVE AND OBJECTIVE BOX
SKYLAR MCCULLOUGH  MRN-[id number]    Follow Up:  knee infection     Interval History: The pt was seen and examined earlier, no acute distress, no new complaints, will be going to rehab on IV antibiotics which was explained to him detail     PAST MEDICAL & SURGICAL HISTORY:  Diabetes Mellitus  diagnosed 10 years ago  doesn't do fingersticks      Hypertension, Essential      Hyperlipidemia      GERD (gastroesophageal reflux disease)      Depression      Obese      Bulging disc      Spinal stenosis      Spondylisthesis      Sleep apnea  had test > 10 years ago--supposed to wear device.  Lost 25 pounds but never retested      Anxiety      Insomnia      Varicosities      DVT (deep venous thrombosis)      R Knee Arthroplasty  Right 2003, Left 2011,      H/O laser iridotomy  left-2008      Reflux  Gastroscopy 2013      Deep vein thrombosis (DVT) of lower extremity          ROS:    [ ] Unobtainable because:  [x ] All other systems negative    Constitutional: no fever, no chills  Head: no trauma  Eyes: no vision changes, no eye pain  ENT:  no sore throat, no rhinorrhea  Cardiovascular:  no chest pain, no palpitation  Respiratory:  no SOB, no cough  GI:  no abd pain, no vomiting, no diarrhea  urinary: no dysuria, no hematuria, no flank pain  musculoskeletal:  no joint pain, no joint swelling  skin:  no rash  neurology:  no headache, no seizure, no change in mental status  psych: no anxiety, no depression         Allergies  No Known Allergies        ANTIMICROBIALS:    ceFAZolin   IVPB 2000 every 8 hours      MEDICATIONS  (STANDING):  dextrose 50% Injectable 25 once  dextrose 50% Injectable 25 once  dextrose 50% Injectable 12.5 once  glucagon  Injectable 1 once  insulin lispro (ADMELOG) corrective regimen sliding scale  three times a day before meals  pantoprazole    Tablet 40 before breakfast  polyethylene glycol 3350 17 at bedtime  rivaroxaban 10 daily  senna 2 at bedtime      PRN  ALPRAZolam 0.5 milliGRAM(s) Oral two times a day PRN  dextrose Oral Gel 15 Gram(s) Oral once PRN  ondansetron Injectable 4 milliGRAM(s) IV Push every 6 hours PRN  oxyCODONE    IR 10 milliGRAM(s) Oral every 3 hours PRN  oxyCODONE    IR 5 milliGRAM(s) Oral every 3 hours PRN      Physical Exam:  Vital Signs Last 24 Hrs  T(F): 98.4 (06-06-23 @ 11:22), Max: 99.8 (06-06-23 @ 05:00)  HR: 91 (06-06-23 @ 11:22)  BP: 134/79 (06-06-23 @ 11:22)  RR: 18 (06-06-23 @ 11:22)  SpO2: 98% (06-06-23 @ 11:22) (97% - 98%)  Wt(kg): --      Physical Exam:  General: Nontoxic-appearing Male in no acute distress.  HEENT: AT/NC.  Anicteric. Conjunctiva pink and moist. Oropharynx clear.   Neck: Not rigid. No sense of mass.  Nodes: None palpable.  Lungs: Clear bilaterally without rales, wheezing or rhonchi  Heart: Regular rate and rhythm. No Murmur. No rub. No gallop. No palpable thrill.  Abdomen: Bowel sounds present and normoactive. Soft. Nondistended. Nontender.  Back: No spinal tenderness. No costovertebral angle tenderness. LUE picc line intact  Extremities: No cyanosis or clubbing. Left knee heavily dressed  Skin: Warm. Dry. Good turgor. No rash. No vasculitic stigmata.  Psychiatric: Appropriate affect and mood for situation.     WBC Count: 9.45 K/uL (06-05 @ 06:56)  WBC Count: 8.99 K/uL (06-04 @ 06:50)  WBC Count: 10.90 K/uL (06-03 @ 07:15)  WBC Count: 14.27 K/uL (06-02 @ 14:43)  WBC Count: 10.96 K/uL (06-02 @ 12:40)  WBC Count: 7.32 K/uL (06-02 @ 06:33)  WBC Count: 7.09 K/uL (06-01 @ 06:20)                            8.0    9.45  )-----------( 342      ( 05 Jun 2023 06:56 )             25.6       06-05    136  |  103  |  8   ----------------------------<  231<H>  3.4<L>   |  28  |  0.89    Ca    8.4<L>      05 Jun 2023 06:56        Creatinine Trend: 0.89<--, 0.82<--, 1.09<--, 1.21<--, 0.72<--, 0.88<--          MICROBIOLOGY:  Vancomycin Level, Trough: 11.7 ug/mL (06-04-23 @ 18:25)  v  Joint Fl left knee tibial medllary canal #1 c/s  06-02-23   No growth  --    No polymorphonuclear cells seen per low power field  No organisms seen      Joint Fl left knee femur medullary canal #1c/s  06-02-23   No growth  --    Rare polymorphonuclear leukocytes seen per low power field  No organisms seen per oil power field      .Tissue left knee synovium #3 c/s  06-02-23   No growth to date.  --    No polymorphonuclear cells seen per low power field  No organisms seen per oil power field      .Tissue left knee synovium #2 c/s  06-02-23   No growth to date.  --    No polymorphonuclear cells seen per low power field  No organisms seen per oil power field      .Tissue left knee synovium #1 c/s  06-02-23   No growth to date.  --    No polymorphonuclear leukocytes per low power field  No organisms seen per oil power field      Knee left knee synovium c/s  06-02-23   No growth  --    Rare polymorphonuclear leukocytes per low power field  No organisms seen      Knee left knee synovial fluid c/s  06-02-23   No growth  --    Rare polymorphonuclear leukocytes seen per low power field  No organisms seen per oil power field      .Blood Blood  05-28-23   No Growth Final  --  --      .Blood Blood  05-28-23   No Growth Final  --  --      .Body Fluid Aqueous Fluid  05-26-23   No growth at 5 days  --    polymorphonuclear leukocytes seen  No organisms seen  by cytocentrifuge      Knee Synovial Fluid  05-26-23   No Growth at 10 Days  --    Few polymorphonuclear leukocytes per low power field  No organisms seen per oil power field      .Blood Blood-Peripheral  05-26-23   No Growth Final  --  --    C-Reactive Protein, Serum: 38 (05-25)    SARS-CoV-2 Result: NotDetec (06-01-23 @ 17:45)      RADIOLOGY:

## 2023-06-06 NOTE — PROGRESS NOTE ADULT - ASSESSMENT
5/26:  Previously known to our group, on Rx for MSSA Septicemia/PJI  Office received a call that patient seemed very confused and had been falling.   Given that the patient is on anticoagulation and it was not clear what was going on, Dr. Bay had spoke with the patient's wife and advised him to be taken to the ER and have a workup including a CT head.  Presently he is much mor lucid, though still slightly confused, and confirms he has been falling ("mostly on my butt").   Patient also complained of swelling and pain in the L thigh, though is vague with respect to onset and other symptoms.   L thigh is red, warm and tender on exam.  IR drainage- old blood, cx pending  Knee aspirate- 29k WBC, PMN predominance  Will broaden antibiotics pending clinical course, though most likely still MSSA    5/30: improving, no fevers, RA, no wbc, Cr and LFTs ok, IR drainage fluid cultures with no growth thus far, BCs with no growth to date on this admission, Vanco trough 11.5 yesterday, will repeat, Vancomycin IV and ertapenem IV continue, possibly to OR by the end of this week for I&D revision procedure.   Attending addendum:  agree with above  continue antibiotics   trend bloody output from drain   pain control   send vancomycin trough with target AUC/LIEN 400-600   (therapeutic drug monitoring required with IV vancomycin)     5/31: afebrile, RA, no WBC, Cr ok, LFTs ok, BCs and synovial fluid cultures with no growth to date, drain in place with bloody drainage (small amount), ertapenem IV and Vancomycin IV continued, will  obtain vanco level today prior evening dose. Possibly to OR by the end of this week.   6/1:continue his antibiotics, vancomycin level 17 last night, continue current dose, scheduled for surgery tomorrow  6/5: s/p removal of total knee prosthesis, insertion of abx spacer performed on 6/2, pt is doing well, no fevers, RA, no WBC, Cr ok, all surgical cultures with no growth to date, will stop IV abx will advise to stat po Keflex 500 mg three times a day until pt's next surgery, knee prosthesis re-implantation.   6/6: after discussion with orthopaedic team, patient will remain on IV antibiotics for another 6 weeks from date of surgery. Will also change anticoagulation in order to add rifampin to help with potential biofilm. 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 Coumadin can be adjusted based on INR levels (rifampin can lower therapeutic levels of coumadin)         Suggestions:  -keep picc line in   -start cefazolin 2g q8hrs and d/c vanco/ertapenem  -Give coumadin 5mg and check INR tomorrow and the next day; target 2.0-3.0. Please dose xarelto for another 1-2 days as part of bridging   -Once INR is therapeutic at 2.0-3.0, stop xarelto, continue coumadin and start Rifampin 600mg daily with daily checks of INR   -Adjust the dose of coumadin based on the INR   -weekly CBC, CMP, ESR and CRP sent to my office   -d/c to [facility]     Discussed with Dr. Bob and ortho residents    Glenn Bay, DO  Infectious Disease Attending  Reachable via Microsoft Teams or ID office: [phone]  After 5pm/weekends please call [phone] for all inquiries and new consults

## 2023-06-06 NOTE — PROGRESS NOTE ADULT - PROVIDER SPECIALTY LIST ADULT
Hospitalist
Infectious Disease
Orthopedics
Hospitalist
Infectious Disease
Orthopedics
Hospitalist
Orthopedics
Hospitalist
Infectious Disease
Infectious Disease
Orthopedics
Infectious Disease

## 2023-06-06 NOTE — PROGRESS NOTE ADULT - ASSESSMENT
63y Male with history of DM II, HTN, HLD L, DVTs on Xarelto, GERD, Depression, Anxiety, Insomnia, Spinal stenosis, Spondyloschises, R Knee Arthroplasty in 2003 and L Knee Arthroplasty in 2011 s/p L TKA I&D with poly exchange 4/14/23 w/ MSSA on OR cultures requiring Ancef via PICC [id number] p/w left thigh fluid collection concerning for PJI.         Plan for Rehab today     PJI w/ fluid collection concerning for abscess  - CT showed a large intramuscular abscess in the mid to distal left vastus musculature w/ a periosteal reaction in the anterior and medial distal left femur adjacent to the intramuscular abscess which could be reactive in nature; however, an underlying osteomyelitis cannot be excluded. There was also an enlarged left iliac lymph node and enlarged left inguinal lymph nodes   - ortho aspirated the joint  - follow up   - IR to aspirated 5 cc of old blood on 5/26 - specimen sent and an 8 [place] drain was left in place for further liquefaction/drainage  - Ortho planing on taking patient to OR for I&D of left knee Vs major revision surgery   - c/w antibiotics as per ortho   -  ID consult following     POD3L TKA explant and placement of antibiotic spacer     Confusion on 5/28  - suspect delirium/ metabolic encephalopathy due to meds - improving with periods of waxing and waning   - as per ID, there is an unlikely possibility of cefepime neurotoxicity and patient was switching to ertapenem  - CT head showed no acute intracranial abnormalities and mild small vessel and atrophic changes  - psych consult note read and appreciated     Patient now declining surgery, wants to postpone, seen by psych to determine capacity     Hx of DVT  - Xarelto on hold by ortho in anticipation of taking patient to OR    COPD  - patient reports it is secondary to exposure to dust at 9/11 site   - c/w Spiriva and Symbicort     DM II  - start ISS  - Hgb A1C in April was 7.4     HTN  - c/w losartan     Depression/ Anxiety/Insomnia  - c/w Zoloft, Cymbalta, Wellbutrin & Xanax     GERD  - c/w Protonix    Constipation  - c/w Senna & Miralax

## 2023-06-06 NOTE — DISCHARGE NOTE NURSING/CASE MANAGEMENT/SOCIAL WORK - NSDCPEFALRISK_GEN_ALL_CORE
For information on Fall & Injury Prevention, visit: [email or web address] OR  [email or web address] OR  https://www.cdc.gov/steadi/patient.html

## 2023-06-06 NOTE — PROGRESS NOTE ADULT - ATTENDING COMMENTS
pain controlled, doing well.    vitals/labs reviewed    LLE exam stable  vac intact    TDWB LLE  Continue IV antibiotics via picc line  Check chest xray for position of picc line  Ancef with ID consult x6 weeks  Will begin Rifampin per ID  transition to coumadin as xarelto interacts with Rifampin

## 2023-06-06 NOTE — PROGRESS NOTE ADULT - SUBJECTIVE AND OBJECTIVE BOX
Patient is a 63y old  Male who presents with a chief complaint of L thigh abscess V PJI (06 Jun 2023 09:40)    INTERVAL HPI/OVERNIGHT EVENTS:    MEDICATIONS  (STANDING):  ascorbic acid 500 milliGRAM(s) Oral two times a day  ceFAZolin   IVPB 2000 milliGRAM(s) IV Intermittent every 8 hours  dextrose 5%. 1000 milliLiter(s) (50 mL/Hr) IV Continuous <Continuous>  dextrose 5%. 1000 milliLiter(s) (100 mL/Hr) IV Continuous <Continuous>  dextrose 50% Injectable 25 Gram(s) IV Push once  dextrose 50% Injectable 25 Gram(s) IV Push once  dextrose 50% Injectable 12.5 Gram(s) IV Push once  folic acid 1 milliGRAM(s) Oral daily  glucagon  Injectable 1 milliGRAM(s) IntraMuscular once  insulin lispro (ADMELOG) corrective regimen sliding scale   SubCutaneous three times a day before meals  multivitamin 1 Tablet(s) Oral daily  pantoprazole    Tablet 40 milliGRAM(s) Oral before breakfast  polyethylene glycol 3350 17 Gram(s) Oral at bedtime  rivaroxaban 10 milliGRAM(s) Oral daily  senna 2 Tablet(s) Oral at bedtime  sodium chloride 0.9%. 1000 milliLiter(s) (75 mL/Hr) IV Continuous <Continuous>    MEDICATIONS  (PRN):  ALPRAZolam 0.5 milliGRAM(s) Oral two times a day PRN anxiety  dextrose Oral Gel 15 Gram(s) Oral once PRN Blood Glucose LESS THAN 70 milliGRAM(s)/deciliter  ondansetron Injectable 4 milliGRAM(s) IV Push every 6 hours PRN Nausea and/or Vomiting  oxyCODONE    IR 10 milliGRAM(s) Oral every 3 hours PRN Severe Pain (7 - 10)  oxyCODONE    IR 5 milliGRAM(s) Oral every 3 hours PRN Moderate Pain (4 - 6)    Allergies    No Known Allergies    Intolerances      REVIEW OF SYSTEMS:  All other systems reviewed and are negative    Vital Signs Last 24 Hrs  T(C): 36.9 (06 Jun 2023 11:22), Max: 37.7 (06 Jun 2023 05:00)  T(F): 98.4 (06 Jun 2023 11:22), Max: 99.8 (06 Jun 2023 05:00)  HR: 91 (06 Jun 2023 11:22) (81 - 91)  BP: 134/79 (06 Jun 2023 11:22) (133/77 - 152/79)  BP(mean): --  RR: 18 (06 Jun 2023 11:22) (18 - 18)  SpO2: 98% (06 Jun 2023 11:22) (97% - 98%)    Parameters below as of 06 Jun 2023 05:00  Patient On (Oxygen Delivery Method): room air      Daily     Daily   I&O's Summary    06 Jun 2023 07:01  -  06 Jun 2023 14:07  --------------------------------------------------------  IN: 0 mL / OUT: 600 mL / NET: -600 mL      CAPILLARY BLOOD GLUCOSE      POCT Blood Glucose.: 266 mg/dL (06 Jun 2023 11:29)  POCT Blood Glucose.: 143 mg/dL (06 Jun 2023 07:58)  POCT Blood Glucose.: 148 mg/dL (05 Jun 2023 21:06)  POCT Blood Glucose.: 121 mg/dL (05 Jun 2023 16:40)    PHYSICAL EXAM:  GENERAL: NAD,    HEAD:  Atraumatic, Normocephalic  EYES: EOMI, PERRLA, conjunctiva and sclera clear  ENMT: No tonsillar erythema, exudates, or enlargement; Moist mucous membranes, Good dentition, No lesions  NECK: Supple, No JVD, Normal thyroid  NERVOUS SYSTEM:  Alert & Oriented X3, Good concentration; Motor Strength 5/5 B/L upper and lower extremities; DTRs 2+ intact and symmetric  CHEST/LUNG: Clear to percussion bilaterally; No rales, rhonchi, wheezing, or rubs  HEART: Regular rate and rhythm; No murmurs, rubs, or gallops  ABDOMEN: Soft, Nontender, Nondistended; Bowel sounds present  EXTREMITIES:  2+ Peripheral Pulses, No clubbing, cyanosis, or edema  LYMPH: No lymphadenopathy noted  SKIN: No rashes or lesions    Labs                          8.0    9.45  )-----------( 342      ( 05 Jun 2023 06:56 )             25.6     06-05    136  |  103  |  8   ----------------------------<  231<H>  3.4<L>   |  28  |  0.89    Ca    8.4<L>      05 Jun 2023 06:56                          DVT prophylaxis: > Lovenox 40mg SQ daily  > Heparin   > SCD's

## 2023-06-09 LAB
CULTURE RESULTS: NO GROWTH — SIGNIFICANT CHANGE UP
CULTURE RESULTS: SIGNIFICANT CHANGE UP
GRAM STN FLD: SIGNIFICANT CHANGE UP
SPECIMEN SOURCE: SIGNIFICANT CHANGE UP

## 2023-06-11 DIAGNOSIS — G47.30 SLEEP APNEA, UNSPECIFIED: ICD-10-CM

## 2023-06-11 DIAGNOSIS — F39 UNSPECIFIED MOOD [AFFECTIVE] DISORDER: ICD-10-CM

## 2023-06-11 DIAGNOSIS — Z79.01 LONG TERM (CURRENT) USE OF ANTICOAGULANTS: ICD-10-CM

## 2023-06-11 DIAGNOSIS — K59.00 CONSTIPATION, UNSPECIFIED: ICD-10-CM
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
## 2023-06-11 DIAGNOSIS — J44.9 CHRONIC OBSTRUCTIVE PULMONARY DISEASE, UNSPECIFIED: ICD-10-CM

## 2023-06-11 DIAGNOSIS — F41.9 ANXIETY DISORDER, UNSPECIFIED: ICD-10-CM

## 2023-06-11 DIAGNOSIS — L02.416 CUTANEOUS ABSCESS OF LEFT LOWER LIMB: ICD-10-CM

## 2023-06-11 DIAGNOSIS — Z86.718 PERSONAL HISTORY OF OTHER VENOUS THROMBOSIS AND EMBOLISM: ICD-10-CM

## 2023-06-11 DIAGNOSIS — K21.9 GASTRO-ESOPHAGEAL REFLUX DISEASE WITHOUT ESOPHAGITIS: ICD-10-CM

## 2023-06-11 DIAGNOSIS — I10 ESSENTIAL (PRIMARY) HYPERTENSION: ICD-10-CM

## 2023-06-11 DIAGNOSIS — F32.A DEPRESSION, UNSPECIFIED: ICD-10-CM

## 2023-06-11 DIAGNOSIS — M96.840 POSTPROCEDURAL HEMATOMA OF A MUSCULOSKELETAL STRUCTURE FOLLOWING A MUSCULOSKELETAL SYSTEM PROCEDURE: ICD-10-CM

## 2023-06-11 DIAGNOSIS — G92.8 OTHER TOXIC ENCEPHALOPATHY: ICD-10-CM

## 2023-06-11 DIAGNOSIS — Z20.822 CONTACT WITH AND (SUSPECTED) EXPOSURE TO COVID-19: ICD-10-CM

## 2023-06-11 DIAGNOSIS — T84.54XA INFECTION AND INFLAMMATORY REACTION DUE TO INTERNAL LEFT KNEE PROSTHESIS, INITIAL ENCOUNTER: ICD-10-CM

## 2023-06-11 DIAGNOSIS — E11.9 TYPE 2 DIABETES MELLITUS WITHOUT COMPLICATIONS: ICD-10-CM

## 2023-06-11 DIAGNOSIS — E78.5 HYPERLIPIDEMIA, UNSPECIFIED: ICD-10-CM
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
## 2023-06-11 DIAGNOSIS — T50.995A ADVERSE EFFECT OF OTHER DRUGS, MEDICAMENTS AND BIOLOGICAL SUBSTANCES, INITIAL ENCOUNTER: ICD-10-CM

## 2023-06-16 LAB
CULTURE RESULTS: SIGNIFICANT CHANGE UP
GRAM STN FLD: SIGNIFICANT CHANGE UP
SPECIMEN SOURCE: SIGNIFICANT CHANGE UP

## 2023-06-26 ENCOUNTER — NON-APPOINTMENT (OUTPATIENT)
Age: 63
End: 2023-06-26

## 2023-07-05 ENCOUNTER — LABORATORY RESULT (OUTPATIENT)
Age: 63
End: 2023-07-05

## 2023-07-06 ENCOUNTER — LABORATORY RESULT (OUTPATIENT)
Age: 63
End: 2023-07-06

## 2023-07-06 ENCOUNTER — NON-APPOINTMENT (OUTPATIENT)
Age: 63
End: 2023-07-06

## 2023-07-07 ENCOUNTER — EMERGENCY (EMERGENCY)
Facility: HOSPITAL | Age: 63
LOS: 0 days | Discharge: HOME | End: 2023-07-07
Attending: EMERGENCY MEDICINE
Payer: MEDICARE

## 2023-07-07 VITALS
HEART RATE: 90 BPM | RESPIRATION RATE: 18 BRPM | SYSTOLIC BLOOD PRESSURE: 148 MMHG | WEIGHT: 154.98 LBS | DIASTOLIC BLOOD PRESSURE: 89 MMHG | OXYGEN SATURATION: 99 % | HEIGHT: 71 IN | TEMPERATURE: 99 F

## 2023-07-07 VITALS
OXYGEN SATURATION: 99 % | SYSTOLIC BLOOD PRESSURE: 149 MMHG | DIASTOLIC BLOOD PRESSURE: 81 MMHG | HEART RATE: 84 BPM | TEMPERATURE: 99 F | RESPIRATION RATE: 18 BRPM

## 2023-07-07 DIAGNOSIS — Z86.718 PERSONAL HISTORY OF OTHER VENOUS THROMBOSIS AND EMBOLISM: ICD-10-CM

## 2023-07-07 DIAGNOSIS — I10 ESSENTIAL (PRIMARY) HYPERTENSION: ICD-10-CM
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
## 2023-07-07 DIAGNOSIS — F41.9 ANXIETY DISORDER, UNSPECIFIED: ICD-10-CM

## 2023-07-07 DIAGNOSIS — Z86.39 PERSONAL HISTORY OF OTHER ENDOCRINE, NUTRITIONAL AND METABOLIC DISEASE: ICD-10-CM
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
## 2023-07-07 DIAGNOSIS — I82.409 ACUTE EMBOLISM AND THROMBOSIS OF UNSPECIFIED DEEP VEINS OF UNSPECIFIED LOWER EXTREMITY: Chronic | ICD-10-CM

## 2023-07-07 DIAGNOSIS — K21.9 GASTRO-ESOPHAGEAL REFLUX DISEASE WITHOUT ESOPHAGITIS: ICD-10-CM

## 2023-07-07 DIAGNOSIS — G47.00 INSOMNIA, UNSPECIFIED: ICD-10-CM

## 2023-07-07 DIAGNOSIS — E78.5 HYPERLIPIDEMIA, UNSPECIFIED: ICD-10-CM

## 2023-07-07 DIAGNOSIS — T82.524A DISPLACEMENT OF INFUSION CATHETER, INITIAL ENCOUNTER: ICD-10-CM

## 2023-07-07 DIAGNOSIS — G47.30 SLEEP APNEA, UNSPECIFIED: ICD-10-CM

## 2023-07-07 DIAGNOSIS — Z79.84 LONG TERM (CURRENT) USE OF ORAL HYPOGLYCEMIC DRUGS: ICD-10-CM

## 2023-07-07 DIAGNOSIS — Z87.39 PERSONAL HISTORY OF OTHER DISEASES OF THE MUSCULOSKELETAL SYSTEM AND CONNECTIVE TISSUE: ICD-10-CM

## 2023-07-07 DIAGNOSIS — F32.A DEPRESSION, UNSPECIFIED: ICD-10-CM

## 2023-07-07 DIAGNOSIS — E11.9 TYPE 2 DIABETES MELLITUS WITHOUT COMPLICATIONS: ICD-10-CM

## 2023-07-07 DIAGNOSIS — Z79.01 LONG TERM (CURRENT) USE OF ANTICOAGULANTS: ICD-10-CM

## 2023-07-07 PROCEDURE — 36410 VNPNXR 3YR/> PHY/QHP DX/THER: CPT

## 2023-07-07 PROCEDURE — 76937 US GUIDE VASCULAR ACCESS: CPT | Mod: 26

## 2023-07-07 PROCEDURE — 99284 EMERGENCY DEPT VISIT MOD MDM: CPT

## 2023-07-07 NOTE — ED PROVIDER NOTE - PROGRESS NOTE DETAILS
Surgical PA is notified. IR was called and sts new policy is surgical PA do the midline since the last iv antibiotic dose will be on 7/14/23 mid line is more appropriate. surgical pa is here performed midline insertion. surgical pa is here performed midline insertion to the left medial arm.

## 2023-07-07 NOTE — ED PROVIDER NOTE - CONSTITUTIONAL, MLM
Well appearing, awake, alert, oriented to person, place, time/situation and in no apparent distress. Speaking in clear full sentences no nasal flaring no shoulders retractions no diaphoresis, appears very comfortable sitting up in the stretcher in the hallway normal...

## 2023-07-07 NOTE — PROCEDURE NOTE - ADDITIONAL PROCEDURE DETAILS
Patient seen at bedside for midline catheter placement.  Verbal consent given by  patient after risks/benefits/alternatives explained.   Upper extremity prepped and draped in usual sterile fashion. Time out performed with RN. 4Fr 15 cm single lumen midline catheter placed using ultrasound guided seldinger technique, R basilic vein. Flushes well, with good venous return. Patient tolerated procedure well without complication and was left in no acute distress. Upper arm circumference noted to be 34cm

## 2023-07-07 NOTE — ED ADULT TRIAGE NOTE - CHIEF COMPLAINT QUOTE
c/o picc line dislodged last night was receiving abx for sepsis until jul 14th states tegaderm loosened and line fell out, home care rn at pt's house this morning placed peripheral iv which wasn't flushing and subsequently was removed, pt states referred by pmd to replace access

## 2023-07-07 NOTE — ED PROVIDER NOTE - OBJECTIVE STATEMENT
63 years old male here sent by his visiting nurse due to PICC line was dislodged this morning Pt supposed get Cefazolin 2 gram q 8 pt received one dose given bh the visiting nurse with peripheral iv at 2:00 pm this afternoon and next dose will be at 21:00 pm today. Pt had surgery of left knee where had infection.

## 2023-07-07 NOTE — CHART NOTE - NSCHARTNOTEFT_GEN_A_CORE
Called by  to place midline as IR deferred PICC line replacement. Pt is to complete an additional 7days of abx and his PICC got dislodged. Discussed with ID about possibly switching to PO, however that was not recommended. Will attempt midline at bedside in ED and pt will not require admission for IV abx.

## 2023-07-07 NOTE — PROCEDURE NOTE - NSICDXPROCEDURE_GEN_ALL_CORE_FT
PROCEDURES:  Midline catheter insertion 07-Jul-2023 18:55:27  Yuly Rollins   Melolabial Transposition Flap Text: The defect edges were debeveled with a #15c scalpel blade.  Given the location of the defect and the proximity to free margins a melolabial flap was deemed most appropriate.  Using a sterile surgical marker, an appropriate melolabial transposition flap was drawn incorporating the defect.    The area thus outlined was incised deep to adipose tissue with a #15 scalpel blade.  The skin margins were undermined to an appropriate distance in all directions utilizing iris scissors.

## 2023-07-07 NOTE — ED ADULT NURSE NOTE - OBJECTIVE STATEMENT
Applied
63 year old male complaining of complication from IV coming out this morning. Patient receives antibiotics through PICC line that came out last night during his sleep. Pt states there was no pain or discomfort when he noticed the PICC line came out. Pt states his home nurse placed an IV in earlier this morning which also came out about 3 hours ago. Pt states that he's come into the ED to have a new IV placed to be able to receive his scheduled antibiotics. Pt denies pain, N/V, fever, and chills. Patient denies any complaints.

## 2023-07-07 NOTE — ED PROVIDER NOTE - PATIENT PORTAL LINK FT
You can access the FollowMyHealth Patient Portal offered by [facility] by registering at the following website: [email or web address]. By joining [employer]’s FollowMyHealth portal, you will also be able to view your health information using other applications (apps) compatible with our system.

## 2023-07-07 NOTE — ED PROVIDER NOTE - CLINICAL SUMMARY MEDICAL DECISION MAKING FREE TEXT BOX
hx, exam run rise medical record surgical pa placed a midline instead of PICC line due to pt's has 7 days left of iv antibiotic course.

## 2023-07-11 ENCOUNTER — APPOINTMENT (OUTPATIENT)
Dept: ORTHOPEDIC SURGERY | Facility: CLINIC | Age: 63
End: 2023-07-11
Payer: MEDICARE

## 2023-07-11 PROCEDURE — 73562 X-RAY EXAM OF KNEE 3: CPT | Mod: LT

## 2023-07-11 PROCEDURE — 99024 POSTOP FOLLOW-UP VISIT: CPT

## 2023-07-12 ENCOUNTER — LABORATORY RESULT (OUTPATIENT)
Age: 63
End: 2023-07-12

## 2023-07-12 ENCOUNTER — APPOINTMENT (OUTPATIENT)
Dept: ORTHOPEDIC SURGERY | Facility: CLINIC | Age: 63
End: 2023-07-12

## 2023-07-17 ENCOUNTER — NON-APPOINTMENT (OUTPATIENT)
Age: 63
End: 2023-07-17

## 2023-07-18 NOTE — DISCUSSION/SUMMARY
[de-identified] : 63-year-old male s/p right TKA explantation and placement of static antibiotic spacer for prosthetic joint infection with cultures positive for MSSA.  He will be continuing IV Ancef for total of 6 weeks managed by infectious disease.  He is concurrently taking rifampin.  He is on Lovenox for DVT prophylaxis.  This he will need to be transition to oral Keflex for approximately 4 weeks for extended antibiotic duration.  Prescription for cephalexin to his pharmacy.  He will need follow-up with the infectious disease team and checking of inflammatory markers.\par Follow-up in 4 weeks

## 2023-07-18 NOTE — PHYSICAL EXAM
[de-identified] : General: No acute distress\par Mental: Alert and oriented x3\par Eyes: Conjunctivitis not seen\par Chest: Symmetric chest rise, no audible wheezing\par Skin: Bilateral lower extremities absent from rashes and ulcers\par Abdomen: No distention\par \par Left knee:\par Skin: Clean, dry, intact with healed midline incision.  Limb rests in flexion due to static spacer.\par Inspection: No obvious malalignment, no masses, no swelling, no effusion. \par Tenderness: no MJLT. no LJLT. No tenderness over the medial and lateral patella facets. No ttp medial/lateral epicondyle, patella tendon, tibial tubercle, pes anserinus, or proximal fibula. \par Range of motion deferred\par Strength: 5/5 TA/GS/EHL\par Neuro: Sensation intact to light touch throughout in dp/sp/tib/madeline/saph nerve distributions\par Pulses: 2+ DP/PT pulses.\par  [de-identified] : X-rays of the left knee show static antibiotic male crossing the knee joint with surrounding cement.  There are no fractures or bony erosions.

## 2023-08-03 ENCOUNTER — APPOINTMENT (OUTPATIENT)
Dept: ORTHOPEDIC SURGERY | Facility: CLINIC | Age: 63
End: 2023-08-03
Payer: MEDICARE

## 2023-08-03 ENCOUNTER — APPOINTMENT (OUTPATIENT)
Dept: INFECTIOUS DISEASE | Facility: CLINIC | Age: 63
End: 2023-08-03

## 2023-08-03 VITALS
HEART RATE: 96 BPM | BODY MASS INDEX: 25.48 KG/M2 | HEIGHT: 71 IN | WEIGHT: 182 LBS | DIASTOLIC BLOOD PRESSURE: 77 MMHG | SYSTOLIC BLOOD PRESSURE: 127 MMHG

## 2023-08-03 DIAGNOSIS — M43.16 SPONDYLOLISTHESIS, LUMBAR REGION: ICD-10-CM

## 2023-08-03 PROCEDURE — 72100 X-RAY EXAM L-S SPINE 2/3 VWS: CPT

## 2023-08-03 PROCEDURE — 20552 NJX 1/MLT TRIGGER POINT 1/2: CPT

## 2023-08-03 PROCEDURE — 99214 OFFICE O/P EST MOD 30 MIN: CPT | Mod: 25

## 2023-08-03 NOTE — ADDENDUM
[FreeTextEntry1] : This note was written by Demetrius Townsend on 08/03/2023 acting as scribe for Dr. Gigi Paul M.D.  I, Gigi Paul MD, have read and attest that all the information, medical decision making and discharge instructions within are true and accurate.

## 2023-08-03 NOTE — PHYSICAL EXAM
[Walker] : ambulates with walker [Hook's Sign] : negative Hook's sign [Pronator Drift] : negative pronator drift [SLR] : negative straight leg raise [de-identified] : 5 out of 5 motor strength, sensation is intact and symmetrical full range of motion flexion extension and rotation, no palpatory tenderness full range of motion of hips knees shoulders and elbows (all four extremities), no atrophy, negative straight leg raise, no pathological reflexes, no swelling, normal ambulation, no apparent distress skin is intact, no palpable lymph nodes, no upper or lower extremity instability, alert and oriented x3 and normal mood. Normal finger-to nose test.  Right SI joint pain. [de-identified] : XR AP Lat Lumbar 08/03/2023 -L4-5 spondylolisthesis,  L5-S1 laminectomy and fusion- reviewed with patient.  98

## 2023-08-03 NOTE — DISCUSSION/SUMMARY
[de-identified] : S/P L5-S1 laminectomy and fusion in May 2013 with me.  L4-5 spondylolisthesis. Right sacroiliitis. Discussed all options. I offered an injection after all risks were explained including allergic reaction to an infection under sterile conditions 1 mg of Depo-Medrol and 2 cc of 1% Lidocaine without epinephrine was injected into the painful site. The patient tolerated the procedure well and received significant relief following the injection. (right SI) All options discussed including rest, medicine, home exercise, acupuncture, Chiropractic care, Physical Therapy, Pain management, and last resort surgery. All questions were answered, all alternatives discussed and the patient is in complete agreement with the treatment plan which the patient contributed to and discussed with me through the shared decision making process. Follow-up appointment as instructed. Any issues and the patient will call or come in sooner.

## 2023-08-03 NOTE — HISTORY OF PRESENT ILLNESS
[de-identified] : 63 year male presents for evaluation of lower back pain after a fall about a week ago.  S/P L5-S1 laminectomy and fusion in May 2013 with me.  Last seen in 2019 for coccygeal pain.  Per Dr. Bob's note - "S/P left TKA explantation and placement of static antibiotic spacer on 6/2/2023. The patient initially presented with acute prosthetic joint infection in April and underwent irrigation and debridement with exchange of polyethylene on 4/14/2023. Cultures were positive for MSSA and he was treated with the appropriate antibiotics. He was doing well for approximately 6 weeks and later developed reaccumulation of a suprapatellar deep abscess." He was re-hospitalized at [facility] last week for sepsis.  He is currently on oral abx.  During the hospitalization he fell while in the shower and has been having lower back pain since. Was doing fine till his recent fall. Denies radiation of pain down the legs.  He takes tylenol for the pain but has intermittent relief.  Can not take NSAIDs as he is on lovenox for DVT. He was on xarelto prior.  No fever chills sweats nausea vomiting no bowel or bladder dysfunction, no recent weight loss or gain no night pain. This history is in addition to the intake form that I personally reviewed.     [Stable] : stable

## 2023-09-05 ENCOUNTER — APPOINTMENT (OUTPATIENT)
Dept: ORTHOPEDIC SURGERY | Facility: CLINIC | Age: 63
End: 2023-09-05
Payer: MEDICARE

## 2023-09-05 PROCEDURE — 99213 OFFICE O/P EST LOW 20 MIN: CPT

## 2023-09-05 PROCEDURE — 73560 X-RAY EXAM OF KNEE 1 OR 2: CPT | Mod: LT

## 2023-09-05 NOTE — HISTORY OF PRESENT ILLNESS
[de-identified] : 3 months s/p Left TKA explant and place of antibiotic spacer. Doing well with knee, no pain. Uses cane and knee brace when ambulating. Not taking pain meds. Reports taking Rifampin and Keflex. Briefly hospitalized recently at [facility] with mental health condition. Also continues to take lovenox for history of DVT.

## 2023-09-05 NOTE — DISCUSSION/SUMMARY
[de-identified] : Begin planning for revision TKA. Needs to be off antibiotics for 2 weeks prior to testing CBC, ESR, and CRP. Recommend followup with Dr. Bay. Recommend followup with PCP for management of lovenox vs xarelto. He will be contacted when blood results are done.

## 2023-09-05 NOTE — PHYSICAL EXAM
[de-identified] : General: No acute distress Mental: Alert and oriented x3 Eyes: Conjunctivitis not seen Chest: Symmetric chest rise, no audible wheezing Skin: Bilateral lower extremities absent from rashes and ulcers Abdomen: No distention  Left knee: Skin: Clean, dry, intact with healed midline incision.   Inspection: No obvious malalignment, no masses, no swelling, no effusion.  Tenderness: no MJLT. no LJLT. No tenderness over the medial and lateral patella facets. No ttp medial/lateral epicondyle, patella tendon, tibial tubercle, pes anserinus, or proximal fibula.  No ROM due to static antibiotic spacer Strength: 5/5 TA/GS/EHL Neuro: Sensation intact to light touch throughout in dp/sp/tib/madeline/saph nerve distributions Pulses: 2+ DP/PT pulses.

## 2023-09-06 NOTE — ED ADULT NURSE NOTE - OBJECTIVE STATEMENT
Pt. had thrombectomy on 10/1/17. Noticed redness and swelling near incision site 2 days ago. Left leg appears swollen with redness. NAD noted. H Plasty Text: Given the location of the defect, shape of the defect and the proximity to free margins a H-plasty was deemed most appropriate for repair.  Using a sterile surgical marker, the appropriate advancement arms of the H-plasty were drawn incorporating the defect and placing the expected incisions within the relaxed skin tension lines where possible. The area thus outlined was incised deep to adipose tissue with a #15 scalpel blade. The skin margins were undermined to an appropriate distance in all directions utilizing iris scissors.  The opposing advancement arms were then advanced into place in opposite direction and anchored with interrupted buried subcutaneous sutures.

## 2023-09-14 ENCOUNTER — APPOINTMENT (OUTPATIENT)
Dept: ORTHOPEDIC SURGERY | Facility: CLINIC | Age: 63
End: 2023-09-14
Payer: MEDICARE

## 2023-09-14 VITALS — HEIGHT: 71 IN | WEIGHT: 182 LBS | BODY MASS INDEX: 25.48 KG/M2

## 2023-09-14 DIAGNOSIS — M51.36 OTHER INTERVERTEBRAL DISC DEGENERATION, LUMBAR REGION: ICD-10-CM

## 2023-09-14 PROCEDURE — 99214 OFFICE O/P EST MOD 30 MIN: CPT | Mod: 25

## 2023-09-14 PROCEDURE — 20552 NJX 1/MLT TRIGGER POINT 1/2: CPT

## 2023-10-02 ENCOUNTER — NON-APPOINTMENT (OUTPATIENT)
Age: 63
End: 2023-10-02

## 2023-10-12 ENCOUNTER — APPOINTMENT (OUTPATIENT)
Dept: INFECTIOUS DISEASE | Facility: CLINIC | Age: 63
End: 2023-10-12
Payer: MEDICARE

## 2023-10-12 VITALS
SYSTOLIC BLOOD PRESSURE: 140 MMHG | HEIGHT: 71 IN | WEIGHT: 175 LBS | BODY MASS INDEX: 24.5 KG/M2 | DIASTOLIC BLOOD PRESSURE: 84 MMHG | TEMPERATURE: 97.8 F | HEART RATE: 90 BPM | OXYGEN SATURATION: 100 %

## 2023-10-12 DIAGNOSIS — T84.84XA PAIN DUE TO INTERNAL ORTHOPEDIC PROSTHETIC DEVICES, IMPLANTS AND GRAFTS, INITIAL ENCOUNTER: ICD-10-CM

## 2023-10-12 DIAGNOSIS — Z96.652 PAIN DUE TO INTERNAL ORTHOPEDIC PROSTHETIC DEVICES, IMPLANTS AND GRAFTS, INITIAL ENCOUNTER: ICD-10-CM

## 2023-10-12 PROCEDURE — 99213 OFFICE O/P EST LOW 20 MIN: CPT

## 2023-10-23 ENCOUNTER — OUTPATIENT (OUTPATIENT)
Dept: OUTPATIENT SERVICES | Facility: HOSPITAL | Age: 63
LOS: 1 days | Discharge: HOME | End: 2023-10-23

## 2023-10-23 DIAGNOSIS — I82.409 ACUTE EMBOLISM AND THROMBOSIS OF UNSPECIFIED DEEP VEINS OF UNSPECIFIED LOWER EXTREMITY: Chronic | ICD-10-CM

## 2023-10-23 DIAGNOSIS — T84.54XA INFECTION AND INFLAMMATORY REACTION DUE TO INTERNAL LEFT KNEE PROSTHESIS, INITIAL ENCOUNTER: ICD-10-CM

## 2023-10-23 LAB
ALBUMIN SERPL ELPH-MCNC: 3.7 G/DL — SIGNIFICANT CHANGE UP (ref 3.3–5)
ALBUMIN SERPL ELPH-MCNC: 3.7 G/DL — SIGNIFICANT CHANGE UP (ref 3.3–5)
ALP SERPL-CCNC: 95 U/L — SIGNIFICANT CHANGE UP (ref 40–120)
ALP SERPL-CCNC: 95 U/L — SIGNIFICANT CHANGE UP (ref 40–120)
ALT FLD-CCNC: 32 U/L — SIGNIFICANT CHANGE UP (ref 12–78)
ALT FLD-CCNC: 32 U/L — SIGNIFICANT CHANGE UP (ref 12–78)
ANION GAP SERPL CALC-SCNC: 6 MMOL/L — SIGNIFICANT CHANGE UP (ref 5–17)
ANION GAP SERPL CALC-SCNC: 6 MMOL/L — SIGNIFICANT CHANGE UP (ref 5–17)
AST SERPL-CCNC: 25 U/L — SIGNIFICANT CHANGE UP (ref 15–37)
AST SERPL-CCNC: 25 U/L — SIGNIFICANT CHANGE UP (ref 15–37)
BASOPHILS # BLD AUTO: 0.09 K/UL — SIGNIFICANT CHANGE UP (ref 0–0.2)
BASOPHILS # BLD AUTO: 0.09 K/UL — SIGNIFICANT CHANGE UP (ref 0–0.2)
BASOPHILS NFR BLD AUTO: 1.2 % — SIGNIFICANT CHANGE UP (ref 0–2)
BASOPHILS NFR BLD AUTO: 1.2 % — SIGNIFICANT CHANGE UP (ref 0–2)
BILIRUB SERPL-MCNC: 0.7 MG/DL — SIGNIFICANT CHANGE UP (ref 0.2–1.2)
BILIRUB SERPL-MCNC: 0.7 MG/DL — SIGNIFICANT CHANGE UP (ref 0.2–1.2)
BUN SERPL-MCNC: 11 MG/DL — SIGNIFICANT CHANGE UP (ref 7–23)
BUN SERPL-MCNC: 11 MG/DL — SIGNIFICANT CHANGE UP (ref 7–23)
CALCIUM SERPL-MCNC: 8.6 MG/DL — SIGNIFICANT CHANGE UP (ref 8.5–10.1)
CALCIUM SERPL-MCNC: 8.6 MG/DL — SIGNIFICANT CHANGE UP (ref 8.5–10.1)
CHLORIDE SERPL-SCNC: 108 MMOL/L — SIGNIFICANT CHANGE UP (ref 96–108)
CHLORIDE SERPL-SCNC: 108 MMOL/L — SIGNIFICANT CHANGE UP (ref 96–108)
CO2 SERPL-SCNC: 25 MMOL/L — SIGNIFICANT CHANGE UP (ref 22–31)
CO2 SERPL-SCNC: 25 MMOL/L — SIGNIFICANT CHANGE UP (ref 22–31)
CREAT SERPL-MCNC: 0.97 MG/DL — SIGNIFICANT CHANGE UP (ref 0.5–1.3)
CREAT SERPL-MCNC: 0.97 MG/DL — SIGNIFICANT CHANGE UP (ref 0.5–1.3)
CRP SERPL-MCNC: 3 MG/L — SIGNIFICANT CHANGE UP
CRP SERPL-MCNC: 3 MG/L — SIGNIFICANT CHANGE UP
EGFR: 87 ML/MIN/1.73M2 — SIGNIFICANT CHANGE UP
EGFR: 87 ML/MIN/1.73M2 — SIGNIFICANT CHANGE UP
EOSINOPHIL # BLD AUTO: 0.31 K/UL — SIGNIFICANT CHANGE UP (ref 0–0.5)
EOSINOPHIL # BLD AUTO: 0.31 K/UL — SIGNIFICANT CHANGE UP (ref 0–0.5)
EOSINOPHIL NFR BLD AUTO: 4.3 % — SIGNIFICANT CHANGE UP (ref 0–6)
EOSINOPHIL NFR BLD AUTO: 4.3 % — SIGNIFICANT CHANGE UP (ref 0–6)
ERYTHROCYTE [SEDIMENTATION RATE] IN BLOOD: 31 MM/HR — HIGH (ref 0–20)
ERYTHROCYTE [SEDIMENTATION RATE] IN BLOOD: 31 MM/HR — HIGH (ref 0–20)
GLUCOSE SERPL-MCNC: 171 MG/DL — HIGH (ref 70–99)
GLUCOSE SERPL-MCNC: 171 MG/DL — HIGH (ref 70–99)
HCT VFR BLD CALC: 38.9 % — LOW (ref 39–50)
HCT VFR BLD CALC: 38.9 % — LOW (ref 39–50)
HGB BLD-MCNC: 12.3 G/DL — LOW (ref 13–17)
HGB BLD-MCNC: 12.3 G/DL — LOW (ref 13–17)
IMM GRANULOCYTES NFR BLD AUTO: 0.3 % — SIGNIFICANT CHANGE UP (ref 0–0.9)
IMM GRANULOCYTES NFR BLD AUTO: 0.3 % — SIGNIFICANT CHANGE UP (ref 0–0.9)
LYMPHOCYTES # BLD AUTO: 2.12 K/UL — SIGNIFICANT CHANGE UP (ref 1–3.3)
LYMPHOCYTES # BLD AUTO: 2.12 K/UL — SIGNIFICANT CHANGE UP (ref 1–3.3)
LYMPHOCYTES # BLD AUTO: 29.3 % — SIGNIFICANT CHANGE UP (ref 13–44)
LYMPHOCYTES # BLD AUTO: 29.3 % — SIGNIFICANT CHANGE UP (ref 13–44)
MCHC RBC-ENTMCNC: 29.1 PG — SIGNIFICANT CHANGE UP (ref 27–34)
MCHC RBC-ENTMCNC: 29.1 PG — SIGNIFICANT CHANGE UP (ref 27–34)
MCHC RBC-ENTMCNC: 31.6 G/DL — LOW (ref 32–36)
MCHC RBC-ENTMCNC: 31.6 G/DL — LOW (ref 32–36)
MCV RBC AUTO: 92 FL — SIGNIFICANT CHANGE UP (ref 80–100)
MCV RBC AUTO: 92 FL — SIGNIFICANT CHANGE UP (ref 80–100)
MONOCYTES # BLD AUTO: 0.62 K/UL — SIGNIFICANT CHANGE UP (ref 0–0.9)
MONOCYTES # BLD AUTO: 0.62 K/UL — SIGNIFICANT CHANGE UP (ref 0–0.9)
MONOCYTES NFR BLD AUTO: 8.6 % — SIGNIFICANT CHANGE UP (ref 2–14)
MONOCYTES NFR BLD AUTO: 8.6 % — SIGNIFICANT CHANGE UP (ref 2–14)
NEUTROPHILS # BLD AUTO: 4.07 K/UL — SIGNIFICANT CHANGE UP (ref 1.8–7.4)
NEUTROPHILS # BLD AUTO: 4.07 K/UL — SIGNIFICANT CHANGE UP (ref 1.8–7.4)
NEUTROPHILS NFR BLD AUTO: 56.3 % — SIGNIFICANT CHANGE UP (ref 43–77)
NEUTROPHILS NFR BLD AUTO: 56.3 % — SIGNIFICANT CHANGE UP (ref 43–77)
NRBC # BLD: 0 /100 WBCS — SIGNIFICANT CHANGE UP (ref 0–0)
NRBC # BLD: 0 /100 WBCS — SIGNIFICANT CHANGE UP (ref 0–0)
PLATELET # BLD AUTO: 352 K/UL — SIGNIFICANT CHANGE UP (ref 150–400)
PLATELET # BLD AUTO: 352 K/UL — SIGNIFICANT CHANGE UP (ref 150–400)
POTASSIUM SERPL-MCNC: 4.3 MMOL/L — SIGNIFICANT CHANGE UP (ref 3.5–5.3)
POTASSIUM SERPL-MCNC: 4.3 MMOL/L — SIGNIFICANT CHANGE UP (ref 3.5–5.3)
POTASSIUM SERPL-SCNC: 4.3 MMOL/L — SIGNIFICANT CHANGE UP (ref 3.5–5.3)
POTASSIUM SERPL-SCNC: 4.3 MMOL/L — SIGNIFICANT CHANGE UP (ref 3.5–5.3)
PROT SERPL-MCNC: 8.5 GM/DL — HIGH (ref 6–8.3)
PROT SERPL-MCNC: 8.5 GM/DL — HIGH (ref 6–8.3)
RBC # BLD: 4.23 M/UL — SIGNIFICANT CHANGE UP (ref 4.2–5.8)
RBC # BLD: 4.23 M/UL — SIGNIFICANT CHANGE UP (ref 4.2–5.8)
RBC # FLD: 14.9 % — HIGH (ref 10.3–14.5)
RBC # FLD: 14.9 % — HIGH (ref 10.3–14.5)
SODIUM SERPL-SCNC: 139 MMOL/L — SIGNIFICANT CHANGE UP (ref 135–145)
SODIUM SERPL-SCNC: 139 MMOL/L — SIGNIFICANT CHANGE UP (ref 135–145)
WBC # BLD: 7.23 K/UL — SIGNIFICANT CHANGE UP (ref 3.8–10.5)
WBC # BLD: 7.23 K/UL — SIGNIFICANT CHANGE UP (ref 3.8–10.5)
WBC # FLD AUTO: 7.23 K/UL — SIGNIFICANT CHANGE UP (ref 3.8–10.5)
WBC # FLD AUTO: 7.23 K/UL — SIGNIFICANT CHANGE UP (ref 3.8–10.5)

## 2023-10-24 ENCOUNTER — NON-APPOINTMENT (OUTPATIENT)
Age: 63
End: 2023-10-24

## 2023-10-25 ENCOUNTER — APPOINTMENT (OUTPATIENT)
Dept: ORTHOPEDIC SURGERY | Facility: CLINIC | Age: 63
End: 2023-10-25
Payer: MEDICARE

## 2023-10-25 DIAGNOSIS — Z96.652 PRESENCE OF LEFT ARTIFICIAL KNEE JOINT: ICD-10-CM

## 2023-10-25 PROCEDURE — 99214 OFFICE O/P EST MOD 30 MIN: CPT | Mod: 25

## 2023-10-25 PROCEDURE — 73562 X-RAY EXAM OF KNEE 3: CPT | Mod: LT

## 2023-10-25 PROCEDURE — 20610 DRAIN/INJ JOINT/BURSA W/O US: CPT | Mod: LT

## 2023-10-28 LAB
CULTURE RESULTS: SIGNIFICANT CHANGE UP
SPECIMEN SOURCE: SIGNIFICANT CHANGE UP

## 2023-11-01 NOTE — ED ADULT TRIAGE NOTE - RESPIRATORY RATE (BREATHS/MIN)
Please note/advise. Thank you. Pt has an appt with Dr. Crockett on 12/13.    Pt and two daughters came to the clinic today and the daughters state that the pt was recently diagnosed with blood clots in his bilateral lower extremities by U/S and is being treat with Eliquis, which was prescribed by Dr. Cosme (cardiology) at [facility].     Daughter states that the Eliquis is too costly (pt last does of Eliquis is today (11/1) and will be transitioning to Xarelto starting tomorrow (11/2) as per Dr. Cosme's approval.     Daughters state that the pt may be transitioned back to warfarin, if either medications become too costly. Informed daughters that this would need to be done with cardiology approval. Daughters were instructed to contact Dr. Crockett and the [facility] with any possible changes to medications or if the pt will be running out of Xarelto (about 7-10 before)     Daughters voiced concern about the possible reason the pt may have developed blood clots and were asking if the pt should see any other specialists like a vascular surgeon or hematologist?    Based on the latest information, the [facility] may be discharging the pt and a new referral would be need if pt is restarted with warfarin.   20

## 2023-11-02 ENCOUNTER — EMERGENCY (EMERGENCY)
Facility: HOSPITAL | Age: 63
LOS: 0 days | Discharge: HOME | End: 2023-11-02
Payer: MEDICARE

## 2023-11-02 VITALS
TEMPERATURE: 100 F | SYSTOLIC BLOOD PRESSURE: 169 MMHG | RESPIRATION RATE: 18 BRPM | HEART RATE: 83 BPM | DIASTOLIC BLOOD PRESSURE: 89 MMHG | OXYGEN SATURATION: 97 %

## 2023-11-02 VITALS
HEART RATE: 78 BPM | SYSTOLIC BLOOD PRESSURE: 165 MMHG | RESPIRATION RATE: 17 BRPM | OXYGEN SATURATION: 98 % | DIASTOLIC BLOOD PRESSURE: 86 MMHG | TEMPERATURE: 98 F | WEIGHT: 188.94 LBS | HEIGHT: 71 IN

## 2023-11-02 DIAGNOSIS — I10 ESSENTIAL (PRIMARY) HYPERTENSION: ICD-10-CM

## 2023-11-02 DIAGNOSIS — F41.9 ANXIETY DISORDER, UNSPECIFIED: ICD-10-CM

## 2023-11-02 DIAGNOSIS — Z79.84 LONG TERM (CURRENT) USE OF ORAL HYPOGLYCEMIC DRUGS: ICD-10-CM
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
## 2023-11-02 DIAGNOSIS — E78.5 HYPERLIPIDEMIA, UNSPECIFIED: ICD-10-CM
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
## 2023-11-02 DIAGNOSIS — M54.9 DORSALGIA, UNSPECIFIED: ICD-10-CM

## 2023-11-02 DIAGNOSIS — Z87.19 PERSONAL HISTORY OF OTHER DISEASES OF THE DIGESTIVE SYSTEM: ICD-10-CM

## 2023-11-02 DIAGNOSIS — S22.42XA MULTIPLE FRACTURES OF RIBS, LEFT SIDE, INITIAL ENCOUNTER FOR CLOSED FRACTURE: ICD-10-CM

## 2023-11-02 DIAGNOSIS — I82.409 ACUTE EMBOLISM AND THROMBOSIS OF UNSPECIFIED DEEP VEINS OF UNSPECIFIED LOWER EXTREMITY: Chronic | ICD-10-CM

## 2023-11-02 DIAGNOSIS — R07.81 PLEURODYNIA: ICD-10-CM

## 2023-11-02 DIAGNOSIS — F32.A DEPRESSION, UNSPECIFIED: ICD-10-CM

## 2023-11-02 DIAGNOSIS — E11.9 TYPE 2 DIABETES MELLITUS WITHOUT COMPLICATIONS: ICD-10-CM

## 2023-11-02 DIAGNOSIS — Z86.718 PERSONAL HISTORY OF OTHER VENOUS THROMBOSIS AND EMBOLISM: ICD-10-CM

## 2023-11-02 LAB
ALBUMIN SERPL ELPH-MCNC: 3.5 G/DL — SIGNIFICANT CHANGE UP (ref 3.3–5)
ALBUMIN SERPL ELPH-MCNC: 3.5 G/DL — SIGNIFICANT CHANGE UP (ref 3.3–5)
ALP SERPL-CCNC: 97 U/L — SIGNIFICANT CHANGE UP (ref 40–120)
ALP SERPL-CCNC: 97 U/L — SIGNIFICANT CHANGE UP (ref 40–120)
ALT FLD-CCNC: 26 U/L — SIGNIFICANT CHANGE UP (ref 12–78)
ALT FLD-CCNC: 26 U/L — SIGNIFICANT CHANGE UP (ref 12–78)
ANION GAP SERPL CALC-SCNC: 5 MMOL/L — SIGNIFICANT CHANGE UP (ref 5–17)
ANION GAP SERPL CALC-SCNC: 5 MMOL/L — SIGNIFICANT CHANGE UP (ref 5–17)
AST SERPL-CCNC: 21 U/L — SIGNIFICANT CHANGE UP (ref 15–37)
AST SERPL-CCNC: 21 U/L — SIGNIFICANT CHANGE UP (ref 15–37)
BASOPHILS # BLD AUTO: 0.07 K/UL — SIGNIFICANT CHANGE UP (ref 0–0.2)
BASOPHILS # BLD AUTO: 0.07 K/UL — SIGNIFICANT CHANGE UP (ref 0–0.2)
BASOPHILS NFR BLD AUTO: 1 % — SIGNIFICANT CHANGE UP (ref 0–2)
BASOPHILS NFR BLD AUTO: 1 % — SIGNIFICANT CHANGE UP (ref 0–2)
BILIRUB SERPL-MCNC: 1.1 MG/DL — SIGNIFICANT CHANGE UP (ref 0.2–1.2)
BILIRUB SERPL-MCNC: 1.1 MG/DL — SIGNIFICANT CHANGE UP (ref 0.2–1.2)
BUN SERPL-MCNC: 10 MG/DL — SIGNIFICANT CHANGE UP (ref 7–23)
BUN SERPL-MCNC: 10 MG/DL — SIGNIFICANT CHANGE UP (ref 7–23)
CALCIUM SERPL-MCNC: 9.1 MG/DL — SIGNIFICANT CHANGE UP (ref 8.5–10.1)
CALCIUM SERPL-MCNC: 9.1 MG/DL — SIGNIFICANT CHANGE UP (ref 8.5–10.1)
CHLORIDE SERPL-SCNC: 103 MMOL/L — SIGNIFICANT CHANGE UP (ref 96–108)
CHLORIDE SERPL-SCNC: 103 MMOL/L — SIGNIFICANT CHANGE UP (ref 96–108)
CO2 SERPL-SCNC: 28 MMOL/L — SIGNIFICANT CHANGE UP (ref 22–31)
CO2 SERPL-SCNC: 28 MMOL/L — SIGNIFICANT CHANGE UP (ref 22–31)
CREAT SERPL-MCNC: 0.89 MG/DL — SIGNIFICANT CHANGE UP (ref 0.5–1.3)
CREAT SERPL-MCNC: 0.89 MG/DL — SIGNIFICANT CHANGE UP (ref 0.5–1.3)
EGFR: 96 ML/MIN/1.73M2 — SIGNIFICANT CHANGE UP
EGFR: 96 ML/MIN/1.73M2 — SIGNIFICANT CHANGE UP
EOSINOPHIL # BLD AUTO: 0.17 K/UL — SIGNIFICANT CHANGE UP (ref 0–0.5)
EOSINOPHIL # BLD AUTO: 0.17 K/UL — SIGNIFICANT CHANGE UP (ref 0–0.5)
EOSINOPHIL NFR BLD AUTO: 2.4 % — SIGNIFICANT CHANGE UP (ref 0–6)
EOSINOPHIL NFR BLD AUTO: 2.4 % — SIGNIFICANT CHANGE UP (ref 0–6)
GLUCOSE SERPL-MCNC: 174 MG/DL — HIGH (ref 70–99)
GLUCOSE SERPL-MCNC: 174 MG/DL — HIGH (ref 70–99)
HCT VFR BLD CALC: 36.5 % — LOW (ref 39–50)
HCT VFR BLD CALC: 36.5 % — LOW (ref 39–50)
HGB BLD-MCNC: 11.9 G/DL — LOW (ref 13–17)
HGB BLD-MCNC: 11.9 G/DL — LOW (ref 13–17)
IMM GRANULOCYTES NFR BLD AUTO: 0.3 % — SIGNIFICANT CHANGE UP (ref 0–0.9)
IMM GRANULOCYTES NFR BLD AUTO: 0.3 % — SIGNIFICANT CHANGE UP (ref 0–0.9)
LYMPHOCYTES # BLD AUTO: 1.41 K/UL — SIGNIFICANT CHANGE UP (ref 1–3.3)
LYMPHOCYTES # BLD AUTO: 1.41 K/UL — SIGNIFICANT CHANGE UP (ref 1–3.3)
LYMPHOCYTES # BLD AUTO: 20.3 % — SIGNIFICANT CHANGE UP (ref 13–44)
LYMPHOCYTES # BLD AUTO: 20.3 % — SIGNIFICANT CHANGE UP (ref 13–44)
MCHC RBC-ENTMCNC: 29.5 PG — SIGNIFICANT CHANGE UP (ref 27–34)
MCHC RBC-ENTMCNC: 29.5 PG — SIGNIFICANT CHANGE UP (ref 27–34)
MCHC RBC-ENTMCNC: 32.6 G/DL — SIGNIFICANT CHANGE UP (ref 32–36)
MCHC RBC-ENTMCNC: 32.6 G/DL — SIGNIFICANT CHANGE UP (ref 32–36)
MCV RBC AUTO: 90.3 FL — SIGNIFICANT CHANGE UP (ref 80–100)
MCV RBC AUTO: 90.3 FL — SIGNIFICANT CHANGE UP (ref 80–100)
MONOCYTES # BLD AUTO: 0.84 K/UL — SIGNIFICANT CHANGE UP (ref 0–0.9)
MONOCYTES # BLD AUTO: 0.84 K/UL — SIGNIFICANT CHANGE UP (ref 0–0.9)
MONOCYTES NFR BLD AUTO: 12.1 % — SIGNIFICANT CHANGE UP (ref 2–14)
MONOCYTES NFR BLD AUTO: 12.1 % — SIGNIFICANT CHANGE UP (ref 2–14)
NEUTROPHILS # BLD AUTO: 4.44 K/UL — SIGNIFICANT CHANGE UP (ref 1.8–7.4)
NEUTROPHILS # BLD AUTO: 4.44 K/UL — SIGNIFICANT CHANGE UP (ref 1.8–7.4)
NEUTROPHILS NFR BLD AUTO: 63.9 % — SIGNIFICANT CHANGE UP (ref 43–77)
NEUTROPHILS NFR BLD AUTO: 63.9 % — SIGNIFICANT CHANGE UP (ref 43–77)
NRBC # BLD: 0 /100 WBCS — SIGNIFICANT CHANGE UP (ref 0–0)
NRBC # BLD: 0 /100 WBCS — SIGNIFICANT CHANGE UP (ref 0–0)
PLATELET # BLD AUTO: 250 K/UL — SIGNIFICANT CHANGE UP (ref 150–400)
PLATELET # BLD AUTO: 250 K/UL — SIGNIFICANT CHANGE UP (ref 150–400)
POTASSIUM SERPL-MCNC: 4 MMOL/L — SIGNIFICANT CHANGE UP (ref 3.5–5.3)
POTASSIUM SERPL-MCNC: 4 MMOL/L — SIGNIFICANT CHANGE UP (ref 3.5–5.3)
POTASSIUM SERPL-SCNC: 4 MMOL/L — SIGNIFICANT CHANGE UP (ref 3.5–5.3)
POTASSIUM SERPL-SCNC: 4 MMOL/L — SIGNIFICANT CHANGE UP (ref 3.5–5.3)
PROT SERPL-MCNC: 8.1 GM/DL — SIGNIFICANT CHANGE UP (ref 6–8.3)
PROT SERPL-MCNC: 8.1 GM/DL — SIGNIFICANT CHANGE UP (ref 6–8.3)
RBC # BLD: 4.04 M/UL — LOW (ref 4.2–5.8)
RBC # BLD: 4.04 M/UL — LOW (ref 4.2–5.8)
RBC # FLD: 14 % — SIGNIFICANT CHANGE UP (ref 10.3–14.5)
RBC # FLD: 14 % — SIGNIFICANT CHANGE UP (ref 10.3–14.5)
SODIUM SERPL-SCNC: 136 MMOL/L — SIGNIFICANT CHANGE UP (ref 135–145)
SODIUM SERPL-SCNC: 136 MMOL/L — SIGNIFICANT CHANGE UP (ref 135–145)
WBC # BLD: 6.95 K/UL — SIGNIFICANT CHANGE UP (ref 3.8–10.5)
WBC # BLD: 6.95 K/UL — SIGNIFICANT CHANGE UP (ref 3.8–10.5)
WBC # FLD AUTO: 6.95 K/UL — SIGNIFICANT CHANGE UP (ref 3.8–10.5)
WBC # FLD AUTO: 6.95 K/UL — SIGNIFICANT CHANGE UP (ref 3.8–10.5)

## 2023-11-02 PROCEDURE — 71260 CT THORAX DX C+: CPT | Mod: 26,MA

## 2023-11-02 PROCEDURE — 99285 EMERGENCY DEPT VISIT HI MDM: CPT

## 2023-11-02 PROCEDURE — 70450 CT HEAD/BRAIN W/O DYE: CPT | Mod: 26,MA

## 2023-11-02 RX ORDER — ACETAMINOPHEN 500 MG
1000 TABLET ORAL ONCE
Refills: 0 | Status: COMPLETED | OUTPATIENT
Start: 2023-11-02 | End: 2023-11-02

## 2023-11-02 RX ORDER — OXYCODONE HYDROCHLORIDE 5 MG/1
1 TABLET ORAL
Qty: 9 | Refills: 0
Start: 2023-11-02 | End: 2023-11-04

## 2023-11-02 RX ORDER — MORPHINE SULFATE 50 MG/1
4 CAPSULE, EXTENDED RELEASE ORAL ONCE
Refills: 0 | Status: DISCONTINUED | OUTPATIENT
Start: 2023-11-02 | End: 2023-11-02

## 2023-11-02 RX ORDER — LIDOCAINE 4 G/100G
1 CREAM TOPICAL ONCE
Refills: 0 | Status: COMPLETED | OUTPATIENT
Start: 2023-11-02 | End: 2023-11-02

## 2023-11-02 RX ADMIN — MORPHINE SULFATE 4 MILLIGRAM(S): 50 CAPSULE, EXTENDED RELEASE ORAL at 14:24

## 2023-11-02 RX ADMIN — LIDOCAINE 1 PATCH: 4 CREAM TOPICAL at 14:23

## 2023-11-02 RX ADMIN — Medication 1000 MILLIGRAM(S): at 14:00

## 2023-11-02 RX ADMIN — MORPHINE SULFATE 4 MILLIGRAM(S): 50 CAPSULE, EXTENDED RELEASE ORAL at 15:00

## 2023-11-02 RX ADMIN — Medication 400 MILLIGRAM(S): at 13:26
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
## 2023-11-02 NOTE — ED PROVIDER NOTE - RESPIRATORY CHEST TENDERNESS
no ecchymosis, bruising, abrasion or laceration, no flail chest, no obvious deformity/LEFT POSTERIOR/LEFT LATERAL THORACIC

## 2023-11-02 NOTE — ED PROVIDER NOTE - MUSCULOSKELETAL, MLM
Spine appears normal, range of motion is not limited, no muscle or joint tenderness Stelara Counseling:  I discussed with the patient the risks of ustekinumab including but not limited to immunosuppression, malignancy, posterior leukoencephalopathy syndrome, and serious infections.  The patient understands that monitoring is required including a PPD at baseline and must alert us or the primary physician if symptoms of infection or other concerning signs are noted.
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
## 2023-11-02 NOTE — ED PROVIDER NOTE - PATIENT PORTAL LINK FT
You can access the FollowMyHealth Patient Portal offered by [facility] by registering at the following website: [email or web address]. By joining [facility]’s FollowMyHealth portal, you will also be able to view your health information using other applications (apps) compatible with our system.
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
## 2023-11-10 PROBLEM — Z96.652 PRESENCE OF LEFT ARTIFICIAL KNEE JOINT: Status: ACTIVE | Noted: 2023-05-24

## 2023-11-15 ENCOUNTER — APPOINTMENT (OUTPATIENT)
Dept: ORTHOPEDIC SURGERY | Facility: CLINIC | Age: 63
End: 2023-11-15

## 2023-12-28 ENCOUNTER — APPOINTMENT (OUTPATIENT)
Dept: ORTHOPEDIC SURGERY | Facility: HOSPITAL | Age: 63
End: 2023-12-28

## 2024-01-02 ENCOUNTER — APPOINTMENT (OUTPATIENT)
Dept: ORTHOPEDIC SURGERY | Facility: CLINIC | Age: 64
End: 2024-01-02

## 2024-01-17 ENCOUNTER — NON-APPOINTMENT (OUTPATIENT)
Age: 64
End: 2024-01-17

## 2024-01-25 ENCOUNTER — NON-APPOINTMENT (OUTPATIENT)
Age: 64
End: 2024-01-25

## 2024-01-25 ENCOUNTER — OUTPATIENT (OUTPATIENT)
Dept: OUTPATIENT SERVICES | Facility: HOSPITAL | Age: 64
LOS: 1 days | Discharge: HOME | End: 2024-01-25
Payer: MEDICARE

## 2024-01-25 VITALS
SYSTOLIC BLOOD PRESSURE: 113 MMHG | WEIGHT: 179.9 LBS | TEMPERATURE: 98 F | RESPIRATION RATE: 18 BRPM | HEART RATE: 79 BPM | DIASTOLIC BLOOD PRESSURE: 64 MMHG | HEIGHT: 71 IN | OXYGEN SATURATION: 98 %

## 2024-01-25 DIAGNOSIS — T84.54XA INFECTION AND INFLAMMATORY REACTION DUE TO INTERNAL LEFT KNEE PROSTHESIS, INITIAL ENCOUNTER: ICD-10-CM

## 2024-01-25 DIAGNOSIS — E11.9 TYPE 2 DIABETES MELLITUS WITHOUT COMPLICATIONS: ICD-10-CM

## 2024-01-25 DIAGNOSIS — F41.9 ANXIETY DISORDER, UNSPECIFIED: ICD-10-CM

## 2024-01-25 DIAGNOSIS — Z98.890 OTHER SPECIFIED POSTPROCEDURAL STATES: Chronic | ICD-10-CM

## 2024-01-25 DIAGNOSIS — I10 ESSENTIAL (PRIMARY) HYPERTENSION: ICD-10-CM

## 2024-01-25 DIAGNOSIS — I82.409 ACUTE EMBOLISM AND THROMBOSIS OF UNSPECIFIED DEEP VEINS OF UNSPECIFIED LOWER EXTREMITY: ICD-10-CM

## 2024-01-25 DIAGNOSIS — Z01.818 ENCOUNTER FOR OTHER PREPROCEDURAL EXAMINATION: ICD-10-CM

## 2024-01-25 DIAGNOSIS — Z96.652 PRESENCE OF LEFT ARTIFICIAL KNEE JOINT: ICD-10-CM

## 2024-01-25 DIAGNOSIS — I82.409 ACUTE EMBOLISM AND THROMBOSIS OF UNSPECIFIED DEEP VEINS OF UNSPECIFIED LOWER EXTREMITY: Chronic | ICD-10-CM

## 2024-01-25 LAB
A1C WITH ESTIMATED AVERAGE GLUCOSE RESULT: 7.2 % — HIGH (ref 4–5.6)
ALBUMIN SERPL ELPH-MCNC: 3.5 G/DL — SIGNIFICANT CHANGE UP (ref 3.3–5)
ALP SERPL-CCNC: 96 U/L — SIGNIFICANT CHANGE UP (ref 40–120)
ALT FLD-CCNC: 25 U/L — SIGNIFICANT CHANGE UP (ref 12–78)
ANION GAP SERPL CALC-SCNC: 2 MMOL/L — LOW (ref 5–17)
APTT BLD: 34.5 SEC — SIGNIFICANT CHANGE UP (ref 24.5–35.6)
AST SERPL-CCNC: 28 U/L — SIGNIFICANT CHANGE UP (ref 15–37)
BASOPHILS # BLD AUTO: 0.06 K/UL — SIGNIFICANT CHANGE UP (ref 0–0.2)
BASOPHILS NFR BLD AUTO: 0.8 % — SIGNIFICANT CHANGE UP (ref 0–2)
BILIRUB SERPL-MCNC: 0.3 MG/DL — SIGNIFICANT CHANGE UP (ref 0.2–1.2)
BLD GP AB SCN SERPL QL: SIGNIFICANT CHANGE UP
BUN SERPL-MCNC: 13 MG/DL — SIGNIFICANT CHANGE UP (ref 7–23)
CALCIUM SERPL-MCNC: 8.7 MG/DL — SIGNIFICANT CHANGE UP (ref 8.5–10.1)
CHLORIDE SERPL-SCNC: 109 MMOL/L — HIGH (ref 96–108)
CO2 SERPL-SCNC: 27 MMOL/L — SIGNIFICANT CHANGE UP (ref 22–31)
CREAT SERPL-MCNC: 0.92 MG/DL — SIGNIFICANT CHANGE UP (ref 0.5–1.3)
EGFR: 93 ML/MIN/1.73M2 — SIGNIFICANT CHANGE UP
EOSINOPHIL # BLD AUTO: 0.39 K/UL — SIGNIFICANT CHANGE UP (ref 0–0.5)
EOSINOPHIL NFR BLD AUTO: 5.2 % — SIGNIFICANT CHANGE UP (ref 0–6)
ESTIMATED AVERAGE GLUCOSE: 160 MG/DL — HIGH (ref 68–114)
GLUCOSE SERPL-MCNC: 122 MG/DL — HIGH (ref 70–99)
HCT VFR BLD CALC: 38.6 % — LOW (ref 39–50)
HGB BLD-MCNC: 12.8 G/DL — LOW (ref 13–17)
IMM GRANULOCYTES NFR BLD AUTO: 0.3 % — SIGNIFICANT CHANGE UP (ref 0–0.9)
INR BLD: 1.03 RATIO — SIGNIFICANT CHANGE UP (ref 0.85–1.18)
LYMPHOCYTES # BLD AUTO: 1.88 K/UL — SIGNIFICANT CHANGE UP (ref 1–3.3)
LYMPHOCYTES # BLD AUTO: 25.1 % — SIGNIFICANT CHANGE UP (ref 13–44)
MCHC RBC-ENTMCNC: 29.2 PG — SIGNIFICANT CHANGE UP (ref 27–34)
MCHC RBC-ENTMCNC: 33.2 G/DL — SIGNIFICANT CHANGE UP (ref 32–36)
MCV RBC AUTO: 88.1 FL — SIGNIFICANT CHANGE UP (ref 80–100)
MONOCYTES # BLD AUTO: 0.81 K/UL — SIGNIFICANT CHANGE UP (ref 0–0.9)
MONOCYTES NFR BLD AUTO: 10.8 % — SIGNIFICANT CHANGE UP (ref 2–14)
MRSA PCR RESULT.: SIGNIFICANT CHANGE UP
NEUTROPHILS # BLD AUTO: 4.32 K/UL — SIGNIFICANT CHANGE UP (ref 1.8–7.4)
NEUTROPHILS NFR BLD AUTO: 57.8 % — SIGNIFICANT CHANGE UP (ref 43–77)
NRBC # BLD: 0 /100 WBCS — SIGNIFICANT CHANGE UP (ref 0–0)
PLATELET # BLD AUTO: 316 K/UL — SIGNIFICANT CHANGE UP (ref 150–400)
POTASSIUM SERPL-MCNC: 4.3 MMOL/L — SIGNIFICANT CHANGE UP (ref 3.5–5.3)
POTASSIUM SERPL-SCNC: 4.3 MMOL/L — SIGNIFICANT CHANGE UP (ref 3.5–5.3)
PROT SERPL-MCNC: 7.6 GM/DL — SIGNIFICANT CHANGE UP (ref 6–8.3)
PROTHROM AB SERPL-ACNC: 12.3 SEC — SIGNIFICANT CHANGE UP (ref 9.5–13)
RBC # BLD: 4.38 M/UL — SIGNIFICANT CHANGE UP (ref 4.2–5.8)
RBC # FLD: 15 % — HIGH (ref 10.3–14.5)
S AUREUS DNA NOSE QL NAA+PROBE: SIGNIFICANT CHANGE UP
SODIUM SERPL-SCNC: 138 MMOL/L — SIGNIFICANT CHANGE UP (ref 135–145)
VIT D25+D1,25 OH+D1,25 PNL SERPL-MCNC: 49.5 PG/ML — SIGNIFICANT CHANGE UP (ref 19.9–79.3)
WBC # BLD: 7.48 K/UL — SIGNIFICANT CHANGE UP (ref 3.8–10.5)
WBC # FLD AUTO: 7.48 K/UL — SIGNIFICANT CHANGE UP (ref 3.8–10.5)

## 2024-01-25 PROCEDURE — 93010 ELECTROCARDIOGRAM REPORT: CPT

## 2024-01-25 RX ORDER — TRAMADOL HYDROCHLORIDE 50 MG/1
1 TABLET ORAL
Refills: 0 | DISCHARGE

## 2024-01-25 RX ORDER — OXYMETAZOLINE HYDROCHLORIDE 0.5 MG/ML
2 SPRAY NASAL
Qty: 0 | Refills: 0 | DISCHARGE

## 2024-01-25 NOTE — H&P PST ADULT - HISTORY OF PRESENT ILLNESS
63M PMH asthma(Denies intubation) HTN, DM, LLE dvt (2019 on xarelto) presents to PST for scheduled total left knee revision replacement removal of antibiotic spacer on 2/8/24 with      pt is a poor historian  63M PMH asthma(Denies intubation) HTN, DM, LLE dvt (2017 on xarelto) presents to PST for scheduled total left knee revision replacement removal of antibiotic spacer on 2/8/24 with      pt is a poor historian

## 2024-01-25 NOTE — OCCUPATIONAL THERAPY INITIAL EVALUATION ADULT - LIVES WITH, PROFILE
in a private house with 4 entry steps equipped with bilateral hand rails that cannot be reached simultaneously . Once inside, pt has to negotiate a flight of stairs, with14 steps left ascending handrail, to access the bedroom and bathroom. The bathroom has a stall shower  fixed shower head and standard toilet with adequate space to fit a commode over it. Pt stated he is in the process of selling his home and his primary residence is in [place]/[facility]

## 2024-01-25 NOTE — OCCUPATIONAL THERAPY INITIAL EVALUATION ADULT - RANGE OF MOTION EXAMINATION, UPPER EXTREMITY
bilateral UE Active ROM was WNL (within normal limits)/bilateral UE Active Assistive ROM was WNL (within normal limits)

## 2024-01-25 NOTE — OCCUPATIONAL THERAPY INITIAL EVALUATION ADULT - SOCIAL CONCERNS
Pt voiced concerns about his recovery at home. Pt endorsed that his spouse will be able to assist him after he is discharge home post-operatively. to his primary residence in [place]. Pt has no support in [place]./Complex psychosocial needs/coping issues Pt voiced concerns about his recovery at home. Pt endorsed that his spouse will be able to assist him after he is discharge home post-operatively to his primary residence in [place]. Pt has no support in [place]./Complex psychosocial needs/coping issues

## 2024-01-25 NOTE — H&P PST ADULT - PROBLEM SELECTOR PLAN 2
Labs-CBC, BMP, PT/INR, PTT ,T&S, Nose Cx, EKG   Medical/cardiac clearance required    Preop Hibiclens x 3 day instructions reviewed and given. Instructed on if Cx is positive use Mupirocin 5 days and checklist given in booklet   Take routine meds DOS with small sips of water, avoid NSAIDs and OTC supplements, verbalized understanding information on proper nutrition, increase protein and better food choices provided in booklet.    Ensure clear not given 2/2 hx DM  Anesthesiologist to review PST labs, EKG, required clearances, and optimization for surgery

## 2024-01-25 NOTE — OCCUPATIONAL THERAPY INITIAL EVALUATION ADULT - PRECAUTIONS/LIMITATIONS, REHAB EVAL
Pt's mobility and ADL management is limited due to pain in left knee ./fall precautions/obesity precautions

## 2024-01-25 NOTE — H&P PST ADULT - NSICDXPASTSURGICALHX_GEN_ALL_CORE_FT
PAST SURGICAL HISTORY:  Deep vein thrombosis (DVT) of lower extremity     H/O laminectomy     H/O laser iridotomy left-2008    H/O left knee surgery     R Knee Arthroplasty Right 2003, Left 2011,    Reflux Gastroscopy 2013

## 2024-01-25 NOTE — H&P PST ADULT - ASSESSMENT
63M PMH asthma, HTN, DM, LLE dvt (2019 on xarelto) presents to PST for scheduled total left knee revision replacement removal of antibiotic spacer on 24 with Dr.Weinberg CAPRINI SCORE [CLOT]    AGE RELATED RISK FACTORS                                                       MOBILITY RELATED FACTORS  [ ] Age 41-60 years                                            (1 Point)                  [ ] Bed rest                                                        (1 Point)  [ x] Age: 61-74 years                                           (2 Points)                 [ ] Plaster cast                                                   (2 Points)  [ ] Age= 75 years                                              (3 Points)                 [ ] Bed bound for more than 72 hours                 (2 Points)    DISEASE RELATED RISK FACTORS                                               GENDER SPECIFIC FACTORS  [ ] Edema in the lower extremities                       (1 Point)                  [ ] Pregnancy                                                     (1 Point)  [ ] Varicose veins                                               (1 Point)                  [ ] Post-partum < 6 weeks                                   (1 Point)             [x ] BMI > 25 Kg/m2                                            (1 Point)                  [ ] Hormonal therapy  or oral contraception          (1 Point)                 [ ] Sepsis (in the previous month)                        (1 Point)                  [ ] History of pregnancy complications                 (1 point)  [ ] Pneumonia or serious lung disease                                               [ ] Unexplained or recurrent                     (1 Point)           (in the previous month)                               (1 Point)  [ ] Abnormal pulmonary function test                     (1 Point)                 SURGERY RELATED RISK FACTORS  [ ] Acute myocardial infarction                              (1 Point)                 [ ]  Section                                             (1 Point)  [ ] Congestive heart failure (in the previous month)  (1 Point)               [ ] Minor surgery                                                  (1 Point)   [ ] Inflammatory bowel disease                             (1 Point)                 [ ] Arthroscopic surgery                                        (2 Points)  [ ] Central venous access                                      (2 Points)                [ ] General surgery lasting more than 45 minutes   (2 Points)       [ ] Stroke (in the previous month)                          (5 Points)               [x] Elective arthroplasty                                         (5 Points)                                                                                                                                               HEMATOLOGY RELATED FACTORS                                                 TRAUMA RELATED RISK FACTORS  [x ] Prior episodes of VTE                                     (3 Points)                [ ] Fracture of the hip, pelvis, or leg                       (5 Points)  [ ] Positive family history for VTE                         (3 Points)                 [ ] Acute spinal cord injury (in the previous month)  (5 Points)  [ ] Prothrombin 93680 A                                     (3 Points)                 [ ] Paralysis  (less than 1 month)                             (5 Points)  [ ] Factor V Leiden                                             (3 Points)                  [ ] Multiple Trauma within 1 month                        (5 Points)  [ ] Lupus anticoagulants                                     (3 Points)                                                           [ ] Anticardiolipin antibodies                               (3 Points)                                                       [ ] High homocysteine in the blood                      (3 Points)                                             [ ] Other congenital or acquired thrombophilia      (3 Points)                                                [ ] Heparin induced thrombocytopenia                  (3 Points)                                          Total Score [       11   ]    Caprini Score 0 - 2:  Low Risk, No VTE Prophylaxis required for most patients, encourage ambulation  Caprini Score 3 - 6:  At Risk, pharmacologic VTE prophylaxis is indicated for most patients (in the absence of a contraindication)  Caprini Score Greater than or = 7:  High Risk, pharmacologic VTE prophylaxis is indicated for most patients (in the absence of a contraindication)

## 2024-01-25 NOTE — OCCUPATIONAL THERAPY INITIAL EVALUATION ADULT - PERTINENT HX OF CURRENT PROBLEM, REHAB EVAL
Pt is a 62 y/o male slated for elective surgery for left TKR with MD Bob on 2/8/24 due to OA, chronic pain and DJD. Pt reported buckling in his left knee, but denied any falls in the past 3-6 months. Pt has left knee spacer due to infection.

## 2024-01-25 NOTE — H&P PST ADULT - ATTENDING COMMENTS
Presents for left revision total knee replacement. 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 s/p explant of TKA and placement of antibiotic spacer. Complete course of IV Abx and extended PO abx. Recent CRP normal and dry aspiration of knee. He has been off antibiotics for 5 months without recurrent symptoms.   vitals/labs reviewed  stopped xarelto 5 days ago, taking lovenox for past 5 days    LLE:  intact skin, healed midline scar  minimal swelling  intact EHL/FHL/GS/TA  SILT throughout foot  palp DP      Discussed risks/benefits/alternatives in detail with patient.   Discussed plan for revision TKA, we will send intraop cultures.  Informed consent signed.  NPO  cleared by cardiac, pulm, hematology, PCP

## 2024-01-25 NOTE — OCCUPATIONAL THERAPY INITIAL EVALUATION ADULT - NSOTDISCHREC_GEN_A_CORE
postoperatively to prevent falls, optimize pt's ability for ADL management & safely navigate in all terrains/Sub-acute Rehab

## 2024-01-25 NOTE — OCCUPATIONAL THERAPY INITIAL EVALUATION ADULT - RANGE OF MOTION EXAMINATION, LOWER EXTREMITY
Pt is unable to flex left knee due to spacer, ROM in left hip  is WFL/Right LE Active ROM was WNL(within normal limits)/Right LE Passive ROM was WNL (within normal limits)
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
## 2024-01-26 ENCOUNTER — NON-APPOINTMENT (OUTPATIENT)
Age: 64
End: 2024-01-26

## 2024-01-26 ENCOUNTER — APPOINTMENT (OUTPATIENT)
Dept: INTERNAL MEDICINE | Facility: CLINIC | Age: 64
End: 2024-01-26
Payer: MEDICARE

## 2024-01-26 VITALS
TEMPERATURE: 98.1 F | SYSTOLIC BLOOD PRESSURE: 130 MMHG | HEART RATE: 84 BPM | DIASTOLIC BLOOD PRESSURE: 89 MMHG | OXYGEN SATURATION: 97 % | RESPIRATION RATE: 16 BRPM

## 2024-01-26 DIAGNOSIS — Z87.39 PERSONAL HISTORY OF OTHER DISEASES OF THE MUSCULOSKELETAL SYSTEM AND CONNECTIVE TISSUE: ICD-10-CM

## 2024-01-26 DIAGNOSIS — I87.009 POSTTHROMBOTIC SYNDROME W/OUT COMPLICATIONS OF UNSPECIFIED EXTREMITY: ICD-10-CM

## 2024-01-26 DIAGNOSIS — Z12.5 ENCOUNTER FOR SCREENING FOR MALIGNANT NEOPLASM OF PROSTATE: ICD-10-CM

## 2024-01-26 DIAGNOSIS — Z76.89 PERSONS ENCOUNTERING HEALTH SERVICES IN OTHER SPECIFIED CIRCUMSTANCES: ICD-10-CM

## 2024-01-26 DIAGNOSIS — J45.909 UNSPECIFIED ASTHMA, UNCOMPLICATED: ICD-10-CM

## 2024-01-26 DIAGNOSIS — F17.290 NICOTINE DEPENDENCE, OTHER TOBACCO PRODUCT, UNCOMPLICATED: ICD-10-CM

## 2024-01-26 DIAGNOSIS — R91.8 OTHER NONSPECIFIC ABNORMAL FINDING OF LUNG FIELD: ICD-10-CM

## 2024-01-26 DIAGNOSIS — E78.5 HYPERLIPIDEMIA, UNSPECIFIED: ICD-10-CM

## 2024-01-26 DIAGNOSIS — M25.562 PAIN IN LEFT KNEE: ICD-10-CM

## 2024-01-26 DIAGNOSIS — R94.31 ABNORMAL ELECTROCARDIOGRAM [ECG] [EKG]: ICD-10-CM

## 2024-01-26 DIAGNOSIS — F32.A DEPRESSION, UNSPECIFIED: ICD-10-CM

## 2024-01-26 DIAGNOSIS — Z86.010 PERSONAL HISTORY OF COLONIC POLYPS: ICD-10-CM

## 2024-01-26 DIAGNOSIS — I82.90 ACUTE EMBOLISM AND THROMBOSIS OF UNSPECIFIED VEIN: ICD-10-CM

## 2024-01-26 DIAGNOSIS — Z12.11 ENCOUNTER FOR SCREENING FOR MALIGNANT NEOPLASM OF COLON: ICD-10-CM

## 2024-01-26 DIAGNOSIS — E11.22 TYPE 2 DIABETES MELLITUS WITH DIABETIC CHRONIC KIDNEY DISEASE: ICD-10-CM

## 2024-01-26 DIAGNOSIS — K76.0 FATTY (CHANGE OF) LIVER, NOT ELSEWHERE CLASSIFIED: ICD-10-CM

## 2024-01-26 DIAGNOSIS — I10 ESSENTIAL (PRIMARY) HYPERTENSION: ICD-10-CM

## 2024-01-26 DIAGNOSIS — R93.2 ABNORMAL FINDINGS ON DIAGNOSTIC IMAGING OF LIVER AND BILIARY TRACT: ICD-10-CM

## 2024-01-26 PROCEDURE — 93000 ELECTROCARDIOGRAM COMPLETE: CPT

## 2024-01-26 PROCEDURE — 99205 OFFICE O/P NEW HI 60 MIN: CPT

## 2024-01-26 RX ORDER — TRAMADOL HYDROCHLORIDE 50 MG/1
50 TABLET, COATED ORAL
Qty: 60 | Refills: 0 | Status: DISCONTINUED | COMMUNITY
Start: 2023-04-12 | End: 2024-01-26

## 2024-01-26 RX ORDER — ALPRAZOLAM 0.5 MG/1
0.5 TABLET ORAL 3 TIMES DAILY
Refills: 0 | Status: DISCONTINUED | COMMUNITY
End: 2024-01-26

## 2024-01-26 RX ORDER — QUETIAPINE FUMARATE 25 MG/1
25 TABLET ORAL 3 TIMES DAILY
Refills: 0 | Status: DISCONTINUED | COMMUNITY
End: 2024-01-26

## 2024-01-26 RX ORDER — IPRATROPIUM BROMIDE AND ALBUTEROL 20; 100 UG/1; UG/1
20-100 SPRAY, METERED RESPIRATORY (INHALATION) 4 TIMES DAILY
Refills: 0 | Status: ACTIVE | COMMUNITY
Start: 2024-01-26

## 2024-01-26 RX ORDER — CEPHALEXIN 500 MG/1
500 CAPSULE ORAL 3 TIMES DAILY
Qty: 90 | Refills: 0 | Status: DISCONTINUED | COMMUNITY
Start: 2023-09-06 | End: 2024-01-26

## 2024-01-26 RX ORDER — CEPHALEXIN 500 MG/1
500 TABLET ORAL EVERY 8 HOURS
Qty: 63 | Refills: 0 | Status: DISCONTINUED | COMMUNITY
Start: 2023-07-11 | End: 2024-01-26

## 2024-01-26 RX ORDER — CEPHALEXIN 500 MG/1
500 TABLET ORAL EVERY 6 HOURS
Qty: 168 | Refills: 0 | Status: DISCONTINUED | COMMUNITY
Start: 2023-05-24 | End: 2024-01-26

## 2024-01-26 RX ORDER — RIFAMPIN 300 MG/1
300 CAPSULE ORAL DAILY
Qty: 60 | Refills: 0 | Status: DISCONTINUED | COMMUNITY
Start: 2023-09-06 | End: 2024-01-26

## 2024-01-26 RX ORDER — OMEPRAZOLE 40 MG/1
40 CAPSULE, DELAYED RELEASE ORAL
Refills: 0 | Status: ACTIVE | COMMUNITY

## 2024-01-26 RX ORDER — LOSARTAN POTASSIUM 100 MG/1
100 TABLET, FILM COATED ORAL
Qty: 90 | Refills: 0 | Status: ACTIVE | COMMUNITY

## 2024-01-26 RX ORDER — RIVAROXABAN 10 MG/1
10 TABLET, FILM COATED ORAL DAILY
Refills: 0 | Status: ACTIVE | COMMUNITY

## 2024-01-29 DIAGNOSIS — Z86.39 PERSONAL HISTORY OF OTHER ENDOCRINE, NUTRITIONAL AND METABOLIC DISEASE: ICD-10-CM

## 2024-01-29 LAB
25(OH)D3 SERPL-MCNC: 29 NG/ML
ALBUMIN SERPL ELPH-MCNC: 4.7 G/DL
ALP BLD-CCNC: 102 U/L
ALT SERPL-CCNC: 21 U/L
ANION GAP SERPL CALC-SCNC: 13 MMOL/L
APPEARANCE: CLEAR
AST SERPL-CCNC: 29 U/L
BACTERIA UR CULT: NORMAL
BACTERIA: NEGATIVE /HPF
BASOPHILS # BLD AUTO: 0.08 K/UL
BASOPHILS NFR BLD AUTO: 1 %
BILIRUB SERPL-MCNC: 0.4 MG/DL
BILIRUBIN URINE: NEGATIVE
BLOOD URINE: NEGATIVE
BUN SERPL-MCNC: 8 MG/DL
CALCIUM SERPL-MCNC: 9.6 MG/DL
CAST: 0 /LPF
CHLORIDE SERPL-SCNC: 101 MMOL/L
CHOLEST SERPL-MCNC: 154 MG/DL
CO2 SERPL-SCNC: 24 MMOL/L
COLOR: YELLOW
CREAT SERPL-MCNC: 0.79 MG/DL
CREAT SPEC-SCNC: 27 MG/DL
EGFR: 99 ML/MIN/1.73M2
EOSINOPHIL # BLD AUTO: 0.31 K/UL
EOSINOPHIL NFR BLD AUTO: 3.9 %
EPITHELIAL CELLS: 0 /HPF
ESTIMATED AVERAGE GLUCOSE: 160 MG/DL
FERRITIN SERPL-MCNC: 43 NG/ML
FOLATE SERPL-MCNC: 13 NG/ML
GLUCOSE QUALITATIVE U: NEGATIVE MG/DL
GLUCOSE SERPL-MCNC: 109 MG/DL
HBA1C MFR BLD HPLC: 7.2 %
HCT VFR BLD CALC: 42.3 %
HDLC SERPL-MCNC: 64 MG/DL
HGB BLD-MCNC: 13.5 G/DL
IMM GRANULOCYTES NFR BLD AUTO: 0.3 %
IRON SATN MFR SERPL: 16 %
IRON SERPL-MCNC: 65 UG/DL
KETONES URINE: ABNORMAL MG/DL
LDLC SERPL CALC-MCNC: 79 MG/DL
LEUKOCYTE ESTERASE URINE: NEGATIVE
LYMPHOCYTES # BLD AUTO: 1.98 K/UL
LYMPHOCYTES NFR BLD AUTO: 24.8 %
MAN DIFF?: NORMAL
MCHC RBC-ENTMCNC: 28.8 PG
MCHC RBC-ENTMCNC: 31.9 GM/DL
MCV RBC AUTO: 90.4 FL
MICROALBUMIN 24H UR DL<=1MG/L-MCNC: 1.7 MG/DL
MICROALBUMIN/CREAT 24H UR-RTO: 64 MG/G
MICROSCOPIC-UA: NORMAL
MONOCYTES # BLD AUTO: 0.74 K/UL
MONOCYTES NFR BLD AUTO: 9.3 %
NEUTROPHILS # BLD AUTO: 4.84 K/UL
NEUTROPHILS NFR BLD AUTO: 60.7 %
NITRITE URINE: NEGATIVE
NONHDLC SERPL-MCNC: 90 MG/DL
PH URINE: 6
PLATELET # BLD AUTO: 317 K/UL
POTASSIUM SERPL-SCNC: 4.7 MMOL/L
PROT SERPL-MCNC: 7.9 G/DL
PROTEIN URINE: NEGATIVE MG/DL
PSA SERPL-MCNC: 0.55 NG/ML
RBC # BLD: 4.68 M/UL
RBC # FLD: 15.3 %
RED BLOOD CELLS URINE: 0 /HPF
SODIUM SERPL-SCNC: 137 MMOL/L
SPECIFIC GRAVITY URINE: 1.01
TIBC SERPL-MCNC: 394 UG/DL
TRIGL SERPL-MCNC: 54 MG/DL
TSH SERPL-ACNC: 1 UIU/ML
UIBC SERPL-MCNC: 329 UG/DL
UROBILINOGEN URINE: 0.2 MG/DL
VIT B12 SERPL-MCNC: 670 PG/ML
WBC # FLD AUTO: 7.97 K/UL
WHITE BLOOD CELLS URINE: 0 /HPF

## 2024-02-04 ENCOUNTER — TRANSCRIPTION ENCOUNTER (OUTPATIENT)
Age: 64
End: 2024-02-04

## 2024-02-04 NOTE — DISCHARGE NOTE PROVIDER - NSDCCPCAREPLAN_GEN_ALL_CORE_FT
PRINCIPAL DISCHARGE DIAGNOSIS  Diagnosis: Failed total knee replacement  Assessment and Plan of Treatment:

## 2024-02-04 NOTE — DISCHARGE NOTE PROVIDER - NSDCFUADDINST_GEN_ALL_CORE_FT
Discharge Instructions for Total Hip Arthroplasty    1.  Diet: Resume previous diet  2. Activity: WBAT, Rolling walker, Daily PT. Walk plenty.   3. Call with: fever over 101, wound redness, drainage or open area, calf pain/calf swelling  4. Wound Care: Keep Aquacell bandage on until seen in office.  5. OK to Shower with Aquacel; Must be an Aquacel bandage to shower.  Avoid direct water beating on bandage.  Continue ICE packs to hip.  6. DVT PE Prophylaxis: ASA 81 BID x 30 days. See med rec for further instructions.  7. Follow Up: Dr. Bob in office in 10-14 days;  Call to schedule appointment.  8. Pain medications:  If going home, eRX sent to your pharmacy for . 1.  Diet: Resume previous diet  2. Activity: WBAT, Rolling walker, Daily PT. Walk plenty.   3. Call with: fever over 101, wound redness, drainage or open area, calf pain/calf swelling  4. Wound Care: Keep Aquacell bandage on until seen in office.  5. OK to Shower with Aquacel; Must be an Aquacel bandage to shower.  Avoid direct water beating on bandage.  Continue ICE packs to hip.  6. DVT PE Prophylaxis: ASA 81 BID x 30 days. See med rec for further instructions.  7. Follow Up: Dr. Bob in office in 10-14 days;  Call to schedule appointment.  8. Pain medications:  If going home, eRX sent to your pharmacy for . 1.  Diet: Resume previous diet  2. Activity: WBAT, Rolling walker, Daily PT. Walk plenty.   3. Call with: fever over 101, wound redness, drainage or open area, calf pain/calf swelling  4. Wound Care: Keep Aquacell bandage on until seen in office.  5. OK to Shower with Aquacel; Must be an Aquacel bandage to shower.  Avoid direct water beating on bandage.  Continue ICE packs to hip.  6. DVT PE Prophylaxis: ASA 81 BID x 30 days. See med rec for further instructions.  7. Follow Up: Dr. Bob in office in 10-14 days;  Call to schedule appointment.  8. Pain medications:  If going home, eRX sent to your pharmacy for .  9. Knee Immobilizer and No Bending knee for 2 weeks.   10. Staples due out and xray on or about postop day 21.    11. Take the prescribed antibiotic for 7 days.

## 2024-02-04 NOTE — DISCHARGE NOTE PROVIDER - NSDCFUSCHEDAPPT_GEN_ALL_CORE_FT
Williams Bob Physician [facility]  ORTHOSURG [address]  Scheduled Appointment: 02/08/2024    Williams Bob  [facility]  LVS PreAdmits  Scheduled Appointment: 02/08/2024    Williams Bob Physician [facility]  ORTHOSURG [address]  Scheduled Appointment: 02/22/2024     Williams Bob  [facility]  ORTHOSURG [address]  Scheduled Appointment: 02/22/2024

## 2024-02-04 NOTE — DISCHARGE NOTE PROVIDER - NSDCHHENCOUNTER_GEN_ALL_CORE
Advance Care Planning     General Advance Care Planning (ACP) Conversation      Date of Conversation: 12/23/2020  Conducted with: Patient with Decision Making Capacity and Healthcare Decision Maker: Next of Kin by law (only applies in absence of a Healthcare Power of  or Legal Guardian)    Healthcare Decision Maker:     Click here to [address] including selection of the Healthcare Decision Maker Relationship (ie \"Primary\")  Today we documented Decision Maker(s) consistent with Legal Next of Kin hierarchy.     Content/Action Overview:   Has NO ACP documents/care preferences - information provided, considering goals and options  Reviewed DNR/DNI and patient elects Full Code (Attempt Resuscitation)  Topics discussed: resuscitation preferences  Additional Comments: none     Length of Voluntary ACP Conversation in minutes:  <16 minutes (Non-Billable)    Dori Johnson DO 04-Feb-2024

## 2024-02-04 NOTE — DISCHARGE NOTE PROVIDER - NSDCMRMEDTOKEN_GEN_ALL_CORE_FT
acetaminophen 325 mg oral tablet: 2 tab(s) orally every 6 hours, As needed, Mild Pain (1 - 3)  Advair Diskus 250 mcg-50 mcg inhalation powder: 1 puff(s) inhaled 2 times a day  albuterol 90 mcg/inh inhalation aerosol: 2 puff(s) inhaled every 6 hours  ALPRAZolam 0.5 mg oral tablet: 1 tab(s) orally 2 times a day  aluminum hydroxide-magnesium hydroxide 200 mg-200 mg/5 mL oral suspension: 30 milliliter(s) orally every 4 hours As needed Dyspepsia  budesonide-formoterol 160 mcg-4.5 mcg/inh inhalation aerosol: inhaled once a day  buPROPion 150 mg/24 hours (XL) oral tablet, extended release: 1 tab(s) orally once a day  ceFAZolin 2 g intravenous injection: 2 gram(s) intravenous every 8 hours  Combivent Respimat 20 mcg-100 mcg/inh inhalation aerosol: 1 puff(s) inhaled every 6 hours  DULoxetine 60 mg oral delayed release capsule: 2 cap(s) orally once a day  fluticasone 50 mcg/inh nasal spray: 2 spray(s) in each nostril once a day  folic acid 1 mg oral tablet: 1 tab(s) orally once a day  losartan 100 mg oral tablet: 1 tab(s) orally once a day  melatonin 3 mg oral tablet: 1 tab(s) orally once a day (at bedtime)  metFORMIN 500 mg oral tablet: 1 orally 2 times a day  Multiple Vitamins oral tablet: 1 tab(s) orally once a day  omeprazole 40 mg oral delayed release capsule: 1 cap(s) orally once a day  oxyCODONE 10 mg oral tablet: 1 tab(s) orally every 4 hours As needed Severe Pain (7 - 10)  oxyCODONE 5 mg oral tablet: 1 tab(s) orally every 4 hours As needed Moderate Pain (4 - 6)  oxyCODONE 5 mg oral tablet: 1 tab(s) orally every 8 hours MDD: 1 tablet every 8 hours for SEVERE pain only  sertraline 100 mg oral tablet: 1 tab(s) orally 2 times a day  tiotropium 2.5 mcg/inh inhalation aerosol: 2 puff(s) inhaled once a day  Xarelto 15 mg oral tablet: 1 tab(s) orally 2 times a day   zolpidem 5 mg oral tablet: 1 tab(s) orally once a day (at bedtime) As needed Insomnia   Advair Diskus 250 mcg-50 mcg inhalation powder: 1 puff(s) inhaled 2 times a day  albuterol 90 mcg/inh inhalation aerosol: 2 puff(s) inhaled every 6 hours  ALPRAZolam 0.5 mg oral tablet: 1 tab(s) orally 2 times a day  buPROPion 150 mg/24 hours (XL) oral tablet, extended release: 1 tab(s) orally once a day  CeleBREX 200 mg oral capsule: 1 cap(s) orally once a day  cephalexin 500 mg oral capsule: 1 cap(s) orally 4 times a day  Colace 100 mg oral capsule: 1 cap(s) orally 2 times a day  DULoxetine 60 mg oral delayed release capsule: 2 cap(s) orally once a day  fluticasone 50 mcg/inh nasal spray: 2 spray(s) in each nostril once a day  losartan 100 mg oral tablet: 1 tab(s) orally once a day  melatonin 3 mg oral tablet: 1 tab(s) orally once a day (at bedtime)  metFORMIN 500 mg oral tablet: 1 orally 2 times a day  omeprazole 40 mg oral delayed release capsule: 1 cap(s) orally once a day  ondansetron 4 mg oral tablet: 1 tab(s) orally 4 times a day as needed for  nausea  oxyCODONE 5 mg oral tablet: 1 tab(s) orally every 6 hours as needed for  moderate pain MDD: 4 tabs  sertraline 100 mg oral tablet: 1 tab(s) orally 2 times a day  Xarelto 15 mg oral tablet: 1 tab(s) orally 2 times a day   zolpidem 5 mg oral tablet: 1 tab(s) orally once a day (at bedtime) As needed Insomnia

## 2024-02-04 NOTE — DISCHARGE NOTE PROVIDER - CARE PROVIDER_API CALL
Williams Bob  Orthopaedic Surgery  [address], [address]  [place] [address]  Phone: [phone]  Fax: [phone]  Follow Up Time:

## 2024-02-04 NOTE — DISCHARGE NOTE PROVIDER - HOSPITAL COURSE
***INCOMPLETE NOTE***      Hospital Course:  Patient presented to [facility] after being medically cleared for an elective surgical procedure, having failed outpatient non-operative conservative management. Prophylactic antibiotics were started before the procedure and continued for 24 hours. They were admitted after surgery to the orthopedic floor.   There were no complications during the hospital stay. All home medications were continued. (**Indicate if: Pt received **U PRBC post op for Acute Blood loss Anemia.)    Patient was started on  DVT/PE prophylaxis, from Physical Therapy for twice daily PT starting on POD 0, and from the Hospitalist for Medical Co-management. Patient was placed on  DOAC vs ECASA 325 BID *** for anticoagulation.  Pertinent home medications were continued.  Daily labs were followed.      On POD 0 or POD 1 the pt was OOB with PT and there were no overnight events. POD 2***, PT was continued, and on POD 2 or 3 a new Aquacel dressing was applied. The pt is ready today for DC to home with home PT** or to Rehab for ongoing PT**.  The orthopedic Attending is aware and agrees.           Hospital Course:  Patient presented to [facility] after being medically cleared for an elective surgical procedure, having failed outpatient non-operative conservative management. Prophylactic antibiotics were started before the procedure and continued for 24 hours. They were admitted after surgery to the orthopedic floor.   There were no complications during the hospital stay. All home medications were continued. (**Indicate if: Pt received **U PRBC post op for Acute Blood loss Anemia.)    Patient was started on  DVT/PE prophylaxis, from Physical Therapy for twice daily PT starting on POD 0, and from the Hospitalist for Medical Co-management. Patient was placed on  ECASA 81 BID for anticoagulation.  Pertinent home medications were continued.  Daily labs were followed.      On POD 0 or POD 1 the pt was OOB with PT and there were no overnight events. POD 2, PT was continued, and the pt is ready today for DC to home with home PT.  The orthopedic Attending is aware and agrees.

## 2024-02-05 ENCOUNTER — NON-APPOINTMENT (OUTPATIENT)
Age: 64
End: 2024-02-05

## 2024-02-07 ENCOUNTER — TRANSCRIPTION ENCOUNTER (OUTPATIENT)
Age: 64
End: 2024-02-07

## 2024-02-08 ENCOUNTER — TRANSCRIPTION ENCOUNTER (OUTPATIENT)
Age: 64
End: 2024-02-08

## 2024-02-08 ENCOUNTER — INPATIENT (INPATIENT)
Facility: HOSPITAL | Age: 64
LOS: 1 days | Discharge: HOME | End: 2024-02-10
Attending: STUDENT IN AN ORGANIZED HEALTH CARE EDUCATION/TRAINING PROGRAM | Admitting: STUDENT IN AN ORGANIZED HEALTH CARE EDUCATION/TRAINING PROGRAM
Payer: MEDICARE

## 2024-02-08 ENCOUNTER — APPOINTMENT (OUTPATIENT)
Dept: ORTHOPEDIC SURGERY | Facility: HOSPITAL | Age: 64
End: 2024-02-08

## 2024-02-08 ENCOUNTER — RESULT REVIEW (OUTPATIENT)
Age: 64
End: 2024-02-08

## 2024-02-08 VITALS
RESPIRATION RATE: 17 BRPM | WEIGHT: 188.94 LBS | HEART RATE: 69 BPM | DIASTOLIC BLOOD PRESSURE: 84 MMHG | TEMPERATURE: 98 F | OXYGEN SATURATION: 100 % | SYSTOLIC BLOOD PRESSURE: 157 MMHG | HEIGHT: 71 IN

## 2024-02-08 DIAGNOSIS — I82.409 ACUTE EMBOLISM AND THROMBOSIS OF UNSPECIFIED DEEP VEINS OF UNSPECIFIED LOWER EXTREMITY: Chronic | ICD-10-CM

## 2024-02-08 DIAGNOSIS — Z98.890 OTHER SPECIFIED POSTPROCEDURAL STATES: Chronic | ICD-10-CM

## 2024-02-08 LAB
ANION GAP SERPL CALC-SCNC: 6 MMOL/L — SIGNIFICANT CHANGE UP (ref 5–17)
BLD GP AB SCN SERPL QL: SIGNIFICANT CHANGE UP
BUN SERPL-MCNC: 9 MG/DL — SIGNIFICANT CHANGE UP (ref 7–23)
CALCIUM SERPL-MCNC: 8.2 MG/DL — LOW (ref 8.5–10.1)
CHLORIDE SERPL-SCNC: 112 MMOL/L — HIGH (ref 96–108)
CO2 SERPL-SCNC: 23 MMOL/L — SIGNIFICANT CHANGE UP (ref 22–31)
CREAT SERPL-MCNC: 0.96 MG/DL — SIGNIFICANT CHANGE UP (ref 0.5–1.3)
EGFR: 88 ML/MIN/1.73M2 — SIGNIFICANT CHANGE UP
GLUCOSE BLDC GLUCOMTR-MCNC: 124 MG/DL — HIGH (ref 70–99)
GLUCOSE BLDC GLUCOMTR-MCNC: 129 MG/DL — HIGH (ref 70–99)
GLUCOSE BLDC GLUCOMTR-MCNC: 130 MG/DL — HIGH (ref 70–99)
GLUCOSE BLDC GLUCOMTR-MCNC: 143 MG/DL — HIGH (ref 70–99)
GLUCOSE SERPL-MCNC: 151 MG/DL — HIGH (ref 70–99)
GRAM STN FLD: SIGNIFICANT CHANGE UP
HCT VFR BLD CALC: 31.5 % — LOW (ref 39–50)
HGB BLD-MCNC: 10.2 G/DL — LOW (ref 13–17)
MCHC RBC-ENTMCNC: 29.5 PG — SIGNIFICANT CHANGE UP (ref 27–34)
MCHC RBC-ENTMCNC: 32.4 G/DL — SIGNIFICANT CHANGE UP (ref 32–36)
MCV RBC AUTO: 91 FL — SIGNIFICANT CHANGE UP (ref 80–100)
NRBC # BLD: 0 /100 WBCS — SIGNIFICANT CHANGE UP (ref 0–0)
PLATELET # BLD AUTO: 221 K/UL — SIGNIFICANT CHANGE UP (ref 150–400)
POTASSIUM SERPL-MCNC: 4.5 MMOL/L — SIGNIFICANT CHANGE UP (ref 3.5–5.3)
POTASSIUM SERPL-SCNC: 4.5 MMOL/L — SIGNIFICANT CHANGE UP (ref 3.5–5.3)
RBC # BLD: 3.46 M/UL — LOW (ref 4.2–5.8)
RBC # FLD: 15 % — HIGH (ref 10.3–14.5)
SODIUM SERPL-SCNC: 141 MMOL/L — SIGNIFICANT CHANGE UP (ref 135–145)
SPECIMEN SOURCE: SIGNIFICANT CHANGE UP
WBC # BLD: 10.18 K/UL — SIGNIFICANT CHANGE UP (ref 3.8–10.5)
WBC # FLD AUTO: 10.18 K/UL — SIGNIFICANT CHANGE UP (ref 3.8–10.5)

## 2024-02-08 PROCEDURE — 27447 TOTAL KNEE ARTHROPLASTY: CPT | Mod: 22,LT

## 2024-02-08 PROCEDURE — 88300 SURGICAL PATH GROSS: CPT | Mod: 26

## 2024-02-08 DEVICE — SURF ART TIB PSN REV TM SZ SM: Type: IMPLANTABLE DEVICE | Site: LEFT | Status: FUNCTIONAL

## 2024-02-08 DEVICE — IMPLANTABLE DEVICE: Type: IMPLANTABLE DEVICE | Site: LEFT | Status: FUNCTIONAL

## 2024-02-08 DEVICE — BONE FILLER BIOCOMPOSITE STIMULAN RAPID CURE 10CC: Type: IMPLANTABLE DEVICE | Site: LEFT | Status: FUNCTIONAL

## 2024-02-08 DEVICE — SCREW HEX HEADED 3.5X48MM: Type: IMPLANTABLE DEVICE | Site: LEFT | Status: FUNCTIONAL

## 2024-02-08 RX ORDER — FLUTICASONE PROPIONATE 50 MCG
2 SPRAY, SUSPENSION NASAL
Refills: 0 | DISCHARGE

## 2024-02-08 RX ORDER — ONDANSETRON 8 MG/1
4 TABLET, FILM COATED ORAL EVERY 6 HOURS
Refills: 0 | Status: DISCONTINUED | OUTPATIENT
Start: 2024-02-08 | End: 2024-02-10

## 2024-02-08 RX ORDER — DULOXETINE HYDROCHLORIDE 30 MG/1
2 CAPSULE, DELAYED RELEASE ORAL
Refills: 0 | DISCHARGE

## 2024-02-08 RX ORDER — SERTRALINE 25 MG/1
100 TABLET, FILM COATED ORAL DAILY
Refills: 0 | Status: DISCONTINUED | OUTPATIENT
Start: 2024-02-08 | End: 2024-02-10

## 2024-02-08 RX ORDER — ACETAMINOPHEN 500 MG
650 TABLET ORAL EVERY 6 HOURS
Refills: 0 | Status: DISCONTINUED | OUTPATIENT
Start: 2024-02-08 | End: 2024-02-10

## 2024-02-08 RX ORDER — ALPRAZOLAM 0.25 MG
0.5 TABLET ORAL
Refills: 0 | Status: DISCONTINUED | OUTPATIENT
Start: 2024-02-08 | End: 2024-02-10

## 2024-02-08 RX ORDER — FENTANYL CITRATE 50 UG/ML
25 INJECTION INTRAVENOUS
Refills: 0 | Status: DISCONTINUED | OUTPATIENT
Start: 2024-02-08 | End: 2024-02-08

## 2024-02-08 RX ORDER — ALPRAZOLAM 0.25 MG
1 TABLET ORAL
Refills: 0 | DISCHARGE

## 2024-02-08 RX ORDER — DEXTROSE 50 % IN WATER 50 %
15 SYRINGE (ML) INTRAVENOUS ONCE
Refills: 0 | Status: DISCONTINUED | OUTPATIENT
Start: 2024-02-08 | End: 2024-02-10

## 2024-02-08 RX ORDER — LOSARTAN POTASSIUM 100 MG/1
100 TABLET, FILM COATED ORAL DAILY
Refills: 0 | Status: DISCONTINUED | OUTPATIENT
Start: 2024-02-08 | End: 2024-02-10

## 2024-02-08 RX ORDER — INSULIN LISPRO 100/ML
VIAL (ML) SUBCUTANEOUS AT BEDTIME
Refills: 0 | Status: DISCONTINUED | OUTPATIENT
Start: 2024-02-08 | End: 2024-02-10

## 2024-02-08 RX ORDER — DEXTROSE 50 % IN WATER 50 %
25 SYRINGE (ML) INTRAVENOUS ONCE
Refills: 0 | Status: DISCONTINUED | OUTPATIENT
Start: 2024-02-08 | End: 2024-02-10

## 2024-02-08 RX ORDER — HYDROMORPHONE HYDROCHLORIDE 2 MG/ML
2 INJECTION INTRAMUSCULAR; INTRAVENOUS; SUBCUTANEOUS EVERY 4 HOURS
Refills: 0 | Status: DISCONTINUED | OUTPATIENT
Start: 2024-02-08 | End: 2024-02-10

## 2024-02-08 RX ORDER — IPRATROPIUM/ALBUTEROL SULFATE 18-103MCG
1 AEROSOL WITH ADAPTER (GRAM) INHALATION
Refills: 0 | DISCHARGE

## 2024-02-08 RX ORDER — FLUTICASONE PROPIONATE AND SALMETEROL 50; 250 UG/1; UG/1
1 POWDER ORAL; RESPIRATORY (INHALATION)
Refills: 0 | DISCHARGE

## 2024-02-08 RX ORDER — BUDESONIDE AND FORMOTEROL FUMARATE DIHYDRATE 160; 4.5 UG/1; UG/1
2 AEROSOL RESPIRATORY (INHALATION)
Refills: 0 | Status: DISCONTINUED | OUTPATIENT
Start: 2024-02-08 | End: 2024-02-10

## 2024-02-08 RX ORDER — ONDANSETRON 8 MG/1
4 TABLET, FILM COATED ORAL ONCE
Refills: 0 | Status: DISCONTINUED | OUTPATIENT
Start: 2024-02-08 | End: 2024-02-08

## 2024-02-08 RX ORDER — SODIUM CHLORIDE 9 MG/ML
3 INJECTION INTRAMUSCULAR; INTRAVENOUS; SUBCUTANEOUS EVERY 8 HOURS
Refills: 0 | Status: DISCONTINUED | OUTPATIENT
Start: 2024-02-08 | End: 2024-02-08

## 2024-02-08 RX ORDER — OXYCODONE HYDROCHLORIDE 5 MG/1
10 TABLET ORAL EVERY 4 HOURS
Refills: 0 | Status: DISCONTINUED | OUTPATIENT
Start: 2024-02-08 | End: 2024-02-08

## 2024-02-08 RX ORDER — OMEPRAZOLE 10 MG/1
1 CAPSULE, DELAYED RELEASE ORAL
Qty: 0 | Refills: 0 | DISCHARGE

## 2024-02-08 RX ORDER — ZOLPIDEM TARTRATE 10 MG/1
5 TABLET ORAL AT BEDTIME
Refills: 0 | Status: DISCONTINUED | OUTPATIENT
Start: 2024-02-08 | End: 2024-02-10

## 2024-02-08 RX ORDER — ACETAMINOPHEN 500 MG
650 TABLET ORAL ONCE
Refills: 0 | Status: COMPLETED | OUTPATIENT
Start: 2024-02-08 | End: 2024-02-08

## 2024-02-08 RX ORDER — GLUCAGON INJECTION, SOLUTION 0.5 MG/.1ML
1 INJECTION, SOLUTION SUBCUTANEOUS ONCE
Refills: 0 | Status: DISCONTINUED | OUTPATIENT
Start: 2024-02-08 | End: 2024-02-10

## 2024-02-08 RX ORDER — HYDROMORPHONE HYDROCHLORIDE 2 MG/ML
0.5 INJECTION INTRAMUSCULAR; INTRAVENOUS; SUBCUTANEOUS
Refills: 0 | Status: DISCONTINUED | OUTPATIENT
Start: 2024-02-08 | End: 2024-02-08

## 2024-02-08 RX ORDER — HYDROMORPHONE HYDROCHLORIDE 2 MG/ML
1 INJECTION INTRAMUSCULAR; INTRAVENOUS; SUBCUTANEOUS EVERY 4 HOURS
Refills: 0 | Status: DISCONTINUED | OUTPATIENT
Start: 2024-02-08 | End: 2024-02-10

## 2024-02-08 RX ORDER — INSULIN LISPRO 100/ML
VIAL (ML) SUBCUTANEOUS
Refills: 0 | Status: DISCONTINUED | OUTPATIENT
Start: 2024-02-08 | End: 2024-02-10

## 2024-02-08 RX ORDER — SODIUM CHLORIDE 9 MG/ML
1000 INJECTION, SOLUTION INTRAVENOUS
Refills: 0 | Status: DISCONTINUED | OUTPATIENT
Start: 2024-02-08 | End: 2024-02-10

## 2024-02-08 RX ORDER — TRAMADOL HYDROCHLORIDE 50 MG/1
50 TABLET ORAL EVERY 4 HOURS
Refills: 0 | Status: DISCONTINUED | OUTPATIENT
Start: 2024-02-08 | End: 2024-02-10

## 2024-02-08 RX ORDER — PANTOPRAZOLE SODIUM 20 MG/1
40 TABLET, DELAYED RELEASE ORAL
Refills: 0 | Status: DISCONTINUED | OUTPATIENT
Start: 2024-02-08 | End: 2024-02-10

## 2024-02-08 RX ORDER — SODIUM CHLORIDE 9 MG/ML
1000 INJECTION INTRAMUSCULAR; INTRAVENOUS; SUBCUTANEOUS
Refills: 0 | Status: DISCONTINUED | OUTPATIENT
Start: 2024-02-08 | End: 2024-02-10

## 2024-02-08 RX ORDER — BUPROPION HYDROCHLORIDE 150 MG/1
1 TABLET, EXTENDED RELEASE ORAL
Refills: 0 | DISCHARGE

## 2024-02-08 RX ORDER — OXYCODONE HYDROCHLORIDE 5 MG/1
5 TABLET ORAL EVERY 4 HOURS
Refills: 0 | Status: DISCONTINUED | OUTPATIENT
Start: 2024-02-08 | End: 2024-02-08

## 2024-02-08 RX ORDER — CEFAZOLIN SODIUM 1 G
2000 VIAL (EA) INJECTION EVERY 8 HOURS
Refills: 0 | Status: DISCONTINUED | OUTPATIENT
Start: 2024-02-08 | End: 2024-02-10

## 2024-02-08 RX ORDER — CEFAZOLIN SODIUM 1 G
2000 VIAL (EA) INJECTION EVERY 8 HOURS
Refills: 0 | Status: DISCONTINUED | OUTPATIENT
Start: 2024-02-08 | End: 2024-02-08

## 2024-02-08 RX ORDER — ALBUTEROL 90 UG/1
2 AEROSOL, METERED ORAL EVERY 6 HOURS
Refills: 0 | Status: DISCONTINUED | OUTPATIENT
Start: 2024-02-08 | End: 2024-02-10

## 2024-02-08 RX ORDER — DEXTROSE 50 % IN WATER 50 %
12.5 SYRINGE (ML) INTRAVENOUS ONCE
Refills: 0 | Status: DISCONTINUED | OUTPATIENT
Start: 2024-02-08 | End: 2024-02-10

## 2024-02-08 RX ORDER — CELECOXIB 200 MG/1
200 CAPSULE ORAL EVERY 12 HOURS
Refills: 0 | Status: DISCONTINUED | OUTPATIENT
Start: 2024-02-09 | End: 2024-02-10

## 2024-02-08 RX ORDER — FLUTICASONE PROPIONATE 50 MCG
1 SPRAY, SUSPENSION NASAL
Refills: 0 | Status: DISCONTINUED | OUTPATIENT
Start: 2024-02-08 | End: 2024-02-10

## 2024-02-08 RX ORDER — METFORMIN HYDROCHLORIDE 850 MG/1
1 TABLET ORAL
Refills: 0 | DISCHARGE

## 2024-02-08 RX ORDER — POLYETHYLENE GLYCOL 3350 17 G/17G
17 POWDER, FOR SOLUTION ORAL AT BEDTIME
Refills: 0 | Status: DISCONTINUED | OUTPATIENT
Start: 2024-02-08 | End: 2024-02-10

## 2024-02-08 RX ORDER — DULOXETINE HYDROCHLORIDE 30 MG/1
60 CAPSULE, DELAYED RELEASE ORAL DAILY
Refills: 0 | Status: DISCONTINUED | OUTPATIENT
Start: 2024-02-08 | End: 2024-02-10

## 2024-02-08 RX ORDER — CELECOXIB 200 MG/1
200 CAPSULE ORAL ONCE
Refills: 0 | Status: COMPLETED | OUTPATIENT
Start: 2024-02-08 | End: 2024-02-08

## 2024-02-08 RX ORDER — BUPROPION HYDROCHLORIDE 150 MG/1
150 TABLET, EXTENDED RELEASE ORAL DAILY
Refills: 0 | Status: DISCONTINUED | OUTPATIENT
Start: 2024-02-08 | End: 2024-02-10

## 2024-02-08 RX ORDER — HYDROMORPHONE HYDROCHLORIDE 2 MG/ML
0.5 INJECTION INTRAMUSCULAR; INTRAVENOUS; SUBCUTANEOUS EVERY 6 HOURS
Refills: 0 | Status: DISCONTINUED | OUTPATIENT
Start: 2024-02-08 | End: 2024-02-10

## 2024-02-08 RX ORDER — RIVAROXABAN 15 MG-20MG
15 KIT ORAL DAILY
Refills: 0 | Status: DISCONTINUED | OUTPATIENT
Start: 2024-02-09 | End: 2024-02-10

## 2024-02-08 RX ORDER — MAGNESIUM HYDROXIDE 400 MG/1
30 TABLET, CHEWABLE ORAL DAILY
Refills: 0 | Status: DISCONTINUED | OUTPATIENT
Start: 2024-02-08 | End: 2024-02-10

## 2024-02-08 RX ORDER — SENNA PLUS 8.6 MG/1
2 TABLET ORAL AT BEDTIME
Refills: 0 | Status: DISCONTINUED | OUTPATIENT
Start: 2024-02-08 | End: 2024-02-10

## 2024-02-08 RX ADMIN — HYDROMORPHONE HYDROCHLORIDE 0.5 MILLIGRAM(S): 2 INJECTION INTRAMUSCULAR; INTRAVENOUS; SUBCUTANEOUS at 18:52

## 2024-02-08 RX ADMIN — HYDROMORPHONE HYDROCHLORIDE 2 MILLIGRAM(S): 2 INJECTION INTRAMUSCULAR; INTRAVENOUS; SUBCUTANEOUS at 21:20

## 2024-02-08 RX ADMIN — POLYETHYLENE GLYCOL 3350 17 GRAM(S): 17 POWDER, FOR SOLUTION ORAL at 21:20

## 2024-02-08 RX ADMIN — HYDROMORPHONE HYDROCHLORIDE 0.5 MILLIGRAM(S): 2 INJECTION INTRAMUSCULAR; INTRAVENOUS; SUBCUTANEOUS at 19:31

## 2024-02-08 RX ADMIN — SENNA PLUS 2 TABLET(S): 8.6 TABLET ORAL at 21:20

## 2024-02-08 RX ADMIN — HYDROMORPHONE HYDROCHLORIDE 0.5 MILLIGRAM(S): 2 INJECTION INTRAMUSCULAR; INTRAVENOUS; SUBCUTANEOUS at 19:12

## 2024-02-08 RX ADMIN — Medication 100 MILLIGRAM(S): at 23:54

## 2024-02-08 RX ADMIN — Medication 650 MILLIGRAM(S): at 10:38

## 2024-02-08 RX ADMIN — CELECOXIB 200 MILLIGRAM(S): 200 CAPSULE ORAL at 10:38

## 2024-02-08 RX ADMIN — HYDROMORPHONE HYDROCHLORIDE 2 MILLIGRAM(S): 2 INJECTION INTRAMUSCULAR; INTRAVENOUS; SUBCUTANEOUS at 22:10

## 2024-02-08 NOTE — ASU PREOP CHECKLIST - SURGICAL CONSENT
Is This A New Presentation, Or A Follow-Up?: Skin Lesion Has Your Skin Lesion Been Treated?: not been treated done

## 2024-02-08 NOTE — PROGRESS NOTE ADULT - ATTENDING COMMENTS
s/p Left revision TKA    WBAT   knee immobilizer  f.u cultures  xarelto for dvt ppx  PT/OT  postop ancef

## 2024-02-09 LAB
ANION GAP SERPL CALC-SCNC: 5 MMOL/L — SIGNIFICANT CHANGE UP (ref 5–17)
BUN SERPL-MCNC: 7 MG/DL — SIGNIFICANT CHANGE UP (ref 7–23)
CALCIUM SERPL-MCNC: 8.2 MG/DL — LOW (ref 8.5–10.1)
CHLORIDE SERPL-SCNC: 109 MMOL/L — HIGH (ref 96–108)
CO2 SERPL-SCNC: 26 MMOL/L — SIGNIFICANT CHANGE UP (ref 22–31)
CREAT SERPL-MCNC: 0.72 MG/DL — SIGNIFICANT CHANGE UP (ref 0.5–1.3)
EGFR: 102 ML/MIN/1.73M2 — SIGNIFICANT CHANGE UP
GLUCOSE BLDC GLUCOMTR-MCNC: 127 MG/DL — HIGH (ref 70–99)
GLUCOSE BLDC GLUCOMTR-MCNC: 149 MG/DL — HIGH (ref 70–99)
GLUCOSE BLDC GLUCOMTR-MCNC: 155 MG/DL — HIGH (ref 70–99)
GLUCOSE BLDC GLUCOMTR-MCNC: 167 MG/DL — HIGH (ref 70–99)
GLUCOSE SERPL-MCNC: 128 MG/DL — HIGH (ref 70–99)
GRAM STN FLD: SIGNIFICANT CHANGE UP
GRAM STN FLD: SIGNIFICANT CHANGE UP
HCT VFR BLD CALC: 32.9 % — LOW (ref 39–50)
HGB BLD-MCNC: 10.7 G/DL — LOW (ref 13–17)
MCHC RBC-ENTMCNC: 29.5 PG — SIGNIFICANT CHANGE UP (ref 27–34)
MCHC RBC-ENTMCNC: 32.5 G/DL — SIGNIFICANT CHANGE UP (ref 32–36)
MCV RBC AUTO: 90.6 FL — SIGNIFICANT CHANGE UP (ref 80–100)
NRBC # BLD: 0 /100 WBCS — SIGNIFICANT CHANGE UP (ref 0–0)
PLATELET # BLD AUTO: 234 K/UL — SIGNIFICANT CHANGE UP (ref 150–400)
POTASSIUM SERPL-MCNC: 4 MMOL/L — SIGNIFICANT CHANGE UP (ref 3.5–5.3)
POTASSIUM SERPL-SCNC: 4 MMOL/L — SIGNIFICANT CHANGE UP (ref 3.5–5.3)
RBC # BLD: 3.63 M/UL — LOW (ref 4.2–5.8)
RBC # FLD: 14.9 % — HIGH (ref 10.3–14.5)
SODIUM SERPL-SCNC: 140 MMOL/L — SIGNIFICANT CHANGE UP (ref 135–145)
SPECIMEN SOURCE: SIGNIFICANT CHANGE UP
SPECIMEN SOURCE: SIGNIFICANT CHANGE UP
WBC # BLD: 8.81 K/UL — SIGNIFICANT CHANGE UP (ref 3.8–10.5)
WBC # FLD AUTO: 8.81 K/UL — SIGNIFICANT CHANGE UP (ref 3.8–10.5)

## 2024-02-09 PROCEDURE — 73560 X-RAY EXAM OF KNEE 1 OR 2: CPT | Mod: 26,LT

## 2024-02-09 RX ORDER — DOCUSATE SODIUM 100 MG
1 CAPSULE ORAL
Qty: 20 | Refills: 0
Start: 2024-02-09

## 2024-02-09 RX ORDER — CEPHALEXIN 500 MG
1 CAPSULE ORAL
Qty: 28 | Refills: 0
Start: 2024-02-09 | End: 2024-02-15

## 2024-02-09 RX ORDER — ONDANSETRON 8 MG/1
1 TABLET, FILM COATED ORAL
Qty: 20 | Refills: 0
Start: 2024-02-09

## 2024-02-09 RX ORDER — CELECOXIB 200 MG/1
1 CAPSULE ORAL
Qty: 30 | Refills: 0
Start: 2024-02-09 | End: 2024-03-09

## 2024-02-09 RX ORDER — OXYCODONE HYDROCHLORIDE 5 MG/1
1 TABLET ORAL
Qty: 20 | Refills: 0
Start: 2024-02-09 | End: 2024-02-13

## 2024-02-09 RX ADMIN — ALBUTEROL 2 PUFF(S): 90 AEROSOL, METERED ORAL at 18:05

## 2024-02-09 RX ADMIN — POLYETHYLENE GLYCOL 3350 17 GRAM(S): 17 POWDER, FOR SOLUTION ORAL at 21:38

## 2024-02-09 RX ADMIN — Medication 650 MILLIGRAM(S): at 06:28

## 2024-02-09 RX ADMIN — Medication 100 MILLIGRAM(S): at 23:40

## 2024-02-09 RX ADMIN — HYDROMORPHONE HYDROCHLORIDE 2 MILLIGRAM(S): 2 INJECTION INTRAMUSCULAR; INTRAVENOUS; SUBCUTANEOUS at 09:30

## 2024-02-09 RX ADMIN — CELECOXIB 200 MILLIGRAM(S): 200 CAPSULE ORAL at 05:40

## 2024-02-09 RX ADMIN — Medication 650 MILLIGRAM(S): at 18:42

## 2024-02-09 RX ADMIN — RIVAROXABAN 15 MILLIGRAM(S): KIT at 11:22

## 2024-02-09 RX ADMIN — Medication 650 MILLIGRAM(S): at 23:40

## 2024-02-09 RX ADMIN — CELECOXIB 200 MILLIGRAM(S): 200 CAPSULE ORAL at 06:28

## 2024-02-09 RX ADMIN — HYDROMORPHONE HYDROCHLORIDE 2 MILLIGRAM(S): 2 INJECTION INTRAMUSCULAR; INTRAVENOUS; SUBCUTANEOUS at 03:00

## 2024-02-09 RX ADMIN — DULOXETINE HYDROCHLORIDE 60 MILLIGRAM(S): 30 CAPSULE, DELAYED RELEASE ORAL at 11:22

## 2024-02-09 RX ADMIN — HYDROMORPHONE HYDROCHLORIDE 2 MILLIGRAM(S): 2 INJECTION INTRAMUSCULAR; INTRAVENOUS; SUBCUTANEOUS at 14:24

## 2024-02-09 RX ADMIN — CELECOXIB 200 MILLIGRAM(S): 200 CAPSULE ORAL at 18:42

## 2024-02-09 RX ADMIN — HYDROMORPHONE HYDROCHLORIDE 2 MILLIGRAM(S): 2 INJECTION INTRAMUSCULAR; INTRAVENOUS; SUBCUTANEOUS at 08:43

## 2024-02-09 RX ADMIN — Medication 1: at 11:22

## 2024-02-09 RX ADMIN — HYDROMORPHONE HYDROCHLORIDE 2 MILLIGRAM(S): 2 INJECTION INTRAMUSCULAR; INTRAVENOUS; SUBCUTANEOUS at 22:40

## 2024-02-09 RX ADMIN — BUPROPION HYDROCHLORIDE 150 MILLIGRAM(S): 150 TABLET, EXTENDED RELEASE ORAL at 11:22

## 2024-02-09 RX ADMIN — Medication 100 MILLIGRAM(S): at 15:57

## 2024-02-09 RX ADMIN — Medication 650 MILLIGRAM(S): at 05:41

## 2024-02-09 RX ADMIN — HYDROMORPHONE HYDROCHLORIDE 2 MILLIGRAM(S): 2 INJECTION INTRAMUSCULAR; INTRAVENOUS; SUBCUTANEOUS at 15:20

## 2024-02-09 RX ADMIN — BUDESONIDE AND FORMOTEROL FUMARATE DIHYDRATE 2 PUFF(S): 160; 4.5 AEROSOL RESPIRATORY (INHALATION) at 05:41

## 2024-02-09 RX ADMIN — CELECOXIB 200 MILLIGRAM(S): 200 CAPSULE ORAL at 18:05

## 2024-02-09 RX ADMIN — Medication 650 MILLIGRAM(S): at 11:22

## 2024-02-09 RX ADMIN — SERTRALINE 100 MILLIGRAM(S): 25 TABLET, FILM COATED ORAL at 11:22

## 2024-02-09 RX ADMIN — LOSARTAN POTASSIUM 100 MILLIGRAM(S): 100 TABLET, FILM COATED ORAL at 05:40

## 2024-02-09 RX ADMIN — Medication 650 MILLIGRAM(S): at 12:15

## 2024-02-09 RX ADMIN — BUDESONIDE AND FORMOTEROL FUMARATE DIHYDRATE 2 PUFF(S): 160; 4.5 AEROSOL RESPIRATORY (INHALATION) at 18:05

## 2024-02-09 RX ADMIN — Medication 100 MILLIGRAM(S): at 08:11

## 2024-02-09 RX ADMIN — SENNA PLUS 2 TABLET(S): 8.6 TABLET ORAL at 21:38

## 2024-02-09 RX ADMIN — Medication 650 MILLIGRAM(S): at 18:05

## 2024-02-09 RX ADMIN — PANTOPRAZOLE SODIUM 40 MILLIGRAM(S): 20 TABLET, DELAYED RELEASE ORAL at 05:42

## 2024-02-09 RX ADMIN — Medication 1: at 08:12

## 2024-02-09 RX ADMIN — Medication 0.5 MILLIGRAM(S): at 18:05

## 2024-02-09 RX ADMIN — Medication 0.5 MILLIGRAM(S): at 05:40

## 2024-02-09 RX ADMIN — HYDROMORPHONE HYDROCHLORIDE 2 MILLIGRAM(S): 2 INJECTION INTRAMUSCULAR; INTRAVENOUS; SUBCUTANEOUS at 02:11

## 2024-02-09 RX ADMIN — HYDROMORPHONE HYDROCHLORIDE 2 MILLIGRAM(S): 2 INJECTION INTRAMUSCULAR; INTRAVENOUS; SUBCUTANEOUS at 21:46

## 2024-02-09 NOTE — PHYSICAL THERAPY INITIAL EVALUATION ADULT - PERTINENT HX OF CURRENT PROBLEM, REHAB EVAL
63M PMH asthma(Denies intubation) HTN, DM, LLE dvt (2017 on xarelto) presents to PST for scheduled total left knee revision replacement removal of antibiotic spacer on 2/8/24 with .

## 2024-02-09 NOTE — PHYSICAL THERAPY INITIAL EVALUATION ADULT - ADDITIONAL COMMENTS
in a private house with 4 entry steps equipped with bilateral hand rails that cannot be reached simultaneously . pt can stay on main level after surgery. There is a flight to second floor with handrail. The bathroom has a stall shower  fixed shower head and standard toilet with adequate space to fit a commode over it. Pt stated he is in the process of selling his home and his primary residence is in [place]. The above house that is described is the house in [place].

## 2024-02-09 NOTE — OCCUPATIONAL THERAPY INITIAL EVALUATION ADULT - GENERAL OBSERVATIONS, REHAB EVAL
Pt was seen for initial OT consult, encountered in bed in NAD, Left knee immobilizer is donned, left knee dressing and prevena VAC is in place. NO Bending in left knee was reviewed and maintained. Pt was AA&Ox4, cooperative & followed commands. Pt c/o left knee pain due to s/p revised left TKR; this limits pt's activity tolerance ,balance, ADL management and functional mobility.

## 2024-02-09 NOTE — OCCUPATIONAL THERAPY INITIAL EVALUATION ADULT - SOCIAL CONCERNS
Pt voiced concerns about his recovery at home. Pt endorsed that his spouse will be able to assist him after he is discharge home post-operatively to his primary residence in [place]. Pt has no support in [place].

## 2024-02-09 NOTE — PHYSICAL THERAPY INITIAL EVALUATION ADULT - STANDING BALANCE: DYNAMIC, REHAB EVAL
Dapsone Pregnancy And Lactation Text: This medication is Pregnancy Category C and is not considered safe during pregnancy or breast feeding. good balance

## 2024-02-09 NOTE — PHYSICAL THERAPY INITIAL EVALUATION ADULT - ACTIVE RANGE OF MOTION EXAMINATION, REHAB EVAL
L LLE in knee immobilizer/bilateral upper extremity Active ROM was WFL (within functional limits)/Right LE Active ROM was WFL (within functional limits)

## 2024-02-09 NOTE — OCCUPATIONAL THERAPY INITIAL EVALUATION ADULT - LIVES WITH, PROFILE
in a private house with 4 entry steps equipped with bilateral hand rails that cannot be reached simultaneously . Once inside, pt has to negotiate a flight of stairs, with14 steps left ascending handrail, to access the bedroom and bathroom. The bathroom has a stall shower  fixed shower head and standard toilet with adequate space to fit a commode over it. Pt stated he is in the process of selling his home and his primary residence is in [place]/[facility]

## 2024-02-09 NOTE — PHYSICAL THERAPY INITIAL EVALUATION ADULT - SOCIAL CONCERNS
Pt voiced concerns about his recovery at home. Pt endorsed that his spouse will be able to assist him after he is discharge home post-operatively to his primary residence in [place]. Pt has no support in [place]./Complex psychosocial needs/coping issues

## 2024-02-09 NOTE — OCCUPATIONAL THERAPY INITIAL EVALUATION ADULT - PERTINENT HX OF CURRENT PROBLEM, REHAB EVAL
Pt is a 64 y/o male admitted for elective surgery for left TKR with MD Bob on 2/8/24 due to OA, chronic pain and DJD. Pt reported buckling in his left knee, but denied any falls in the past 3-6 months. Pt has left knee spacer due to infection.

## 2024-02-09 NOTE — PHYSICAL THERAPY INITIAL EVALUATION ADULT - PLANNED THERAPY INTERVENTIONS, PT EVAL
stair training: pt will negotiate stairs in reciprocal pattern independently with 1 handrail x6 weeks/balance training/bed mobility training/gait training/strengthening/transfer training

## 2024-02-09 NOTE — PHYSICAL THERAPY INITIAL EVALUATION ADULT - GAIT TRAINING, PT EVAL
pt will be able to ambulate distances >1200 feet for return to community ambulation independently x6 weeks

## 2024-02-09 NOTE — OCCUPATIONAL THERAPY INITIAL EVALUATION ADULT - RANGE OF MOTION EXAMINATION, LOWER EXTREMITY
ROM is limited in left hip/Right LE Active ROM was WNL(within normal limits)/Right LE Passive ROM was WNL (within normal limits)

## 2024-02-09 NOTE — PHYSICAL THERAPY INITIAL EVALUATION ADULT - GENERAL OBSERVATIONS, REHAB EVAL
pt encountered semi-reclined in bed, alert and oriented x4, prevana in place, benton and knee immobilizer donnes. Pt is POD#1 L TK revision, WBAT.

## 2024-02-09 NOTE — PROGRESS NOTE ADULT - ATTENDING COMMENTS
s/p left knee removal hardware and revision TKA  vitals/labs reviewed    LLE:  dressing c/d/i  intact quad, ehl, fhl, gs, ta  SILT throughout foot  palp DP    WBAT LLE  knee immobilizer  benton removed  postop ancef and extended keflex due to high risk revision TKA  xarelto for dvt ppx  f/u cultures  no knee motion until 2 weeks postop  prevena for 1 week

## 2024-02-09 NOTE — OCCUPATIONAL THERAPY INITIAL EVALUATION ADULT - ADDITIONAL COMMENTS
Prior to admission, pt was functioning in his roles, self sufficient & ambulating independently with a SAC.  Pt owns a rolling walker and 3:1 commode. Presently pt needs assistance with lower body self care tasks/ IADL  due to pain, weakness, stiffness and decreased ROM from left TKR. Pt is right hand dominant and wears glasses for reading.

## 2024-02-09 NOTE — OCCUPATIONAL THERAPY INITIAL EVALUATION ADULT - NSOTDISCHREC_GEN_A_CORE
to prevent falls, optimize pt's ability for ADL management & safely navigate in all terrains . Pt has the potential to progress to out patient./Home OT

## 2024-02-10 ENCOUNTER — TRANSCRIPTION ENCOUNTER (OUTPATIENT)
Age: 64
End: 2024-02-10

## 2024-02-10 VITALS
HEART RATE: 99 BPM | OXYGEN SATURATION: 97 % | TEMPERATURE: 99 F | DIASTOLIC BLOOD PRESSURE: 78 MMHG | RESPIRATION RATE: 18 BRPM | SYSTOLIC BLOOD PRESSURE: 150 MMHG

## 2024-02-10 LAB
ANION GAP SERPL CALC-SCNC: 2 MMOL/L — LOW (ref 5–17)
BUN SERPL-MCNC: 8 MG/DL — SIGNIFICANT CHANGE UP (ref 7–23)
CALCIUM SERPL-MCNC: 8.8 MG/DL — SIGNIFICANT CHANGE UP (ref 8.5–10.1)
CHLORIDE SERPL-SCNC: 105 MMOL/L — SIGNIFICANT CHANGE UP (ref 96–108)
CO2 SERPL-SCNC: 29 MMOL/L — SIGNIFICANT CHANGE UP (ref 22–31)
CREAT SERPL-MCNC: 0.76 MG/DL — SIGNIFICANT CHANGE UP (ref 0.5–1.3)
EGFR: 100 ML/MIN/1.73M2 — SIGNIFICANT CHANGE UP
GLUCOSE BLDC GLUCOMTR-MCNC: 115 MG/DL — HIGH (ref 70–99)
GLUCOSE BLDC GLUCOMTR-MCNC: 134 MG/DL — HIGH (ref 70–99)
GLUCOSE BLDC GLUCOMTR-MCNC: 220 MG/DL — HIGH (ref 70–99)
GLUCOSE SERPL-MCNC: 123 MG/DL — HIGH (ref 70–99)
HCT VFR BLD CALC: 34 % — LOW (ref 39–50)
HGB BLD-MCNC: 11.1 G/DL — LOW (ref 13–17)
MCHC RBC-ENTMCNC: 29.4 PG — SIGNIFICANT CHANGE UP (ref 27–34)
MCHC RBC-ENTMCNC: 32.6 G/DL — SIGNIFICANT CHANGE UP (ref 32–36)
MCV RBC AUTO: 89.9 FL — SIGNIFICANT CHANGE UP (ref 80–100)
NRBC # BLD: 0 /100 WBCS — SIGNIFICANT CHANGE UP (ref 0–0)
PLATELET # BLD AUTO: 207 K/UL — SIGNIFICANT CHANGE UP (ref 150–400)
POTASSIUM SERPL-MCNC: 4.5 MMOL/L — SIGNIFICANT CHANGE UP (ref 3.5–5.3)
POTASSIUM SERPL-SCNC: 4.5 MMOL/L — SIGNIFICANT CHANGE UP (ref 3.5–5.3)
RBC # BLD: 3.78 M/UL — LOW (ref 4.2–5.8)
RBC # FLD: 14.6 % — HIGH (ref 10.3–14.5)
SODIUM SERPL-SCNC: 136 MMOL/L — SIGNIFICANT CHANGE UP (ref 135–145)
WBC # BLD: 10.78 K/UL — HIGH (ref 3.8–10.5)
WBC # FLD AUTO: 10.78 K/UL — HIGH (ref 3.8–10.5)

## 2024-02-10 RX ORDER — ONDANSETRON 8 MG/1
1 TABLET, FILM COATED ORAL
Qty: 20 | Refills: 0
Start: 2024-02-10

## 2024-02-10 RX ORDER — CEPHALEXIN 500 MG
1 CAPSULE ORAL
Qty: 28 | Refills: 0
Start: 2024-02-10 | End: 2024-02-16

## 2024-02-10 RX ORDER — OXYCODONE HYDROCHLORIDE 5 MG/1
1 TABLET ORAL
Qty: 20 | Refills: 0
Start: 2024-02-10 | End: 2024-02-14

## 2024-02-10 RX ORDER — CELECOXIB 200 MG/1
1 CAPSULE ORAL
Qty: 30 | Refills: 0
Start: 2024-02-10 | End: 2024-03-10

## 2024-02-10 RX ORDER — DOCUSATE SODIUM 100 MG
1 CAPSULE ORAL
Qty: 20 | Refills: 0
Start: 2024-02-10

## 2024-02-10 RX ORDER — RIVAROXABAN 15 MG-20MG
1 KIT ORAL
Qty: 56 | Refills: 0
Start: 2024-02-10 | End: 2024-03-08

## 2024-02-10 RX ADMIN — Medication 1 SPRAY(S): at 17:13

## 2024-02-10 RX ADMIN — Medication 650 MILLIGRAM(S): at 05:31

## 2024-02-10 RX ADMIN — Medication 650 MILLIGRAM(S): at 17:12

## 2024-02-10 RX ADMIN — BUDESONIDE AND FORMOTEROL FUMARATE DIHYDRATE 2 PUFF(S): 160; 4.5 AEROSOL RESPIRATORY (INHALATION) at 17:13

## 2024-02-10 RX ADMIN — Medication 650 MILLIGRAM(S): at 13:42

## 2024-02-10 RX ADMIN — Medication 650 MILLIGRAM(S): at 18:12

## 2024-02-10 RX ADMIN — CELECOXIB 200 MILLIGRAM(S): 200 CAPSULE ORAL at 06:30

## 2024-02-10 RX ADMIN — BUPROPION HYDROCHLORIDE 150 MILLIGRAM(S): 150 TABLET, EXTENDED RELEASE ORAL at 12:44

## 2024-02-10 RX ADMIN — BUDESONIDE AND FORMOTEROL FUMARATE DIHYDRATE 2 PUFF(S): 160; 4.5 AEROSOL RESPIRATORY (INHALATION) at 07:01

## 2024-02-10 RX ADMIN — Medication 2: at 11:31

## 2024-02-10 RX ADMIN — Medication 650 MILLIGRAM(S): at 12:42

## 2024-02-10 RX ADMIN — Medication 100 MILLIGRAM(S): at 08:14

## 2024-02-10 RX ADMIN — CELECOXIB 200 MILLIGRAM(S): 200 CAPSULE ORAL at 18:12

## 2024-02-10 RX ADMIN — Medication 1 SPRAY(S): at 05:32

## 2024-02-10 RX ADMIN — SERTRALINE 100 MILLIGRAM(S): 25 TABLET, FILM COATED ORAL at 12:43

## 2024-02-10 RX ADMIN — HYDROMORPHONE HYDROCHLORIDE 2 MILLIGRAM(S): 2 INJECTION INTRAMUSCULAR; INTRAVENOUS; SUBCUTANEOUS at 08:15

## 2024-02-10 RX ADMIN — LOSARTAN POTASSIUM 100 MILLIGRAM(S): 100 TABLET, FILM COATED ORAL at 05:32

## 2024-02-10 RX ADMIN — HYDROMORPHONE HYDROCHLORIDE 2 MILLIGRAM(S): 2 INJECTION INTRAMUSCULAR; INTRAVENOUS; SUBCUTANEOUS at 09:15

## 2024-02-10 RX ADMIN — CELECOXIB 200 MILLIGRAM(S): 200 CAPSULE ORAL at 05:32

## 2024-02-10 RX ADMIN — CELECOXIB 200 MILLIGRAM(S): 200 CAPSULE ORAL at 17:13

## 2024-02-10 RX ADMIN — Medication 0.5 MILLIGRAM(S): at 05:32

## 2024-02-10 RX ADMIN — Medication 0.5 MILLIGRAM(S): at 17:12

## 2024-02-10 RX ADMIN — Medication 650 MILLIGRAM(S): at 00:40

## 2024-02-10 RX ADMIN — RIVAROXABAN 15 MILLIGRAM(S): KIT at 12:43

## 2024-02-10 RX ADMIN — Medication 650 MILLIGRAM(S): at 06:30

## 2024-02-10 RX ADMIN — PANTOPRAZOLE SODIUM 40 MILLIGRAM(S): 20 TABLET, DELAYED RELEASE ORAL at 05:32

## 2024-02-10 RX ADMIN — Medication 100 MILLIGRAM(S): at 17:12

## 2024-02-10 RX ADMIN — DULOXETINE HYDROCHLORIDE 60 MILLIGRAM(S): 30 CAPSULE, DELAYED RELEASE ORAL at 12:43

## 2024-02-10 NOTE — PROGRESS NOTE ADULT - ATTENDING COMMENTS
s/p left knee removal hardware and revision TKA  vitals/labs reviewed    LLE:  dressing c/d/i  intact quad, ehl, fhl, gs, ta  SILT throughout foot  palp DP    WBAT LLE  knee immobilizer  benton removed  postop ancef and extended keflex due to high risk revision TKA  xarelto for dvt ppx  f/u cultures  no knee motion until 2 weeks postop  prevena for 1 week s/p left knee removal hardware and revision TKA  vitals/labs reviewed    LLE:  dressing c/d/i  intact quad, ehl, fhl, gs, ta  SILT throughout foot  palp DP    Cultures no growth    WBAT LLE  knee immobilizer  postop ancef and extended keflex due to high risk revision TKA  xarelto for dvt ppx  f/u cultures  no knee motion until 2 weeks postop  prevena for 1 week

## 2024-02-10 NOTE — DISCHARGE NOTE NURSING/CASE MANAGEMENT/SOCIAL WORK - PATIENT PORTAL LINK FT
You can access the FollowMyHealth Patient Portal offered by [facility] by registering at the following website: [email or web address]. By joining [facility]’s FollowMyHealth portal, you will also be able to view your health information using other applications (apps) compatible with our system.

## 2024-02-10 NOTE — DISCHARGE NOTE NURSING/CASE MANAGEMENT/SOCIAL WORK - NSDCPEFALRISK_GEN_ALL_CORE
For information on Fall & Injury Prevention, visit: [email or web address] OR  [email or web address] OR  https://www.cdc.gov/steadi/patient.html

## 2024-02-10 NOTE — PROGRESS NOTE ADULT - SUBJECTIVE AND OBJECTIVE BOX
Patient seen and examined at bedside.  No acute complaints at this time. Pain well controlled. Denies chest pain, shortness of breath, nausea or vomiting.     Vital Signs (24 Hrs):  T(C): 37.2 (02-09-24 @ 04:00), Max: 37.2 (02-09-24 @ 04:00)  HR: 83 (02-09-24 @ 04:00) (69 - 88)  BP: 149/81 (02-09-24 @ 04:00) (141/73 - 165/78)  RR: 18 (02-09-24 @ 04:00) (12 - 22)  SpO2: 97% (02-09-24 @ 04:00) (96% - 100%)  Wt(kg): --    LABS:                          10.2   10.18 )-----------( 221      ( 08 Feb 2024 18:47 )             31.5     02-08    141  |  112<H>  |  9   ----------------------------<  151<H>  4.5   |  23  |  0.96    Ca    8.2<L>      08 Feb 2024 18:47        Exam:  General: NAD, resting comfortably in bed    LLE:   Prevena in place  Dressing in place C/D/I  SCD in place bilaterally  No calf tenderness   Motor: + EHL/FHL/TA/GSC  +SILT: SPN/DPN/Carly/Saph/Tib  DP/PT 2+      A/P:  63y m s/p L Revision TKA POD #1    -PT/OT  -WBAT  -Keep knee immobilizer on at all times, no flexion of knee  -Prevena   -Pain Control  -DVT ppx: Xarelto  -Continue Ancef while admitted  -FU AM Labs  -Rest, ice, compress and elevate the extremity as we needed  -Incentive Spirometry  -Medical management appreciated  -Dispo pending PT eval  -Discussed above with Dr. Bob, who agrees with plan  
Postop Check s/p L Revision TKA    Patient tolerated the procedure well. Patient seen and examined at bedside.  No acute complaints at this time. Pain well controlled. Denies chest pain, shortness of breath, nausea or vomiting.       Vital Signs Last 24 Hrs  T(C): 37 (02-08-24 @ 22:10), Max: 37.1 (02-08-24 @ 20:10)  T(F): 98.6 (02-08-24 @ 22:10), Max: 98.7 (02-08-24 @ 20:10)  HR: 78 (02-08-24 @ 22:10) (69 - 88)  BP: 142/77 (02-08-24 @ 22:10) (141/73 - 165/78)  BP(mean): --  RR: 16 (02-08-24 @ 22:10) (12 - 22)  SpO2: 98% (02-08-24 @ 22:10) (96% - 100%)    Exam:  General: NAD, resting comfortably in bed  LLE:   Prevena in place  Dressing in place C/D/I  SCD in place bilaterally  No calf tenderness   Motor: + EHL/FHL/TA/GSC  +SILT: SPN/DPN/Carly/Saph/Tib  DP/PT 2+      A/P:  63y m s/p L Revision TKA POD #0    -PT/OT  -WBAT  -Keep knee immobilizer on at all times, no flexion of knee  -Prevena   -Pain Control  -DVT ppx: Xarelto  -Continue perioperative abx x 24 hours  -FU AM Labs  -Rest, ice, compress and elevate the extremity as we needed  -Incentive Spirometry  -Medical management appreciated  -Dispo pending PT eval  -Discussed above with Dr. Bob, who agrees with plan
Patient seen and examined at bedside.  No acute complaints at this time. Pain well controlled. Denies chest pain, shortness of breath, nausea or vomiting.       Vital Signs (24 Hrs):  T(C): 36.7 (02-10-24 @ 05:30), Max: 36.8 (02-09-24 @ 09:21)  HR: 75 (02-10-24 @ 05:30) (75 - 90)  BP: 162/77 (02-10-24 @ 05:30) (142/71 - 167/80)  RR: 19 (02-10-24 @ 05:30) (17 - 19)  SpO2: 99% (02-10-24 @ 05:30) (96% - 99%)  Wt(kg): --    LABS:                          11.1   10.78 )-----------( 207      ( 10 Feb 2024 05:37 )             34.0     02-10    136  |  105  |  8   ----------------------------<  123<H>  4.5   |  29  |  0.76    Ca    8.8      10 Feb 2024 05:37        Exam:  General: NAD, resting comfortably in bed    LLE:   Prevena in place  Dressing in place C/D/I  SCD in place bilaterally  No calf tenderness   Motor: + EHL/FHL/TA/GSC  +SILT: SPN/DPN/Carly/Saph/Tib  DP/PT 2+      A/P:  63y m s/p L Revision TKA POD #2    -PT/OT  -WBAT  -Keep knee immobilizer on at all times, no flexion of knee  -Prevena   -Pain Control  -DVT ppx: Xarelto  -Continue Ancef while admitted  -FU AM Labs  -Rest, ice, compress and elevate the extremity as we needed  -Incentive Spirometry  -Medical management appreciated  -Dispo likely home   -Discussed above with Dr. Bob, who agrees with plan

## 2024-02-12 ENCOUNTER — NON-APPOINTMENT (OUTPATIENT)
Age: 64
End: 2024-02-12

## 2024-02-12 LAB — SURGICAL PATHOLOGY STUDY: SIGNIFICANT CHANGE UP

## 2024-02-14 ENCOUNTER — APPOINTMENT (OUTPATIENT)
Dept: ORTHOPEDIC SURGERY | Facility: CLINIC | Age: 64
End: 2024-02-14
Payer: MEDICARE

## 2024-02-14 DIAGNOSIS — F43.10 POST-TRAUMATIC STRESS DISORDER, UNSPECIFIED: ICD-10-CM

## 2024-02-14 DIAGNOSIS — M21.962 UNSPECIFIED ACQUIRED DEFORMITY OF LEFT LOWER LEG: ICD-10-CM

## 2024-02-14 DIAGNOSIS — Z86.718 PERSONAL HISTORY OF OTHER VENOUS THROMBOSIS AND EMBOLISM: ICD-10-CM

## 2024-02-14 DIAGNOSIS — K21.9 GASTRO-ESOPHAGEAL REFLUX DISEASE WITHOUT ESOPHAGITIS: ICD-10-CM

## 2024-02-14 DIAGNOSIS — E11.9 TYPE 2 DIABETES MELLITUS WITHOUT COMPLICATIONS: ICD-10-CM

## 2024-02-14 DIAGNOSIS — Z79.01 LONG TERM (CURRENT) USE OF ANTICOAGULANTS: ICD-10-CM

## 2024-02-14 DIAGNOSIS — Z79.84 LONG TERM (CURRENT) USE OF ORAL HYPOGLYCEMIC DRUGS: ICD-10-CM
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
## 2024-02-14 DIAGNOSIS — I11.9 HYPERTENSIVE HEART DISEASE WITHOUT HEART FAILURE: ICD-10-CM
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
## 2024-02-14 DIAGNOSIS — E78.00 PURE HYPERCHOLESTEROLEMIA, UNSPECIFIED: ICD-10-CM

## 2024-02-14 PROCEDURE — 99024 POSTOP FOLLOW-UP VISIT: CPT

## 2024-02-19 NOTE — ED PROVIDER NOTE - CPE EDP RESP NORM
Called patient to inform her that referral was sent to [facility] dermatology. I left voicemail also for patient to give our office a call back.   - - -

## 2024-02-22 ENCOUNTER — APPOINTMENT (OUTPATIENT)
Dept: ORTHOPEDIC SURGERY | Facility: CLINIC | Age: 64
End: 2024-02-22
Payer: MEDICARE

## 2024-02-22 VITALS — HEIGHT: 71 IN | WEIGHT: 196 LBS | BODY MASS INDEX: 27.44 KG/M2

## 2024-02-22 DIAGNOSIS — T84.59XA INFECTION AND INFLAMMATORY REACTION DUE TO OTHER INTERNAL JOINT PROSTHESIS, INITIAL ENCOUNTER: ICD-10-CM

## 2024-02-22 DIAGNOSIS — Z96.659 INFECTION AND INFLAMMATORY REACTION DUE TO OTHER INTERNAL JOINT PROSTHESIS, INITIAL ENCOUNTER: ICD-10-CM

## 2024-02-22 PROCEDURE — 73564 X-RAY EXAM KNEE 4 OR MORE: CPT | Mod: LT

## 2024-02-22 PROCEDURE — 99024 POSTOP FOLLOW-UP VISIT: CPT

## 2024-02-28 NOTE — PROGRESS NOTE ADULT - ASSESSMENT
63M with a PMH of DVT on xarelto, DM, HTN, L TKA in 2011, R TKA in 2003, chronic anxiety who presents to the ED for L knee pain.  Patient was evaulated by Ortho who performed and arthrocentesis of the joint.  Admitted to ortho service who plan to do washout tomorrow AM.  Hospital medicine consulted for medical clearance.             Problem/Recommendation - 1:  ·  Problem: Infected prosthetic knee joint.  Sepsis POA  ·  Recommendation: Ortho on board,   s/p washout  antibiotics as per ID. follow cultures      Problem/Recommendation - 2:  ·  Problem: Hypokalemia.   ·  Recommendation: oral replacement, trend labs.     Problem/Recommendation - 3:  ·  Problem: GAURAV (acute kidney injury).   ·  Recommendation: light hydration.     Problem/Recommendation - 4:  ·  Problem: Chronic anxiety.   ·  Recommendation: ambien 10 prn nightly, wellbutrin 150 daily.     Problem/Recommendation - 5:  ·  Problem: Essential hypertension.   ·  Recommendation: Losartan.     Problem/Recommendation - 6:  ·  Problem: Diabetes mellitus.   ·  Recommendation: insulin corrective scale.   soft/nontender negative

## 2024-03-02 NOTE — HISTORY OF PRESENT ILLNESS
[Clean/Dry/Intact] : clean, dry and intact [Neuro Intact] : an unremarkable neurological exam [Vascular Intact] : ~T peripheral vascular exam normal [Xray (Date:___)] : [unfilled] Xray -  [Doing Well] : is doing well [Excellent Pain Control] : has excellent pain control [de-identified] : Date of Surgery: 2/8/24 Procedure: Removal of antibiotic spacer, revision TKA [de-identified] : Pain controlled, overall doing well. Ambulating without brace in office today. He is on xarelto for dvt ppx.  [de-identified] : knee incision c/d/i. Staples removed.  intact EHL/FHL/GS/TA, SILT at toes and foot. [de-identified] : Left knee x-rays show total knee revision components in appropriate position and alignment, no loosening.  Patella is at appropriate height, slight lateral position. [de-identified] : Weightbearing as tolerated, begin physical therapy.  He was prescribed hinged knee brace allowing motion 0-15.  We will gradually advance brace motion.  Continue Xarelto for DVT prophylaxis.  Wound care instructions reviewed. Follow-up 2 weeks [de-identified] : 2 week s/p left revision TKA and removal of antibiotic spacer.

## 2024-03-07 ENCOUNTER — APPOINTMENT (OUTPATIENT)
Dept: ORTHOPEDIC SURGERY | Facility: CLINIC | Age: 64
End: 2024-03-07

## 2024-03-07 ENCOUNTER — RX RENEWAL (OUTPATIENT)
Age: 64
End: 2024-03-07

## 2024-03-07 RX ORDER — RIVAROXABAN 15 MG/1
15 TABLET, FILM COATED ORAL
Qty: 56 | Refills: 0 | Status: ACTIVE | COMMUNITY
Start: 2024-03-07 | End: 1900-01-01

## 2024-03-14 ENCOUNTER — APPOINTMENT (OUTPATIENT)
Dept: INFECTIOUS DISEASE | Facility: CLINIC | Age: 64
End: 2024-03-14
Payer: MEDICARE

## 2024-03-14 VITALS
HEART RATE: 79 BPM | SYSTOLIC BLOOD PRESSURE: 157 MMHG | TEMPERATURE: 97.3 F | WEIGHT: 193 LBS | BODY MASS INDEX: 26.92 KG/M2 | DIASTOLIC BLOOD PRESSURE: 76 MMHG | OXYGEN SATURATION: 99 %

## 2024-03-14 PROCEDURE — 99213 OFFICE O/P EST LOW 20 MIN: CPT

## 2024-03-15 LAB
ALBUMIN SERPL ELPH-MCNC: 4.3 G/DL
ALP BLD-CCNC: 135 U/L
ALT SERPL-CCNC: 13 U/L
ANION GAP SERPL CALC-SCNC: 15 MMOL/L
AST SERPL-CCNC: 17 U/L
BILIRUB SERPL-MCNC: 0.4 MG/DL
BUN SERPL-MCNC: 8 MG/DL
CALCIUM SERPL-MCNC: 9.2 MG/DL
CHLORIDE SERPL-SCNC: 104 MMOL/L
CO2 SERPL-SCNC: 23 MMOL/L
CREAT SERPL-MCNC: 0.81 MG/DL
CRP SERPL-MCNC: <3 MG/L
EGFR: 98 ML/MIN/1.73M2
ERYTHROCYTE [SEDIMENTATION RATE] IN BLOOD BY WESTERGREN METHOD: 13 MM/HR
GLUCOSE SERPL-MCNC: 105 MG/DL
HCT VFR BLD CALC: 36 %
HGB BLD-MCNC: 11.4 G/DL
MCHC RBC-ENTMCNC: 29.1 PG
MCHC RBC-ENTMCNC: 31.7 GM/DL
MCV RBC AUTO: 91.8 FL
PLATELET # BLD AUTO: 333 K/UL
POTASSIUM SERPL-SCNC: 3.8 MMOL/L
PROT SERPL-MCNC: 7.6 G/DL
RBC # BLD: 3.92 M/UL
RBC # FLD: 15.2 %
SODIUM SERPL-SCNC: 142 MMOL/L
WBC # FLD AUTO: 5.95 K/UL

## 2024-03-20 ENCOUNTER — APPOINTMENT (OUTPATIENT)
Dept: ORTHOPEDIC SURGERY | Facility: CLINIC | Age: 64
End: 2024-03-20
Payer: MEDICARE

## 2024-03-20 DIAGNOSIS — T84.54XA INFECTION AND INFLAMMATORY REACTION DUE TO INTERNAL LEFT KNEE PROSTHESIS, INITIAL ENCOUNTER: ICD-10-CM

## 2024-03-20 DIAGNOSIS — Z96.652 PRESENCE OF LEFT ARTIFICIAL KNEE JOINT: ICD-10-CM

## 2024-03-20 LAB
BACTERIA BLD CULT: NORMAL
BACTERIA BLD CULT: NORMAL

## 2024-03-20 PROCEDURE — 99024 POSTOP FOLLOW-UP VISIT: CPT

## 2024-03-20 NOTE — HISTORY OF PRESENT ILLNESS
[___ Weeks Post Op] : [unfilled] weeks post op [Clean/Dry/Intact] : clean, dry and intact [Healed] : healed [Neuro Intact] : an unremarkable neurological exam [Vascular Intact] : ~T peripheral vascular exam normal [Xray (Date:___)] : [unfilled] Xray -  [Doing Well] : is doing well [Excellent Pain Control] : has excellent pain control [de-identified] : Date of Surgery: 2/8/24 Procedure: Removal of antibiotic spacer, revision TKA [de-identified] : Left knee anterior incision healed, mild swelling, no effusion. ROM 5-90, 5/5 quad strength, stable to varus and valgus. [de-identified] : Pain controlled, overall doing well. Doing PT on his own.  He is partially compliant with the brace and reports he has significantly improved range of motion despite not changing the brace settings.  Recently seen by infectious disease and had blood work including ESR, CRP, and blood cultures which was normal and no growth final. He was a no-show for his appointment 2 weeks ago. [de-identified] : 6 week s/p left revision TKA and removal of antibiotic spacer. [de-identified] : He is progressing very well and doing physical therapy on his own.  He may continue self-guided physical therapy.  He will continue use of the knee brace allowing full motion, and gradually increase range of motion.  He does not need antibiotics.  He remains on Xarelto as part of his home medications.  Follow-up in 6 weeks

## 2024-04-10 NOTE — ASSESSMENT
[FreeTextEntry1] : Mr Rodney is a 63M with hx of PJI now s/p removal of spacer and placement of new prosthetic joint.   Plan: cbc cmp  esr  crp  blood cx x 2 sets   follow up with Dr Bob 3/20/24  Advised to wear the hinged brace recommended by Dr Bob

## 2024-04-10 NOTE — PHYSICAL EXAM
[General Appearance - Alert] : alert [General Appearance - In No Acute Distress] : in no acute distress [Sclera] : the sclera and conjunctiva were normal [PERRL With Normal Accommodation] : pupils were equal in size, round, reactive to light [Extraocular Movements] : extraocular movements were intact [Neck Appearance] : the appearance of the neck was normal [Neck Cervical Mass (___cm)] : no neck mass was observed [Jugular Venous Distention Increased] : there was no jugular-venous distention [Thyroid Diffuse Enlargement] : the thyroid was not enlarged [Auscultation Breath Sounds / Voice Sounds] : lungs were clear to auscultation bilaterally [Heart Rate And Rhythm] : heart rate was normal and rhythm regular [Heart Sounds] : normal S1 and S2 [Heart Sounds Gallop] : no gallops [Murmurs] : no murmurs [Heart Sounds Pericardial Friction Rub] : no pericardial rub [Bowel Sounds] : normal bowel sounds [Abdomen Soft] : soft [Abdomen Tenderness] : non-tender [Abdomen Mass (___ Cm)] : no abdominal mass palpated [Costovertebral Angle Tenderness] : no CVA tenderness [FreeTextEntry1] : ROM is good but slightly tight, increased warmth aruond the knee nontender and no erythema or discharge the surgical site healed well  [] : no rash [Skin Lesions] : no skin lesions [No Focal Deficits] : no focal deficits [Oriented To Time, Place, And Person] : oriented to person, place, and time

## 2024-05-06 ENCOUNTER — APPOINTMENT (OUTPATIENT)
Dept: ORTHOPEDIC SURGERY | Facility: CLINIC | Age: 64
End: 2024-05-06

## 2024-05-31 ENCOUNTER — APPOINTMENT (OUTPATIENT)
Dept: RADIOLOGY | Facility: HOSPITAL | Age: 64
End: 2024-05-31

## 2024-05-31 ENCOUNTER — OUTPATIENT (OUTPATIENT)
Dept: OUTPATIENT SERVICES | Facility: HOSPITAL | Age: 64
LOS: 1 days | Discharge: HOME | End: 2024-05-31
Payer: MEDICARE

## 2024-05-31 DIAGNOSIS — Z98.890 OTHER SPECIFIED POSTPROCEDURAL STATES: Chronic | ICD-10-CM

## 2024-05-31 DIAGNOSIS — I82.409 ACUTE EMBOLISM AND THROMBOSIS OF UNSPECIFIED DEEP VEINS OF UNSPECIFIED LOWER EXTREMITY: Chronic | ICD-10-CM

## 2024-05-31 DIAGNOSIS — R13.10 DYSPHAGIA, UNSPECIFIED: ICD-10-CM

## 2024-05-31 PROCEDURE — 74220 X-RAY XM ESOPHAGUS 1CNTRST: CPT | Mod: 26

## 2024-07-03 ENCOUNTER — APPOINTMENT (OUTPATIENT)
Dept: ORTHOPEDIC SURGERY | Facility: CLINIC | Age: 64
End: 2024-07-03
Payer: MEDICARE

## 2024-07-03 DIAGNOSIS — Z96.652 PAIN DUE TO INTERNAL ORTHOPEDIC PROSTHETIC DEVICES, IMPLANTS AND GRAFTS, INITIAL ENCOUNTER: ICD-10-CM

## 2024-07-03 DIAGNOSIS — Z96.659 INFECTION AND INFLAMMATORY REACTION DUE TO OTHER INTERNAL JOINT PROSTHESIS, INITIAL ENCOUNTER: ICD-10-CM

## 2024-07-03 DIAGNOSIS — T84.84XA PAIN DUE TO INTERNAL ORTHOPEDIC PROSTHETIC DEVICES, IMPLANTS AND GRAFTS, INITIAL ENCOUNTER: ICD-10-CM

## 2024-07-03 DIAGNOSIS — Z96.652 PRESENCE OF LEFT ARTIFICIAL KNEE JOINT: ICD-10-CM

## 2024-07-03 DIAGNOSIS — T84.59XA INFECTION AND INFLAMMATORY REACTION DUE TO OTHER INTERNAL JOINT PROSTHESIS, INITIAL ENCOUNTER: ICD-10-CM

## 2024-07-03 PROCEDURE — 73564 X-RAY EXAM KNEE 4 OR MORE: CPT | Mod: LT

## 2024-07-03 PROCEDURE — 99213 OFFICE O/P EST LOW 20 MIN: CPT

## 2024-07-03 RX ORDER — CELECOXIB 200 MG/1
200 CAPSULE ORAL TWICE DAILY
Qty: 28 | Refills: 0 | Status: ACTIVE | COMMUNITY
Start: 2024-07-03 | End: 1900-01-01

## 2024-07-25 ENCOUNTER — NON-APPOINTMENT (OUTPATIENT)
Age: 64
End: 2024-07-25

## 2024-07-30 ENCOUNTER — APPOINTMENT (OUTPATIENT)
Dept: ORTHOPEDIC SURGERY | Facility: CLINIC | Age: 64
End: 2024-07-30
Payer: COMMERCIAL

## 2024-07-30 VITALS — BODY MASS INDEX: 25.9 KG/M2 | HEIGHT: 71 IN | WEIGHT: 185 LBS

## 2024-07-30 DIAGNOSIS — M43.16 SPONDYLOLISTHESIS, LUMBAR REGION: ICD-10-CM

## 2024-07-30 DIAGNOSIS — S70.01XA CONTUSION OF RIGHT HIP, INITIAL ENCOUNTER: ICD-10-CM

## 2024-07-30 DIAGNOSIS — M25.551 PAIN IN RIGHT HIP: ICD-10-CM

## 2024-07-30 DIAGNOSIS — S70.11XA CONTUSION OF RIGHT HIP, INITIAL ENCOUNTER: ICD-10-CM

## 2024-07-30 DIAGNOSIS — G89.29 PAIN IN RIGHT HIP: ICD-10-CM

## 2024-07-30 DIAGNOSIS — Z96.651 PRESENCE OF RIGHT ARTIFICIAL KNEE JOINT: ICD-10-CM

## 2024-07-30 PROCEDURE — 99204 OFFICE O/P NEW MOD 45 MIN: CPT

## 2024-07-30 PROCEDURE — 72100 X-RAY EXAM L-S SPINE 2/3 VWS: CPT

## 2024-07-30 PROCEDURE — 73562 X-RAY EXAM OF KNEE 3: CPT | Mod: RT

## 2024-07-30 PROCEDURE — 73502 X-RAY EXAM HIP UNI 2-3 VIEWS: CPT | Mod: RT

## 2024-08-03 NOTE — PHYSICAL EXAM
[Cane] : ambulates with cane [de-identified] : General: No acute distress Mental: Alert and oriented x3 Eyes: Conjunctivitis not seen Chest: Symmetric chest rise, no audible wheezing Skin: Bilateral lower extremities absent from rashes and ulcers Abdomen: No distention  Right hip: Skin: Clean, dry and intact Inspection: No obvious deformity, no swelling, no ecchymosis. Tenderness: positive tenderness over greater trochanter/gluteus medius insertion. No tenderness pubic symphysis, pubic tubercle, hip flexors. No tenderness ischial tuberosity or buttock. Nontender over the ASIS/[place] crest. ROM: 0-120. Internal rotation 40, external rotation 70 Special tests: negative Stinchfield, negative FADIR, positive BESSY, negative logroll Strength: 5/5 hip flexion/Abduction/Q/H/TA/GS/EHL Neuro: Sensation intact to light touch throughout in dp/sp/tib/madeline/saph distributions Pulses: 2+ DP/PT pulses  Right knee: Skin: Clean, dry, intact, midline scar Inspection: No obvious malalignment, no masses, no swelling, no effusion.  Tenderness: no MJLT. no LJLT. No tenderness over the medial and lateral patella facets. No ttp medial/lateral epicondyle, patella tendon, tibial tubercle, pes anserinus, or proximal fibula.  ROM: 0 to 110  Stability: Stable to varus and valgus, negative lachman. Negative anterior/posterior drawer.  Additional tests: Negative McMurrays test, negative Steinmann, Negative patellar grind test.   Strength: 5/5 Q/H/TA/GS/EHL, no atrophy  Neuro: Sensation intact to light touch throughout in dp/sp/tib/madeline/saph nerve distributions Pulses: 2+ DP/PT pulses. [de-identified] : Right knee x-ray with 3 views shows cemented total knee arthroplasty components in stable position and alignment.  No fracture, patella is at appropriate height and central position.  Lumbar spine x-rays AP and lateral shows appropriate lordosis, L5-S1 instrumented fusion, intervertebral disc space narrowing, spondylosis, osteophytes.  Pelvis and right hip x-ray shows appropriate alignment, well-maintained joint spaces, no fracture, cortical thickening at the subtrochanteric region medial cortex.
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
## 2024-08-21 NOTE — PATIENT PROFILE ADULT - HISTORY OF COVID-19 VACCINATION
Render Note In Bullet Format When Appropriate: No Detail Level: Detailed Consent: The patient's consent was obtained including but not limited to risks of crusting, scabbing, blistering, scarring, darker or lighter pigmentary change, recurrence, incomplete removal and infection. Show Applicator Variable?: Yes Post-Care Instructions: I reviewed with the patient in detail post-care instructions. Patient is to wear sunprotection, and avoid picking at any of the treated lesions. Pt may apply Vaseline to crusted or scabbing areas. Duration Of Freeze Thaw-Cycle (Seconds): 0 Yes

## 2024-09-10 ENCOUNTER — APPOINTMENT (OUTPATIENT)
Dept: ORTHOPEDIC SURGERY | Facility: CLINIC | Age: 64
End: 2024-09-10

## 2024-09-21 NOTE — ED ADULT NURSE NOTE - NS_NURSE_DISC_TEACHING_YN_ED_ALL_ED
Problem: CARDIOVASCULAR - ADULT  Goal: Maintains optimal cardiac output and hemodynamic stability  Description: INTERVENTIONS:  - Monitor I/O, vital signs and rhythm  - Monitor for S/S and trends of decreased cardiac output  - Administer and titrate ordered vasoactive medications to optimize hemodynamic stability  - Assess quality of pulses, skin color and temperature  - Assess for signs of decreased coronary artery perfusion  - Instruct patient to report change in severity of symptoms  Outcome: Progressing  Goal: Absence of cardiac dysrhythmias or at baseline rhythm  Description: INTERVENTIONS:  - Continuous cardiac monitoring, vital signs, obtain 12 lead EKG if ordered  - Administer antiarrhythmic and heart rate control medications as ordered  - Monitor electrolytes and administer replacement therapy as ordered  Outcome: Progressing     Problem: PAIN - ADULT  Goal: Verbalizes/displays adequate comfort level or baseline comfort level  Description: Interventions:  - Encourage patient to monitor pain and request assistance  - Assess pain using appropriate pain scale  - Administer analgesics based on type and severity of pain and evaluate response  - Implement non-pharmacological measures as appropriate and evaluate response  - Consider cultural and social influences on pain and pain management  - Notify physician/advanced practitioner if interventions unsuccessful or patient reports new pain  Outcome: Progressing     Problem: GASTROINTESTINAL - ADULT  Goal: Minimal or absence of nausea and/or vomiting  Description: INTERVENTIONS:  - Administer IV fluids if ordered to ensure adequate hydration  - Maintain NPO status until nausea and vomiting are resolved  - Nasogastric tube if ordered  - Administer ordered antiemetic medications as needed  - Provide nonpharmacologic comfort measures as appropriate  - Advance diet as tolerated, if ordered  - Consider nutrition services referral to assist patient with adequate  nutrition and appropriate food choices  Outcome: Progressing      No

## 2024-10-08 ENCOUNTER — NON-APPOINTMENT (OUTPATIENT)
Age: 64
End: 2024-10-08

## 2024-10-15 ENCOUNTER — APPOINTMENT (OUTPATIENT)
Dept: ORTHOPEDIC SURGERY | Facility: CLINIC | Age: 64
End: 2024-10-15

## 2024-12-10 NOTE — H&P PST ADULT - RX
Expiration Date For Kenalog (Optional): FEB 2026 Concentration Of Kenalog Solution Injected (Mg/Ml): 10.0 Include Z78.9 (Other Specified Conditions Influencing Health Status) As An Associated Diagnosis?: No Detail Level: Detailed Kenalog Preparation: Kenalog How Many Mls Were Removed From The 40 Mg/Ml (5ml) Vial When Preparing The Injectable Solution?: 0 Validate Note Data When Using Inventory: Yes Medical Necessity Clause: This procedure was medically necessary because the lesions that were treated were: Ndc# For Kenalog Only: 5969-6273-22 Total Volume (Ccs): 0.10 Consent: The risks of atrophy were reviewed with the patient. Treatment Number (Optional): 1 Lot # For Kenalog (Optional): [id number] Kenalog Type Of Vial: Multiple Dose Administered By (Optional): Dina Babcock Pa-C cpap

## 2025-01-20 NOTE — ED ADULT TRIAGE NOTE - GLASGOW COMA SCALE: EYE OPENING, MLM
Subjective  Josh Skinner is a 53 y.o. male who presents for Follow-up.  HPI  Htn, follow up increasing lisinopril dose, tolerating fine, bp at goal  Review labs, lipids well controlled.   Reviewed ct screening chest as well.   No new problems or concerns.  Review of Systems   All other systems reviewed and are negative.  .    No Known Allergies    Current Outpatient Medications on File Prior to Visit   Medication Sig Dispense Refill    amLODIPine (Norvasc) 10 mg tablet Take 1 tablet (10 mg) by mouth once daily. 90 tablet 3    ascorbic acid (Vitamin C) 500 mg tablet Take by mouth.      atorvastatin (Lipitor) 20 mg tablet Take 1 tablet (20 mg) by mouth once daily. 90 tablet 3    buPROPion XL (Wellbutrin XL) 150 mg 24 hr tablet Take 1 tablet (150 mg) by mouth once daily. Do not crush, chew, or split 30 tablet 3    calcium citrate (Calcitrate) 200 mg (950 mg) tablet Take 1 tablet (200 mg) by mouth once daily.      docusate sodium (Colace) 100 mg capsule Take 1 capsule (100 mg) by mouth 2 times a day.      lisinopril 20 mg tablet Take 1 tablet (20 mg) by mouth 2 times a day. 60 tablet 3    omeprazole OTC (PriLOSEC OTC) 20 mg EC tablet TAKE AS DIRECTED      varenicline (Chantix Continuing Month Box) 1 mg tablet Take 1 tablet (1 mg) by mouth 2 times a day. Take with full glass of water. 60 tablet 1    varenicline (Chantix Starting Month Box) 0.5 mg (11)- 1 mg (42) tablet Take 1 mg by mouth 2 times a day. 53 each 0     No current facility-administered medications on file prior to visit.       Patient Active Problem List   Diagnosis    Chronic low back pain    Essential hypertension    Gastroesophageal reflux disease without esophagitis    Hematuria    Left bundle-branch block    Tobacco use disorder    Obstructive sleep apnea syndrome    Stress at work       Objective     Visit Vitals  /76 Comment: seated left arm regular cuff   Pulse 84      Physical Exam  Vitals reviewed.   HENT:      Head: Normocephalic.    Pulmonary:      Effort: Pulmonary effort is normal.   Skin:     Coloration: Skin is not pale.   Neurological:      General: No focal deficit present.      Mental Status: He is alert. Mental status is at baseline.       No visits with results within 1 Week(s) from this visit.   Latest known visit with results is:   Lab on 12/26/2024   Component Date Value Ref Range Status    WBC 12/26/2024 7.8  4.4 - 11.3 x10*3/uL Final    nRBC 12/26/2024 0.0  0.0 - 0.0 /100 WBCs Final    RBC 12/26/2024 4.72  4.50 - 5.90 x10*6/uL Final    Hemoglobin 12/26/2024 14.7  13.5 - 17.5 g/dL Final    Hematocrit 12/26/2024 43.5  41.0 - 52.0 % Final    MCV 12/26/2024 92  80 - 100 fL Final    MCH 12/26/2024 31.1  26.0 - 34.0 pg Final    MCHC 12/26/2024 33.8  32.0 - 36.0 g/dL Final    RDW 12/26/2024 11.9  11.5 - 14.5 % Final    Platelets 12/26/2024 320  150 - 450 x10*3/uL Final    Neutrophils % 12/26/2024 58.7  40.0 - 80.0 % Final    Immature Granulocytes %, Automated 12/26/2024 0.3  0.0 - 0.9 % Final    Immature Granulocyte Count (IG) includes promyelocytes, myelocytes and metamyelocytes but does not include bands. Percent differential counts (%) should be interpreted in the context of the absolute cell counts (cells/UL).    Lymphocytes % 12/26/2024 28.9  13.0 - 44.0 % Final    Monocytes % 12/26/2024 8.1  2.0 - 10.0 % Final    Eosinophils % 12/26/2024 3.0  0.0 - 6.0 % Final    Basophils % 12/26/2024 1.0  0.0 - 2.0 % Final    Neutrophils Absolute 12/26/2024 4.56  1.20 - 7.70 x10*3/uL Final    Percent differential counts (%) should be interpreted in the context of the absolute cell counts (cells/uL).    Immature Granulocytes Absolute, Au* 12/26/2024 0.02  0.00 - 0.70 x10*3/uL Final    Lymphocytes Absolute 12/26/2024 2.24  1.20 - 4.80 x10*3/uL Final    Monocytes Absolute 12/26/2024 0.63  0.10 - 1.00 x10*3/uL Final    Eosinophils Absolute 12/26/2024 0.23  0.00 - 0.70 x10*3/uL Final    Basophils Absolute 12/26/2024 0.08  0.00 - 0.10 x10*3/uL Final     Cholesterol 12/26/2024 100  0 - 199 mg/dL Final          Age      Desirable   Borderline High   High     0-19 Y     0 - 169       170 - 199     >/= 200    20-24 Y     0 - 189       190 - 224     >/= 225         >24 Y     0 - 199       200 - 239     >/= 240   **All ranges are based on fasting samples. Specific   therapeutic targets will vary based on patient-specific   cardiac risk.    Pediatric guidelines reference:Pediatrics 2011, 128(S5).Adult guidelines reference: NCEP ATPIII Guidelines,RY 2001, 258:2486-97    Venipuncture immediately after or during the administration of Metamizole may lead to falsely low results. Testing should be performed immediately prior to Metamizole dosing.    HDL-Cholesterol 12/26/2024 44.0  mg/dL Final      Age       Very Low   Low     Normal    High    0-19 Y    < 35      < 40     40-45     ----  20-24 Y    ----     < 40      >45      ----        >24 Y      ----     < 40     40-60      >60      Cholesterol/HDL Ratio 12/26/2024 2.3   Final      Ref Values  Desirable  < 3.4  High Risk  > 5.0    LDL Calculated 12/26/2024 46  <=99 mg/dL Final                                Near   Borderline      AGE      Desirable  Optimal    High     High     Very High     0-19 Y     0 - 109     ---    110-129   >/= 130     ----    20-24 Y     0 - 119     ---    120-159   >/= 160     ----      >24 Y     0 -  99   100-129  130-159   160-189     >/=190      VLDL 12/26/2024 10  0 - 40 mg/dL Final    Triglycerides 12/26/2024 51  0 - 149 mg/dL Final    Age              Desirable        Borderline         High        Very High  SEX:B           mg/dL             mg/dL               mg/dL      mg/dL  <=14D                       ----               ----        ----  15D-365D                    ----               ----        ----  1Y-9Y           0-74               75-99             >=100       ----  10Y-19Y        0-89                            >=130       ----  20Y-24Y        0-114              115-149             >=150      ----  >= 25Y         0-149             150-199             200-499    >=500      Venipuncture immediately after or during the administration of Metamizole may lead to falsely low results. Testing should be performed immediately prior to Metamizole dosing.    Non HDL Cholesterol 12/26/2024 56  0 - 149 mg/dL Final          Age       Desirable   Borderline High   High     Very High     0-19 Y     0 - 119       120 - 144     >/= 145    >/= 160    20-24 Y     0 - 149       150 - 189     >/= 190      ----         >24 Y    30 mg/dL above LDL Cholesterol goal      Glucose 12/26/2024 98  74 - 99 mg/dL Final    Sodium 12/26/2024 140  136 - 145 mmol/L Final    Potassium 12/26/2024 4.3  3.5 - 5.3 mmol/L Final    Chloride 12/26/2024 106  98 - 107 mmol/L Final    Bicarbonate 12/26/2024 29  21 - 32 mmol/L Final    Anion Gap 12/26/2024 9 (L)  10 - 20 mmol/L Final    Urea Nitrogen 12/26/2024 13  6 - 23 mg/dL Final    Creatinine 12/26/2024 0.97  0.50 - 1.30 mg/dL Final    eGFR 12/26/2024 >90  >60 mL/min/1.73m*2 Final    Calculations of estimated GFR are performed using the 2021 CKD-EPI Study Refit equation without the race variable for the IDMS-Traceable creatinine methods.  https://jasn.asnjournals.org/content/early/2021/09/22/ASN.7309179385    Calcium 12/26/2024 8.8  8.6 - 10.3 mg/dL Final         Assessment/Plan   Problem List Items Addressed This Visit       Essential hypertension - Primary     Followup six months, no labs, call concerns.          Annamaria An MD    (E4) spontaneous

## 2025-03-11 NOTE — H&P ADULT - NSHPSOCIALHISTORY_GEN_ALL_CORE
nonsmoker  no IVDA  nonsmoker  lives with family
[FreeTextEntry1] : ALL is a 1 yr old girl who was brought for cardiology follow up to reassess her coronary artery origins. I had initially evaluated her 3/21/23 due to an innocent murmur. At that time, the circumflex coronary artery was not clearly demonstrated; origin of the circumflex from the right sinus of Valsalva was not ruled out.  She has been active at home, eating without difficulty, gaining weight and developing appropriately.  There has been no tachypnea, increased work of breathing, cyanosis or pallor. There has been no apparent chest pain, palpitations or syncope. She runs, climbs and has good exercise tolerance.

## 2025-03-15 ENCOUNTER — INPATIENT (INPATIENT)
Facility: HOSPITAL | Age: 65
LOS: 3 days | Discharge: NURSING, REHAB OR LONG-TERM CARE | End: 2025-03-19
Attending: INTERNAL MEDICINE | Admitting: INTERNAL MEDICINE
Payer: MEDICARE

## 2025-03-15 VITALS
HEIGHT: 71 IN | TEMPERATURE: 98 F | RESPIRATION RATE: 16 BRPM | HEART RATE: 94 BPM | WEIGHT: 210.1 LBS | SYSTOLIC BLOOD PRESSURE: 163 MMHG | DIASTOLIC BLOOD PRESSURE: 93 MMHG | OXYGEN SATURATION: 97 %

## 2025-03-15 DIAGNOSIS — Z98.890 OTHER SPECIFIED POSTPROCEDURAL STATES: Chronic | ICD-10-CM

## 2025-03-15 DIAGNOSIS — I82.409 ACUTE EMBOLISM AND THROMBOSIS OF UNSPECIFIED DEEP VEINS OF UNSPECIFIED LOWER EXTREMITY: Chronic | ICD-10-CM

## 2025-03-15 LAB
ALP SERPL-CCNC: 102 U/L — SIGNIFICANT CHANGE UP (ref 40–120)
ALT FLD-CCNC: 27 U/L — SIGNIFICANT CHANGE UP (ref 12–78)
ANION GAP SERPL CALC-SCNC: 3 MMOL/L — LOW (ref 5–17)
APTT BLD: 28.6 SEC — SIGNIFICANT CHANGE UP (ref 24.5–35.6)
AST SERPL-CCNC: 32 U/L — SIGNIFICANT CHANGE UP (ref 15–37)
BASOPHILS # BLD AUTO: 0.06 K/UL — SIGNIFICANT CHANGE UP (ref 0–0.2)
BASOPHILS NFR BLD AUTO: 0.5 % — SIGNIFICANT CHANGE UP (ref 0–2)
BLD GP AB SCN SERPL QL: SIGNIFICANT CHANGE UP
BUN SERPL-MCNC: 9 MG/DL — SIGNIFICANT CHANGE UP (ref 7–23)
CALCIUM SERPL-MCNC: 8.7 MG/DL — SIGNIFICANT CHANGE UP (ref 8.5–10.1)
CO2 SERPL-SCNC: 30 MMOL/L — SIGNIFICANT CHANGE UP (ref 22–31)
CREAT SERPL-MCNC: 0.76 MG/DL — SIGNIFICANT CHANGE UP (ref 0.5–1.3)
EGFR: 100 ML/MIN/1.73M2 — SIGNIFICANT CHANGE UP
EGFR: 100 ML/MIN/1.73M2 — SIGNIFICANT CHANGE UP
EOSINOPHIL NFR BLD AUTO: 1.2 % — SIGNIFICANT CHANGE UP (ref 0–6)
ETHANOL SERPL-MCNC: 207 MG/DL — HIGH (ref 0–10)
GLUCOSE SERPL-MCNC: 99 MG/DL — SIGNIFICANT CHANGE UP (ref 70–99)
HCT VFR BLD CALC: 40.2 % — SIGNIFICANT CHANGE UP (ref 39–50)
HGB BLD-MCNC: 13.8 G/DL — SIGNIFICANT CHANGE UP (ref 13–17)
IMM GRANULOCYTES NFR BLD AUTO: 0.4 % — SIGNIFICANT CHANGE UP (ref 0–0.9)
INR BLD: 0.99 RATIO — SIGNIFICANT CHANGE UP (ref 0.85–1.16)
LYMPHOCYTES # BLD AUTO: 2.46 K/UL — SIGNIFICANT CHANGE UP (ref 1–3.3)
LYMPHOCYTES # BLD AUTO: 22.3 % — SIGNIFICANT CHANGE UP (ref 13–44)
MCHC RBC-ENTMCNC: 30.6 PG — SIGNIFICANT CHANGE UP (ref 27–34)
MCHC RBC-ENTMCNC: 34.3 G/DL — SIGNIFICANT CHANGE UP (ref 32–36)
MCV RBC AUTO: 89.1 FL — SIGNIFICANT CHANGE UP (ref 80–100)
MONOCYTES # BLD AUTO: 0.73 K/UL — SIGNIFICANT CHANGE UP (ref 0–0.9)
MONOCYTES NFR BLD AUTO: 6.6 % — SIGNIFICANT CHANGE UP (ref 2–14)
NEUTROPHILS # BLD AUTO: 7.59 K/UL — HIGH (ref 1.8–7.4)
NEUTROPHILS NFR BLD AUTO: 69 % — SIGNIFICANT CHANGE UP (ref 43–77)
NRBC BLD AUTO-RTO: 0 /100 WBCS — SIGNIFICANT CHANGE UP (ref 0–0)
PLATELET # BLD AUTO: 232 K/UL — SIGNIFICANT CHANGE UP (ref 150–400)
POTASSIUM SERPL-MCNC: 3.4 MMOL/L — LOW (ref 3.5–5.3)
POTASSIUM SERPL-SCNC: 3.4 MMOL/L — LOW (ref 3.5–5.3)
PROT SERPL-MCNC: 7.8 GM/DL — SIGNIFICANT CHANGE UP (ref 6–8.3)
PROTHROM AB SERPL-ACNC: 11.5 SEC — SIGNIFICANT CHANGE UP (ref 9.9–13.4)
SODIUM SERPL-SCNC: 140 MMOL/L — SIGNIFICANT CHANGE UP (ref 135–145)
WBC # BLD: 11.01 K/UL — HIGH (ref 3.8–10.5)
WBC # FLD AUTO: 11.01 K/UL — HIGH (ref 3.8–10.5)

## 2025-03-15 PROCEDURE — 72170 X-RAY EXAM OF PELVIS: CPT | Mod: 26

## 2025-03-15 PROCEDURE — 70486 CT MAXILLOFACIAL W/O DYE: CPT | Mod: 26

## 2025-03-15 PROCEDURE — 99285 EMERGENCY DEPT VISIT HI MDM: CPT | Mod: 25

## 2025-03-15 PROCEDURE — 70450 CT HEAD/BRAIN W/O DYE: CPT | Mod: 26

## 2025-03-15 PROCEDURE — 93010 ELECTROCARDIOGRAM REPORT: CPT

## 2025-03-15 PROCEDURE — G0168: CPT

## 2025-03-15 PROCEDURE — 72125 CT NECK SPINE W/O DYE: CPT | Mod: 26

## 2025-03-15 PROCEDURE — 71046 X-RAY EXAM CHEST 2 VIEWS: CPT | Mod: 26

## 2025-03-15 RX ORDER — LABETALOL HYDROCHLORIDE 200 MG/1
10 TABLET, FILM COATED ORAL ONCE
Refills: 0 | Status: DISCONTINUED | OUTPATIENT
Start: 2025-03-15 | End: 2025-03-15

## 2025-03-15 RX ORDER — NICARDIPINE HCL 30 MG
5 CAPSULE ORAL
Qty: 40 | Refills: 0 | Status: DISCONTINUED | OUTPATIENT
Start: 2025-03-15 | End: 2025-03-16

## 2025-03-15 RX ORDER — LOSARTAN POTASSIUM 100 MG/1
100 TABLET, FILM COATED ORAL ONCE
Refills: 0 | Status: COMPLETED | OUTPATIENT
Start: 2025-03-15 | End: 2025-03-15

## 2025-03-15 RX ORDER — CLOSTRIDIUM TETANI TOXOID ANTIGEN (FORMALDEHYDE INACTIVATED), CORYNEBACTERIUM DIPHTHERIAE TOXOID ANTIGEN (FORMALDEHYDE INACTIVATED), BORDETELLA PERTUSSIS TOXOID ANTIGEN (GLUTARALDEHYDE INACTIVATED), BORDETELLA PERTUSSIS FILAMENTOUS HEMAGGLUTININ ANTIGEN (FORMALDEHYDE INACTIVATED), BORDETELLA PERTUSSIS PERTACTIN ANTIGEN, AND BORDETELLA PERTUSSIS FIMBRIAE 2/3 ANTIGEN 5; 2; 2.5; 5; 3; 5 [LF]/.5ML; [LF]/.5ML; UG/.5ML; UG/.5ML; UG/.5ML; UG/.5ML
0.5 INJECTION, SUSPENSION INTRAMUSCULAR ONCE
Refills: 0 | Status: COMPLETED | OUTPATIENT
Start: 2025-03-15 | End: 2025-03-15

## 2025-03-15 RX ADMIN — CLOSTRIDIUM TETANI TOXOID ANTIGEN (FORMALDEHYDE INACTIVATED), CORYNEBACTERIUM DIPHTHERIAE TOXOID ANTIGEN (FORMALDEHYDE INACTIVATED), BORDETELLA PERTUSSIS TOXOID ANTIGEN (GLUTARALDEHYDE INACTIVATED), BORDETELLA PERTUSSIS FILAMENTOUS HEMAGGLUTININ ANTIGEN (FORMALDEHYDE INACTIVATED), BORDETELLA PERTUSSIS PERTACTIN ANTIGEN, AND BORDETELLA PERTUSSIS FIMBRIAE 2/3 ANTIGEN 0.5 MILLILITER(S): 5; 2; 2.5; 5; 3; 5 INJECTION, SUSPENSION INTRAMUSCULAR at 19:29

## 2025-03-15 RX ADMIN — LOSARTAN POTASSIUM 100 MILLIGRAM(S): 100 TABLET, FILM COATED ORAL at 20:54

## 2025-03-15 RX ADMIN — Medication 25 MG/HR: at 22:09

## 2025-03-15 NOTE — ED ADULT NURSE NOTE - CHPI ED NUR SYMPTOMS NEG
no abdominal distension/no abdominal pain/no chills/no confusion/no fever/no nausea/no vomiting/no weakness

## 2025-03-15 NOTE — ED PROVIDER NOTE - CLINICAL SUMMARY MEDICAL DECISION MAKING FREE TEXT BOX
64-year-old male with a past medical history of hypertension, hyperlipidemia, diabetes, DVTs on Xarelto, PTSD presenting after a fall. Patient states today he had a pint of vodka. Patient does remember how he fell but landed on the concrete. Fall was witnessed by neighbors who state the patient did not lose consciousness patient complaining of laceration to right eyebrow. Denies headache, neck pain, chest pain, difficulty breathing, nausea, vomiting, abdominal pain, weakness or numbness in the upper or lower extremities. Physical exam reveals intoxicated appearing male, heart rate regular, clear breath sounds bilaterally, soft nontender abdomen, no C-spine, T-spine, L-spine midline tenderness, pelvis intact, 5/5 strength bilateral upper and lower extremities, 1 cm superficial laceration over the right eyebrow, 2 cm laceration on the upper buccal mucosa. Will evaluate for ICH given Xarelto use, CT head, C-spine, maxillofacial, chest x-ray, pelvic x-ray, alcohol level.

## 2025-03-15 NOTE — ED ADULT TRIAGE NOTE - CHIEF COMPLAINT QUOTE
brought by ems for alcohol intoxication . patient fell and hit his head on the concrete and sustained a laceration to rt upper lip and forehead . h/o alcohol abuse, DM, HTN

## 2025-03-15 NOTE — ED PROVIDER NOTE - PROGRESS NOTE DETAILS
DO Madhuri: Spoke with radiology patient has acute subcentimeter parenchymal hemorrhage in the subcortical left parietal lobe. Spoke with neurosurgery at [facility]. At this time they are not recommending reversal of Xarelto or beginning nicardipine drip. Recommend giving patient's home blood pressure medication to keep blood pressure under 150 systolic. Unable to obtain home blood pressure medications. Will begin with 10 mg of IV labetalol and reassess patient. Neurosurgery also recommending repeat CT scan at midnight. Patient reassessed at bedside and remains clinically unchanged with no neurological deficits. DO Madhuri: Spoke with radiology patient has acute subcentimeter parenchymal hemorrhage in the subcortical left parietal lobe. Spoke with neurosurgery at [facility]. At this time they are not recommending reversal of Xarelto or beginning nicardipine drip. Recommend giving patient's home blood pressure medication to keep blood pressure under 150 systolic. Patient takes losartan 100mg per chart review. Neurosurgery also recommending repeat CT scan at midnight. Patient reassessed at bedside and remains clinically unchanged with no neurological deficits. Dhaliwal, DO: Repeat blood pressure still elevated after beginning home medication of losartan. Will begin patient on nicardipine drip. Patient reassessed at bedside and continues to have unchanged clinical examination. DO Madhuri: Spoke with neurosurgery who recommends keeping systolic blood pressure below 160. Given that patient is been on nicardipine drip will titrate patient off of nicardipine drip with IV labetalol and keep patient in hospital for observation stay and blood pressure management. Also recommending 500 mg of Keppra twice daily for 7 days. Also recommending outpatient follow up with Dr. Noe Patton after hospital discharge. Patient was signed out to me pending reevaluation of blood pressure.  Blood pressure was rechecked at 0243 noted to be 136/77.  Patient does not require the initiation of a nicardipine drip.  For this reason, we believe that the patient is safe for admission to the hospitalist service.  To discuss this case with the hospitalist service who accepted admission for the patient.

## 2025-03-15 NOTE — ED ADULT NURSE NOTE - BREATH SOUNDS, MLM
Anytime your address or phone number changes please call our clinic at [phone] to update your records. Thank you!  
Clear

## 2025-03-15 NOTE — ED ADULT NURSE NOTE - OBJECTIVE STATEMENT
Pt presents to ED accompanied by daughter s/p Pt presents to ED accompanied by daughter s/p fall. Per pt he had approximately 2 pints of vodka today. Pt endorses falling on concrete but is unsure of LOC. Per daughter fall was witnessed by neighbors who report that pt did not lose consciousness. Pt endorses taking blood thinners, Pt noted with laceration to R eyebrow no active bleeding noted.

## 2025-03-16 DIAGNOSIS — Z29.9 ENCOUNTER FOR PROPHYLACTIC MEASURES, UNSPECIFIED: ICD-10-CM

## 2025-03-16 DIAGNOSIS — E87.6 HYPOKALEMIA: ICD-10-CM

## 2025-03-16 DIAGNOSIS — Z79.01 LONG TERM (CURRENT) USE OF ANTICOAGULANTS: ICD-10-CM

## 2025-03-16 DIAGNOSIS — K21.9 GASTRO-ESOPHAGEAL REFLUX DISEASE WITHOUT ESOPHAGITIS: ICD-10-CM

## 2025-03-16 DIAGNOSIS — I61.9 NONTRAUMATIC INTRACEREBRAL HEMORRHAGE, UNSPECIFIED: ICD-10-CM

## 2025-03-16 DIAGNOSIS — D72.829 ELEVATED WHITE BLOOD CELL COUNT, UNSPECIFIED: ICD-10-CM

## 2025-03-16 DIAGNOSIS — Z87.09 PERSONAL HISTORY OF OTHER DISEASES OF THE RESPIRATORY SYSTEM: ICD-10-CM

## 2025-03-16 DIAGNOSIS — E11.9 TYPE 2 DIABETES MELLITUS WITHOUT COMPLICATIONS: ICD-10-CM

## 2025-03-16 DIAGNOSIS — I10 ESSENTIAL (PRIMARY) HYPERTENSION: ICD-10-CM

## 2025-03-16 DIAGNOSIS — F10.929 ALCOHOL USE, UNSPECIFIED WITH INTOXICATION, UNSPECIFIED: ICD-10-CM

## 2025-03-16 DIAGNOSIS — F43.10 POST-TRAUMATIC STRESS DISORDER, UNSPECIFIED: ICD-10-CM

## 2025-03-16 LAB
ALBUMIN SERPL ELPH-MCNC: 3.5 G/DL — SIGNIFICANT CHANGE UP (ref 3.3–5)
ALBUMIN SERPL ELPH-MCNC: 3.7 G/DL — SIGNIFICANT CHANGE UP (ref 3.3–5)
ALP SERPL-CCNC: 103 U/L — SIGNIFICANT CHANGE UP (ref 40–120)
ALP SERPL-CCNC: 111 U/L — SIGNIFICANT CHANGE UP (ref 40–120)
ALT FLD-CCNC: 25 U/L — SIGNIFICANT CHANGE UP (ref 12–78)
ANION GAP SERPL CALC-SCNC: 6 MMOL/L — SIGNIFICANT CHANGE UP (ref 5–17)
ANION GAP SERPL CALC-SCNC: 9 MMOL/L — SIGNIFICANT CHANGE UP (ref 5–17)
AST SERPL-CCNC: 21 U/L — SIGNIFICANT CHANGE UP (ref 15–37)
AST SERPL-CCNC: 29 U/L — SIGNIFICANT CHANGE UP (ref 15–37)
BASOPHILS # BLD AUTO: 0.04 K/UL — SIGNIFICANT CHANGE UP (ref 0–0.2)
BASOPHILS NFR BLD AUTO: 0.4 % — SIGNIFICANT CHANGE UP (ref 0–2)
BILIRUB DIRECT SERPL-MCNC: 0.3 MG/DL — SIGNIFICANT CHANGE UP (ref 0–0.3)
BILIRUB INDIRECT FLD-MCNC: 0.8 MG/DL — SIGNIFICANT CHANGE UP (ref 0.2–1)
BILIRUB SERPL-MCNC: 0.8 MG/DL — SIGNIFICANT CHANGE UP (ref 0.2–1.2)
BILIRUB SERPL-MCNC: 1.1 MG/DL — SIGNIFICANT CHANGE UP (ref 0.2–1.2)
BUN SERPL-MCNC: 7 MG/DL — SIGNIFICANT CHANGE UP (ref 7–23)
BUN SERPL-MCNC: 7 MG/DL — SIGNIFICANT CHANGE UP (ref 7–23)
CALCIUM SERPL-MCNC: 8.7 MG/DL — SIGNIFICANT CHANGE UP (ref 8.5–10.1)
CALCIUM SERPL-MCNC: 8.9 MG/DL — SIGNIFICANT CHANGE UP (ref 8.5–10.1)
CHLORIDE SERPL-SCNC: 102 MMOL/L — SIGNIFICANT CHANGE UP (ref 96–108)
CHLORIDE SERPL-SCNC: 105 MMOL/L — SIGNIFICANT CHANGE UP (ref 96–108)
CO2 SERPL-SCNC: 27 MMOL/L — SIGNIFICANT CHANGE UP (ref 22–31)
CO2 SERPL-SCNC: 28 MMOL/L — SIGNIFICANT CHANGE UP (ref 22–31)
CREAT SERPL-MCNC: 0.73 MG/DL — SIGNIFICANT CHANGE UP (ref 0.5–1.3)
CREAT SERPL-MCNC: 0.74 MG/DL — SIGNIFICANT CHANGE UP (ref 0.5–1.3)
EGFR: 101 ML/MIN/1.73M2 — SIGNIFICANT CHANGE UP
EOSINOPHIL # BLD AUTO: 0.26 K/UL — SIGNIFICANT CHANGE UP (ref 0–0.5)
EOSINOPHIL NFR BLD AUTO: 2.8 % — SIGNIFICANT CHANGE UP (ref 0–6)
GLUCOSE BLDC GLUCOMTR-MCNC: 142 MG/DL — HIGH (ref 70–99)
GLUCOSE BLDC GLUCOMTR-MCNC: 161 MG/DL — HIGH (ref 70–99)
GLUCOSE SERPL-MCNC: 169 MG/DL — HIGH (ref 70–99)
GLUCOSE SERPL-MCNC: 87 MG/DL — SIGNIFICANT CHANGE UP (ref 70–99)
HCT VFR BLD CALC: 40.9 % — SIGNIFICANT CHANGE UP (ref 39–50)
HGB BLD-MCNC: 14.1 G/DL — SIGNIFICANT CHANGE UP (ref 13–17)
IMM GRANULOCYTES NFR BLD AUTO: 0.2 % — SIGNIFICANT CHANGE UP (ref 0–0.9)
LYMPHOCYTES # BLD AUTO: 2.39 K/UL — SIGNIFICANT CHANGE UP (ref 1–3.3)
LYMPHOCYTES # BLD AUTO: 26.1 % — SIGNIFICANT CHANGE UP (ref 13–44)
MAGNESIUM SERPL-MCNC: 1.5 MG/DL — LOW (ref 1.6–2.6)
MCHC RBC-ENTMCNC: 30.5 PG — SIGNIFICANT CHANGE UP (ref 27–34)
MCHC RBC-ENTMCNC: 34.5 G/DL — SIGNIFICANT CHANGE UP (ref 32–36)
MCV RBC AUTO: 88.5 FL — SIGNIFICANT CHANGE UP (ref 80–100)
MONOCYTES # BLD AUTO: 0.84 K/UL — SIGNIFICANT CHANGE UP (ref 0–0.9)
MONOCYTES NFR BLD AUTO: 9.2 % — SIGNIFICANT CHANGE UP (ref 2–14)
NEUTROPHILS # BLD AUTO: 5.61 K/UL — SIGNIFICANT CHANGE UP (ref 1.8–7.4)
NEUTROPHILS NFR BLD AUTO: 61.3 % — SIGNIFICANT CHANGE UP (ref 43–77)
NRBC BLD AUTO-RTO: 0 /100 WBCS — SIGNIFICANT CHANGE UP (ref 0–0)
PHOSPHATE SERPL-MCNC: 2.7 MG/DL — SIGNIFICANT CHANGE UP (ref 2.5–4.5)
PLATELET # BLD AUTO: 215 K/UL — SIGNIFICANT CHANGE UP (ref 150–400)
POTASSIUM SERPL-MCNC: 3.1 MMOL/L — LOW (ref 3.5–5.3)
POTASSIUM SERPL-MCNC: 3.6 MMOL/L — SIGNIFICANT CHANGE UP (ref 3.5–5.3)
POTASSIUM SERPL-SCNC: 3.1 MMOL/L — LOW (ref 3.5–5.3)
POTASSIUM SERPL-SCNC: 3.6 MMOL/L — SIGNIFICANT CHANGE UP (ref 3.5–5.3)
PROT SERPL-MCNC: 7.4 GM/DL — SIGNIFICANT CHANGE UP (ref 6–8.3)
PROT SERPL-MCNC: 8.1 GM/DL — SIGNIFICANT CHANGE UP (ref 6–8.3)
RBC # FLD: 14.9 % — HIGH (ref 10.3–14.5)
SODIUM SERPL-SCNC: 136 MMOL/L — SIGNIFICANT CHANGE UP (ref 135–145)
SODIUM SERPL-SCNC: 141 MMOL/L — SIGNIFICANT CHANGE UP (ref 135–145)
TROPONIN I, HIGH SENSITIVITY RESULT: 74.2 NG/L — SIGNIFICANT CHANGE UP
WBC # BLD: 9.16 K/UL — SIGNIFICANT CHANGE UP (ref 3.8–10.5)
WBC # FLD AUTO: 9.16 K/UL — SIGNIFICANT CHANGE UP (ref 3.8–10.5)

## 2025-03-16 PROCEDURE — 99222 1ST HOSP IP/OBS MODERATE 55: CPT

## 2025-03-16 PROCEDURE — 93010 ELECTROCARDIOGRAM REPORT: CPT

## 2025-03-16 PROCEDURE — 70450 CT HEAD/BRAIN W/O DYE: CPT | Mod: 26,77

## 2025-03-16 PROCEDURE — 70450 CT HEAD/BRAIN W/O DYE: CPT | Mod: 26

## 2025-03-16 RX ORDER — B1/B2/B3/B5/B6/B12/VIT C/FOLIC 500-0.5 MG
1 TABLET ORAL DAILY
Refills: 0 | Status: DISCONTINUED | OUTPATIENT
Start: 2025-03-16 | End: 2025-03-19

## 2025-03-16 RX ORDER — MAGNESIUM, ALUMINUM HYDROXIDE 200-200 MG
30 TABLET,CHEWABLE ORAL EVERY 4 HOURS
Refills: 0 | Status: DISCONTINUED | OUTPATIENT
Start: 2025-03-16 | End: 2025-03-16

## 2025-03-16 RX ORDER — LORAZEPAM 4 MG/ML
2 VIAL (ML) INJECTION EVERY 6 HOURS
Refills: 0 | Status: DISCONTINUED | OUTPATIENT
Start: 2025-03-16 | End: 2025-03-17

## 2025-03-16 RX ORDER — LEVETIRACETAM 10 MG/ML
500 INJECTION, SOLUTION INTRAVENOUS ONCE
Refills: 0 | Status: COMPLETED | OUTPATIENT
Start: 2025-03-16 | End: 2025-03-16

## 2025-03-16 RX ORDER — INSULIN LISPRO 100 U/ML
INJECTION, SOLUTION INTRAVENOUS; SUBCUTANEOUS
Refills: 0 | Status: DISCONTINUED | OUTPATIENT
Start: 2025-03-16 | End: 2025-03-19

## 2025-03-16 RX ORDER — FOLIC ACID 1 MG/1
1 TABLET ORAL DAILY
Refills: 0 | Status: DISCONTINUED | OUTPATIENT
Start: 2025-03-16 | End: 2025-03-19

## 2025-03-16 RX ORDER — ACETAMINOPHEN 500 MG/5ML
650 LIQUID (ML) ORAL EVERY 6 HOURS
Refills: 0 | Status: DISCONTINUED | OUTPATIENT
Start: 2025-03-16 | End: 2025-03-19

## 2025-03-16 RX ORDER — LEVETIRACETAM 10 MG/ML
500 INJECTION, SOLUTION INTRAVENOUS
Refills: 0 | Status: DISCONTINUED | OUTPATIENT
Start: 2025-03-16 | End: 2025-03-19

## 2025-03-16 RX ORDER — GLUCAGON 3 MG/1
1 POWDER NASAL ONCE
Refills: 0 | Status: DISCONTINUED | OUTPATIENT
Start: 2025-03-16 | End: 2025-03-19

## 2025-03-16 RX ORDER — DEXTROSE 50 % IN WATER 50 %
25 SYRINGE (ML) INTRAVENOUS ONCE
Refills: 0 | Status: DISCONTINUED | OUTPATIENT
Start: 2025-03-16 | End: 2025-03-19

## 2025-03-16 RX ORDER — LORAZEPAM 4 MG/ML
0.5 VIAL (ML) INJECTION EVERY 6 HOURS
Refills: 0 | Status: DISCONTINUED | OUTPATIENT
Start: 2025-03-19 | End: 2025-03-19

## 2025-03-16 RX ORDER — LORAZEPAM 4 MG/ML
1 VIAL (ML) INJECTION EVERY 6 HOURS
Refills: 0 | Status: DISCONTINUED | OUTPATIENT
Start: 2025-03-18 | End: 2025-03-19

## 2025-03-16 RX ORDER — MELATONIN 5 MG
3 TABLET ORAL AT BEDTIME
Refills: 0 | Status: DISCONTINUED | OUTPATIENT
Start: 2025-03-16 | End: 2025-03-16

## 2025-03-16 RX ORDER — SODIUM CHLORIDE 9 G/1000ML
1000 INJECTION, SOLUTION INTRAVENOUS
Refills: 0 | Status: DISCONTINUED | OUTPATIENT
Start: 2025-03-16 | End: 2025-03-19

## 2025-03-16 RX ORDER — LABETALOL HYDROCHLORIDE 200 MG/1
10 TABLET, FILM COATED ORAL EVERY 6 HOURS
Refills: 0 | Status: DISCONTINUED | OUTPATIENT
Start: 2025-03-16 | End: 2025-03-16

## 2025-03-16 RX ORDER — LORAZEPAM 4 MG/ML
1.5 VIAL (ML) INJECTION EVERY 6 HOURS
Refills: 0 | Status: DISCONTINUED | OUTPATIENT
Start: 2025-03-17 | End: 2025-03-18

## 2025-03-16 RX ORDER — LABETALOL HYDROCHLORIDE 200 MG/1
10 TABLET, FILM COATED ORAL ONCE
Refills: 0 | Status: COMPLETED | OUTPATIENT
Start: 2025-03-16 | End: 2025-03-16

## 2025-03-16 RX ORDER — DEXTROSE 50 % IN WATER 50 %
15 SYRINGE (ML) INTRAVENOUS ONCE
Refills: 0 | Status: DISCONTINUED | OUTPATIENT
Start: 2025-03-16 | End: 2025-03-19

## 2025-03-16 RX ORDER — LOSARTAN POTASSIUM 100 MG/1
100 TABLET, FILM COATED ORAL DAILY
Refills: 0 | Status: DISCONTINUED | OUTPATIENT
Start: 2025-03-16 | End: 2025-03-19

## 2025-03-16 RX ORDER — DEXTROSE 50 % IN WATER 50 %
12.5 SYRINGE (ML) INTRAVENOUS ONCE
Refills: 0 | Status: DISCONTINUED | OUTPATIENT
Start: 2025-03-16 | End: 2025-03-19

## 2025-03-16 RX ORDER — LORAZEPAM 4 MG/ML
2 VIAL (ML) INJECTION
Refills: 0 | Status: DISCONTINUED | OUTPATIENT
Start: 2025-03-16 | End: 2025-03-19

## 2025-03-16 RX ORDER — LORAZEPAM 4 MG/ML
VIAL (ML) INJECTION
Refills: 0 | Status: DISCONTINUED | OUTPATIENT
Start: 2025-03-16 | End: 2025-03-19

## 2025-03-16 RX ADMIN — Medication 2 MILLIGRAM(S): at 18:10

## 2025-03-16 RX ADMIN — LEVETIRACETAM 500 MILLIGRAM(S): 10 INJECTION, SOLUTION INTRAVENOUS at 05:05

## 2025-03-16 RX ADMIN — Medication 650 MILLIGRAM(S): at 11:53

## 2025-03-16 RX ADMIN — LEVETIRACETAM 500 MILLIGRAM(S): 10 INJECTION, SOLUTION INTRAVENOUS at 18:10

## 2025-03-16 RX ADMIN — Medication 40 MILLIEQUIVALENT(S): at 11:52

## 2025-03-16 RX ADMIN — Medication 100 MILLIGRAM(S): at 13:42

## 2025-03-16 RX ADMIN — Medication 2 MILLIGRAM(S): at 13:43

## 2025-03-16 RX ADMIN — INSULIN LISPRO 1: 100 INJECTION, SOLUTION INTRAVENOUS; SUBCUTANEOUS at 11:53

## 2025-03-16 RX ADMIN — Medication 2 MILLIGRAM(S): at 23:26

## 2025-03-16 RX ADMIN — Medication 650 MILLIGRAM(S): at 12:53

## 2025-03-16 RX ADMIN — Medication 40 MILLIGRAM(S): at 06:21

## 2025-03-16 RX ADMIN — LABETALOL HYDROCHLORIDE 10 MILLIGRAM(S): 200 TABLET, FILM COATED ORAL at 01:36

## 2025-03-16 RX ADMIN — Medication 1 TABLET(S): at 13:42

## 2025-03-16 RX ADMIN — FOLIC ACID 1 MILLIGRAM(S): 1 TABLET ORAL at 13:42

## 2025-03-16 RX ADMIN — LOSARTAN POTASSIUM 100 MILLIGRAM(S): 100 TABLET, FILM COATED ORAL at 05:05

## 2025-03-16 RX ADMIN — LEVETIRACETAM 500 MILLIGRAM(S): 10 INJECTION, SOLUTION INTRAVENOUS at 01:36

## 2025-03-16 RX ADMIN — Medication 5 MG/HR: at 01:39

## 2025-03-16 NOTE — H&P ADULT - PROBLEM SELECTOR PLAN 6
- Home meds: metformin  - start  ISS, POC BG achs>> calculate basal bolus based on need  - hypoglycemia protocol in place  - carb control diet  - f/u A1c

## 2025-03-16 NOTE — H&P ADULT - HISTORY OF PRESENT ILLNESS
64-year-old male with past medical history of hypertension, diabetes, GERD, PTSD, COPD, alcohol use disorder who presented to the ED via EMS after being found intoxicated and sustaining a fall resulting in upper lip laceration. The patient does not recall how he got to the hospital. Recalls drinking about one pint of vodka. He states that he does not drink on a daily basis; this was a one-time episode due to stressful situations at home. He denies any fevers, chills, headache, blurry vision, dizziness, GI/ symptoms, or any other active complaints. He denies any signs of withdrawals.   On presentation, patient was hypertensive 163/93. lab notable for WBC 11, potassium 3.4, alcohol level 207. CT head revealed subcentimeter acute parenchymal hemorrhage in the subcortical left parietal lobe. Repeat CT unchanged. CT facial/maxillofacial negative for acute findings. Neurosurgery was consulted by ED; recommends no surgical intervention at this point, keep BP <160 and start Keppra. Lip laceration glued in ED. Patient received labetalol, was briefly on Cardene infusion, Keppra, and losartan in the ED.

## 2025-03-16 NOTE — H&P ADULT - PROBLEM SELECTOR PLAN 4
- alcohol level 207  - +fall and lip laceration  - no signs of withdrawal  - Pt states it was a relapse, does not drink regularly  - ctm

## 2025-03-16 NOTE — H&P ADULT - ASSESSMENT
64-year-old male with past medical history of hypertension, diabetes, GERD, PTSD, COPD, alcohol use disorder who presented to the ED via EMS after being found intoxicated and sustaining a fall resulting in upper lip laceration. The patient does not recall how he got to the hospital. Recalls drinking about one pint of vodka.  CT head revealed subcentimeter acute parenchymal hemorrhage in the subcortical left parietal lobe. Repeat CT unchanged. Neurosurgery was consulted by ED; recommends no surgical intervention at this point, keep BP <160 and start Keppra. Admit to telemetry.

## 2025-03-16 NOTE — ED ADULT NURSE REASSESSMENT NOTE - NS ED NURSE REASSESS COMMENT FT1
Patient awake and alert, c/o chest pain 8/10 radiating to back, reports he forget to mention the pain when he initially came in and that pain has been constant. MD Calixto made aware. EKG and troponin to be ordered.
Pt reassessed states his chest pain is better than earlier states he can "deeply breath now".
Received report from BARTOLOME Lerner. PT awake alert and oriented. pt BP was elevated, Dr Dhaliwal was aware. for repeat CT scan of the head at 12 mn.
Patient is stable. No neurological deficits. Will transfer to 1C hold soon.

## 2025-03-16 NOTE — H&P ADULT - PROBLEM SELECTOR PLAN 10
- On Xarelto, unclear indication  - hold in the setting of brain bleed  - f/u with collateral history in am

## 2025-03-16 NOTE — H&P ADULT - NSHPLABSRESULTS_GEN_ALL_CORE
LABS:  cret                        13.8   11.01 )-----------( 232      ( 15 Mar 2025 20:26 )             40.2     03-15    140  |  107  |  9   ----------------------------<  99  3.4[L]   |  30  |  0.76    Ca    8.7      15 Mar 2025 20:26    TPro  7.8  /  Alb  3.6  /  TBili  0.5  /  DBili  x   /  AST  32  /  ALT  27  /  AlkPhos  102  03-15    PT/INR - ( 15 Mar 2025 20:26 )   PT: 11.5 sec;   INR: 0.99 ratio         PTT - ( 15 Mar 2025 20:26 )  PTT:28.6 sec    < from: CT Head No Cont (03.16.25 @ 00:48) >    IMPRESSION:  Unchanged subcentimeter acute parenchymal hemorrhage in the subcortical   left parietal lobe. No new areas of hemorrhage or midline shift.    < end of copied text >    < from: CT Maxillofacial No Cont (03.15.25 @ 19:59) >    FACIAL CT:  No acute fracture, intraorbital hemorrhage orradiopaque foreign body.    CERVICAL SPINE CT:  No acute fracture or traumatic malalignment.    < end of copied text >    < from: CT Head No Cont (03.15.25 @ 19:58) >    HEAD CT:    Subcentimeter acute parenchymal hemorrhage in the subcortical left   parietal lobe. Short interval follow-up is advised.    No additional site of hemorrhage at this time, and no significant mass   effect.      < end of copied text >

## 2025-03-16 NOTE — H&P ADULT - PROBLEM SELECTOR PLAN 1
- Fell due to alcohol intoxication  - On Xarelto for unclear indication.   - CT reviewed as above: subcentimeter parietal lobe bleed  - repeat CT unchanged  - Neurosurgery consulted in ED: no surgical intervention, c/w BP control <160, Keppra 500 BID  - neurology consult in am  - Neuro checks and VS q4  - monitor on telemetry

## 2025-03-16 NOTE — CHART NOTE - NSCHARTNOTEFT_GEN_A_CORE
64 year-old male      h/o  HTN, , GERD, PTSD, COPD, alcohol use disorder   who presented to the ED via EMS after being found intoxicated and sustaining a fall resulting in upper lip laceration.      drank   one pint of vodka.       CT head   subcentimeter acute parenchymal hemorrhage in the subcortical left parietal lobe. Repeat CT unchanged.     Neurosurgery was consulted by ED; recommends no surgical intervention at this point, keep BP <160 and start Keppra.      Fall,    h/o   alcohol intoxication/   level  207       CT reviewed as above: subcentimeter parietal lobe bleed   ETOH  bause       iv fluids.  thiamine, CIWA  protocol     prito  h/o  DVT  .  2017,  wa s on xarelto,  held  now   HTN         losartan    DM   COPD.      rpt  ct   head  pending    fall  risk   on CIWA  protocol       dvt ppx/  pas

## 2025-03-16 NOTE — H&P ADULT - PROBLEM SELECTOR PLAN 2
- Hypertensive on presentation  - monitor q4 in the setting of brain bleed  - Resume losartan   - labetalol 10 mg iv q6 prn for BP >160  - ctm and adjust as appropriate

## 2025-03-17 LAB
A1C WITH ESTIMATED AVERAGE GLUCOSE RESULT: 7.1 % — HIGH (ref 4–5.6)
ANION GAP SERPL CALC-SCNC: 8 MMOL/L — SIGNIFICANT CHANGE UP (ref 5–17)
BUN SERPL-MCNC: 11 MG/DL — SIGNIFICANT CHANGE UP (ref 7–23)
CALCIUM SERPL-MCNC: 8.7 MG/DL — SIGNIFICANT CHANGE UP (ref 8.5–10.1)
CHLORIDE SERPL-SCNC: 103 MMOL/L — SIGNIFICANT CHANGE UP (ref 96–108)
CO2 SERPL-SCNC: 26 MMOL/L — SIGNIFICANT CHANGE UP (ref 22–31)
CREAT SERPL-MCNC: 0.82 MG/DL — SIGNIFICANT CHANGE UP (ref 0.5–1.3)
EGFR: 97 ML/MIN/1.73M2 — SIGNIFICANT CHANGE UP
EGFR: 97 ML/MIN/1.73M2 — SIGNIFICANT CHANGE UP
ESTIMATED AVERAGE GLUCOSE: 157 MG/DL — HIGH (ref 68–114)
GLUCOSE BLDC GLUCOMTR-MCNC: 108 MG/DL — HIGH (ref 70–99)
GLUCOSE BLDC GLUCOMTR-MCNC: 133 MG/DL — HIGH (ref 70–99)
GLUCOSE BLDC GLUCOMTR-MCNC: 189 MG/DL — HIGH (ref 70–99)
GLUCOSE BLDC GLUCOMTR-MCNC: 95 MG/DL — SIGNIFICANT CHANGE UP (ref 70–99)
HCT VFR BLD CALC: 40.7 % — SIGNIFICANT CHANGE UP (ref 39–50)
HGB BLD-MCNC: 13.9 G/DL — SIGNIFICANT CHANGE UP (ref 13–17)
MAGNESIUM SERPL-MCNC: 1.8 MG/DL — SIGNIFICANT CHANGE UP (ref 1.6–2.6)
MCHC RBC-ENTMCNC: 30.6 PG — SIGNIFICANT CHANGE UP (ref 27–34)
MCHC RBC-ENTMCNC: 34.2 G/DL — SIGNIFICANT CHANGE UP (ref 32–36)
MCV RBC AUTO: 89.6 FL — SIGNIFICANT CHANGE UP (ref 80–100)
NRBC BLD AUTO-RTO: 0 /100 WBCS — SIGNIFICANT CHANGE UP (ref 0–0)
PHOSPHATE SERPL-MCNC: 3.2 MG/DL — SIGNIFICANT CHANGE UP (ref 2.5–4.5)
PLATELET # BLD AUTO: 229 K/UL — SIGNIFICANT CHANGE UP (ref 150–400)
POTASSIUM SERPL-MCNC: 3.6 MMOL/L — SIGNIFICANT CHANGE UP (ref 3.5–5.3)
POTASSIUM SERPL-SCNC: 3.6 MMOL/L — SIGNIFICANT CHANGE UP (ref 3.5–5.3)
RBC # BLD: 4.54 M/UL — SIGNIFICANT CHANGE UP (ref 4.2–5.8)
RBC # FLD: 15.1 % — HIGH (ref 10.3–14.5)
SODIUM SERPL-SCNC: 137 MMOL/L — SIGNIFICANT CHANGE UP (ref 135–145)
WBC # BLD: 7.24 K/UL — SIGNIFICANT CHANGE UP (ref 3.8–10.5)
WBC # FLD AUTO: 7.24 K/UL — SIGNIFICANT CHANGE UP (ref 3.8–10.5)

## 2025-03-17 PROCEDURE — 99232 SBSQ HOSP IP/OBS MODERATE 35: CPT

## 2025-03-17 RX ADMIN — Medication 1.5 MILLIGRAM(S): at 11:51

## 2025-03-17 RX ADMIN — LEVETIRACETAM 500 MILLIGRAM(S): 10 INJECTION, SOLUTION INTRAVENOUS at 18:05

## 2025-03-17 RX ADMIN — FOLIC ACID 1 MILLIGRAM(S): 1 TABLET ORAL at 11:50

## 2025-03-17 RX ADMIN — Medication 40 MILLIGRAM(S): at 05:25

## 2025-03-17 RX ADMIN — Medication 650 MILLIGRAM(S): at 21:52

## 2025-03-17 RX ADMIN — Medication 650 MILLIGRAM(S): at 22:52

## 2025-03-17 RX ADMIN — Medication 1 TABLET(S): at 11:50

## 2025-03-17 RX ADMIN — LEVETIRACETAM 500 MILLIGRAM(S): 10 INJECTION, SOLUTION INTRAVENOUS at 05:24

## 2025-03-17 RX ADMIN — LOSARTAN POTASSIUM 100 MILLIGRAM(S): 100 TABLET, FILM COATED ORAL at 05:25

## 2025-03-17 RX ADMIN — Medication 2 MILLIGRAM(S): at 05:25

## 2025-03-17 RX ADMIN — Medication 100 MILLIGRAM(S): at 11:50

## 2025-03-17 RX ADMIN — Medication 1.5 MILLIGRAM(S): at 18:05

## 2025-03-17 RX ADMIN — INSULIN LISPRO 1: 100 INJECTION, SOLUTION INTRAVENOUS; SUBCUTANEOUS at 18:06

## 2025-03-17 NOTE — PHYSICAL THERAPY INITIAL EVALUATION ADULT - LIVES WITH, PROFILE
Pt states he lives in a pvt house with wife, has 5 steps to enter the house with rails and 1 flight to the bedroom on 2nd floor.

## 2025-03-17 NOTE — PROGRESS NOTE ADULT - ASSESSMENT
64 year-old male      h/o  HTN, , GERD, PTSD, COPD, alcohol use disorder   who presented to  er via EMS after being found intoxicated / sustained   a fall resulting in upper lip laceration.      pt  had   drank   one pint of vodka.       CT head ,  subcentimeter acute parenchymal hemorrhage in the subcortical left parietal lobe.  and  Repeat CT unchanged.     Neurosurgery was consulted by ED; recommends no surgical intervention at this point, keep BP <160 and start Keppra.      Fall,  from  etoh   abuse     h/o   alcohol intoxication/   level  207      CT reviewed as above: subcentimeter parietal lobe bleed. amd  2  f/p ct scans, stable       on keppra per  N/S       iv fluids.  thiamine,  CIWA  protocol     prior   h/o  DVT  .  2017,  was  on xarelto,  held  now   HTN         losartan    DM   COPD    fall  risk   on CIWA  protocol      dvt ppx/  pas.   total encounter  time  35  minutes

## 2025-03-17 NOTE — PHYSICAL THERAPY INITIAL EVALUATION ADULT - ADDITIONAL COMMENTS
Pt states prior to this admission he ambulates inside the house with no assistive device, uses a cane intermittently, outdoors he uses a cane.

## 2025-03-17 NOTE — PHYSICAL THERAPY INITIAL EVALUATION ADULT - PERTINENT HX OF CURRENT PROBLEM, REHAB EVAL
Pt is admitted with Dx Intracranial hemorrhage CT 3/16: Stable small L parietal intracranial hematoma, no mass effect, no new hematoma.

## 2025-03-17 NOTE — PATIENT PROFILE ADULT - LIFE CHALLENGES - DETAILS
Patient is requesting to speak with social related to personal life issues- (separation from spouse)

## 2025-03-17 NOTE — PHYSICAL THERAPY INITIAL EVALUATION ADULT - GENERAL OBSERVATIONS, REHAB EVAL
Pt is alert, oriented x 4, coherent, follow simple instructions, on room air, denies pain, no slurring of speech.

## 2025-03-17 NOTE — PATIENT PROFILE ADULT - MONEY FOR FOOD
Patient identification verified with 2 identifiers.    Location: [facility] - [address] [place] [address] [phone]     Referring Physician: DR STAUFFER  Enrollment/ Re-enrollment date: 10/24   INR Goal: 2.0-3.0  INR monitoring is per [facility] protocol.  Anticoagulation Medication: warfarin  Indication: Atrial Fibrillation/Atrial Flutter    Subjective   Bleeding signs/symptoms: No    Bruising: No   Major bleeding event: No  Thrombosis signs/symptoms: No  Thromboembolic event: No  Missed doses: No  Extra doses: No  Medication changes: No  Dietary changes: No  Change in health: No  Change in activity: No  Alcohol: No  Other concerns: No    Upcoming Surgeries:  Does the Patient Have any upcoming surgeries that require interruption in anticoagulation therapy? no  Does the patient require bridging? no      Anticoagulation Summary  As of 3/4/2024      INR goal:  2.0-3.0   TTR:  64.9 % (5.4 mo)   INR used for dosin.70 (3/4/2024)   Weekly warfarin total:  42 mg             Assessment/Plan   Therapeutic     1. New dose: no change    2. Next INR: 2 weeks      Education provided to patient during the visit:  Patient instructed to call in interim with questions, concerns and changes.   Patient educated on interactions between medications and warfarin.   Patient educated on dietary consistency in vitamin k consumption.   Patient educated on affects of alcohol consumption while taking warfarin.   Patient educated on signs of bleeding/clotting.   Patient educated on compliance with dosing, follow up appointments, and prescribed plan of care.         no

## 2025-03-18 LAB
GLUCOSE BLDC GLUCOMTR-MCNC: 113 MG/DL — HIGH (ref 70–99)
GLUCOSE BLDC GLUCOMTR-MCNC: 123 MG/DL — HIGH (ref 70–99)
GLUCOSE BLDC GLUCOMTR-MCNC: 135 MG/DL — HIGH (ref 70–99)
GLUCOSE BLDC GLUCOMTR-MCNC: 156 MG/DL — HIGH (ref 70–99)
RAPID RVP RESULT: SIGNIFICANT CHANGE UP
SARS-COV-2 RNA SPEC QL NAA+PROBE: SIGNIFICANT CHANGE UP

## 2025-03-18 PROCEDURE — 99232 SBSQ HOSP IP/OBS MODERATE 35: CPT

## 2025-03-18 RX ORDER — MELATONIN 5 MG
3 TABLET ORAL AT BEDTIME
Refills: 0 | Status: DISCONTINUED | OUTPATIENT
Start: 2025-03-18 | End: 2025-03-19

## 2025-03-18 RX ADMIN — Medication 1 MILLIGRAM(S): at 12:01

## 2025-03-18 RX ADMIN — Medication 100 MILLIGRAM(S): at 12:02

## 2025-03-18 RX ADMIN — LOSARTAN POTASSIUM 100 MILLIGRAM(S): 100 TABLET, FILM COATED ORAL at 05:36

## 2025-03-18 RX ADMIN — INSULIN LISPRO 1: 100 INJECTION, SOLUTION INTRAVENOUS; SUBCUTANEOUS at 11:58

## 2025-03-18 RX ADMIN — Medication 1 MILLIGRAM(S): at 17:46

## 2025-03-18 RX ADMIN — Medication 3 MILLIGRAM(S): at 22:12

## 2025-03-18 RX ADMIN — FOLIC ACID 1 MILLIGRAM(S): 1 TABLET ORAL at 12:02

## 2025-03-18 RX ADMIN — Medication 1.5 MILLIGRAM(S): at 05:35

## 2025-03-18 RX ADMIN — Medication 1.5 MILLIGRAM(S): at 00:11

## 2025-03-18 RX ADMIN — LEVETIRACETAM 500 MILLIGRAM(S): 10 INJECTION, SOLUTION INTRAVENOUS at 17:48

## 2025-03-18 RX ADMIN — LEVETIRACETAM 500 MILLIGRAM(S): 10 INJECTION, SOLUTION INTRAVENOUS at 05:36

## 2025-03-18 RX ADMIN — Medication 40 MILLIGRAM(S): at 05:36

## 2025-03-18 RX ADMIN — Medication 1 MILLIGRAM(S): at 23:49

## 2025-03-18 RX ADMIN — Medication 1 TABLET(S): at 12:02

## 2025-03-18 NOTE — OCCUPATIONAL THERAPY INITIAL EVALUATION ADULT - PERTINENT HX OF CURRENT PROBLEM, REHAB EVAL
64 year-old male with h/o  HTN, , GERD, PTSD, COPD, alcohol use disorder, who presented to  er via EMS after being found intoxicated / sustained   a fall resulting in upper lip laceration (pt  had   drank   one pint of vodka).      -CT head: "subcentimeter acute parenchymal hemorrhage in the subcortical left parietal lobe."

## 2025-03-18 NOTE — PROGRESS NOTE ADULT - ASSESSMENT
64 year-old male      h/o  HTN, , GERD, PTSD, COPD, alcohol use disorder   who presented to  er via EMS after being found intoxicated / sustained   a fall resulting in upper lip laceration.      pt  had   drank   one pint of vodka.       CT head ,  subcentimeter acute parenchymal hemorrhage in the subcortical left parietal lobe.  and  Repeat CT unchanged.     Neurosurgery was consulted by ED; recommends no surgical intervention at this point, keep BP <160 and start Keppra.      Fall,  from  etoh   abuse     h/o   alcohol intoxication/   level  207      CT reviewed as above: subcentimeter parietal lobe bleed. amd  2  f/p ct scans, stable       on keppra per  N/S       iv fluids.  thiamine,  CIWA  protocol     prior   h/o  DVT  .  2017,  was  on xarelto,  held  now   HTN         losartan    DM   COPD    fall  risk   on CIWA  protocol     doing well, stable  vitals    d/c plans to rehab,  a s per  PT     dvt ppx/  pas.   total encounter  time  35  minutes       64 year-old male      h/o  HTN, , GERD, PTSD, COPD, alcohol use disorder   who presented to  er via EMS after being found intoxicated / sustained   a fall resulting in upper lip laceration.      pt  had   drank   one pint of vodka.       CT head ,  subcentimeter acute parenchymal hemorrhage in the subcortical left parietal lobe.  and  Repeat CT unchanged.     Neurosurgery was consulted by ED; recommends no surgical intervention at this point, keep BP <160 and start Keppra.      Fall,  from  etoh   abuse     h/o   alcohol intoxication/   level  207      CT  reviewed as above: subcentimeter parietal lobe bleed. amd  2  f/p ct scans, stable       on keppra per  N/S       iv fluids.  thiamine,  CIWA  protocol     prior   h/o  DVT  .  2017,  was  on xarelto,  held  now   HTN         losartan    DM   COPD    fall  risk   on CIWA  protocol     doing well, stable  vitals    d/c plans to rehab, as  per  PT     dvt ppx/  pas     discussed  with daughter   total encounter  time  35  minutes

## 2025-03-18 NOTE — OCCUPATIONAL THERAPY INITIAL EVALUATION ADULT - ADDITIONAL COMMENTS
Pt reports he lives with daughter in a private house with 4 plus 5 steps to navigate with rails. Pt was independent with ADLs and mobility using a cane before admission.

## 2025-03-19 ENCOUNTER — TRANSCRIPTION ENCOUNTER (OUTPATIENT)
Age: 65
End: 2025-03-19

## 2025-03-19 VITALS
RESPIRATION RATE: 18 BRPM | DIASTOLIC BLOOD PRESSURE: 81 MMHG | SYSTOLIC BLOOD PRESSURE: 154 MMHG | TEMPERATURE: 98 F | OXYGEN SATURATION: 94 % | HEART RATE: 91 BPM

## 2025-03-19 LAB
GLUCOSE BLDC GLUCOMTR-MCNC: 119 MG/DL — HIGH (ref 70–99)
GLUCOSE BLDC GLUCOMTR-MCNC: 180 MG/DL — HIGH (ref 70–99)

## 2025-03-19 PROCEDURE — 99239 HOSP IP/OBS DSCHRG MGMT >30: CPT

## 2025-03-19 RX ORDER — MELATONIN 5 MG
1 TABLET ORAL
Qty: 0 | Refills: 0 | DISCHARGE
Start: 2025-03-19

## 2025-03-19 RX ORDER — LOSARTAN POTASSIUM 100 MG/1
1 TABLET, FILM COATED ORAL
Qty: 0 | Refills: 0 | DISCHARGE
Start: 2025-03-19

## 2025-03-19 RX ORDER — FOLIC ACID 1 MG/1
1 TABLET ORAL
Qty: 0 | Refills: 0 | DISCHARGE
Start: 2025-03-19

## 2025-03-19 RX ORDER — LEVETIRACETAM 10 MG/ML
1 INJECTION, SOLUTION INTRAVENOUS
Qty: 0 | Refills: 0 | DISCHARGE
Start: 2025-03-19

## 2025-03-19 RX ORDER — B1/B2/B3/B5/B6/B12/VIT C/FOLIC 500-0.5 MG
1 TABLET ORAL
Qty: 0 | Refills: 0 | DISCHARGE
Start: 2025-03-19

## 2025-03-19 RX ADMIN — Medication 100 MILLIGRAM(S): at 14:17

## 2025-03-19 RX ADMIN — Medication 1 MILLIGRAM(S): at 06:36

## 2025-03-19 RX ADMIN — LEVETIRACETAM 500 MILLIGRAM(S): 10 INJECTION, SOLUTION INTRAVENOUS at 05:45

## 2025-03-19 RX ADMIN — Medication 1 TABLET(S): at 14:16

## 2025-03-19 RX ADMIN — Medication 40 MILLIGRAM(S): at 05:45

## 2025-03-19 RX ADMIN — FOLIC ACID 1 MILLIGRAM(S): 1 TABLET ORAL at 14:16

## 2025-03-19 RX ADMIN — LOSARTAN POTASSIUM 100 MILLIGRAM(S): 100 TABLET, FILM COATED ORAL at 05:45

## 2025-03-19 RX ADMIN — INSULIN LISPRO 1: 100 INJECTION, SOLUTION INTRAVENOUS; SUBCUTANEOUS at 11:19

## 2025-03-19 RX ADMIN — LEVETIRACETAM 500 MILLIGRAM(S): 10 INJECTION, SOLUTION INTRAVENOUS at 17:37

## 2025-03-19 NOTE — DISCHARGE NOTE PROVIDER - CARE PROVIDER_API CALL
Max Darnell  Endocrinology/Metab/Diabetes  [address], [address]  [place] [address]  Phone: [phone]  Fax: [phone]  Follow Up Time:

## 2025-03-19 NOTE — PROGRESS NOTE ADULT - REASON FOR ADMISSION
Subcentimeter acute parenchymal hemorrhage of left parietal lobe

## 2025-03-19 NOTE — PROGRESS NOTE ADULT - ASSESSMENT
64 year-old male      h/o  HTN, , GERD, PTSD, COPD, alcohol use disorder   who presented to  er via EMS after being found intoxicated / sustained   a fall resulting in upper lip laceration.      pt  had   drank   one pint of vodka.       CT head ,  subcentimeter acute parenchymal hemorrhage in the subcortical left parietal lobe.  and  Repeat CT unchanged.     Neurosurgery was consulted by ED; recommends no surgical intervention at this point, keep BP <160 and start Keppra.      Fall,  from  etoh   abuse     h/o   alcohol intoxication/   level  207      CT  reviewed as above: subcentimeter parietal lobe bleed. amd  2  f/p ct scans, stable       on keppra per  N/S       iv fluids.  thiamine,  CIWA  protocol     prior   h/o  DVT  .  2017,  was  on xarelto,  held  now   HTN         losartan    DM   COPD    fall  risk   on CIWA  protocol     doing well, stable  vitals    d/c plans to rehab, as  per  PT     dvt ppx/  pas    cleared   for  d/c to rehab      discussed  with daughter   total encounter  time  35  minutes       64 year-old male      h/o  HTN, , GERD, PTSD, COPD, alcohol use disorder   who presented to  er via EMS after being found intoxicated / sustained   a fall resulting in upper lip laceration.      pt  had   drank   one pint of vodka.       CT head ,  subcentimeter acute parenchymal hemorrhage in the subcortical left parietal lobe.  and  Repeat CT unchanged.     Neurosurgery was consulted by ED; recommends no surgical intervention at this point, keep BP <160 and start Keppra.      Fall,  from  etoh   abuse     h/o   alcohol intoxication/   level  207 . fall, resulting in cerebral bkleed     CT   head,  subcentimeter parietal lobe bleed. amd  2  f/p ct scans, stable       on keppra per  N/S       was on  iv fluids.  thiamine,  CIWA  protocol     per  daughter  pt has  a   long h/o  etoh abuse      prior   h/o  DVT  .  2017,  was  on xarelto,  held  now   HTN         losartan    DM   COPD    fall  risk     was  CIWA  protocol     doing well, stable  vitals    d/c plans to rehab, as  per  PT     dvt ppx/  pas    cleared   for  d/c to rehab .  discussed  with care cortn   total encounter  time  35  minutes

## 2025-03-19 NOTE — PROGRESS NOTE ADULT - SUBJECTIVE AND OBJECTIVE BOX
date of service: 03-17-25 @ 10:17  [facility]  REVIEW OF SYSTEMS:  CONSTITUTIONAL: No fever,  no  weight loss  ENT:  No  tinnitus,   no   vertigo  NECK: No pain or stiffness  RESPIRATORY: No cough, wheezing, chills or hemoptysis;    No Shortness of Breath  CARDIOVASCULAR: No chest pain, palpitations, dizziness  GASTROINTESTINAL: No abdominal or epigastric pain. No nausea, vomiting, or hematemesis; No diarrhea  No melena or hematochezia.  GENITOURINARY: No dysuria, frequency, hematuria, or incontinence  NEUROLOGICAL: No headaches  SKIN: No itching,  no   rash  LYMPH Nodes: No enlarged glands  ENDOCRINE: No heat or cold intolerance  MUSCULOSKELETAL: No joint pain or swelling  PSYCHIATRIC: No depression, anxiety  HEME/LYMPH: No easy bruising, or bleeding gums  ALLERGY AND IMMUNOLOGIC: No hives or eczema	    MEDICATIONS  (STANDING):  dextrose 5%. 1000 milliLiter(s) (50 mL/Hr) IV Continuous <Continuous>  dextrose 5%. 1000 milliLiter(s) (100 mL/Hr) IV Continuous <Continuous>  dextrose 50% Injectable 25 Gram(s) IV Push once  dextrose 50% Injectable 12.5 Gram(s) IV Push once  dextrose 50% Injectable 25 Gram(s) IV Push once  folic acid 1 milliGRAM(s) Oral daily  glucagon  Injectable 1 milliGRAM(s) IntraMuscular once  insulin lispro (ADMELOG) corrective regimen sliding scale   SubCutaneous three times a day before meals  levETIRAcetam 500 milliGRAM(s) Oral two times a day  LORazepam   Injectable 1.5 milliGRAM(s) IV Push every 6 hours  LORazepam   Injectable   IV Push   losartan 100 milliGRAM(s) Oral daily  multivitamin 1 Tablet(s) Oral daily  pantoprazole    Tablet 40 milliGRAM(s) Oral before breakfast  thiamine 100 milliGRAM(s) Oral daily    MEDICATIONS  (PRN):  acetaminophen     Tablet .. 650 milliGRAM(s) Oral every 6 hours PRN Temp greater or equal to 38C (100.4F), Mild Pain (1 - 3)  dextrose Oral Gel 15 Gram(s) Oral once PRN Blood Glucose LESS THAN 70 milliGRAM(s)/deciliter  LORazepam   Injectable 2 milliGRAM(s) IV Push every 1 hour PRN CIWA 8-14      Vital Signs Last 24 Hrs  T(C): 36.8 (17 Mar 2025 08:22), Max: 37.1 (16 Mar 2025 19:15)  T(F): 98.2 (17 Mar 2025 08:22), Max: 98.7 (16 Mar 2025 19:15)  HR: 72 (17 Mar 2025 08:22) (72 - 81)  BP: 141/85 (17 Mar 2025 08:22) (141/85 - 160/92)  BP(mean): --  RR: 19 (17 Mar 2025 08:22) (18 - 22)  SpO2: 97% (17 Mar 2025 08:22) (95% - 97%)    Parameters below as of 17 Mar 2025 08:22  Patient On (Oxygen Delivery Method): room air      CAPILLARY BLOOD GLUCOSE      POCT Blood Glucose.: 133 mg/dL (17 Mar 2025 08:29)  POCT Blood Glucose.: 142 mg/dL (16 Mar 2025 16:33)  POCT Blood Glucose.: 161 mg/dL (16 Mar 2025 11:29)    I&O's Summary        Appearance: Normal	  HEENT:   Normal oral mucosa, PERRL, EOMI	  Lymphatic: No lymphadenopathy  Cardiovascular: Normal S1 S2, No JVD  Respiratory: Lungs clear to auscultation	  Gastrointestinal:  Soft, Non-tender, + BS	  Skin: No rash, No ecchymoses	  Extremities:     LABS:                        13.9   7.24  )-----------( 229      ( 17 Mar 2025 06:55 )             40.7     03-17    137  |  103  |  11  ----------------------------<  165[H]  3.6   |  26  |  0.82    Ca    8.7      17 Mar 2025 06:55  Phos  3.2     03-17  Mg     1.8     03-17    TPro  7.4  /  Alb  3.5  /  TBili  1.1  /  DBili  0.3  /  AST  21  /  ALT  25  /  AlkPhos  103  03-16    PT/INR - ( 15 Mar 2025 20:26 )   PT: 11.5 sec;   INR: 0.99 ratio         PTT - ( 15 Mar 2025 20:26 )  PTT:28.6 sec      Urinalysis Basic - ( 17 Mar 2025 06:55 )    Color: x / Appearance: x / SG: x / pH: x  Gluc: 165 mg/dL / Ketone: x  / Bili: x / Urobili: x   Blood: x / Protein: x / Nitrite: x   Leuk Esterase: x / RBC: x / WBC x   Sq Epi: x / Non Sq Epi: x / Bacteria: x                      Consultant(s) Notes Reviewed:      Care Discussed with Consultants/Other Providers:    
  date of service: 03-18-25 @ 10:47  [facility]  REVIEW OF SYSTEMS:  CONSTITUTIONAL: No fever,  no  weight loss  ENT:  No  tinnitus,   no   vertigo  NECK: No pain or stiffness  RESPIRATORY: No cough, wheezing, chills or hemoptysis;    No Shortness of Breath  CARDIOVASCULAR: No chest pain, palpitations, dizziness  GASTROINTESTINAL: No abdominal or epigastric pain. No nausea, vomiting, or hematemesis; No diarrhea  No melena or hematochezia.  GENITOURINARY: No dysuria, frequency, hematuria, or incontinence  NEUROLOGICAL: No headaches  SKIN: No itching,  no   rash  LYMPH Nodes: No enlarged glands  ENDOCRINE: No heat or cold intolerance  MUSCULOSKELETAL: No joint pain or swelling  PSYCHIATRIC: No depression, anxiety  HEME/LYMPH: No easy bruising, or bleeding gums  ALLERGY AND IMMUNOLOGIC: No hives or eczema	    MEDICATIONS  (STANDING):  dextrose 5%. 1000 milliLiter(s) (50 mL/Hr) IV Continuous <Continuous>  dextrose 5%. 1000 milliLiter(s) (100 mL/Hr) IV Continuous <Continuous>  dextrose 50% Injectable 25 Gram(s) IV Push once  dextrose 50% Injectable 12.5 Gram(s) IV Push once  dextrose 50% Injectable 25 Gram(s) IV Push once  folic acid 1 milliGRAM(s) Oral daily  glucagon  Injectable 1 milliGRAM(s) IntraMuscular once  insulin lispro (ADMELOG) corrective regimen sliding scale   SubCutaneous three times a day before meals  levETIRAcetam 500 milliGRAM(s) Oral two times a day  LORazepam   Injectable 1 milliGRAM(s) IV Push every 6 hours  LORazepam   Injectable   IV Push   losartan 100 milliGRAM(s) Oral daily  multivitamin 1 Tablet(s) Oral daily  pantoprazole    Tablet 40 milliGRAM(s) Oral before breakfast  thiamine 100 milliGRAM(s) Oral daily    MEDICATIONS  (PRN):  acetaminophen     Tablet .. 650 milliGRAM(s) Oral every 6 hours PRN Temp greater or equal to 38C (100.4F), Mild Pain (1 - 3)  dextrose Oral Gel 15 Gram(s) Oral once PRN Blood Glucose LESS THAN 70 milliGRAM(s)/deciliter  LORazepam   Injectable 2 milliGRAM(s) IV Push every 1 hour PRN CIWA 8-14      Vital Signs Last 24 Hrs  T(C): 36.8 (18 Mar 2025 04:47), Max: 36.9 (17 Mar 2025 23:43)  T(F): 98.3 (18 Mar 2025 04:47), Max: 98.4 (17 Mar 2025 23:43)  HR: 80 (18 Mar 2025 04:47) (78 - 86)  BP: 122/72 (18 Mar 2025 04:47) (122/72 - 172/89)  BP(mean): --  RR: 18 (18 Mar 2025 04:47) (18 - 18)  SpO2: 94% (18 Mar 2025 04:47) (94% - 98%)    Parameters below as of 18 Mar 2025 04:47  Patient On (Oxygen Delivery Method): room air      CAPILLARY BLOOD GLUCOSE      POCT Blood Glucose.: 113 mg/dL (18 Mar 2025 07:42)  POCT Blood Glucose.: 95 mg/dL (17 Mar 2025 21:11)  POCT Blood Glucose.: 189 mg/dL (17 Mar 2025 17:27)  POCT Blood Glucose.: 108 mg/dL (17 Mar 2025 13:40)    I&O's Summary        Appearance: Normal	  HEENT:   Normal oral mucosa, PERRL, EOMI	  Lymphatic: No lymphadenopathy  Cardiovascular: Normal S1 S2, No JVD  Respiratory: Lungs clear to auscultation	  Gastrointestinal:  Soft, Non-tender, + BS	  Skin: No rash, No ecchymoses	  Extremities:     LABS:                        13.9   7.24  )-----------( 229      ( 17 Mar 2025 06:55 )             40.7     03-17    137  |  103  |  11  ----------------------------<  165[H]  3.6   |  26  |  0.82    Ca    8.7      17 Mar 2025 06:55  Phos  3.2     03-17  Mg     1.8     03-17    TPro  7.4  /  Alb  3.5  /  TBili  1.1  /  DBili  0.3  /  AST  21  /  ALT  25  /  AlkPhos  103  03-16          Urinalysis Basic - ( 17 Mar 2025 06:55 )    Color: x / Appearance: x / SG: x / pH: x  Gluc: 165 mg/dL / Ketone: x  / Bili: x / Urobili: x   Blood: x / Protein: x / Nitrite: x   Leuk Esterase: x / RBC: x / WBC x   Sq Epi: x / Non Sq Epi: x / Bacteria: x                      Consultant(s) Notes Reviewed:      Care Discussed with Consultants/Other Providers:    
  date of service: 03-19-25 @ 11:59  [facility]  REVIEW OF SYSTEMS:  CONSTITUTIONAL: No fever,  no  weight loss  ENT:  No  tinnitus,   no   vertigo  NECK: No pain or stiffness  RESPIRATORY: No cough, wheezing, chills or hemoptysis;    No Shortness of Breath  CARDIOVASCULAR: No chest pain, palpitations, dizziness  GASTROINTESTINAL: No abdominal or epigastric pain. No nausea, vomiting, or hematemesis; No diarrhea  No melena or hematochezia.  GENITOURINARY: No dysuria, frequency, hematuria, or incontinence  NEUROLOGICAL: No headaches  SKIN: No itching,  no   rash  LYMPH Nodes: No enlarged glands  ENDOCRINE: No heat or cold intolerance  MUSCULOSKELETAL: No joint pain or swelling  PSYCHIATRIC: No depression, anxiety  HEME/LYMPH: No easy bruising, or bleeding gums  ALLERGY AND IMMUNOLOGIC: No hives or eczema	    MEDICATIONS  (STANDING):  dextrose 5%. 1000 milliLiter(s) (50 mL/Hr) IV Continuous <Continuous>  dextrose 5%. 1000 milliLiter(s) (100 mL/Hr) IV Continuous <Continuous>  dextrose 50% Injectable 25 Gram(s) IV Push once  dextrose 50% Injectable 12.5 Gram(s) IV Push once  dextrose 50% Injectable 25 Gram(s) IV Push once  folic acid 1 milliGRAM(s) Oral daily  glucagon  Injectable 1 milliGRAM(s) IntraMuscular once  insulin lispro (ADMELOG) corrective regimen sliding scale   SubCutaneous three times a day before meals  levETIRAcetam 500 milliGRAM(s) Oral two times a day  LORazepam   Injectable 0.5 milliGRAM(s) IV Push every 6 hours  LORazepam   Injectable   IV Push   losartan 100 milliGRAM(s) Oral daily  melatonin 3 milliGRAM(s) Oral at bedtime  multivitamin 1 Tablet(s) Oral daily  pantoprazole    Tablet 40 milliGRAM(s) Oral before breakfast  thiamine 100 milliGRAM(s) Oral daily    MEDICATIONS  (PRN):  acetaminophen     Tablet .. 650 milliGRAM(s) Oral every 6 hours PRN Temp greater or equal to 38C (100.4F), Mild Pain (1 - 3)  dextrose Oral Gel 15 Gram(s) Oral once PRN Blood Glucose LESS THAN 70 milliGRAM(s)/deciliter  LORazepam   Injectable 2 milliGRAM(s) IV Push every 1 hour PRN CIWA 8-14      Vital Signs Last 24 Hrs  T(C): 36.6 (19 Mar 2025 10:33), Max: 36.8 (18 Mar 2025 17:02)  T(F): 97.8 (19 Mar 2025 10:33), Max: 98.2 (18 Mar 2025 17:02)  HR: 78 (19 Mar 2025 10:33) (73 - 81)  BP: 161/92 (19 Mar 2025 10:33) (118/90 - 185/97)  BP(mean): --  RR: 18 (19 Mar 2025 10:33) (18 - 19)  SpO2: 95% (19 Mar 2025 10:33) (93% - 97%)    Parameters below as of 19 Mar 2025 10:33  Patient On (Oxygen Delivery Method): room air      CAPILLARY BLOOD GLUCOSE      POCT Blood Glucose.: 180 mg/dL (19 Mar 2025 11:00)  POCT Blood Glucose.: 119 mg/dL (19 Mar 2025 08:07)  POCT Blood Glucose.: 135 mg/dL (18 Mar 2025 20:56)  POCT Blood Glucose.: 123 mg/dL (18 Mar 2025 17:10)    I&O's Summary    19 Mar 2025 07:01  -  19 Mar 2025 11:59  --------------------------------------------------------  IN: 0 mL / OUT: 500 mL / NET: -500 mL          Appearance: Normal	  HEENT:   Normal oral mucosa, PERRL, EOMI	  Lymphatic: No lymphadenopathy  Cardiovascular: Normal S1 S2, No JVD  Respiratory: Lungs clear to auscultation	  Gastrointestinal:  Soft, Non-tender, + BS	  Skin: No rash, No ecchymoses	  Extremities:     LABS:                                  Consultant(s) Notes Reviewed:      Care Discussed with Consultants/Other Providers:

## 2025-03-19 NOTE — DISCHARGE NOTE NURSING/CASE MANAGEMENT/SOCIAL WORK - FINANCIAL ASSISTANCE
[facility] provides services at a reduced cost to those who are determined to be eligible through [facility]’s financial assistance program. Information regarding [facility]’s financial assistance program can be found by going to [email or web address] or by calling [phone].

## 2025-03-19 NOTE — DISCHARGE NOTE PROVIDER - HOSPITAL COURSE
64 year-old male      h/o  HTN, , GERD, PTSD, COPD, alcohol use disorder   who presented to  er via EMS after being found intoxicated / sustained   a fall resulting in upper lip laceration.      pt  had   drank   one pint of vodka.       CT head ,  subcentimeter acute parenchymal hemorrhage in the subcortical left parietal lobe.  and  Repeat CT unchanged.     Neurosurgery was consulted by ED; recommends no surgical intervention at this point, keep BP <160 and start Keppra.      Fall,  from  etoh   abuse     h/o   alcohol intoxication/   level  207 . fall, resulting in cerebral bleed      CT   head,  subcentimeter parietal lobe bleed. amd  2  f/p ct scans, stable       on keppra per  N/S       was on  iv fluids.  thiamine,  CIWA  protocol     per  daughter  pt has  a   long h/o  etoh abuse      prior   h/o  DVT  .  2017,  was  on xarelto,  held  now   HTN         losartan    DM   COPD    fall  risk     was  CIWA  protocol     doing well, stable  vitals    d/c plans to rehab, as  per  PT     dvt ppx/  pas    cl     64 year-old male      h/o  HTN, , GERD, PTSD, COPD, alcohol use disorder   who presented to  er via EMS after being found intoxicated / sustained   a fall resulting in upper lip laceration.      pt  had   drank   one pint of vodka.       CT head ,  subcentimeter acute parenchymal hemorrhage in the subcortical left parietal lobe.  and  Repeat CT unchanged.     Neurosurgery was consulted by ED; recommends no surgical intervention at this point, keep BP <160 and start Keppra.      Fall,  from  etoh   abuse     h/o   alcohol intoxication/   level  207 . fall, resulting in cerebral bleed      CT   head,  subcentimeter parietal lobe bleed. amd  2  f/p ct scans, stable       on keppra per  N/S       was on  iv fluids.  thiamine,  CIWA  protocol     per  daughter  pt has  a   long h/o  etoh abuse      prior   h/o  DVT  .  2017,  was  on xarelto,  held    -repeat ct head in 4 weeks and address restarting xarelto on the results of that test      HTN         losartan    DM  please continue sliding scale low dose insulin in the rehab pt not requiring more than 1 unit at a time last 2 days of hospital stay      COPD    fall  risk     was  CIWA  protocol     doing well, stable  vitals    d/c plans to rehab, as  per  PT     dvt ppx/  pas    cl    case /plan discussed with attending in full prior to discharge

## 2025-03-19 NOTE — DISCHARGE NOTE NURSING/CASE MANAGEMENT/SOCIAL WORK - NSDCVIVACCINE_GEN_ALL_CORE_FT
Tdap; 15-Mar-2025 19:29; Yann Mohan (RN); [employer]; [id number] (Exp. Date: 01-Apr-2026); IntraMuscular; Deltoid Right.; 0.5 milliLiter(s); VIS (VIS Published: 09-May-2013, VIS Presented: 15-Mar-2025);

## 2025-03-19 NOTE — DISCHARGE NOTE NURSING/CASE MANAGEMENT/SOCIAL WORK - PATIENT PORTAL LINK FT
You can access the FollowMyHealth Patient Portal offered by [facility] by registering at the following website: [email or web address]. By joining [employer]’s FollowMyHealth portal, you will also be able to view your health information using other applications (apps) compatible with our system.

## 2025-03-19 NOTE — DISCHARGE NOTE PROVIDER - NSDCMRMEDTOKEN_GEN_ALL_CORE_FT
Advair Diskus 250 mcg-50 mcg inhalation powder: 1 puff(s) inhaled 2 times a day  albuterol 90 mcg/inh inhalation aerosol: 2 puff(s) inhaled every 6 hours  ALPRAZolam 0.5 mg oral tablet: 1 tab(s) orally 2 times a day  buPROPion 150 mg/24 hours (XL) oral tablet, extended release: 1 tab(s) orally once a day  CeleBREX 200 mg oral capsule: 1 cap(s) orally once a day  Colace 100 mg oral capsule: 1 cap(s) orally 2 times a day  DULoxetine 60 mg oral delayed release capsule: 2 cap(s) orally once a day  fluticasone 50 mcg/inh nasal spray: 2 spray(s) in each nostril once a day  losartan 100 mg oral tablet: 1 tab(s) orally once a day  melatonin 3 mg oral tablet: 1 tab(s) orally once a day (at bedtime)  metFORMIN 500 mg oral tablet: 1 orally 2 times a day  omeprazole 40 mg oral delayed release capsule: 1 cap(s) orally once a day  ondansetron 4 mg oral tablet: 1 tab(s) orally 4 times a day as needed for  nausea  oxyCODONE 5 mg oral tablet: 1 tab(s) orally every 6 hours as needed for  moderate pain MDD: 4 tabs  sertraline 100 mg oral tablet: 1 tab(s) orally 2 times a day  Xarelto 15 mg oral tablet: 1 tab(s) orally 2 times a day   zolpidem 5 mg oral tablet: 1 tab(s) orally once a day (at bedtime) As needed Insomnia   Advair Diskus 250 mcg-50 mcg inhalation powder: 1 puff(s) inhaled 2 times a day  albuterol 90 mcg/inh inhalation aerosol: 2 puff(s) inhaled every 6 hours  fluticasone 50 mcg/inh nasal spray: 2 spray(s) in each nostril once a day  folic acid 1 mg oral tablet: 1 tab(s) orally once a day  levETIRAcetam 500 mg oral tablet: 1 tab(s) orally 2 times a day  losartan 100 mg oral tablet: 1 tab(s) orally once a day  melatonin 3 mg oral tablet: 1 tab(s) orally once a day (at bedtime)  Multiple Vitamins oral tablet: 1 tab(s) orally once a day  omeprazole 40 mg oral delayed release capsule: 1 cap(s) orally once a day  pantoprazole 40 mg oral delayed release tablet: 1 tab(s) orally once a day (before a meal)  thiamine 100 mg oral tablet: 1 tab(s) orally once a day

## 2025-03-27 DIAGNOSIS — G47.00 INSOMNIA, UNSPECIFIED: ICD-10-CM

## 2025-03-27 DIAGNOSIS — S01.511A LACERATION WITHOUT FOREIGN BODY OF LIP, INITIAL ENCOUNTER: ICD-10-CM

## 2025-03-27 DIAGNOSIS — E78.5 HYPERLIPIDEMIA, UNSPECIFIED: ICD-10-CM
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
## 2025-03-27 DIAGNOSIS — F10.129 ALCOHOL ABUSE WITH INTOXICATION, UNSPECIFIED: ICD-10-CM

## 2025-03-27 DIAGNOSIS — S01.111A LACERATION WITHOUT FOREIGN BODY OF RIGHT EYELID AND PERIOCULAR AREA, INITIAL ENCOUNTER: ICD-10-CM

## 2025-03-27 DIAGNOSIS — J44.9 CHRONIC OBSTRUCTIVE PULMONARY DISEASE, UNSPECIFIED: ICD-10-CM

## 2025-03-27 DIAGNOSIS — Z79.899 OTHER LONG TERM (CURRENT) DRUG THERAPY: ICD-10-CM

## 2025-03-27 DIAGNOSIS — F32.A DEPRESSION, UNSPECIFIED: ICD-10-CM
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
## 2025-03-27 DIAGNOSIS — E11.9 TYPE 2 DIABETES MELLITUS WITHOUT COMPLICATIONS: ICD-10-CM

## 2025-03-27 DIAGNOSIS — Z79.84 LONG TERM (CURRENT) USE OF ORAL HYPOGLYCEMIC DRUGS: ICD-10-CM
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
## 2025-03-27 DIAGNOSIS — Z86.718 PERSONAL HISTORY OF OTHER VENOUS THROMBOSIS AND EMBOLISM: ICD-10-CM

## 2025-03-27 DIAGNOSIS — Z79.51 LONG TERM (CURRENT) USE OF INHALED STEROIDS: ICD-10-CM

## 2025-03-27 DIAGNOSIS — K21.9 GASTRO-ESOPHAGEAL REFLUX DISEASE WITHOUT ESOPHAGITIS: ICD-10-CM

## 2025-03-27 DIAGNOSIS — I10 ESSENTIAL (PRIMARY) HYPERTENSION: ICD-10-CM

## 2025-03-27 DIAGNOSIS — Z79.01 LONG TERM (CURRENT) USE OF ANTICOAGULANTS: ICD-10-CM

## 2025-03-27 DIAGNOSIS — E87.6 HYPOKALEMIA: ICD-10-CM

## 2025-04-07 NOTE — PHYSICAL THERAPY INITIAL EVALUATION ADULT - REHAB POTENTIAL, PT EVAL
Labs are stable without significant change from last test.    OK to follow up as scheduled to review results in detail.    Yordan Juarez MD       good, to achieve stated therapy goals

## 2025-05-07 NOTE — PHYSICAL THERAPY INITIAL EVALUATION ADULT - ASSISTIVE DEVICE FOR TRANSFER: STAND/SIT, REHAB EVAL
DATE OF VISIT: 5/7/2025    REASON FOR VISIT: Followup for right lung adenocarcinoma     PROBLEM LIST:  1. 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  CT chest done on June 2019 revealed 3 cm right upper lobe lung nodule  B.  Bronchoscopy with biopsy done by Dr. Patel June 13, 2019 was negative  C.  Status post surgical resection with lobectomy and lymph node sampling done by Dr. Barreto August 30, 2019  D.  Final pathology revealed moderate differentiated adenocarcinoma 3.5 cm in size clear surgical margins, no lymphovascular invasion, no pleural invasion, and negative lymph nodes.  2.  COPD  3.  Hypertension  4.  Enlarging left upper lobe lung nodule:  A.  Status post CyberKnife with Dr. Lees completed September 7, 2023    HISTORY OF PRESENT ILLNESS: The patient is a very pleasant 70 y.o. female  with past medical history significant for right upper lobe adenocarcinoma diagnosed August 30, 2019.  Patient was treated with surgical resection.  She did not require adjuvant treatment. The patient is here today for scheduled follow-up visit.    SUBJECTIVE: Lizbeth is here today by herself.  She is on chronic oxygen replacement now intermittently.  She is anxious about the scan result.      Past History:  Medical History: has a past medical history of Acid reflux, Arthritis, Body piercing, Cataract, bilateral, COPD (chronic obstructive pulmonary disease), Disease of thyroid gland, Full dentures, History of radiation therapy (09/27/2023), Hyperlipidemia, Hypertension, Leg cramps, Lung cancer, Lung mass, On home oxygen therapy, SOB (shortness of breath), and Wears glasses.   Surgical History: has a past surgical history that includes Tubal ligation; Colonoscopy (N/A, 02/23/2017); Colonoscopy (N/A, 06/04/2018); Esophagogastroduodenoscopy (06/13/2019); Bronchoscopy (N/A, 06/13/2019); Colonoscopy (N/A, 06/19/2019); Multiple tooth extractions; ORIF tibia & fibula fractures (Left); Thoracoscopy (Right,  "08/30/2019); Sigmoidoscopy (N/A, 01/09/2020); Esophagogastroduodenoscopy (N/A, 01/09/2020); Other surgical history; Lung cancer surgery (Right); Esophagogastroduodenoscopy (N/A, 01/23/2024); and Colonoscopy (N/A, 02/02/2024).   Family History: family history includes Cancer in her mother; Cirrhosis in her brother; Colon cancer in her brother; Liver disease in her brother; Lung cancer in her mother; No Known Problems in her father.   Social History: reports that she quit smoking about 5 years ago. Her smoking use included cigarettes. She started smoking about 55 years ago. She has a 50 pack-year smoking history. She has been exposed to tobacco smoke. She has never used smokeless tobacco. She reports current alcohol use of about 2.0 standard drinks of alcohol per week. She reports that she does not use drugs.    (Not in a hospital admission)     Allergies: Patient has no known allergies.     Review of Systems   Constitutional:  Positive for fatigue.   Respiratory: Negative.     Cardiovascular: Negative.    Psychiatric/Behavioral:  The patient is nervous/anxious.        PHYSICAL EXAMINATION:   /69   Pulse 62   Temp 96.7 °F (35.9 °C) (Infrared)   Ht 152.4 cm (60\")   Wt 89.6 kg (197 lb 8 oz)   LMP 03/16/2004   SpO2 92%   BMI 38.57 kg/m²    Pain Score    05/07/25 1411   PainSc: 0-No pain          ECOG score: 0            ECOG Performance Status: 0 - Asymptomatic      General Appearance:      alert, cooperative, no apparent distress and appears stated age   Lungs:   Clear to auscultation bilaterally; respirations regular, even, and unlabored bilaterally   Heart:  Regular rate and rhythm, no murmurs appreciated   Abdomen:   Soft, non-tender, and non-distended                 No visits with results within 2 Week(s) from this visit.   Latest known visit with results is:   Admission on 04/18/2025, Discharged on 04/19/2025   Component Date Value Ref Range Status    QT Interval 04/18/2025 388  ms Preliminary    QTC " Interval 04/18/2025 421  ms Preliminary    QT Interval 04/18/2025 426  ms Preliminary    QTC Interval 04/18/2025 421  ms Preliminary    Glucose 04/18/2025 134 (H)  65 - 99 mg/dL Final    BUN 04/18/2025 32 (H)  8 - 23 mg/dL Final    Creatinine 04/18/2025 1.15 (H)  0.57 - 1.00 mg/dL Final    Sodium 04/18/2025 142  136 - 145 mmol/L Final    Potassium 04/18/2025 4.5  3.5 - 5.2 mmol/L Final    Chloride 04/18/2025 101  98 - 107 mmol/L Final    CO2 04/18/2025 32.2 (H)  22.0 - 29.0 mmol/L Final    Calcium 04/18/2025 10.3  8.6 - 10.5 mg/dL Final    Total Protein 04/18/2025 8.0  6.0 - 8.5 g/dL Final    Albumin 04/18/2025 4.3  3.5 - 5.2 g/dL Final    ALT (SGPT) 04/18/2025 12  1 - 33 U/L Final    AST (SGOT) 04/18/2025 21  1 - 32 U/L Final    Alkaline Phosphatase 04/18/2025 177 (H)  39 - 117 U/L Final    Total Bilirubin 04/18/2025 0.2  0.0 - 1.2 mg/dL Final    Globulin 04/18/2025 3.7  gm/dL Final    A/G Ratio 04/18/2025 1.2  g/dL Final    BUN/Creatinine Ratio 04/18/2025 27.8 (H)  7.0 - 25.0 Final    Anion Gap 04/18/2025 8.8  5.0 - 15.0 mmol/L Final    eGFR 04/18/2025 51.0 (L)  >60.0 mL/min/1.73 Final    proBNP 04/18/2025 216.0  0.0 - 900.0 pg/mL Final    HS Troponin T 04/18/2025 18 (H)  <14 ng/L Final    Extra Tube 04/18/2025 Hold for add-ons.   Final    Auto resulted.    Extra Tube 04/18/2025 hold for add-on   Final    Auto resulted    Extra Tube 04/18/2025 Hold for add-ons.   Final    Auto resulted.    Extra Tube 04/18/2025 Hold for add-ons.   Final    Auto resulted.    Extra Tube 04/18/2025 Hold for add-ons.   Final    Auto resulted    WBC 04/18/2025 6.55  3.40 - 10.80 10*3/mm3 Final    RBC 04/18/2025 3.81  3.77 - 5.28 10*6/mm3 Final    Hemoglobin 04/18/2025 10.3 (L)  12.0 - 15.9 g/dL Final    Hematocrit 04/18/2025 33.1 (L)  34.0 - 46.6 % Final    MCV 04/18/2025 86.9  79.0 - 97.0 fL Final    MCH 04/18/2025 27.0  26.6 - 33.0 pg Final    MCHC 04/18/2025 31.1 (L)  31.5 - 35.7 g/dL Final    RDW 04/18/2025 15.6 (H)  12.3 - 15.4 %  Final    RDW-SD 04/18/2025 50.4  37.0 - 54.0 fl Final    MPV 04/18/2025 9.0  6.0 - 12.0 fL Final    Platelets 04/18/2025 268  140 - 450 10*3/mm3 Final    Neutrophil % 04/18/2025 63.8  42.7 - 76.0 % Final    Lymphocyte % 04/18/2025 21.5  19.6 - 45.3 % Final    Monocyte % 04/18/2025 9.2  5.0 - 12.0 % Final    Eosinophil % 04/18/2025 5.0  0.3 - 6.2 % Final    Basophil % 04/18/2025 0.3  0.0 - 1.5 % Final    Immature Grans % 04/18/2025 0.2  0.0 - 0.5 % Final    Neutrophils, Absolute 04/18/2025 4.18  1.70 - 7.00 10*3/mm3 Final    Lymphocytes, Absolute 04/18/2025 1.41  0.70 - 3.10 10*3/mm3 Final    Monocytes, Absolute 04/18/2025 0.60  0.10 - 0.90 10*3/mm3 Final    Eosinophils, Absolute 04/18/2025 0.33  0.00 - 0.40 10*3/mm3 Final    Basophils, Absolute 04/18/2025 0.02  0.00 - 0.20 10*3/mm3 Final    Immature Grans, Absolute 04/18/2025 0.01  0.00 - 0.05 10*3/mm3 Final    ADENOVIRUS, PCR 04/18/2025 Not Detected  Not Detected Final    Coronavirus 229E 04/18/2025 Not Detected  Not Detected Final    Coronavirus HKU1 04/18/2025 Not Detected  Not Detected Final    Coronavirus NL63 04/18/2025 Not Detected  Not Detected Final    Coronavirus OC43 04/18/2025 Not Detected  Not Detected Final    COVID19 04/18/2025 Not Detected  Not Detected - Ref. Range Final    Human Metapneumovirus 04/18/2025 Not Detected  Not Detected Final    Human Rhinovirus/Enterovirus 04/18/2025 Not Detected  Not Detected Final    Influenza A PCR 04/18/2025 Not Detected  Not Detected Final    Influenza B PCR 04/18/2025 Not Detected  Not Detected Final    Parainfluenza Virus 1 04/18/2025 Not Detected  Not Detected Final    Parainfluenza Virus 2 04/18/2025 Not Detected  Not Detected Final    Parainfluenza Virus 3 04/18/2025 Not Detected  Not Detected Final    Parainfluenza Virus 4 04/18/2025 Not Detected  Not Detected Final    RSV, PCR 04/18/2025 Not Detected  Not Detected Final    Bordetella pertussis pcr 04/18/2025 Not Detected  Not Detected Final    Bordetella  parapertussis PCR 04/18/2025 Not Detected  Not Detected Final    Chlamydophila pneumoniae PCR 04/18/2025 Not Detected  Not Detected Final    Mycoplasma pneumo by PCR 04/18/2025 Not Detected  Not Detected Final    Magnesium 04/18/2025 1.9  1.6 - 2.4 mg/dL Final    HS Troponin T 04/18/2025 15 (H)  <14 ng/L Final    Troponin T Numeric Delta 04/18/2025 -3  ng/L Final    Troponin T % Delta 04/18/2025 -17  Abnormal if >/= 20% Final    WBC 04/19/2025 7.89  3.40 - 10.80 10*3/mm3 Final    RBC 04/19/2025 3.54 (L)  3.77 - 5.28 10*6/mm3 Final    Hemoglobin 04/19/2025 9.6 (L)  12.0 - 15.9 g/dL Final    Hematocrit 04/19/2025 30.0 (L)  34.0 - 46.6 % Final    MCV 04/19/2025 84.7  79.0 - 97.0 fL Final    MCH 04/19/2025 27.1  26.6 - 33.0 pg Final    MCHC 04/19/2025 32.0  31.5 - 35.7 g/dL Final    RDW 04/19/2025 15.6 (H)  12.3 - 15.4 % Final    RDW-SD 04/19/2025 48.8  37.0 - 54.0 fl Final    MPV 04/19/2025 9.2  6.0 - 12.0 fL Final    Platelets 04/19/2025 267  140 - 450 10*3/mm3 Final    Neutrophil % 04/19/2025 84.7 (H)  42.7 - 76.0 % Final    Lymphocyte % 04/19/2025 11.9 (L)  19.6 - 45.3 % Final    Monocyte % 04/19/2025 3.0 (L)  5.0 - 12.0 % Final    Eosinophil % 04/19/2025 0.0 (L)  0.3 - 6.2 % Final    Basophil % 04/19/2025 0.0  0.0 - 1.5 % Final    Immature Grans % 04/19/2025 0.4  0.0 - 0.5 % Final    Neutrophils, Absolute 04/19/2025 6.68  1.70 - 7.00 10*3/mm3 Final    Lymphocytes, Absolute 04/19/2025 0.94  0.70 - 3.10 10*3/mm3 Final    Monocytes, Absolute 04/19/2025 0.24  0.10 - 0.90 10*3/mm3 Final    Eosinophils, Absolute 04/19/2025 0.00  0.00 - 0.40 10*3/mm3 Final    Basophils, Absolute 04/19/2025 0.00  0.00 - 0.20 10*3/mm3 Final    Immature Grans, Absolute 04/19/2025 0.03  0.00 - 0.05 10*3/mm3 Final    Glucose 04/19/2025 145 (H)  65 - 99 mg/dL Final    BUN 04/19/2025 33 (H)  8 - 23 mg/dL Final    Creatinine 04/19/2025 1.11 (H)  0.57 - 1.00 mg/dL Final    Sodium 04/19/2025 142  136 - 145 mmol/L Final    Potassium 04/19/2025 4.1   3.5 - 5.2 mmol/L Final    Chloride 04/19/2025 102  98 - 107 mmol/L Final    CO2 04/19/2025 28.0  22.0 - 29.0 mmol/L Final    Calcium 04/19/2025 10.0  8.6 - 10.5 mg/dL Final    Total Protein 04/19/2025 7.5  6.0 - 8.5 g/dL Final    Albumin 04/19/2025 4.0  3.5 - 5.2 g/dL Final    ALT (SGPT) 04/19/2025 10  1 - 33 U/L Final    AST (SGOT) 04/19/2025 16  1 - 32 U/L Final    Alkaline Phosphatase 04/19/2025 158 (H)  39 - 117 U/L Final    Total Bilirubin 04/19/2025 0.2  0.0 - 1.2 mg/dL Final    Globulin 04/19/2025 3.5  gm/dL Final    A/G Ratio 04/19/2025 1.1  g/dL Final    BUN/Creatinine Ratio 04/19/2025 29.7 (H)  7.0 - 25.0 Final    Anion Gap 04/19/2025 12.0  5.0 - 15.0 mmol/L Final    eGFR 04/19/2025 53.3 (L)  >60.0 mL/min/1.73 Final        CT Chest With Contrast Diagnostic  Result Date: 4/21/2025  Narrative: PROCEDURE: CT CHEST W CONTRAST DIAGNOSTIC-  HISTORY: Follow up lung cancer; C34.91-Malignant neoplasm of unspecified part of right bronchus or lung; C34.12-Malignant neoplasm of upper lobe, left bronchus or lung  COMPARISON: December 2024.  PROCEDURE: Multiple axial CT images were obtained from the thoracic inlet through the upper abdomen following the administration of intravenous contrast.  FINDINGS: Soft tissue windows demonstrate no suspicious adenopathy within the chest. The heart is normal in size. Mild dilatation of the left ventricle is similar to the prior exam. The aorta is normal in caliber. There is no pericardial or pleural effusion. Pleural or parenchymal disease is seen laterally in the left upper lobe, stable from the prior exam, but improved since July 2024. There is stable postsurgical change in the left upper lung. There is a 3 mm nodule on series 3 image 25, stable. A groundglass opacity in the posterior left upper lobe on series 2 image #24 has mildly increased in size now measuring 9 mm. A 6-month follow-up exam is recommended. There is mild emphysematous change. The visualized upper abdomen shows  fatty infiltration of the liver and bilateral renal cysts. There is a 2 cm stable left adrenal nodule. Bone windows reveal no, bony destructive lesions.      Impression: Lateral left upper lobe pleural/parenchymal disease is stable. A 6-month follow-up chest CT is recommended to document continued stability.  Mildly increased groundglass opacity in the left upper lobe. This could also be evaluated on the 6-month follow-up exam.   This study was performed with techniques to keep radiation doses as low as reasonably achievable (ALARA). Individualized dose reduction techniques using automated exposure control or adjustment of mA and/or kV according to the patient size were employed.    CTDI: 5.08 mGy DLP: 189.96 mGy.cm      Images were reviewed, interpreted, and dictated by Dr. Candelaria Madrigal MD Transcribed by Sharlene Amos PA-C.  This report was signed and finalized on 4/21/2025 12:10 PM by Candelaria Madrigal MD.      XR Chest 1 View  Result Date: 4/18/2025  Narrative: XR CHEST 1 VW Date of Exam: 4/18/2025 5:56 AM EDT Indication: SOA triage protocol Comparison: 12/13/2024. Findings: Stable elevation of the right hemidiaphragm. There are no airspace consolidations. No pleural fluid. No pneumothorax. The pulmonary vasculature appears within normal limits. The cardiac and mediastinal silhouette appear unremarkable. No acute osseous abnormality identified.     Impression: Impression: No acute cardiopulmonary process. Electronically Signed: Clarisse Monsalve MD  4/18/2025 6:09 AM EDT  Workstation ID: [id number]        ASSESSMENT: The patient is a very pleasant 70 y.o. female  with right upper lobe lung adenocarcinoma      PLAN:    1.  Right upper lobe lung adenocarcinoma stage Ib:  A.  I did go over the scan result with the patient from April 21, 2025.  No evidence of recurrent malignancy.  B.  Continue surveillance.  She will follow-up with us 6 months at this point.    2.  Left upper lobe lung nodules:  A.  Status post CyberKnife  radiotherapy with Dr. Lees done September 7, 2023.  B.  Discussed with the patient CT scan is most consistent with postradiation change.    3.  Hypertension:  A.  She will continue Norvasc daily and Coreg twice a day.    FOLLOW UP: 6 months with CT scan.    Vinnie José MD  5/7/2025   rolling walker

## 2025-06-01 NOTE — H&P ADULT - PROBLEM SELECTOR PROBLEM 6
B/L LE US 5/13/25 at [facility] with occlusive thrombus within the posterior tibial vein as well as nearly occlusive thrombus within the peroneal vein. Positive LLE DVT study. IVC filter in place    Plan:  - holding AC and VTE ppx pending additional workup and possible intervention   - DVT US with extensive DVTs, discussed with vascular surgery who would not recommend thrombectomy w/o ability to anticoagulate  - will discuss with [facility] team ability to anticoagulate based on CT head imaging     Diabetes

## 2025-06-03 ENCOUNTER — NON-APPOINTMENT (OUTPATIENT)
Age: 65
End: 2025-06-03

## 2025-06-04 ENCOUNTER — APPOINTMENT (OUTPATIENT)
Dept: ORTHOPEDIC SURGERY | Facility: CLINIC | Age: 65
End: 2025-06-04

## 2025-08-06 ENCOUNTER — APPOINTMENT (OUTPATIENT)
Dept: ORTHOPEDIC SURGERY | Facility: CLINIC | Age: 65
End: 2025-08-06
Payer: MEDICARE

## 2025-08-06 VITALS — BODY MASS INDEX: 30.1 KG/M2 | HEIGHT: 71 IN | WEIGHT: 215 LBS

## 2025-08-06 DIAGNOSIS — Z96.652 PAIN DUE TO INTERNAL ORTHOPEDIC PROSTHETIC DEVICES, IMPLANTS AND GRAFTS, INITIAL ENCOUNTER: ICD-10-CM

## 2025-08-06 DIAGNOSIS — T84.84XA PAIN DUE TO INTERNAL ORTHOPEDIC PROSTHETIC DEVICES, IMPLANTS AND GRAFTS, INITIAL ENCOUNTER: ICD-10-CM

## 2025-08-06 DIAGNOSIS — Z96.652 PRESENCE OF LEFT ARTIFICIAL KNEE JOINT: ICD-10-CM

## 2025-08-06 PROCEDURE — 73562 X-RAY EXAM OF KNEE 3: CPT | Mod: LT

## 2025-08-06 PROCEDURE — 99213 OFFICE O/P EST LOW 20 MIN: CPT

## 2025-08-06 RX ORDER — IBUPROFEN 800 MG/1
800 TABLET, FILM COATED ORAL
Qty: 120 | Refills: 0 | Status: ACTIVE | COMMUNITY
Start: 2025-08-06 | End: 1900-01-01

## (undated) DEVICE — MIXER BONE CEMENT EVAC III

## (undated) DEVICE — SAW BLADE LINVATEC OSCILLATING 9.5X25X0.4MM

## (undated) DEVICE — TOURNIQUET ESMARK 6"

## (undated) DEVICE — FRA-ESU BOVIE FORCE TRIAD T7J19717DX: Type: DURABLE MEDICAL EQUIPMENT

## (undated) DEVICE — SAW BLADE STRYKER SAGITTAL DUAL CUT 75X18X1.27MM

## (undated) DEVICE — SAW SAGITTAL 2108 SERIES 20.5X1.27X85MM

## (undated) DEVICE — SAW BLADE STRYKER SAGITTAL DUAL CUT WITH FAN

## (undated) DEVICE — DRAIN JACKSON PRATT 3 SPRING RESERVOIR W 10FR PVC DRAIN

## (undated) DEVICE — DRSG AQUACEL 3.5 X 14"

## (undated) DEVICE — DRSG WEBRIL 6"

## (undated) DEVICE — BLADE OSTEOTOME FLEX 6MMX1.5"

## (undated) DEVICE — SYR LUER LOK 50CC

## (undated) DEVICE — SUT STRATAFIX SYMMETRIC PDS PLUS 1 18" CTX VIOLET

## (undated) DEVICE — GLV 9 PROTEXIS ORTHO (BROWN)

## (undated) DEVICE — DRAPE STERI-DRAPE INCISE 32X33"

## (undated) DEVICE — MEDICATION LABELS W MARKER

## (undated) DEVICE — CRYO/CUFF GRAVITY COOLER KNEE LARGE

## (undated) DEVICE — DRAPE STICKY U BLUE 60 X 84"

## (undated) DEVICE — SYR LUER LOK 10CC

## (undated) DEVICE — DRSG PREVENA PLUS SYSTEM

## (undated) DEVICE — FRA-TOURNIQUET 402010120017: Type: DURABLE MEDICAL EQUIPMENT

## (undated) DEVICE — DRSG DERMABOND PRINEO 22CM

## (undated) DEVICE — BRACE KNEE IMMOBILIZER SPLINT SUPER MEDIUM 24"

## (undated) DEVICE — MIDAS REX MR8 METAL CUTTER DIAMOND WHEEL 25.4MM X 9CM

## (undated) DEVICE — NDL HYPO SAFE 22G X 1.5" (BLACK)

## (undated) DEVICE — HOOD FLYTE STRYKER HELMET SHIELD

## (undated) DEVICE — BLADE SURGICAL #15 CARBON

## (undated) DEVICE — DRAPE SHOWER CURTAIN ISOLATION

## (undated) DEVICE — ZIMMER BLADE PATELLA REAMER 35MM

## (undated) DEVICE — DRAPE TOWEL BLUE 17" X 24"

## (undated) DEVICE — ORTHOALIGN PLUS UNIT

## (undated) DEVICE — PACK TOTAL JOINT

## (undated) DEVICE — FRA-TOURNIQUET 402010060005: Type: DURABLE MEDICAL EQUIPMENT

## (undated) DEVICE — DRAPE 3/4 SHEET W REINFORCEMENT 56X77"

## (undated) DEVICE — BLADE OSTEOTOME FLEX 10MMX1.5"

## (undated) DEVICE — HOOD T5 PEELAWAY

## (undated) DEVICE — SUCTION TIP KAMVAC MINI

## (undated) DEVICE — FRA-ESU BOVIE FORCE TRIAD T6D04777DX: Type: DURABLE MEDICAL EQUIPMENT

## (undated) DEVICE — PREP CHLORAPREP HI-LITE ORANGE 26ML

## (undated) DEVICE — SUT PDS II 2-0 27" CT-1

## (undated) DEVICE — VENODYNE/SCD SLEEVE CALF MEDIUM

## (undated) DEVICE — GLV 9 PROTEXIS (WHITE)

## (undated) DEVICE — ZIMMER PULSAVAC PLUS FAN KIT

## (undated) DEVICE — SAW BLADE LINVATEC HALL 13X90X1.27MM

## (undated) DEVICE — SAW BLADE STRYKER SAGITTAL DUAL CUT 25X90X1.27MM

## (undated) DEVICE — SOL IRR BAG NS 0.9% 3000ML

## (undated) DEVICE — DRAPE EXTREMITY 87" X 128.5"

## (undated) DEVICE — DRAPE STERI-DRAPE INCISE 19X17"

## (undated) DEVICE — MARKING PEN W RULER

## (undated) DEVICE — GLV 8.5 PROTEXIS (WHITE)

## (undated) DEVICE — SUT STRATAFIX SPIRAL MONOCRYL PLUS 4-0 45CM PS-2 UNDYED

## (undated) DEVICE — WARMING BLANKET UPPER ADULT

## (undated) DEVICE — DRSG TELFA 3 X 8

## (undated) DEVICE — ZIMMER MIXING BOWL WITH SPATULA

## (undated) DEVICE — SUT VICRYL 0 18" CT-1 UNDYED (POP-OFF)

## (undated) DEVICE — PACK TOTAL KNEE

## (undated) DEVICE — PACK BASIC

## (undated) DEVICE — ZIMMER BLADE PATELLA REAMER W PILOT HOLE SZ 32

## (undated) DEVICE — NDL HYPO SAFE 18G X 1.5" (PINK)

## (undated) DEVICE — SUT STRATAFIX SPIRAL PDS PLUS 2-0 45CM CT-1

## (undated) DEVICE — SUT VICRYL 2-0 27" CT-2 UNDYED

## (undated) DEVICE — DRSG ACE BANDAGE 6"

## (undated) DEVICE — DRAPE INSTRUMENT POUCH 6.75" X 11"

## (undated) DEVICE — SUT VICRYL 0 27" CT-1 UNDYED

## (undated) DEVICE — DRSG AQUACEL 3.5 X 10"

## (undated) DEVICE — BLADE SURGICAL #20 CARBON

## (undated) DEVICE — SYR LUER LOK 20CC

## (undated) DEVICE — SUT ETHIBOND 5 30" V-40

## (undated) DEVICE — PREP SCRUB BRUSH W CHG 4%

## (undated) DEVICE — STAPLER SKIN MULTI DIRECTION W35

## (undated) DEVICE — SUCTION YANKAUER NO CONTROL VENT

## (undated) DEVICE — DRSG AQUACEL 3.5 X 12"

## (undated) DEVICE — DRAPE SURGICAL #1010

## (undated) DEVICE — SUT PDS II 1 36" CTX

## (undated) DEVICE — SAW BLADE STRYKER SAGITTAL 25X0.89X75MM

## (undated) DEVICE — SAW BLADE STRYKER SAGITTAL DUAL CUT 18X90X1.19MM